# Patient Record
Sex: MALE | Race: WHITE | NOT HISPANIC OR LATINO | Employment: FULL TIME | URBAN - METROPOLITAN AREA
[De-identification: names, ages, dates, MRNs, and addresses within clinical notes are randomized per-mention and may not be internally consistent; named-entity substitution may affect disease eponyms.]

---

## 2017-08-04 ENCOUNTER — TRANSFERRED RECORDS (OUTPATIENT)
Dept: HEALTH INFORMATION MANAGEMENT | Facility: CLINIC | Age: 57
End: 2017-08-04

## 2017-08-11 ENCOUNTER — TRANSFERRED RECORDS (OUTPATIENT)
Dept: HEALTH INFORMATION MANAGEMENT | Facility: CLINIC | Age: 57
End: 2017-08-11

## 2017-11-30 ENCOUNTER — TRANSFERRED RECORDS (OUTPATIENT)
Dept: HEALTH INFORMATION MANAGEMENT | Facility: CLINIC | Age: 57
End: 2017-11-30

## 2017-12-29 ENCOUNTER — TRANSFERRED RECORDS (OUTPATIENT)
Dept: HEALTH INFORMATION MANAGEMENT | Facility: CLINIC | Age: 57
End: 2017-12-29

## 2018-01-19 ENCOUNTER — TRANSFERRED RECORDS (OUTPATIENT)
Dept: HEALTH INFORMATION MANAGEMENT | Facility: CLINIC | Age: 58
End: 2018-01-19

## 2018-01-25 ENCOUNTER — MEDICAL CORRESPONDENCE (OUTPATIENT)
Dept: HEALTH INFORMATION MANAGEMENT | Facility: CLINIC | Age: 58
End: 2018-01-25

## 2018-01-26 ENCOUNTER — TRANSFERRED RECORDS (OUTPATIENT)
Dept: HEALTH INFORMATION MANAGEMENT | Facility: CLINIC | Age: 58
End: 2018-01-26

## 2018-03-20 ENCOUNTER — PRE VISIT (OUTPATIENT)
Dept: CARDIOLOGY | Facility: CLINIC | Age: 58
End: 2018-03-20

## 2018-03-20 DIAGNOSIS — D86.85 SARCOID, CARDIAC: Primary | ICD-10-CM

## 2018-03-20 PROBLEM — R00.2 PALPITATIONS: Status: ACTIVE | Noted: 2017-11-30

## 2018-03-20 PROBLEM — D86.9 SARCOIDOSIS: Status: ACTIVE | Noted: 2018-03-20

## 2018-03-20 PROBLEM — R00.2 PALPITATIONS: Status: ACTIVE | Noted: 2018-03-20

## 2018-03-20 PROBLEM — I44.0 FIRST DEGREE AV BLOCK: Status: ACTIVE | Noted: 2018-03-20

## 2018-03-20 PROBLEM — I47.10 PAROXYSMAL SUPRAVENTRICULAR TACHYCARDIA (H): Status: ACTIVE | Noted: 2018-03-20

## 2018-03-20 PROBLEM — I47.10 PAROXYSMAL SUPRAVENTRICULAR TACHYCARDIA (H): Status: ACTIVE | Noted: 2017-11-30

## 2018-03-20 RX ORDER — METOPROLOL SUCCINATE 25 MG/1
50 TABLET, EXTENDED RELEASE ORAL DAILY
COMMUNITY
End: 2018-09-20

## 2018-03-20 RX ORDER — PREDNISOLONE ACETATE 10 MG/ML
1 SUSPENSION/ DROPS OPHTHALMIC
COMMUNITY
End: 2024-07-09

## 2018-03-20 RX ORDER — IBUPROFEN 200 MG
400 TABLET ORAL EVERY 4 HOURS PRN
COMMUNITY
End: 2019-09-26

## 2018-03-22 ENCOUNTER — OFFICE VISIT (OUTPATIENT)
Dept: CARDIOLOGY | Facility: CLINIC | Age: 58
End: 2018-03-22
Attending: INTERNAL MEDICINE
Payer: COMMERCIAL

## 2018-03-22 VITALS
SYSTOLIC BLOOD PRESSURE: 112 MMHG | HEIGHT: 73 IN | BODY MASS INDEX: 24.4 KG/M2 | WEIGHT: 184.1 LBS | DIASTOLIC BLOOD PRESSURE: 75 MMHG | HEART RATE: 71 BPM | OXYGEN SATURATION: 98 %

## 2018-03-22 DIAGNOSIS — D86.85 SARCOID, CARDIAC: ICD-10-CM

## 2018-03-22 LAB
ALBUMIN SERPL-MCNC: 3.9 G/DL (ref 3.4–5)
ALP SERPL-CCNC: 56 U/L (ref 40–150)
ALT SERPL W P-5'-P-CCNC: 25 U/L (ref 0–70)
ANION GAP SERPL CALCULATED.3IONS-SCNC: 7 MMOL/L (ref 3–14)
AST SERPL W P-5'-P-CCNC: 17 U/L (ref 0–45)
BILIRUB SERPL-MCNC: 0.8 MG/DL (ref 0.2–1.3)
BUN SERPL-MCNC: 19 MG/DL (ref 7–30)
CALCIUM SERPL-MCNC: 8.9 MG/DL (ref 8.5–10.1)
CHLORIDE SERPL-SCNC: 104 MMOL/L (ref 94–109)
CO2 SERPL-SCNC: 27 MMOL/L (ref 20–32)
CREAT SERPL-MCNC: 0.94 MG/DL (ref 0.66–1.25)
ERYTHROCYTE [DISTWIDTH] IN BLOOD BY AUTOMATED COUNT: 12.9 % (ref 10–15)
GFR SERPL CREATININE-BSD FRML MDRD: 83 ML/MIN/1.7M2
GLUCOSE SERPL-MCNC: 103 MG/DL (ref 70–99)
HCT VFR BLD AUTO: 42.8 % (ref 40–53)
HGB BLD-MCNC: 15.2 G/DL (ref 13.3–17.7)
MAGNESIUM SERPL-MCNC: 2.2 MG/DL (ref 1.6–2.3)
MCH RBC QN AUTO: 32.2 PG (ref 26.5–33)
MCHC RBC AUTO-ENTMCNC: 35.5 G/DL (ref 31.5–36.5)
MCV RBC AUTO: 91 FL (ref 78–100)
PLATELET # BLD AUTO: 213 10E9/L (ref 150–450)
POTASSIUM SERPL-SCNC: 3.8 MMOL/L (ref 3.4–5.3)
PROT SERPL-MCNC: 6.9 G/DL (ref 6.8–8.8)
RBC # BLD AUTO: 4.72 10E12/L (ref 4.4–5.9)
SODIUM SERPL-SCNC: 139 MMOL/L (ref 133–144)
TROPONIN I SERPL-MCNC: <0.015 UG/L (ref 0–0.04)
TSH SERPL DL<=0.005 MIU/L-ACNC: 1.01 MU/L (ref 0.4–4)
WBC # BLD AUTO: 4.7 10E9/L (ref 4–11)

## 2018-03-22 PROCEDURE — 80053 COMPREHEN METABOLIC PANEL: CPT | Performed by: INTERNAL MEDICINE

## 2018-03-22 PROCEDURE — 84484 ASSAY OF TROPONIN QUANT: CPT | Performed by: INTERNAL MEDICINE

## 2018-03-22 PROCEDURE — 83735 ASSAY OF MAGNESIUM: CPT | Performed by: INTERNAL MEDICINE

## 2018-03-22 PROCEDURE — 85027 COMPLETE CBC AUTOMATED: CPT | Performed by: INTERNAL MEDICINE

## 2018-03-22 PROCEDURE — 84443 ASSAY THYROID STIM HORMONE: CPT | Performed by: INTERNAL MEDICINE

## 2018-03-22 PROCEDURE — G0463 HOSPITAL OUTPT CLINIC VISIT: HCPCS | Mod: 25,ZF

## 2018-03-22 PROCEDURE — 93010 ELECTROCARDIOGRAM REPORT: CPT | Mod: ZP | Performed by: INTERNAL MEDICINE

## 2018-03-22 PROCEDURE — 36415 COLL VENOUS BLD VENIPUNCTURE: CPT | Performed by: INTERNAL MEDICINE

## 2018-03-22 PROCEDURE — 99205 OFFICE O/P NEW HI 60 MIN: CPT | Mod: ZP | Performed by: INTERNAL MEDICINE

## 2018-03-22 ASSESSMENT — PAIN SCALES - GENERAL: PAINLEVEL: NO PAIN (0)

## 2018-03-22 NOTE — MR AVS SNAPSHOT
After Visit Summary   3/22/2018    Jose Carney    MRN: [id number]           Patient Information     Date Of Birth          1960        Visit Information        Provider Department      3/22/2018 2:00 PM Mandi Dickey MD [facility]        Today's Diagnoses     Sarcoid, cardiac/EF 54% per MRI,[place] of affected myocardium is 12% of myocardial mass          Care Instructions    Schedule Cardiac PET scan  Follow up with Dr. Dickey to be determined      Yudelka Sloan, RN  Cardiology Care Coordinator  Please be aware that I work part-time but I will be checking messages several times per week.   For urgent needs, please call the number below.    [phone], press 1 for [place], press 3 to speak to a nurse    .          Follow-ups after your visit        Future tests that were ordered for you today     Open Future Orders        Priority Expected Expires Ordered    PET Myocardial Perfusion Sgl Rst or Strs Routine 3/22/2018 3/22/2019 3/22/2018    PET Cardiac Viability Routine 3/22/2018 3/22/2019 3/22/2018            Who to contact     If you have questions or need follow up information about today's clinic visit or your schedule please contact [facility] directly at [phone].  Normal or non-critical lab and imaging results will be communicated to you by MyChart, letter or phone within 4 business days after the clinic has received the results. If you do not hear from us within 7 days, please contact the clinic through MyChart or phone. If you have a critical or abnormal lab result, we will notify you by phone as soon as possible.  Submit refill requests through [employer] or call your pharmacy and they will forward the refill request to us. Please allow 3 business days for your refill to be completed.          Additional Information About Your Visit        [facility] Information     [employer] gives you secure access to your electronic health record. If you see a primary  "care provider, you can also send messages to your care team and make appointments. If you have questions, please call your primary care clinic.  If you do not have a primary care provider, please call [phone] and they will assist you.        Care EveryWhere ID     This is your Care EveryWhere ID. This could be used by other organizations to access your [place] medical records  [id number]        Your Vitals Were     Pulse Height Pulse Oximetry BMI (Body Mass Index)          71 1.854 m (6' 1\") 98% 24.29 kg/m2         Blood Pressure from Last 3 Encounters:   03/22/18 112/75    Weight from Last 3 Encounters:   03/22/18 83.5 kg (184 lb 1.6 oz)              We Performed the Following     EKG 12-lead, tracing only (Future)        Primary Care Provider Office Phone # Fax #    [facility] [phone] [address] [facility] [address]        Equal Access to Services     DILSHAD HERNANDEZ : Hadii aad ku hadasho Soomaali, waaxda luqadaha, qaybta kaalmada adeegyada, waxay jenniferin hayconnorn alec rolon . So [facility] [phone].    ATENCIÓN: Si ilda jaquez, tiene a pearson disposición servicios gratuitos de asistencia lingüística. Llame al [phone].    We comply with applicable federal civil rights laws and [place] laws. We do not discriminate on the basis of race, color, national origin, age, disability, sex, sexual orientation, or gender identity.            Thank you!     Thank you for choosing [facility]  for your care. Our goal is always to provide you with excellent care. Hearing back from our patients is one way we can continue to improve our services. Please take a few minutes to complete the written survey that you may receive in the mail after your visit with us. Thank you!             Your Updated Medication List - Protect others around you: Learn how to safely use, store and throw away your medicines at www.disposemymeds.org.          This list is accurate as of 3/22/18 "  2:43 PM.  Always use your most recent med list.                   Brand Name Dispense Instructions for use Diagnosis    ADVIL 200 MG tablet   Generic drug:  ibuprofen      Take 400 mg by mouth every 4 hours as needed for mild pain    Sarcoid, cardiac       ASPIRIN 81 PO      Take 81 mg by mouth daily    Sarcoid, cardiac       prednisoLONE acetate 1 % ophthalmic susp    PRED FORTE     Place 1 drop into both eyes every hour    Sarcoid, cardiac       TOPROL XL 25 MG 24 hr tablet   Generic drug:  metoprolol succinate      Take 50 mg by mouth daily    Sarcoid, cardiac

## 2018-03-22 NOTE — LETTER
3/22/2018      RE: Jose Carney  [address]  [place] [address]       Dear Colleague,    Thank you for the opportunity to participate in the care of your patient, Jose Carney, at the [facility] at [facility]. Please see a copy of my visit note below.      Dear Dr. De León,     I the pleasure of meeting Jose Carney in the [facility] cardiac sarcoidosis clinic on 3/22/2018.     As you know he is a very pleasant 57-year-old man with a history sarcoidosis which is diagnosed by transbronchial biopsy in 2013.  He had been having back pain prior to this and 30 pound weight loss and had substantial mediastinal lymphadenopathy.  He was placed on several months of steroids with significant weight gain and improvement in symptoms as well as back pain.  He has been off of all steroids since this time.     In late summer and fall 2017 he started to develop palpitations which were found to be supraventricular tachycardia (likely AVNRT as well as some atrial tachycardia)  based on his cardiac monitor. He then had a cardiac MRI which was consistent with myocardial involvement from sarcoidosis (see below).  He was then sent down here for further management and was placed on metoprolol.    He did have improvement in his palpitations with the addition of metoprolol although he did have some palpitations which lasted about half an hour this morning.  While he has had some dizziness he is never lost consciousness.  On his cardiac monitor he did not have heart block or ventricular tachycardia.       PAST MEDICAL HISTORY:  Past Medical History:   Diagnosis Date     First degree AV block 3/20/2018     Palpitations 3/20/2018     Paroxysmal supraventricular tachycardia (H) 3/20/2018     Sarcoid, cardiac/EF 54% per MRI,[place] of affected myocardium is 12% of myocardial mass 1/25/2018     Sarcoidosis/ systemic 2013 3/20/2018     FAMILY HISTORY:  His family history is notable  "for several family members with autoimmune disease.  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  He denies any tobacco or alcohol use or recreational drug use.       CURRENT MEDICATIONS:  Current Outpatient Prescriptions   Medication Sig Dispense Refill     metoprolol succinate (TOPROL XL) 25 MG 24 hr tablet Take 50 mg by mouth daily       prednisoLONE acetate (PRED FORTE) 1 % ophthalmic susp Place 1 drop into both eyes every hour       ibuprofen (ADVIL) 200 MG tablet Take 400 mg by mouth every 4 hours as needed for mild pain         ROS:   Constitutional: No fever, chills, or sweats. Weight has been stable   ENT: No visual disturbance, ear ache, epistaxis, sore throat.   Allergies/Immunologic: Negative.   Respiratory: No cough, hemoptysis.   Cardiovascular: As per HPI.   GI: No nausea, vomiting, hematemesis, melena, or hematochezia.   : No urinary frequency, dysuria, or hematuria.   Integument: Negative.   Psychiatric: Negative.   Neuro: Negative.   Endocrinology: Negative.   Musculoskeletal: Chronic back pain    EXAM:  /75 (BP Location: Left arm, Patient Position: Chair, Cuff Size: Adult Regular)  Pulse 71  Ht 1.854 m (6' 1\")  Wt 83.5 kg (184 lb 1.6 oz)  SpO2 98%  BMI 24.29 kg/m2  General: appears comfortable, alert and articulate  Head: normocephalic, atraumatic  Eyes: anicteric sclera, EOMI  Neck: no adenopathy  Orophyarynx: moist mucosa, no lesions, dentition intact  Heart: regular, S1/S2, no murmur, gallop, rub, estimated JVP < 10   Lungs: clear, no rales or wheezing  Abdomen: soft, non-tender, bowel sounds present, no hepatosplenomegaly  Extremities: no clubbing, cyanosis or edema  Neurological: normal speech and affect, no gross motor deficits    Labs:    Lab Results   Component Value Date    WBC 4.7 03/22/2018    HGB 15.2 03/22/2018    HCT 42.8 03/22/2018    PLT " 213 03/22/2018     03/22/2018    POTASSIUM 3.8 03/22/2018    CHLORIDE 104 03/22/2018    CO2 27 03/22/2018    BUN 19 03/22/2018    CR 0.94 03/22/2018     (H) 03/22/2018    TROPI <0.015 03/22/2018    AST 17 03/22/2018    ALT 25 03/22/2018    ALKPHOS 56 03/22/2018    BILITOTAL 0.8 03/22/2018         ECG: Sinus rhythm, first-degree AV block PVCs normal axis normal QT interval     Cardiac monitor from 8/20/2017 10/9/2017     SUMMARY: Several transmissions were available for review. More than half of these transmissions were automatic transmissions without symptoms. The transmissions that had symptoms reported as shortness of breath, heart racing, lightheadedness were reviewed and indicated sinus rhythm with first degree AV block, paroxysmal supraventricular tachycardia with rate of about 150 beats per minute, which, based on the rate, could not exclude underlying atrial flutter; however, no distinct flutter waves noted.     CONCLUSION:   1. Predominantly sinus rhythm.   2. Symptoms of shortness of breath, heart racing associated with sinus rhythm and paroxysmal supraventricular tachycardia. Heart rate as fast as 170 beats a minute, which, based on the rate, could not exclude atrial flutter.   3. Other times, patient was in supraventricular tachycardia, no symptoms reported and rate has been right in between 150 to 170 beats a minute.   4. Clinical correlation advised.     Assessment and Plan:   In summary this is a very pleasant 57-year-old man with a history of biopsy-proven cardiac sarcoid from a transbronchial biopsy-who now has palpitations, supraventricular tachycardia, and a cardiac MRI consistent with myocardial involvement from sarcoidosis.   At this point I would like to do a PET scan for further definition of the  degree of myocardial involvement and determine whether there are any other areas of sarcoid involvement.     He has had some fatigue and shortness of breath and he likely needs repeat  pulmonary function tests as well.     As a starting point - he has normal cardiac function and he presently has no clinical heart failure.     For his atrial arrhythmias, I am suspicious that his PET will be positive and therefore while he can remain on metoprolol - I would prefer that we treat him with steroids before considering ablation as his arrhythmias may improve substantially with treatment of the underlying sarcoid.     We spent 45 minutes today discussing the diagnosis of cardiac sarcoidosis in the pathway going forward.     If his PET is positive we will start 40 mill grams of prednisone with Bactrim prophylaxis for 3-4 months with a repeat clinic visit with me in a repeat PET at that time and echo. When he returns we will do pulmonary function tests, see pulmonary here and be given the opportunity to enter into our cardiac sarcoidosis registry.  If his PET is positive I would recommend after we achieve remission again that we place him on something chronic given this is his second flare with organ involvement.     I will also repeat his cardiac monitor prior to his next visit with me.     For sudden cardiac death prevention - he does not presently meet guideline criteria for ICD placement.     Please feel free to call me with any further questions and thank you for the opportunity to assist in his care.     Mandi Dickey MD   [facility]  No care team member to display  GIULIANO KOHLER,  [place], [place]

## 2018-03-22 NOTE — PROGRESS NOTES
Dear Dr. De León,     I the pleasure of meeting Jose Carney in the [facility] cardiac sarcoidosis clinic on 3/22/2018.     As you know he is a very pleasant 57-year-old man with a history sarcoidosis which is diagnosed by transbronchial biopsy in 2013.  He had been having back pain prior to this and 30 pound weight loss and had substantial mediastinal lymphadenopathy.  He was placed on several months of steroids with significant weight gain and improvement in symptoms as well as back pain.  He has been off of all steroids since this time.     In late summer and fall 2017 he started to develop palpitations which were found to be supraventricular tachycardia (likely AVNRT as well as some atrial tachycardia)  based on his cardiac monitor. He then had a cardiac MRI which was consistent with myocardial involvement from sarcoidosis (see below).  He was then sent down here for further management and was placed on metoprolol.    He did have improvement in his palpitations with the addition of metoprolol although he did have some palpitations which lasted about half an hour this morning.  While he has had some dizziness he is never lost consciousness.  On his cardiac monitor he did not have heart block or ventricular tachycardia.       PAST MEDICAL HISTORY:  Past Medical History:   Diagnosis Date     First degree AV block 3/20/2018     Palpitations 3/20/2018     Paroxysmal supraventricular tachycardia (H) 3/20/2018     Sarcoid, cardiac/EF 54% per MRI,[place] of affected myocardium is 12% of myocardial mass 1/25/2018     Sarcoidosis/ systemic 2013 3/20/2018     FAMILY HISTORY:  His family history is notable for several family members with autoimmune disease.  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  He denies any tobacco or alcohol use or recreational drug use.       CURRENT  "MEDICATIONS:  Current Outpatient Prescriptions   Medication Sig Dispense Refill     metoprolol succinate (TOPROL XL) 25 MG 24 hr tablet Take 50 mg by mouth daily       prednisoLONE acetate (PRED FORTE) 1 % ophthalmic susp Place 1 drop into both eyes every hour       ibuprofen (ADVIL) 200 MG tablet Take 400 mg by mouth every 4 hours as needed for mild pain         ROS:   Constitutional: No fever, chills, or sweats. Weight has been stable   ENT: No visual disturbance, ear ache, epistaxis, sore throat.   Allergies/Immunologic: Negative.   Respiratory: No cough, hemoptysis.   Cardiovascular: As per HPI.   GI: No nausea, vomiting, hematemesis, melena, or hematochezia.   : No urinary frequency, dysuria, or hematuria.   Integument: Negative.   Psychiatric: Negative.   Neuro: Negative.   Endocrinology: Negative.   Musculoskeletal: Chronic back pain    EXAM:  /75 (BP Location: Left arm, Patient Position: Chair, Cuff Size: Adult Regular)  Pulse 71  Ht 1.854 m (6' 1\")  Wt 83.5 kg (184 lb 1.6 oz)  SpO2 98%  BMI 24.29 kg/m2  General: appears comfortable, alert and articulate  Head: normocephalic, atraumatic  Eyes: anicteric sclera, EOMI  Neck: no adenopathy  Orophyarynx: moist mucosa, no lesions, dentition intact  Heart: regular, S1/S2, no murmur, gallop, rub, estimated JVP < 10   Lungs: clear, no rales or wheezing  Abdomen: soft, non-tender, bowel sounds present, no hepatosplenomegaly  Extremities: no clubbing, cyanosis or edema  Neurological: normal speech and affect, no gross motor deficits    Labs:    Lab Results   Component Value Date    WBC 4.7 03/22/2018    HGB 15.2 03/22/2018    HCT 42.8 03/22/2018     03/22/2018     03/22/2018    POTASSIUM 3.8 03/22/2018    CHLORIDE 104 03/22/2018    CO2 27 03/22/2018    BUN 19 03/22/2018    CR 0.94 03/22/2018     (H) 03/22/2018    TROPI <0.015 03/22/2018    AST 17 03/22/2018    ALT 25 03/22/2018    ALKPHOS 56 03/22/2018    BILITOTAL 0.8 03/22/2018 "         ECG: Sinus rhythm, first-degree AV block PVCs normal axis normal QT interval     Cardiac monitor from 8/20/2017 10/9/2017     SUMMARY: Several transmissions were available for review. More than half of these transmissions were automatic transmissions without symptoms. The transmissions that had symptoms reported as shortness of breath, heart racing, lightheadedness were reviewed and indicated sinus rhythm with first degree AV block, paroxysmal supraventricular tachycardia with rate of about 150 beats per minute, which, based on the rate, could not exclude underlying atrial flutter; however, no distinct flutter waves noted.     CONCLUSION:   1. Predominantly sinus rhythm.   2. Symptoms of shortness of breath, heart racing associated with sinus rhythm and paroxysmal supraventricular tachycardia. Heart rate as fast as 170 beats a minute, which, based on the rate, could not exclude atrial flutter.   3. Other times, patient was in supraventricular tachycardia, no symptoms reported and rate has been right in between 150 to 170 beats a minute.   4. Clinical correlation advised.     Assessment and Plan:   In summary this is a very pleasant 57-year-old man with a history of biopsy-proven cardiac sarcoid from a transbronchial biopsy-who now has palpitations, supraventricular tachycardia, and a cardiac MRI consistent with myocardial involvement from sarcoidosis.   At this point I would like to do a PET scan for further definition of the  degree of myocardial involvement and determine whether there are any other areas of sarcoid involvement.     He has had some fatigue and shortness of breath and he likely needs repeat pulmonary function tests as well.     As a starting point - he has normal cardiac function and he presently has no clinical heart failure.     For his atrial arrhythmias, I am suspicious that his PET will be positive and therefore while he can remain on metoprolol - I would prefer that we treat him with  steroids before considering ablation as his arrhythmias may improve substantially with treatment of the underlying sarcoid.     We spent 45 minutes today discussing the diagnosis of cardiac sarcoidosis in the pathway going forward.     If his PET is positive we will start 40 mill grams of prednisone with Bactrim prophylaxis for 3-4 months with a repeat clinic visit with me in a repeat PET at that time and echo. When he returns we will do pulmonary function tests, see pulmonary here and be given the opportunity to enter into our cardiac sarcoidosis registry.  If his PET is positive I would recommend after we achieve remission again that we place him on something chronic given this is his second flare with organ involvement.     I will also repeat his cardiac monitor prior to his next visit with me.     For sudden cardiac death prevention - he does not presently meet guideline criteria for ICD placement.       Please feel free to call me with any further questions and thank you for the opportunity to assist in his care.       Mandi Dickey MD   [facility]  No care team member to display  GIULIANO KOHLER,  [place], [place]

## 2018-03-22 NOTE — NURSING NOTE
Chief Complaint   Patient presents with     New Patient     Ref. Dr. Genet Shah  for eval. of sarcodosis, EKG, labs     Vitals were taken and medications were reconciled. EKG was performed.    Cynthia Frederick MA    1:45 PM

## 2018-03-22 NOTE — PATIENT INSTRUCTIONS
Schedule Cardiac PET scan  Follow up with Dr. Dickey to be determined      Yudelka Sloan, RN  Cardiology Care Coordinator  Please be aware that I work part-time but I will be checking messages several times per week.   For urgent needs, please call the number below.    [phone], press 1 for [place], press 3 to speak to a nurse    .

## 2018-03-23 LAB — INTERPRETATION ECG - MUSE: NORMAL

## 2018-05-18 ENCOUNTER — RADIANT APPOINTMENT (OUTPATIENT)
Dept: PET IMAGING | Facility: CLINIC | Age: 58
End: 2018-05-18
Attending: INTERNAL MEDICINE
Payer: COMMERCIAL

## 2018-05-18 DIAGNOSIS — D86.85 SARCOID, CARDIAC: ICD-10-CM

## 2018-05-18 DIAGNOSIS — I50.9 CHF (CONGESTIVE HEART FAILURE) (H): Primary | ICD-10-CM

## 2018-05-18 LAB — GLUCOSE SERPL-MCNC: 97 MG/DL (ref 70–99)

## 2018-05-31 ENCOUNTER — CARE COORDINATION (OUTPATIENT)
Dept: CARDIOLOGY | Facility: CLINIC | Age: 58
End: 2018-05-31

## 2018-05-31 NOTE — PROGRESS NOTES
Per Dr. Dickey, patient's cardiac PET showed active sarcoid.  Her recommendations are:    1.  Prednisone 40 mg daily for 3-4 mos.  2.  Bactrim SS daily while on steroids  3.  48 hr Holter after treatment with steroids - done locally before he returns (if SVT persists, may consider ablation)  4.  When he returns, PFT's, appt with Dr. Altamirano/Pulmonary, echo and EKG, appt w/Dr. Dickey    I have called patient and left message on his cell phone to call me to discuss PET results and therapy plan.

## 2018-06-01 ENCOUNTER — CARE COORDINATION (OUTPATIENT)
Dept: CARDIOLOGY | Facility: CLINIC | Age: 58
End: 2018-06-01

## 2018-06-01 DIAGNOSIS — Z79.2 NEED FOR PROPHYLACTIC ANTIBIOTIC: ICD-10-CM

## 2018-06-01 DIAGNOSIS — D84.821 IMMUNOCOMPROMISED DUE TO CORTICOSTEROIDS (H): ICD-10-CM

## 2018-06-01 DIAGNOSIS — Z79.52 IMMUNOCOMPROMISED DUE TO CORTICOSTEROIDS (H): ICD-10-CM

## 2018-06-01 DIAGNOSIS — T38.0X5A IMMUNOCOMPROMISED DUE TO CORTICOSTEROIDS (H): ICD-10-CM

## 2018-06-01 DIAGNOSIS — D86.85 CARDIAC SARCOIDOSIS: Primary | ICD-10-CM

## 2018-06-01 RX ORDER — SULFAMETHOXAZOLE AND TRIMETHOPRIM 400; 80 MG/1; MG/1
1 TABLET ORAL DAILY
Qty: 30 TABLET | Refills: 4 | Status: SHIPPED | OUTPATIENT
Start: 2018-06-01 | End: 2018-09-20

## 2018-06-01 RX ORDER — PREDNISONE 20 MG/1
40 TABLET ORAL DAILY
Qty: 60 TABLET | Refills: 4 | Status: SHIPPED | OUTPATIENT
Start: 2018-06-01 | End: 2018-09-20 | Stop reason: DRUGHIGH

## 2018-06-01 NOTE — PROGRESS NOTES
Received call back from patient's wife today. We discussed the results of the cardiac PET scan which was positive for sarcoid. We discussed Dr. Dickey's recommendation to start treatment with prednisone 40 mg daily and bactrim ss for prophylaxis. We discussed potential side effects from the prednisone and verified he is not allergic to sulfa. Prescriptions have been sent electronically to his local pharmacy.    He will return in 3-4 months for repeat PET, echo, EKG and appt with Dr. Dickey. At that time she would also like him to have PFT's and see Dr. Altamirano for pulmonary sarcoid. I will coordinate getting the follow up testing done. He lives about 4 hours from the [facility] and they would prefer to have all appts in one day, but they understand since there are several tests and appts we may not be able to get all done in one day. The preference is to have done over 2 days then instead of coming back on different weeks.

## 2018-07-27 DIAGNOSIS — D86.9 SARCOIDOSIS: ICD-10-CM

## 2018-07-27 DIAGNOSIS — R00.2 PALPITATIONS: ICD-10-CM

## 2018-07-27 DIAGNOSIS — I47.10 PAROXYSMAL SUPRAVENTRICULAR TACHYCARDIA (H): ICD-10-CM

## 2018-07-27 DIAGNOSIS — D86.85 SARCOID, CARDIAC: Primary | ICD-10-CM

## 2018-08-09 ENCOUNTER — CARE COORDINATION (OUTPATIENT)
Dept: CARDIOLOGY | Facility: CLINIC | Age: 58
End: 2018-08-09

## 2018-09-10 DIAGNOSIS — R00.2 PALPITATIONS: ICD-10-CM

## 2018-09-10 DIAGNOSIS — I47.10 PAROXYSMAL SUPRAVENTRICULAR TACHYCARDIA (H): Primary | ICD-10-CM

## 2018-09-10 DIAGNOSIS — I44.0 FIRST DEGREE AV BLOCK: ICD-10-CM

## 2018-09-10 DIAGNOSIS — D86.0 PULMONARY SARCOIDOSIS (H): ICD-10-CM

## 2018-09-12 ENCOUNTER — TRANSFERRED RECORDS (OUTPATIENT)
Dept: HEALTH INFORMATION MANAGEMENT | Facility: CLINIC | Age: 58
End: 2018-09-12

## 2018-09-13 ENCOUNTER — TRANSFERRED RECORDS (OUTPATIENT)
Dept: HEALTH INFORMATION MANAGEMENT | Facility: CLINIC | Age: 58
End: 2018-09-13

## 2018-09-18 ENCOUNTER — PATIENT OUTREACH (OUTPATIENT)
Dept: CARE COORDINATION | Facility: CLINIC | Age: 58
End: 2018-09-18

## 2018-09-19 DIAGNOSIS — D86.85 SARCOID, CARDIAC: Primary | ICD-10-CM

## 2018-09-20 ENCOUNTER — OFFICE VISIT (OUTPATIENT)
Dept: CARDIOLOGY | Facility: CLINIC | Age: 58
End: 2018-09-20
Attending: INTERNAL MEDICINE
Payer: COMMERCIAL

## 2018-09-20 ENCOUNTER — RADIANT APPOINTMENT (OUTPATIENT)
Dept: PET IMAGING | Facility: CLINIC | Age: 58
End: 2018-09-20
Attending: INTERNAL MEDICINE
Payer: COMMERCIAL

## 2018-09-20 ENCOUNTER — RADIANT APPOINTMENT (OUTPATIENT)
Dept: CARDIOLOGY | Facility: CLINIC | Age: 58
End: 2018-09-20
Payer: COMMERCIAL

## 2018-09-20 VITALS
DIASTOLIC BLOOD PRESSURE: 67 MMHG | WEIGHT: 188.2 LBS | BODY MASS INDEX: 24.15 KG/M2 | OXYGEN SATURATION: 98 % | HEIGHT: 74 IN | SYSTOLIC BLOOD PRESSURE: 101 MMHG | HEART RATE: 73 BPM

## 2018-09-20 DIAGNOSIS — D86.85 SARCOID, CARDIAC: ICD-10-CM

## 2018-09-20 DIAGNOSIS — R00.2 PALPITATIONS: ICD-10-CM

## 2018-09-20 DIAGNOSIS — I50.9 CHF (CONGESTIVE HEART FAILURE) (H): Primary | ICD-10-CM

## 2018-09-20 DIAGNOSIS — D86.9 SARCOIDOSIS: Primary | ICD-10-CM

## 2018-09-20 DIAGNOSIS — I47.10 PAROXYSMAL SUPRAVENTRICULAR TACHYCARDIA (H): ICD-10-CM

## 2018-09-20 LAB
ANION GAP SERPL CALCULATED.3IONS-SCNC: 8 MMOL/L (ref 3–14)
BUN SERPL-MCNC: 26 MG/DL (ref 7–30)
CALCIUM SERPL-MCNC: 9.4 MG/DL (ref 8.5–10.1)
CHLORIDE SERPL-SCNC: 105 MMOL/L (ref 94–109)
CO2 SERPL-SCNC: 25 MMOL/L (ref 20–32)
CREAT SERPL-MCNC: 0.94 MG/DL (ref 0.66–1.25)
GFR SERPL CREATININE-BSD FRML MDRD: 82 ML/MIN/1.7M2
GLUCOSE SERPL-MCNC: 100 MG/DL (ref 70–99)
GLUCOSE SERPL-MCNC: 145 MG/DL (ref 70–99)
NT-PROBNP SERPL-MCNC: 277 PG/ML (ref 0–125)
POTASSIUM SERPL-SCNC: 4.8 MMOL/L (ref 3.4–5.3)
SODIUM SERPL-SCNC: 138 MMOL/L (ref 133–144)
TROPONIN I SERPL-MCNC: <0.015 UG/L (ref 0–0.04)

## 2018-09-20 PROCEDURE — 99215 OFFICE O/P EST HI 40 MIN: CPT | Mod: ZP | Performed by: INTERNAL MEDICINE

## 2018-09-20 PROCEDURE — 84484 ASSAY OF TROPONIN QUANT: CPT | Performed by: INTERNAL MEDICINE

## 2018-09-20 PROCEDURE — 80048 BASIC METABOLIC PNL TOTAL CA: CPT | Performed by: INTERNAL MEDICINE

## 2018-09-20 PROCEDURE — G0463 HOSPITAL OUTPT CLINIC VISIT: HCPCS | Mod: ZF

## 2018-09-20 PROCEDURE — 83880 ASSAY OF NATRIURETIC PEPTIDE: CPT | Performed by: INTERNAL MEDICINE

## 2018-09-20 PROCEDURE — 36415 COLL VENOUS BLD VENIPUNCTURE: CPT | Performed by: INTERNAL MEDICINE

## 2018-09-20 RX ORDER — METOPROLOL SUCCINATE 25 MG/1
75 TABLET, EXTENDED RELEASE ORAL DAILY
Qty: 270 TABLET | Refills: 3 | Status: SHIPPED | OUTPATIENT
Start: 2018-09-20 | End: 2019-09-10

## 2018-09-20 RX ORDER — PREDNISONE 10 MG/1
TABLET ORAL
Qty: 90 TABLET | Refills: 1 | Status: SHIPPED | OUTPATIENT
Start: 2018-09-20 | End: 2019-03-19 | Stop reason: DRUGHIGH

## 2018-09-20 RX ORDER — SILDENAFIL CITRATE 20 MG/1
20-60 TABLET ORAL PRN
COMMUNITY
Start: 2018-07-31

## 2018-09-20 RX ORDER — TOLTERODINE TARTRATE 1 MG/1
1 TABLET, EXTENDED RELEASE ORAL
Status: ON HOLD | COMMUNITY
Start: 2018-07-31 | End: 2022-03-29

## 2018-09-20 ASSESSMENT — PAIN SCALES - GENERAL: PAINLEVEL: NO PAIN (0)

## 2018-09-20 NOTE — NURSING NOTE
Chief Complaint   Patient presents with     Follow Up For      cardiac sarcoidosis     Vitals were taken and medications were reconciled.     KEILA Foley  2:27 PM

## 2018-09-20 NOTE — PATIENT INSTRUCTIONS
Please increase your Toprol to 75 MG once daily.    Here are some directions for slowly decreasing your Prednisone and slowly increasing you Methotrexate.    Take Methotrexate 7.5 MG once weekly with Folic Acid 5 MG once weekly.  Take Prednisone 30 MG daily.  Do this for 2 weeks.   Come in for a lab test, a CBC and a Hepatic panel.    Then, increase Methotrexate to 10 MG once weekly with Folic Acid 5 MG once weekly.  Decrease Prednisone to 20 MG daily.  Stop taking Bactrim.  Do this for 2 weeks.  Come in for a lab test, a CBC and a Hepatic panel.    Then, increase Methotrexate to 12.5 MG once weekly.  Decrease Prednisone to 10 MG daily.  Do this for 1 week.  Come in for a lab test, a CBC and a Hepatic panel.    Keep Methotrexate at 12.5 MG once weekly with Folic Acid 5 MG once weekly  Decrease Prednisone to 5 MG once daily.  Stay with these doses until you see Dr. Dickey again.    Cardiology Providers you saw during your visit:  Dr. Dickey    Medication changes: See medication message above.      Follow up: In 4 months with lab testing and echocardiogram.      Please return to clinic for follow-up:  With Dr. Dickey in 4 months.      Please call if you have :  1. Weight gain of more than 2 pounds in a day or 5 pounds in a week  2. Increased shortness of breath, swelling or bloating  3. Dizziness, lightheadedness   4. Any questions or concerns.       Follow the American Heart Association Diet and Lifestyle recommendations:  Limit saturated fat, trans fat, sodium, red meat, sweets and sugar-sweetened beverages. If you choose to eat red meat, compare labels and select the leanest cuts available.  Aim for at least 150 minutes of moderate physical activity or 75 minutes of vigorous physical activity - or an equal combination of both - each week.      During business hours: [phone], press option # 1 to be routed to the [employer] then option # 3 for medical questions to speak with a nurse     After hours,  weekends or holidays: On Call Cardiologist- [phone]   option #4 and ask to speak to the on-call Cardiologist. Inform them you are a CORE/heart failure patient at the [facility].      Joe Torres, RN  Cardiology Nurse Care Coordinator    Keep up the good work!    Take Care!    Please let me know if you have any questions.    Thanks!

## 2018-09-20 NOTE — PROGRESS NOTES
September 20, 2018    Dear Dr. De León,     I the pleasure of seeing Jose Carney in the [facility] cardiac sarcoidosis clinic today.     As you know he is a very pleasant 57-year-old man with a history sarcoidosis which is diagnosed by transbronchial biopsy in 2013.  He had been having back pain prior to this and 30 pound weight loss and had substantial mediastinal lymphadenopathy.  He was placed on several months of steroids with significant weight gain and improvement in symptoms as well as back pain.  He has been off of all steroids since this time.     In late summer and fall 2017 he started to develop palpitations which were found to be supraventricular tachycardia (likely AVNRT as well as some atrial tachycardia)  based on his cardiac monitor. He then had a cardiac MRI which was consistent with myocardial involvement from sarcoidosis (see below).  He was then sent down here for further management and was placed on metoprolol.    He has now been on several months of prednisone after PET scan showed active disease.  He tolerated the prednisone relatively well and has now had a repeat PET scan which is negative for any cardiac inflammation.  He has been feeling tired overall which he attributes to insomnia rather than shortness of breath.  He can walk a mile on flat ground.  He denies PND orthopnea or lower extremity edema.  He denies any chest heaviness or pressure.  He does have frequent palpitations, some of which are sustained episodes.  He has occasional lightheadedness in the setting of palpitations but no overt syncope.       PAST MEDICAL HISTORY:  Past Medical History:   Diagnosis Date     First degree AV block 3/20/2018     Palpitations 3/20/2018     Paroxysmal supraventricular tachycardia (H) 3/20/2018     Sarcoid, cardiac/EF 54% per MRI,[place] of affected myocardium is 12% of myocardial mass 1/25/2018     Sarcoidosis/ systemic 2013 3/20/2018     FAMILY HISTORY:  His family history is  "notable for several family members with autoimmune disease.  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  He denies any tobacco or alcohol use or recreational drug use.       CURRENT MEDICATIONS:  Current Outpatient Prescriptions   Medication Sig Dispense Refill     ASPIRIN 81 PO Take 81 mg by mouth daily       ibuprofen (ADVIL) 200 MG tablet Take 400 mg by mouth every 4 hours as needed for mild pain       metoprolol succinate (TOPROL XL) 25 MG 24 hr tablet Take 50 mg by mouth daily       prednisoLONE acetate (PRED FORTE) 1 % ophthalmic susp Place 1 drop into both eyes every hour       predniSONE (DELTASONE) 20 MG tablet Take 2 tablets (40 mg) by mouth daily 60 tablet 4     sulfamethoxazole-trimethoprim (BACTRIM/SEPTRA) 400-80 MG per tablet Take 1 tablet by mouth daily 30 tablet 4     tolterodine (DETROL) 1 MG tablet Take 1 mg by mouth       sildenafil (REVATIO) 20 MG tablet Take 20-60 mg by mouth as needed         ROS:   Constitutional: No fever, chills, or sweats. Weight has been stable + fatigue   ENT: No visual disturbance, ear ache, epistaxis, sore throat.   Allergies/Immunologic: Negative.   Respiratory: No cough, hemoptysis.   Cardiovascular: As per HPI.   GI: No nausea, vomiting, hematemesis, melena, or hematochezia.   : No urinary frequency, dysuria, or hematuria.   Integument: Negative.   Psychiatric: insomnia   Neuro: Negative.   Endocrinology: Negative.   Musculoskeletal: Chronic back pain    EXAM:  /67 (BP Location: Left arm, Cuff Size: Adult Regular)  Pulse 73  Ht 1.867 m (6' 1.5\")  Wt 85.4 kg (188 lb 3.2 oz)  SpO2 98%  BMI 24.49 kg/m2  General: appears comfortable, alert and articulate  Head: normocephalic, atraumatic  Eyes: anicteric sclera, EOMI  Neck: no adenopathy  Orophyarynx: moist mucosa, no lesions, dentition intact  Heart: regular, S1/S2, II/IV " systolic murmur at the apex, no gallop, rub, estimated JVP < 10   Lungs: clear, no rales or wheezing  Abdomen: soft, non-tender, bowel sounds present, no hepatosplenomegaly  Extremities: no clubbing, cyanosis or edema  Neurological: normal speech and affect, no gross motor deficits    Labs:    Lab Results   Component Value Date    WBC 4.7 03/22/2018    HGB 15.2 03/22/2018    HCT 42.8 03/22/2018     03/22/2018     09/20/2018    POTASSIUM 4.8 09/20/2018    CHLORIDE 105 09/20/2018    CO2 25 09/20/2018    BUN 26 09/20/2018    CR 0.94 09/20/2018     (H) 09/20/2018    NTBNP 277 (H) 09/20/2018    TROPI <0.015 09/20/2018    AST 17 03/22/2018    ALT 25 03/22/2018    ALKPHOS 56 03/22/2018    BILITOTAL 0.8 03/22/2018         ECG: Sinus rhythm, first-degree AV block PVCs normal axis normal QT interval     Cardiac monitor from 8/20/2017 10/9/2017     Repeat PET:   8/2018    Impression: In this patient with cardiac sarcoidosis under treatment  with steroids for the last 4 months;  1. No FDG uptake within the myocardium. Overall there is complete  resolution of previous cardiac sarcoidosis.  2. Excellent suppression of myocardial glucose metabolism.  3. Stable number and size of parenchymal lung nodules, although  previously seen high metabolic activity within a few of these lesions  is no longer present.  4. Stable size number and metabolic activity of lymphadenopathy in the  mediastinum, bilateral axilla and right supraclavicular region. 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  5. Stable left stone.     Last cardiac monitor     SUMMARY: Several transmissions were available for review. More than half of these transmissions were automatic transmissions without symptoms. The transmissions that had symptoms reported as shortness of breath, heart racing, lightheadedness were reviewed and indicated sinus rhythm with first degree AV block, paroxysmal supraventricular tachycardia with  rate of about 150 beats per minute, which, based on the rate, could not exclude underlying atrial flutter; however, no distinct flutter waves noted.     CONCLUSION:   1. Predominantly sinus rhythm.   2. Symptoms of shortness of breath, heart racing associated with sinus rhythm and paroxysmal supraventricular tachycardia. Heart rate as fast as 170 beats a minute, which, based on the rate, could not exclude atrial flutter.   3. Other times, patient was in supraventricular tachycardia, no symptoms reported and rate has been right in between 150 to 170 beats a minute.   4. Clinical correlation advised.     Repeat cardiac monitor: 9/13/2018  Repeat cardiac monitor shows significant PVC burden mostly in a bigeminy pattern the test was a 48 hour Holter 2436 ventricular runs noted 35  % in percent of all beats were ventricular ectopy longest run was 16 beats although this appeared to be slow.   echocardiogam today   Interpretation Summary  GLS strain not performed due to arrhythmia  Borderline (EF 50-55%) reduced left ventricular function is present.  No diagnostic wall motion abnormality, but analysis limited by irregular  rhythm with frequent ectopy.  Moderate left atrial enlargement is present.  Mild-moderate mitral valve regurgitation.  Previous study not available for comparison.      Assessment and Plan:   In summary this is a very pleasant 57-year-old man with a history of biopsy-proven cardiac sarcoid from a transbronchial biopsy-who now has palpitations, supraventricular tachycardia, and a cardiac MRI consistent with myocardial involvement from sarcoidosis. His PET was positive for myocardial involvement which now after several months of steroids-  this has resolved completely.  While I was hopeful that this would decrease his arrhythmia burden, he has presently 35% of all beats his PVCs on his last 48 hour Holter.  He also has some slower sustained runs of ventricular tachycardia (although we would likely classify  this as an accelerated idioventricular rhythm).  He has had dizzy spells but no overt syncope.     We will look at this MRI as a team to determine whether or not he has high risk features- this may guide our decision whether he needs a primary prevention ICD.  If he does have a defibrillator placed I would recommend a dual-chamber in case he requires atrial pacing at a later date.  For now I will increase his metoprolol to 75 mg a day to try to have further PVC suppression.  I cannot blame his degree of ectopy on active sarcoid presently and this must be due to old scart.  I do not have the 48 hour Holter print out to  know whether this is one focus or several different foci contributing to this PVC burden as this will make a difference on whether we try medical therapy versus catheter ablation.     We will plan to present him at her sarcoidosis meeting next week for a team decision around whether or not he requires a primary prevention ICD and whether or not we recommend an ablation.  These could be done closer to home with his local electrophysiologist or here at the [facility] if that was preferred.     With regard to his cardiac sarcoid immunosuppression -   I am changing him to methotrexate (see titration schedule in instructions to patient) with a slow taper of his prednisone and I will see him again in 4 months with another echocardiogram.     Mild to moderate MR: will watch this for now, LV is not dilated     When I see him back I would recommend that he see ophthalmology, as well as have pulmonary function tests to establish a baseline.       Please feel free to call me with any further questions and thank you for the opportunity to assist in his care.       Mandi Dickey MD   [facility] at [facility]  Patient Care Team:  [facility], [facility] as PCP - GIULIANO Juarez,  [place], [place]

## 2018-09-20 NOTE — LETTER
9/20/2018      RE: Jose Carney  [address]  formerly [facility] [address]       Dear Colleague,    Thank you for the opportunity to participate in the care of your patient, Jose Carney, at the [facility]. Please see a copy of my visit note below.    September 20, 2018    Dear Dr. De León,     I the pleasure of seeing Jose Carney in the [facility] cardiac sarcoidosis clinic today.     As you know he is a very pleasant 57-year-old man with a history sarcoidosis which is diagnosed by transbronchial biopsy in 2013.  He had been having back pain prior to this and 30 pound weight loss and had substantial mediastinal lymphadenopathy.  He was placed on several months of steroids with significant weight gain and improvement in symptoms as well as back pain.  He has been off of all steroids since this time.     In late summer and fall 2017 he started to develop palpitations which were found to be supraventricular tachycardia (likely AVNRT as well as some atrial tachycardia)  based on his cardiac monitor. He then had a cardiac MRI which was consistent with myocardial involvement from sarcoidosis (see below).  He was then sent down here for further management and was placed on metoprolol.    He has now been on several months of prednisone after PET scan showed active disease.  He tolerated the prednisone relatively well and has now had a repeat PET scan which is negative for any cardiac inflammation.  He has been feeling tired overall which he attributes to insomnia rather than shortness of breath.  He can walk a mile on flat ground.  He denies PND orthopnea or lower extremity edema.  He denies any chest heaviness or pressure.  He does have frequent palpitations, some of which are sustained episodes.  He has occasional lightheadedness in the setting of palpitations but no overt syncope.       PAST MEDICAL HISTORY:  Past Medical History:   Diagnosis Date      First degree AV block 3/20/2018     Palpitations 3/20/2018     Paroxysmal supraventricular tachycardia (H) 3/20/2018     Sarcoid, cardiac/EF 54% per MRI,[place] of affected myocardium is 12% of myocardial mass 1/25/2018     Sarcoidosis/ systemic 2013 3/20/2018     FAMILY HISTORY:  His family history is notable for several family members with autoimmune disease.  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  He denies any tobacco or alcohol use or recreational drug use.       CURRENT MEDICATIONS:  Current Outpatient Prescriptions   Medication Sig Dispense Refill     ASPIRIN 81 PO Take 81 mg by mouth daily       ibuprofen (ADVIL) 200 MG tablet Take 400 mg by mouth every 4 hours as needed for mild pain       metoprolol succinate (TOPROL XL) 25 MG 24 hr tablet Take 50 mg by mouth daily       prednisoLONE acetate (PRED FORTE) 1 % ophthalmic susp Place 1 drop into both eyes every hour       predniSONE (DELTASONE) 20 MG tablet Take 2 tablets (40 mg) by mouth daily 60 tablet 4     sulfamethoxazole-trimethoprim (BACTRIM/SEPTRA) 400-80 MG per tablet Take 1 tablet by mouth daily 30 tablet 4     tolterodine (DETROL) 1 MG tablet Take 1 mg by mouth       sildenafil (REVATIO) 20 MG tablet Take 20-60 mg by mouth as needed         ROS:   Constitutional: No fever, chills, or sweats. Weight has been stable + fatigue   ENT: No visual disturbance, ear ache, epistaxis, sore throat.   Allergies/Immunologic: Negative.   Respiratory: No cough, hemoptysis.   Cardiovascular: As per HPI.   GI: No nausea, vomiting, hematemesis, melena, or hematochezia.   : No urinary frequency, dysuria, or hematuria.   Integument: Negative.   Psychiatric: insomnia   Neuro: Negative.   Endocrinology: Negative.   Musculoskeletal: Chronic back pain    EXAM:  /67 (BP Location: Left arm, Cuff Size: Adult Regular)  Pulse 73   "Ht 1.867 m (6' 1.5\")  Wt 85.4 kg (188 lb 3.2 oz)  SpO2 98%  BMI 24.49 kg/m2  General: appears comfortable, alert and articulate  Head: normocephalic, atraumatic  Eyes: anicteric sclera, EOMI  Neck: no adenopathy  Orophyarynx: moist mucosa, no lesions, dentition intact  Heart: regular, S1/S2, II/IV systolic murmur at the apex, no gallop, rub, estimated JVP < 10   Lungs: clear, no rales or wheezing  Abdomen: soft, non-tender, bowel sounds present, no hepatosplenomegaly  Extremities: no clubbing, cyanosis or edema  Neurological: normal speech and affect, no gross motor deficits    Labs:    Lab Results   Component Value Date    WBC 4.7 03/22/2018    HGB 15.2 03/22/2018    HCT 42.8 03/22/2018     03/22/2018     09/20/2018    POTASSIUM 4.8 09/20/2018    CHLORIDE 105 09/20/2018    CO2 25 09/20/2018    BUN 26 09/20/2018    CR 0.94 09/20/2018     (H) 09/20/2018    NTBNP 277 (H) 09/20/2018    TROPI <0.015 09/20/2018    AST 17 03/22/2018    ALT 25 03/22/2018    ALKPHOS 56 03/22/2018    BILITOTAL 0.8 03/22/2018         ECG: Sinus rhythm, first-degree AV block PVCs normal axis normal QT interval     Cardiac monitor from 8/20/2017 10/9/2017     Repeat PET:   8/2018    Impression: In this patient with cardiac sarcoidosis under treatment  with steroids for the last 4 months;  1. No FDG uptake within the myocardium. Overall there is complete  resolution of previous cardiac sarcoidosis.  2. Excellent suppression of myocardial glucose metabolism.  3. Stable number and size of parenchymal lung nodules, although  previously seen high metabolic activity within a few of these lesions  is no longer present.  4. Stable size number and metabolic activity of lymphadenopathy in the  mediastinum, bilateral axilla and right supraclavicular region. 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  5. Stable left stone.     Last cardiac monitor     SUMMARY: Several transmissions were available for " review. More than half of these transmissions were automatic transmissions without symptoms. The transmissions that had symptoms reported as shortness of breath, heart racing, lightheadedness were reviewed and indicated sinus rhythm with first degree AV block, paroxysmal supraventricular tachycardia with rate of about 150 beats per minute, which, based on the rate, could not exclude underlying atrial flutter; however, no distinct flutter waves noted.     CONCLUSION:   1. Predominantly sinus rhythm.   2. Symptoms of shortness of breath, heart racing associated with sinus rhythm and paroxysmal supraventricular tachycardia. Heart rate as fast as 170 beats a minute, which, based on the rate, could not exclude atrial flutter.   3. Other times, patient was in supraventricular tachycardia, no symptoms reported and rate has been right in between 150 to 170 beats a minute.   4. Clinical correlation advised.     Repeat cardiac monitor: 9/13/2018  Repeat cardiac monitor shows significant PVC burden mostly in a bigeminy pattern the test was a 48 hour Holter 2436 ventricular runs noted 35  % in percent of all beats were ventricular ectopy longest run was 16 beats although this appeared to be slow.   echocardiogam today   Interpretation Summary  GLS strain not performed due to arrhythmia  Borderline (EF 50-55%) reduced left ventricular function is present.  No diagnostic wall motion abnormality, but analysis limited by irregular  rhythm with frequent ectopy.  Moderate left atrial enlargement is present.  Mild-moderate mitral valve regurgitation.  Previous study not available for comparison.      Assessment and Plan:   In summary this is a very pleasant 57-year-old man with a history of biopsy-proven cardiac sarcoid from a transbronchial biopsy-who now has palpitations, supraventricular tachycardia, and a cardiac MRI consistent with myocardial involvement from sarcoidosis. His PET was positive for myocardial involvement which now  after several months of steroids-  this has resolved completely.  While I was hopeful that this would decrease his arrhythmia burden, he has presently 35% of all beats his PVCs on his last 48 hour Holter.  He also has some slower sustained runs of ventricular tachycardia (although we would likely classify this as an accelerated idioventricular rhythm).  He has had dizzy spells but no overt syncope.     We will look at this MRI as a team to determine whether or not he has high risk features- this may guide our decision whether he needs a primary prevention ICD.  If he does have a defibrillator placed I would recommend a dual-chamber in case he requires atrial pacing at a later date.  For now I will increase his metoprolol to 75 mg a day to try to have further PVC suppression.  I cannot blame his degree of ectopy on active sarcoid presently and this must be due to old scart.  I do not have the 48 hour Holter print out to  know whether this is one focus or several different foci contributing to this PVC burden as this will make a difference on whether we try medical therapy versus catheter ablation.     We will plan to present him at her sarcoidosis meeting next week for a team decision around whether or not he requires a primary prevention ICD and whether or not we recommend an ablation.  These could be done closer to home with his local electrophysiologist [facility].     With regard to his cardiac sarcoid immunosuppression -   I am changing him to methotrexate (see titration schedule in instructions to patient) with a slow taper of his prednisone and I will see him again in 4 months with another echocardiogram.     Mild to moderate MR: will watch this for now, LV is not dilated     When I see him back I would recommend that he see ophthalmology, as well as have pulmonary function tests to establish a baseline.       Please feel free to call me with any further questions and thank  you for the opportunity to assist in his care.       Mandi Dickey MD   [facility] at [facility]  Patient Care Team:  U[facility], Chino Andrews as PCP - General  GIULIANO KOHLER [place], [place]

## 2018-09-20 NOTE — MR AVS SNAPSHOT
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    Here are some directions for slowly decreasing your Prednisone and slowly increasing you Methotrexate.    Take Methotrexate 7.5 MG once weekly with Folic Acid 5 MG once weekly.  Take Prednisone 30 MG daily.  Do this for 2 weeks.   Come in for a lab test, a CBC and a Hepatic panel.    Then, increase Methotrexate to 10 MG once weekly with Folic Acid 5 MG once weekly.  Decrease Prednisone to 20 MG daily.  Stop taking Bactrim.  Do this for 2 weeks.  Come in for a lab test, a CBC and a Hepatic panel.    Then, increase Methotrexate to 12.5 MG once weekly.  Decrease Prednisone to 10 MG daily.  Do this for 1 week.  Come in for a lab test, a CBC and a Hepatic panel.    Keep Methotrexate at 12.5 MG once weekly with Folic Acid 5 MG once weekly  Decrease Prednisone to 5 MG once daily.  Stay with these doses until you see Dr. Dickey again.    Cardiology Providers you saw during your visit:  Dr. Dickey    Medication changes: See medication message above.      Follow up: In 4 months with lab testing and echocardiogram.      Please return to clinic for follow-up:  With Dr. Dickey in 4 months.      Please call if you have :  1. Weight gain of more than 2 pounds in a day or 5 pounds in a week  2. Increased shortness of breath, swelling or bloating  3. Dizziness, lightheadedness   4. Any questions or concerns.       Follow the American Heart Association Diet and Lifestyle recommendations:  Limit  saturated fat, trans fat, sodium, red meat, sweets and sugar-sweetened beverages. If you choose to eat red meat, compare labels and select the leanest cuts available.  Aim for at least 150 minutes of moderate physical activity or 75 minutes of vigorous physical activity - or an equal combination of both - each week.      During business hours: [phone], press option # 1 to be routed to the [facility] then option # 3 for medical questions to speak with a nurse     After hours, weekends or holidays: On Call Cardiologist- [phone]   option #4 and ask to speak to the on-call Cardiologist. Inform them you are a CORE/heart failure patient at the [facility].      Joe Torres, RN  Cardiology Nurse Care Coordinator    Keep up the good work!    Take Care!    Please let me know if you have any questions.    Thanks!          Follow-ups after your visit        Additional Services     Follow-Up with Cardiologist       Please schedule an echocardiogram, labs, and a clinic visit same day in 4 months.                  Your next 10 appointments already scheduled     Jan 24, 2019  3:45 PM CST   Lab with  LAB   [facility] ([facility])    [address] [place] [id number] [phone]            Jan 24, 2019  4:00 PM CST   Ech Complete with [id number]   [facility] Echo ([facility])    [address] [place] [id number] [phone]           1.  Please bring or wear a comfortable two-piece outfit. 2.  You may eat, drink and take your normal medicines. 3.  For any questions that cannot be answered, please contact the ordering physician 4.  Please do not wear perfumes or scented lotions on the day of your exam.            Jan 24, 2019  5:00 PM CST   (Arrive by 4:45 PM)   RETURN HEART FAILURE with Mandi Dickey MD   [facility] ([facility])    [address] [place] [address]  [address] [place] [address]  [id number] [phone]              Future tests that were ordered for you today     Open Standing Orders        Priority Remaining Interval Expires Ordered    CBC with platelets Routine 99/99  9/20/2019 9/20/2018    Hepatic panel Routine 99/99  9/20/2019 9/20/2018          Open Future Orders        Priority Expected Expires Ordered    Follow-Up with Cardiologist Routine 1/18/2019 4/18/2019 9/20/2018    Echo Complete Routine 1/18/2019 4/18/2019 9/20/2018    CBC with platelets Routine 1/18/2019 4/18/2019 9/20/2018    Hepatic panel Routine 1/18/2019 4/18/2019 9/20/2018    N terminal pro BNP outpatient Routine 1/18/2019 4/18/2019 9/20/2018    Basic metabolic panel Routine 1/18/2019 4/18/2019 9/20/2018            Who to contact     If you have questions or need follow up information about today's clinic visit or your schedule please contact [facility] directly at [phone].  Normal or non-critical lab and imaging results will be communicated to you by [employer], letter or phone within 4 business days after the clinic has received the results. If you do not hear from us within 7 days, please contact the clinic through [employer] or phone. If you have a critical or abnormal lab result, we will notify you by phone as soon as possible.  Submit refill requests through [employer] or call your pharmacy and they will forward the refill request to us. Please allow 3 business days for your refill to be completed.          Additional Information About Your Visit        [facility] [employer] Information     [facility] lets you send messages to your doctor, view your test results, renew your prescriptions, schedule appointments and more. To sign up, go to [email or web address] . Click on \"Log in\" on the left side of the screen, which will take you to the Welcome page. Then click on \"Sign up Now\" on the right side of the page.     You will be asked to enter the access code listed below, as well as some personal information. Please follow " "the directions to create your username and password.     Your access code is: [email or web address]  Expires: 2018  3:38 PM     Your access code will  in 90 days. If you need help or a new code, please call your [facility] clinic or [phone].        Care EveryWhere ID     This is your Care EveryWhere ID. This could be used by other organizations to access your [facility] medical records  [id number]        Your Vitals Were     Pulse Height Pulse Oximetry BMI (Body Mass Index)          73 1.867 m (6' 1.5\") 98% 24.49 kg/m2         Blood Pressure from Last 3 Encounters:   18 101/67   18 112/75    Weight from Last 3 Encounters:   18 85.4 kg (188 lb 3.2 oz)   18 83.5 kg (184 lb 1.6 oz)              We Performed the Following     EKG 12-lead, tracing only (Future)          Today's Medication Changes          These changes are accurate as of 18  4:03 PM.  If you have any questions, ask your nurse or doctor.               Start taking these medicines.        Dose/Directions    folic acid 5 MG Caps   Used for:  Sarcoid, cardiac   Started by:  Mnadi Dickey MD        Take 5 MG once weekly with your Methotrexate   Quantity:  30 capsule   Refills:  1       methotrexate 2.5 MG tablet CHEMO   Used for:  Sarcoid, cardiac   Started by:  Mandi Dickey MD        Tapering schedule: Take Methotrexate 7.5 MG once weekly times 2 weeks, then 10 MG weekly times 2 weeks, then 12.5 MG weekly there after   Quantity:  30 tablet   Refills:  3         These medicines have changed or have updated prescriptions.        Dose/Directions    metoprolol succinate 25 MG 24 hr tablet   Commonly known as:  TOPROL XL   This may have changed:  how much to take   Used for:  Sarcoid, cardiac   Changed by:  Mandi Dickey MD        Dose:  75 mg   Take 3 tablets (75 mg) by mouth daily   Quantity:  270 tablet   Refills:  3       predniSONE 10 MG tablet   Commonly known as:  DELTASONE   This may have " changed:    - medication strength  - how much to take  - how to take this  - when to take this  - additional instructions   Used for:  Sarcoid, cardiac   Changed by:  Mandi Dickey MD        Tapering schedule: 30 MG daily times 2 weeks, then 20 MG daily times 2 weeks, then 10 MG daily times one week then 5 MG daily there after   Quantity:  90 tablet   Refills:  1            Where to get your medicines      These medications were sent to [id number] - [place], [place] - [address]  [address] [facility] [address]     Phone:  [phone]     folic acid 5 MG Caps    methotrexate 2.5 MG tablet CHEMO    metoprolol succinate 25 MG 24 hr tablet    predniSONE 10 MG tablet                Primary Care Provider Office Phone # Fax #    [facility] [phone] [address] [facility] [address]        Equal Access to Services     DILSHAD HERNANDEZ : Hadii carlos hernandez hadasho Soomaali, waaxda luqadaha, qaybta kaalmada adeegyada, waxay jenniferin hayterrence rolon . So [facility] [phone].    ATENCIÓN: Si habla español, tiene a pearson disposición servicios gratuitos de asistencia lingüística. Llame al [phone].    We comply with applicable federal civil rights laws and [place] laws. We do not discriminate on the basis of race, color, national origin, age, disability, sex, sexual orientation, or gender identity.            Thank you!     Thank you for choosing [facility]  for your care. Our goal is always to provide you with excellent care. Hearing back from our patients is one way we can continue to improve our services. Please take a few minutes to complete the written survey that you may receive in the mail after your visit with us. Thank you!             Your Updated Medication List - Protect others around you: Learn how to safely use, store and throw away your medicines at www.disposemymeds.org.          This list is accurate as of 9/20/18  4:03 PM.  Always use your most  recent med list.                   Brand Name Dispense Instructions for use Diagnosis    ADVIL 200 MG tablet   Generic drug:  ibuprofen      Take 400 mg by mouth every 4 hours as needed for mild pain    Sarcoid, cardiac       ASPIRIN 81 PO      Take 81 mg by mouth daily    Sarcoid, cardiac       folic acid 5 MG Caps     30 capsule    Take 5 MG once weekly with your Methotrexate    Sarcoid, cardiac       methotrexate 2.5 MG tablet CHEMO     30 tablet    Tapering schedule: Take Methotrexate 7.5 MG once weekly times 2 weeks, then 10 MG weekly times 2 weeks, then 12.5 MG weekly there after    Sarcoid, cardiac       metoprolol succinate 25 MG 24 hr tablet    TOPROL XL    270 tablet    Take 3 tablets (75 mg) by mouth daily    Sarcoid, cardiac       prednisoLONE acetate 1 % ophthalmic susp    PRED FORTE     Place 1 drop into both eyes every hour    Sarcoid, cardiac       predniSONE 10 MG tablet    DELTASONE    90 tablet    Tapering schedule: 30 MG daily times 2 weeks, then 20 MG daily times 2 weeks, then 10 MG daily times one week then 5 MG daily there after    Sarcoid, cardiac       sildenafil 20 MG tablet    REVATIO     Take 20-60 mg by mouth as needed        tolterodine 1 MG tablet    DETROL     Take 1 mg by mouth

## 2018-10-09 LAB
DLCOCOR-%PRED-PRE: 105 %
DLCOCOR-PRE: 32.22 ML/MIN/MMHG
DLCOUNC-%PRED-PRE: 107 %
DLCOUNC-PRE: 32.75 ML/MIN/MMHG
DLCOUNC-PRED: 30.41 ML/MIN/MMHG
ERV-%PRED-PRE: 72 %
ERV-PRE: 1.19 L
ERV-PRED: 1.63 L
EXPTIME-PRE: 9.22 SEC
FEF2575-%PRED-PRE: 115 %
FEF2575-PRE: 3.74 L/SEC
FEF2575-PRED: 3.24 L/SEC
FEFMAX-%PRED-PRE: 101 %
FEFMAX-PRE: 10.25 L/SEC
FEFMAX-PRED: 10.13 L/SEC
FEV1-%PRED-PRE: 91 %
FEV1-PRE: 3.48 L
FEV1FEV6-PRE: 82 %
FEV1FEV6-PRED: 79 %
FEV1FVC-PRE: 82 %
FEV1FVC-PRED: 76 %
FEV1SVC-PRE: 82 %
FEV1SVC-PRED: 68 %
FIFMAX-PRE: 6.2 L/SEC
FRCPLETH-%PRED-PRE: 97 %
FRCPLETH-PRE: 3.65 L
FRCPLETH-PRED: 3.76 L
FVC-%PRED-PRE: 87 %
FVC-PRE: 4.22 L
FVC-PRED: 4.85 L
IC-%PRED-PRE: 76 %
IC-PRE: 3.04 L
IC-PRED: 3.96 L
RVPLETH-%PRED-PRE: 100 %
RVPLETH-PRE: 2.47 L
RVPLETH-PRED: 2.45 L
TLCPLETH-%PRED-PRE: 86 %
TLCPLETH-PRE: 6.69 L
TLCPLETH-PRED: 7.74 L
VA-%PRED-PRE: 83 %
VA-PRE: 6.14 L
VC-%PRED-PRE: 75 %
VC-PRE: 4.23 L
VC-PRED: 5.59 L

## 2018-10-22 ENCOUNTER — CARE COORDINATION (OUTPATIENT)
Dept: CARDIOLOGY | Facility: CLINIC | Age: 58
End: 2018-10-22

## 2018-10-22 NOTE — PROGRESS NOTES
Patient had seen Dr. Dickey in office in September for f/u sarcoid.  His PET at that time showed resolution of the myocardial sarcoid.  He had also had a recent Holter locally to evaluate his ongoing arrhythmias and although the PET was clear, he continued to have frequent PVC's.  The plan at that time was for Dr. Dickey to discuss with local EP physician to see if they could do a PVC ablation and ICD or if they preferred the patient have it [facility] the [facility]. I updated the patient's wife with this plan on 10/5.  However, Dr. Dickey had left message with the local EP physician but had never heard back. Per Dr. Dickey's recommendation, the Holter strips were obtained and I emailed them for review to Dr. Will and his team to review. I have left message on patient's wife's cell phone.  Will check on review of Holter strips.

## 2018-10-29 ENCOUNTER — CARE COORDINATION (OUTPATIENT)
Dept: CARDIOLOGY | Facility: CLINIC | Age: 58
End: 2018-10-29

## 2018-10-29 NOTE — PROGRESS NOTES
"Pt has been seen and treated by Dr. Dickey for cardiac sarcoid.  His last PET scan showed resolution of the sarcoid.  However, a Holter done locally per our request showed a large amount of ventricular ectopy (PVC's) and strips were obtained and reviewed by Dr. Will per our request to see if the patient would benefit from a PVC ablation and whether an ICD was needed as Dr. Caban and Dr. Dickey felt he may be high risk for arrhythmia based on the appearance of his MRI.    Per Dr. Will (via email), \"PVC's are multi-focal with a somewhat dominant morphology. He can be amenable to PVC ablation but will not take all away\".Dr. Dickey had previously attempted to call to discuss whether patient's local EP physician, Dr. Spencer De León, but last week she had not heard back from him and requested I call patient and set him up for consult with Dr. Will.  I had previously spoke with patient's wife and they are agreeable to come back for possible PVC ablation or have Dr. De León do it locally.      I called wife today at her work number to attempt to set up appt for consult. I noted in care everywhere that Dr. De León's nurse had called patient (per his request) and left message to check on how patient is feeling and to discuss his appt with Dr. De León which appears it is on 12/7. I spoke with wife today and she is unaware of the phone message left by Dr. De León's office. I called his office and left message for his nurse to call me to clarify whether Dr. De León would be seeing Wm to discuss possible ablation (perhaps Dr. Dickey and Dr. De León did discuss this plan).    10/30/18 - Spoke with Karla from Dr. De León's office who informed me that he has been out of the office and will return next Wed on 11/7. Discussed that Dr. Dickey recommended Wm have a PVC ablation and possible ICD and wanted to discuss with him whether he preferred to do it there or with us at the " [facility]. She will give message to Dr. De León and request he call Dr. Dickey to discuss when he returns. I called Wm's wife and informed her of plan.

## 2018-11-12 ENCOUNTER — CARE COORDINATION (OUTPATIENT)
Dept: CARDIOLOGY | Facility: CLINIC | Age: 58
End: 2018-11-12

## 2018-11-12 NOTE — PROGRESS NOTES
Received call from patient's wife.  They have not heard from Dr. De León's office re: PVC ablation. We had left a couple of messages and was informed Dr. De León was out of the office and would be back on 11/7. We left Dr. Dickey's cell phone number for him to call her directly when he returned. Patient and wife are understandably feeling frustrated and she states they have moved Wm's f/u appointment from 12/6 to 12/17.  I have sent message to Dr. Dickey to see if she has received any f/u.    *Received message back from Dr. Dickey that she had talked to Dr. De León and they would be reaching out to patient to schedule and ICD and PVC ablation. Spoke to wife and she confirmed she received a health message from his office.  Wm has a f/u appt with Dr. Dickey the end of January.

## 2019-01-14 DIAGNOSIS — D86.85 SARCOID, CARDIAC: ICD-10-CM

## 2019-01-18 RX ORDER — FOLIC ACID 1 MG/1
TABLET ORAL
Qty: 30 TABLET | Refills: 1 | Status: SHIPPED | OUTPATIENT
Start: 2019-01-18 | End: 2019-04-15

## 2019-02-07 ENCOUNTER — ANCILLARY PROCEDURE (OUTPATIENT)
Dept: CARDIOLOGY | Facility: CLINIC | Age: 59
End: 2019-02-07
Attending: INTERNAL MEDICINE
Payer: COMMERCIAL

## 2019-02-07 ENCOUNTER — ANCILLARY PROCEDURE (OUTPATIENT)
Dept: GENERAL RADIOLOGY | Facility: CLINIC | Age: 59
End: 2019-02-07
Attending: INTERNAL MEDICINE
Payer: COMMERCIAL

## 2019-02-07 VITALS
HEART RATE: 60 BPM | BODY MASS INDEX: 24.96 KG/M2 | SYSTOLIC BLOOD PRESSURE: 93 MMHG | DIASTOLIC BLOOD PRESSURE: 59 MMHG | OXYGEN SATURATION: 96 % | WEIGHT: 194.5 LBS | HEIGHT: 74 IN

## 2019-02-07 DIAGNOSIS — D86.85 SARCOID, CARDIAC: ICD-10-CM

## 2019-02-07 DIAGNOSIS — I47.20 VENTRICULAR TACHYCARDIA (H): ICD-10-CM

## 2019-02-07 DIAGNOSIS — D86.85 CARDIAC SARCOIDOSIS: ICD-10-CM

## 2019-02-07 DIAGNOSIS — D86.85 CARDIAC SARCOIDOSIS: Primary | ICD-10-CM

## 2019-02-07 LAB
ALBUMIN SERPL-MCNC: 3.6 G/DL (ref 3.4–5)
ALP SERPL-CCNC: 58 U/L (ref 40–150)
ALT SERPL W P-5'-P-CCNC: 29 U/L (ref 0–70)
ANION GAP SERPL CALCULATED.3IONS-SCNC: 6 MMOL/L (ref 3–14)
AST SERPL W P-5'-P-CCNC: 15 U/L (ref 0–45)
BILIRUB DIRECT SERPL-MCNC: 0.2 MG/DL (ref 0–0.2)
BILIRUB SERPL-MCNC: 0.8 MG/DL (ref 0.2–1.3)
BUN SERPL-MCNC: 20 MG/DL (ref 7–30)
CALCIUM SERPL-MCNC: 8.5 MG/DL (ref 8.5–10.1)
CHLORIDE SERPL-SCNC: 107 MMOL/L (ref 94–109)
CO2 SERPL-SCNC: 28 MMOL/L (ref 20–32)
CREAT SERPL-MCNC: 1.19 MG/DL (ref 0.66–1.25)
ERYTHROCYTE [DISTWIDTH] IN BLOOD BY AUTOMATED COUNT: 13.6 % (ref 10–15)
GFR SERPL CREATININE-BSD FRML MDRD: 67 ML/MIN/{1.73_M2}
GLUCOSE SERPL-MCNC: 91 MG/DL (ref 70–99)
HCT VFR BLD AUTO: 44.8 % (ref 40–53)
HGB BLD-MCNC: 15 G/DL (ref 13.3–17.7)
MCH RBC QN AUTO: 31.3 PG (ref 26.5–33)
MCHC RBC AUTO-ENTMCNC: 33.5 G/DL (ref 31.5–36.5)
MCV RBC AUTO: 94 FL (ref 78–100)
NT-PROBNP SERPL-MCNC: 274 PG/ML (ref 0–125)
PLATELET # BLD AUTO: 232 10E9/L (ref 150–450)
POTASSIUM SERPL-SCNC: 3.7 MMOL/L (ref 3.4–5.3)
PROT SERPL-MCNC: 6.2 G/DL (ref 6.8–8.8)
RBC # BLD AUTO: 4.79 10E12/L (ref 4.4–5.9)
SODIUM SERPL-SCNC: 142 MMOL/L (ref 133–144)
WBC # BLD AUTO: 12.8 10E9/L (ref 4–11)

## 2019-02-07 PROCEDURE — 99215 OFFICE O/P EST HI 40 MIN: CPT | Mod: ZP | Performed by: INTERNAL MEDICINE

## 2019-02-07 PROCEDURE — 80076 HEPATIC FUNCTION PANEL: CPT | Performed by: INTERNAL MEDICINE

## 2019-02-07 PROCEDURE — 80048 BASIC METABOLIC PNL TOTAL CA: CPT | Performed by: INTERNAL MEDICINE

## 2019-02-07 PROCEDURE — 36415 COLL VENOUS BLD VENIPUNCTURE: CPT | Performed by: INTERNAL MEDICINE

## 2019-02-07 PROCEDURE — 83880 ASSAY OF NATRIURETIC PEPTIDE: CPT | Performed by: INTERNAL MEDICINE

## 2019-02-07 PROCEDURE — 85027 COMPLETE CBC AUTOMATED: CPT | Performed by: INTERNAL MEDICINE

## 2019-02-07 PROCEDURE — G0463 HOSPITAL OUTPT CLINIC VISIT: HCPCS | Mod: ZF

## 2019-02-07 RX ORDER — AMIODARONE HYDROCHLORIDE 200 MG/1
TABLET ORAL
COMMUNITY
Start: 2019-01-25 | End: 2021-12-21

## 2019-02-07 RX ORDER — LANOLIN ALCOHOL/MO/W.PET/CERES
3 CREAM (GRAM) TOPICAL
COMMUNITY
Start: 2019-01-26 | End: 2022-06-15

## 2019-02-07 ASSESSMENT — MIFFLIN-ST. JEOR: SCORE: 1777

## 2019-02-07 ASSESSMENT — PAIN SCALES - GENERAL: PAINLEVEL: NO PAIN (0)

## 2019-02-07 NOTE — PROGRESS NOTES
February 7, 2019    Dear Dr. De León,     I the pleasure of seeing Jose Carney in the [facility]iac [facility]arcoidosis clinic today.     As you know he is a very pleasant 57-year-old man with a history sarcoidosis which is diagnosed by transbronchial biopsy in 2013.  He had been having back pain prior to this and 30 pound weight loss and had substantial mediastinal lymphadenopathy.  He was placed on several months of steroids with significant weight gain and improvement in symptoms as well as back pain.      In late summer and fall 2017 he started to develop palpitations which were found to be supraventricular tachycardia (likely AVNRT as well as some atrial tachycardia)  based on his cardiac monitor. He then had a cardiac MRI which was consistent with myocardial involvement from sarcoidosis (see below).  He was then sent down here for further management and was placed on metoprolol.    Based on high risk features of his cardiac MRI we recommended that he have an ICD placed which was placed locally with your team.  I did place him on a burst of prednisone given a highly suspicious PET scan which showed complete resolution of his cardiac sarcoidosis.  At that time I switch him to methotrexate as a staring steroid sparing agent and was titrating this up and tapering down his prednisone when he developed ventricular tachycardia.  This was in January 2019.  The VT was at a rate of 135 and was unable to be paced out.  He did not have syncope with this.  He was loaded with amiodarone, and given failure to gain control of his VT he was given Solu-Medrol and placed on higher dose of prednisone in addition to his methotrexate.     He was then able to be converted and he is finishing his oral load of amiodarone now.  He is scheduled to go 200 mg tomorrow.  He has not any further palpitations and his device interrogation today shows no further ventricular tachycardia since the time of discharge.  He remains again  on methotrexate 12.5 mg daily and 40 mg of prednisone.     He can walk a mile on flat ground.  He denies PND orthopnea or lower extremity edema.  He denies any chest heaviness or pressure.      Of note he also was treated for uveitis in the last year and has some ongoing residual right eye blurriness.  He is not presently driving due to his ventricular tachycardia.     PAST MEDICAL HISTORY:  Past Medical History:   Diagnosis Date     First degree AV block 3/20/2018     Palpitations 3/20/2018     Paroxysmal supraventricular tachycardia (H) 3/20/2018     Sarcoid, cardiac/EF 54% per MRI,[place] of affected myocardium is 12% of myocardial mass 1/25/2018     Sarcoidosis/ systemic 2013 3/20/2018     FAMILY HISTORY:  His family history is notable for several family members with autoimmune disease.  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  He denies any tobacco or alcohol use or recreational drug use.     CURRENT MEDICATIONS:  Current Outpatient Medications   Medication Sig Dispense Refill     amiodarone (PACERONE/CODARONE) 200 MG tablet Take 2 tabs (400 mg) by mouth twice daily for 13 days. Then take 2 tabs (400 mg) a day for 14 days. Then take 1 tab (200 mg) daily.       ASPIRIN 81 PO Take 81 mg by mouth daily       folic acid (FOLVITE) 1 MG tablet TAKE 5 MG ONCE WEEKLY WITH YOUR METHOTREXATE 30 tablet 1     ibuprofen (ADVIL) 200 MG tablet Take 400 mg by mouth every 4 hours as needed for mild pain       melatonin 3 MG tablet Take 6 mg by mouth       methotrexate 2.5 MG tablet CHEMO Tapering schedule: Take Methotrexate 7.5 MG once weekly times 2 weeks, then 10 MG weekly times 2 weeks, then 12.5 MG weekly there after 30 tablet 3     metoprolol succinate (TOPROL XL) 25 MG 24 hr tablet Take 3 tablets (75 mg) by mouth daily 270 tablet 3     prednisoLONE acetate (PRED FORTE) 1 % ophthalmic susp  "Place 1 drop into both eyes every hour       predniSONE (DELTASONE) 10 MG tablet Tapering schedule: 30 MG daily times 2 weeks, then 20 MG daily times 2 weeks, then 10 MG daily times one week then 5 MG daily there after 90 tablet 1     sildenafil (REVATIO) 20 MG tablet Take 20-60 mg by mouth as needed       tolterodine (DETROL) 1 MG tablet Take 1 mg by mouth         ROS:   Constitutional: No fever, chills, or sweats. Weight has been stable + fatigue   ENT: No visual disturbance, ear ache, epistaxis, sore throat.   Allergies/Immunologic: Negative.   Respiratory: No cough, hemoptysis.   Cardiovascular: As per HPI.   GI: No nausea, vomiting, hematemesis, melena, or hematochezia.   : No urinary frequency, dysuria, or hematuria.   Integument: Negative.   Psychiatric: insomnia   Neuro: Negative.   Endocrinology: Negative.   Musculoskeletal: Chronic back pain    EXAM:  BP 93/59 (BP Location: Right arm, Patient Position: Chair, Cuff Size: Adult Regular)   Pulse 60   Ht 1.88 m (6' 2\")   Wt 88.2 kg (194 lb 8 oz)   SpO2 96%   BMI 24.97 kg/m    General: appears comfortable, alert and articulate  Head: normocephalic, atraumatic  Eyes: anicteric sclera, EOMI  Neck: no adenopathy  Orophyarynx: moist mucosa, no lesions, dentition intact  Heart: regular, S1/S2, II/IV systolic murmur at the apex, no gallop, rub, estimated JVP < 10   Lungs: clear, no rales or wheezing  Abdomen: soft, non-tender, bowel sounds present, no hepatosplenomegaly  Extremities: no clubbing, cyanosis or edema  Neurological: normal speech and affect, no gross motor deficits    Labs:    Lab Results   Component Value Date    WBC 12.8 (H) 02/07/2019    HGB 15.0 02/07/2019    HCT 44.8 02/07/2019     02/07/2019     02/07/2019    POTASSIUM 3.7 02/07/2019    CHLORIDE 107 02/07/2019    CO2 28 02/07/2019    BUN 20 02/07/2019    CR 1.19 02/07/2019    GLC 91 02/07/2019    NTBNP 274 (H) 02/07/2019    TROPI <0.015 09/20/2018    AST 15 02/07/2019    ALT 29 " 02/07/2019    ALKPHOS 58 02/07/2019    BILITOTAL 0.8 02/07/2019         ECG: Sinus rhythm, first-degree AV block PVCs normal axis normal QT interval     Cardiac monitor from 8/20/2017 10/9/2017     Repeat PET:   8/2018    Impression: In this patient with cardiac sarcoidosis under treatment  with steroids for the last 4 months;  1. No FDG uptake within the myocardium. Overall there is complete  resolution of previous cardiac sarcoidosis.  2. Excellent suppression of myocardial glucose metabolism.  3. Stable number and size of parenchymal lung nodules, although  previously seen high metabolic activity within a few of these lesions  is no longer present.  4. Stable size number and metabolic activity of lymphadenopathy in the  mediastinum, bilateral axilla and right supraclavicular region. 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  5. Stable left stone.     Last cardiac monitor     SUMMARY: Several transmissions were available for review. More than half of these transmissions were automatic transmissions without symptoms. The transmissions that had symptoms reported as shortness of breath, heart racing, lightheadedness were reviewed and indicated sinus rhythm with first degree AV block, paroxysmal supraventricular tachycardia with rate of about 150 beats per minute, which, based on the rate, could not exclude underlying atrial flutter; however, no distinct flutter waves noted.     CONCLUSION:   1. Predominantly sinus rhythm.   2. Symptoms of shortness of breath, heart racing associated with sinus rhythm and paroxysmal supraventricular tachycardia. Heart rate as fast as 170 beats a minute, which, based on the rate, could not exclude atrial flutter.   3. Other times, patient was in supraventricular tachycardia, no symptoms reported and rate has been right in between 150 to 170 beats a minute.   4. Clinical correlation advised.     Repeat cardiac monitor: 9/13/2018  Repeat cardiac monitor shows  significant PVC burden mostly in a bigeminy pattern the test was a 48 hour Holter 2436 ventricular runs noted 35  % in percent of all beats were ventricular ectopy longest run was 16 beats although this appeared to be slow.   echocardiogam today   Interpretation Summary  GLS strain not performed due to arrhythmia  Borderline (EF 50-55%) reduced left ventricular function is present.  No diagnostic wall motion abnormality, but analysis limited by irregular  rhythm with frequent ectopy.  Moderate left atrial enlargement is present.  Mild-moderate mitral valve regurgitation.  Previous study not available for comparison.      Assessment and Plan:   In summary this is a very pleasant 58-year-old man with a history of biopsy-proven cardiac sarcoid from a transbronchial biopsy-who has a cardiac MRI consistent with myocardial involvement from sarcoidosis. His PET scan related inflammation resolved completely with 3-4 months of 40 mg of prednisone.  I did have an ICD placed given he had high risk features on his cardiac MRI for malignant arrhythmias.     He is presently on high-dose amiodarone as well as 12.5 mg of methotrexate and 40 mg of prednisone.  The real question here is whether this is scar based ventricular tachycardia or represents recurrence of inflammation due to lowering his immunosuppression.     We are finding out whether his ICD has MRI compatible leads presently-we will plan on a repeat MRI if possible to determine the degree of inflammation and presence of scar.  This will dictate my next move about whether or not to lower his immunosuppression further or think about catheter ablation.     Uveitis-  Followed locally     Please feel free to call me with any further questions and thank you for the opportunity to assist in his care.       Mandi Dickey MD   [facility]  Patient Care Team:  [facility], [facility] as PCP - General  GIULIANO KOHLER  M    Darryl [place] Hausch, Raymond C, MD

## 2019-02-07 NOTE — PATIENT INSTRUCTIONS
It was a pleasure to see you in clinic today.  Please do not hesitate to call with any questions or concerns.     Aliyah Muñiz, RN, BSN  Electrophysiology Nurse Clinician  [facility]    During Business Hours Please Call:  [phone]  After Hours Please Call:  [phone] - select option #4 and ask for job code [phone]     My findings and recommendations are based on patient's symptoms, eye exam, diagnostic testing, and records.

## 2019-02-07 NOTE — PATIENT INSTRUCTIONS
Cardiology Providers you saw during your visit:  Dr. Dickey    Medication changes:  No medication changes    Follow up:  Chest xray today  Cardiac MRI tomorrow at, please report to the [address] [address] to the [facility] waiting room at 10:15 am  Follow up with Dr. Dickey will be determined based on MRI results      Please call if you have :  1. Weight gain of more than 2 pounds in a day or 5 pounds in a week  2. Increased shortness of breath, swelling or bloating  3. Dizziness, lightheadedness   4. Any questions or concerns.       Follow the American Heart Association Diet and Lifestyle recommendations:  Limit saturated fat, trans fat, sodium, red meat, sweets and sugar-sweetened beverages. If you choose to eat red meat, compare labels and select the leanest cuts available.  Aim for at least 150 minutes of moderate physical activity or 75 minutes of vigorous physical activity - or an equal combination of both - each week.      During business hours: [phone], press option # 1 to be routed to the [facility] then option # 3 for medical questions to speak with a nurse      After hours, weekends or holidays: On Call Cardiologist- [phone]   option #4 and ask to speak to the on-call Cardiologist. Inform them you are a CORE/heart failure patient at the [facility].      Joe Torres RN  Cardiology Nurse Care Coordinator    Keep up the good work!    Take Care!

## 2019-02-07 NOTE — LETTER
2/7/2019      RE: Jose Carney  [address]  [place] [facility] [address]       Dear Colleague,    Thank you for the opportunity to participate in the care of your patient, Jose Carney, at the [facility]. Please see a copy of my visit note below.    February 7, 2019    Dear Dr. De León,     I the pleasure of seeing Jose Carney in the [facility] Cardiac Sarcoidosis clinic today.     As you know he is a very pleasant 57-year-old man with a history sarcoidosis which is diagnosed by transbronchial biopsy in 2013.  He had been having back pain prior to this and 30 pound weight loss and had substantial mediastinal lymphadenopathy.  He was placed on several months of steroids with significant weight gain and improvement in symptoms as well as back pain.      In late summer and fall 2017 he started to develop palpitations which were found to be supraventricular tachycardia (likely AVNRT as well as some atrial tachycardia)  based on his cardiac monitor. He then had a cardiac MRI which was consistent with myocardial involvement from sarcoidosis (see below).  He was then sent down here for further management and was placed on metoprolol.    Based on high risk features of his cardiac MRI we recommended that he have an ICD placed which was placed locally with your team.  I did place him on a burst of prednisone given a highly suspicious PET scan which showed complete resolution of his cardiac sarcoidosis.  At that time I switch him to methotrexate as a staring steroid sparing agent and was titrating this up and tapering down his prednisone when he developed ventricular tachycardia.  This was in January 2019.  The VT was at a rate of 135 and was unable to be paced out.  He did not have syncope with this.  He was loaded with amiodarone, and given failure to gain control of his VT he was given Solu-Medrol and placed on higher dose of prednisone in addition to  his methotrexate.     He was then able to be converted and he is finishing his oral load of amiodarone now.  He is scheduled to go 200 mg tomorrow.  He has not any further palpitations and his device interrogation today shows no further ventricular tachycardia since the time of discharge.  He remains again on methotrexate 12.5 mg daily and 40 mg of prednisone.     He can walk a mile on flat ground.  He denies PND orthopnea or lower extremity edema.  He denies any chest heaviness or pressure.      Of note he also was treated for uveitis in the last year and has some ongoing residual right eye blurriness.  He is not presently driving due to his ventricular tachycardia.     PAST MEDICAL HISTORY:  Past Medical History:   Diagnosis Date     First degree AV block 3/20/2018     Palpitations 3/20/2018     Paroxysmal supraventricular tachycardia (H) 3/20/2018     Sarcoid, cardiac/EF 54% per MRI,[place] of affected myocardium is 12% of myocardial mass 1/25/2018     Sarcoidosis/ systemic 2013 3/20/2018     FAMILY HISTORY:  His family history is notable for several family members with autoimmune disease.  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  He denies any tobacco or alcohol use or recreational drug use.     CURRENT MEDICATIONS:  Current Outpatient Medications   Medication Sig Dispense Refill     amiodarone (PACERONE/CODARONE) 200 MG tablet Take 2 tabs (400 mg) by mouth twice daily for 13 days. Then take 2 tabs (400 mg) a day for 14 days. Then take 1 tab (200 mg) daily.       ASPIRIN 81 PO Take 81 mg by mouth daily       folic acid (FOLVITE) 1 MG tablet TAKE 5 MG ONCE WEEKLY WITH YOUR METHOTREXATE 30 tablet 1     ibuprofen (ADVIL) 200 MG tablet Take 400 mg by mouth every 4 hours as needed for mild pain       melatonin 3 MG tablet Take 6 mg by mouth       methotrexate 2.5 MG tablet CHEMO  "Tapering schedule: Take Methotrexate 7.5 MG once weekly times 2 weeks, then 10 MG weekly times 2 weeks, then 12.5 MG weekly there after 30 tablet 3     metoprolol succinate (TOPROL XL) 25 MG 24 hr tablet Take 3 tablets (75 mg) by mouth daily 270 tablet 3     prednisoLONE acetate (PRED FORTE) 1 % ophthalmic susp Place 1 drop into both eyes every hour       predniSONE (DELTASONE) 10 MG tablet Tapering schedule: 30 MG daily times 2 weeks, then 20 MG daily times 2 weeks, then 10 MG daily times one week then 5 MG daily there after 90 tablet 1     sildenafil (REVATIO) 20 MG tablet Take 20-60 mg by mouth as needed       tolterodine (DETROL) 1 MG tablet Take 1 mg by mouth         ROS:   Constitutional: No fever, chills, or sweats. Weight has been stable + fatigue   ENT: No visual disturbance, ear ache, epistaxis, sore throat.   Allergies/Immunologic: Negative.   Respiratory: No cough, hemoptysis.   Cardiovascular: As per HPI.   GI: No nausea, vomiting, hematemesis, melena, or hematochezia.   : No urinary frequency, dysuria, or hematuria.   Integument: Negative.   Psychiatric: insomnia   Neuro: Negative.   Endocrinology: Negative.   Musculoskeletal: Chronic back pain    EXAM:  BP 93/59 (BP Location: Right arm, Patient Position: Chair, Cuff Size: Adult Regular)   Pulse 60   Ht 1.88 m (6' 2\")   Wt 88.2 kg (194 lb 8 oz)   SpO2 96%   BMI 24.97 kg/m     General: appears comfortable, alert and articulate  Head: normocephalic, atraumatic  Eyes: anicteric sclera, EOMI  Neck: no adenopathy  Orophyarynx: moist mucosa, no lesions, dentition intact  Heart: regular, S1/S2, II/IV systolic murmur at the apex, no gallop, rub, estimated JVP < 10   Lungs: clear, no rales or wheezing  Abdomen: soft, non-tender, bowel sounds present, no hepatosplenomegaly  Extremities: no clubbing, cyanosis or edema  Neurological: normal speech and affect, no gross motor deficits    Labs:    Lab Results   Component Value Date    WBC 12.8 (H) 02/07/2019 "    HGB 15.0 02/07/2019    HCT 44.8 02/07/2019     02/07/2019     02/07/2019    POTASSIUM 3.7 02/07/2019    CHLORIDE 107 02/07/2019    CO2 28 02/07/2019    BUN 20 02/07/2019    CR 1.19 02/07/2019    GLC 91 02/07/2019    NTBNP 274 (H) 02/07/2019    TROPI <0.015 09/20/2018    AST 15 02/07/2019    ALT 29 02/07/2019    ALKPHOS 58 02/07/2019    BILITOTAL 0.8 02/07/2019         ECG: Sinus rhythm, first-degree AV block PVCs normal axis normal QT interval     Cardiac monitor from 8/20/2017 10/9/2017     Repeat PET:   8/2018    Impression: In this patient with cardiac sarcoidosis under treatment  with steroids for the last 4 months;  1. No FDG uptake within the myocardium. Overall there is complete  resolution of previous cardiac sarcoidosis.  2. Excellent suppression of myocardial glucose metabolism.  3. Stable number and size of parenchymal lung nodules, although  previously seen high metabolic activity within a few of these lesions  is no longer present.  4. Stable size number and metabolic activity of lymphadenopathy in the  mediastinum, bilateral axilla and right supraclavicular region. 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  5. Stable left stone.     Last cardiac monitor     SUMMARY: Several transmissions were available for review. More than half of these transmissions were automatic transmissions without symptoms. The transmissions that had symptoms reported as shortness of breath, heart racing, lightheadedness were reviewed and indicated sinus rhythm with first degree AV block, paroxysmal supraventricular tachycardia with rate of about 150 beats per minute, which, based on the rate, could not exclude underlying atrial flutter; however, no distinct flutter waves noted.     CONCLUSION:   1. Predominantly sinus rhythm.   2. Symptoms of shortness of breath, heart racing associated with sinus rhythm and paroxysmal supraventricular tachycardia. Heart rate as fast as 170 beats a  minute, which, based on the rate, could not exclude atrial flutter.   3. Other times, patient was in supraventricular tachycardia, no symptoms reported and rate has been right in between 150 to 170 beats a minute.   4. Clinical correlation advised.     Repeat cardiac monitor: 9/13/2018  Repeat cardiac monitor shows significant PVC burden mostly in a bigeminy pattern the test was a 48 hour Holter 2436 ventricular runs noted 35  % in percent of all beats were ventricular ectopy longest run was 16 beats although this appeared to be slow.   echocardiogam today   Interpretation Summary  GLS strain not performed due to arrhythmia  Borderline (EF 50-55%) reduced left ventricular function is present.  No diagnostic wall motion abnormality, but analysis limited by irregular  rhythm with frequent ectopy.  Moderate left atrial enlargement is present.  Mild-moderate mitral valve regurgitation.  Previous study not available for comparison.    Assessment and Plan:   In summary this is a very pleasant 58-year-old man with a history of biopsy-proven cardiac sarcoid from a transbronchial biopsy-who has a cardiac MRI consistent with myocardial involvement from sarcoidosis. His PET scan related inflammation resolved completely with 3-4 months of 40 mg of prednisone.  I did have an ICD placed given he had high risk features on his cardiac MRI for malignant arrhythmias.     He is presently on high-dose amiodarone as well as 12.5 mg of methotrexate and 40 mg of prednisone.  The real question here is whether this is scar based ventricular tachycardia or represents recurrence of inflammation due to lowering his immunosuppression.     We are finding out whether his ICD has MRI compatible leads presently-we will plan on a repeat MRI if possible to determine the degree of inflammation and presence of scar.  This will dictate my next move about whether or not to lower his immunosuppression further or think about catheter ablation.     Uveitis-   Followed locally     Please feel free to call me with any further questions and thank you for the opportunity to assist in his care.     Mandi Dickey MD   [facility]  Patient Care Team:  [facility], [place] as PCP - General  GIULIANO KOHLER  [place], [place]   Gary Soares MD

## 2019-02-08 ENCOUNTER — HOSPITAL ENCOUNTER (OUTPATIENT)
Dept: MRI IMAGING | Facility: CLINIC | Age: 59
Discharge: HOME | End: 2019-02-08
Attending: INTERNAL MEDICINE | Admitting: INTERNAL MEDICINE
Payer: COMMERCIAL

## 2019-02-08 DIAGNOSIS — D86.85 CARDIAC SARCOIDOSIS: ICD-10-CM

## 2019-02-08 PROCEDURE — A9585 GADOBUTROL INJECTION: HCPCS | Performed by: INTERNAL MEDICINE

## 2019-02-08 PROCEDURE — 25500064 ZZH RX 255 OP 636: Performed by: INTERNAL MEDICINE

## 2019-02-08 PROCEDURE — 75561 CARDIAC MRI FOR MORPH W/DYE: CPT

## 2019-02-08 RX ORDER — GADOBUTROL 604.72 MG/ML
10 INJECTION INTRAVENOUS ONCE
Status: COMPLETED | OUTPATIENT
Start: 2019-02-08 | End: 2019-02-08

## 2019-02-08 RX ADMIN — GADOBUTROL 10 ML: 604.72 INJECTION INTRAVENOUS at 10:58

## 2019-02-14 ENCOUNTER — CARE COORDINATION (OUTPATIENT)
Dept: CARDIOLOGY | Facility: CLINIC | Age: 59
End: 2019-02-14

## 2019-02-14 DIAGNOSIS — D86.9 SARCOIDOSIS: ICD-10-CM

## 2019-02-14 DIAGNOSIS — D86.85 SARCOID, CARDIAC: Primary | ICD-10-CM

## 2019-02-14 RX ORDER — PREDNISONE 10 MG/1
TABLET ORAL
Qty: 114 TABLET | Refills: 3 | Status: SHIPPED | OUTPATIENT
Start: 2019-02-14 | End: 2019-03-19 | Stop reason: DRUGHIGH

## 2019-02-14 NOTE — PROGRESS NOTES
"Per Dr. Dickey \"Will obtain another CXR when he returns - likely granuloma in lung and nothing to worry about\"    MRI results reviewed by Dr. Dickey, she recommends  tapering prednisone by 10 mg per week and and then leave him at 10 and go to 15 on the methotrexate now.     Follow up in 3-4 months.    Left a voicemail for patient's wife with results and plan. I asked that she calls back to clarify the doses for Prednisone and Methotrexate that patient is on.      Called Faby sosa    Given the below instructions    Please taper off Prednisone as per below:  1- Decrease dose to  30 mg by mouth once daily for 1 week.  2-Then decrease to 20 mg by mouth once daily for 1 week and stop taking Bactrim.  3- Then decrease to 15 mg by mouth once daily for 1 week.  4- After that decrease Prednisone to 10 mg by mouth once daily  And saty on this dose      CBC and liver function before increasing  Methotrexate to 15mg.   CBC and liver function 2 weeks after increasing Methotrexate to 15 mg CBC and liver function every 3 months once stable on goal dose.        "

## 2019-02-14 NOTE — PROGRESS NOTES
[facility] Call Center    Phone Message    May a detailed message be left on voicemail: yes    Reason for Call: Other: Trevon (pts wife) called in for Rashefraín. Please give her a call back tomorrow (Friday) thank you!     Action Taken: Message routed to:  [facility] ([facility] Cardiology

## 2019-02-25 LAB
ABSOLUTE BASOPHILS (EXTERNAL): 0
ALBUMIN SERPL-MCNC: 4.1 G/DL
ALP SERPL-CCNC: 48 U/L
ALT SERPL-CCNC: 33 U/L
AST SERPL-CCNC: 24 U/L
BASOPHILS NFR BLD AUTO: 0.1 %
BILIRUB SERPL-MCNC: 0.8 MG/DL
BILIRUBIN DIRECT: 0.3 MG/DL
BILIRUBIN INDIRECT NEONATAL: 0.5 MG/DL
EOSINOPHIL # BLD AUTO: 0 10*3/UL
EOSINOPHIL NFR BLD AUTO: 0.1 %
ERYTHROCYTE [DISTWIDTH] IN BLOOD BY AUTOMATED COUNT: 14.7 %
HCT VFR BLD AUTO: 44.6 %
HEMOGLOBIN: 15.6 G/DL (ref 13.3–17.7)
IMMATURE GRANS (ABS): 0.12 X10E3/UL
IMMATURE GRANULOCYTES: 1.3 %
LYMPHOCYTES # BLD AUTO: 0.3 10*3/UL
LYMPHOCYTES NFR BLD AUTO: 3.8 %
MCH RBC QN AUTO: 32.5 PG
MCHC RBC AUTO-ENTMCNC: 35 G/DL
MCV RBC AUTO: 92.9 FL
MONOCYTES # BLD AUTO: 0.2 10*3/UL
MONOCYTES NFR BLD AUTO: 2.7 %
NEUTROPHILS # BLD AUTO: 8.3 X10E3/UL
NEUTROPHILS NFR BLD AUTO: 92 %
PLATELET COUNT - QUEST: 190 10^9/L (ref 150–450)
PROTEIN TOTAL (EXTERNAL): 6.3
RBC # BLD AUTO: 4.8 10^12/L
WBC # BLD AUTO: 9 10^9/L

## 2019-03-19 DIAGNOSIS — D86.85 SARCOID, CARDIAC: Primary | ICD-10-CM

## 2019-03-19 DIAGNOSIS — Z79.631 ON METHOTREXATE THERAPY: ICD-10-CM

## 2019-03-19 DIAGNOSIS — D86.9 SARCOIDOSIS: ICD-10-CM

## 2019-03-19 RX ORDER — PREDNISONE 5 MG/1
5 TABLET ORAL DAILY
Qty: 90 TABLET | Refills: 3 | Status: SHIPPED | OUTPATIENT
Start: 2019-03-19 | End: 2019-05-09

## 2019-03-20 ENCOUNTER — CARE COORDINATION (OUTPATIENT)
Dept: CARDIOLOGY | Facility: CLINIC | Age: 59
End: 2019-03-20

## 2019-03-20 NOTE — PROGRESS NOTES
Have been communicating with patient/wife via My Chart. Patient currently on methotrexate 12.5 mg weekly and 10 mg prednisone daily.  Per Dr. Dickey's recommendations, pt should increase methotrexate to 15 mg weekly and decrease prednisone to 5 mg daily and continue as maintenance dose.  He will decrease dosages on 3/24 and obtain labs one week later.  Lab order sent to [facility] in [place] per pt request.

## 2019-03-21 LAB
MDC_IDC_LEAD_IMPLANT_DT: NORMAL
MDC_IDC_LEAD_IMPLANT_DT: NORMAL
MDC_IDC_LEAD_LOCATION: NORMAL
MDC_IDC_LEAD_LOCATION: NORMAL
MDC_IDC_LEAD_LOCATION_DETAIL_1: NORMAL
MDC_IDC_LEAD_LOCATION_DETAIL_1: NORMAL
MDC_IDC_LEAD_MFG: NORMAL
MDC_IDC_LEAD_MFG: NORMAL
MDC_IDC_LEAD_MODEL: NORMAL
MDC_IDC_LEAD_MODEL: NORMAL
MDC_IDC_LEAD_POLARITY_TYPE: NORMAL
MDC_IDC_LEAD_POLARITY_TYPE: NORMAL
MDC_IDC_LEAD_SERIAL: NORMAL
MDC_IDC_LEAD_SERIAL: NORMAL
MDC_IDC_MSMT_BATTERY_DTM: NORMAL
MDC_IDC_MSMT_BATTERY_REMAINING_LONGEVITY: 144 MO
MDC_IDC_MSMT_BATTERY_STATUS: NORMAL
MDC_IDC_MSMT_CAP_CHARGE_DTM: NORMAL
MDC_IDC_MSMT_CAP_CHARGE_ENERGY: 0 J
MDC_IDC_MSMT_CAP_CHARGE_TIME: 0 S
MDC_IDC_MSMT_CAP_CHARGE_TIME: 8.93 S
MDC_IDC_MSMT_CAP_CHARGE_TYPE: NORMAL
MDC_IDC_MSMT_CAP_CHARGE_TYPE: NORMAL
MDC_IDC_MSMT_LEADCHNL_RA_IMPEDANCE_VALUE: 723 OHM
MDC_IDC_MSMT_LEADCHNL_RA_PACING_THRESHOLD_AMPLITUDE: 0.8 V
MDC_IDC_MSMT_LEADCHNL_RA_PACING_THRESHOLD_PULSEWIDTH: 0.4 MS
MDC_IDC_MSMT_LEADCHNL_RA_SENSING_INTR_AMPL: 8.5 MV
MDC_IDC_MSMT_LEADCHNL_RV_IMPEDANCE_VALUE: 493 OHM
MDC_IDC_MSMT_LEADCHNL_RV_PACING_THRESHOLD_AMPLITUDE: 0.7 V
MDC_IDC_MSMT_LEADCHNL_RV_PACING_THRESHOLD_PULSEWIDTH: 0.4 MS
MDC_IDC_MSMT_LEADCHNL_RV_SENSING_INTR_AMPL: 13.6 MV
MDC_IDC_PG_IMPLANT_DTM: NORMAL
MDC_IDC_PG_MFG: NORMAL
MDC_IDC_PG_MODEL: NORMAL
MDC_IDC_PG_SERIAL: NORMAL
MDC_IDC_PG_TYPE: NORMAL
MDC_IDC_SESS_CLINIC_NAME: NORMAL
MDC_IDC_SESS_DTM: NORMAL
MDC_IDC_SESS_TYPE: NORMAL
MDC_IDC_SET_BRADY_AT_MODE_SWITCH_MODE: NORMAL
MDC_IDC_SET_BRADY_AT_MODE_SWITCH_RATE: 170 {BEATS}/MIN
MDC_IDC_SET_BRADY_LOWRATE: 60 {BEATS}/MIN
MDC_IDC_SET_BRADY_MAX_SENSOR_RATE: 125 {BEATS}/MIN
MDC_IDC_SET_BRADY_MAX_TRACKING_RATE: 125 {BEATS}/MIN
MDC_IDC_SET_BRADY_MODE: NORMAL
MDC_IDC_SET_BRADY_PAV_DELAY_HIGH: 110 MS
MDC_IDC_SET_BRADY_PAV_DELAY_LOW: 220 MS
MDC_IDC_SET_BRADY_SAV_DELAY_HIGH: 110 MS
MDC_IDC_SET_BRADY_SAV_DELAY_LOW: 220 MS
MDC_IDC_SET_LEADCHNL_RA_PACING_AMPLITUDE: 3.5 V
MDC_IDC_SET_LEADCHNL_RA_PACING_ANODE_ELECTRODE_1: NORMAL
MDC_IDC_SET_LEADCHNL_RA_PACING_ANODE_LOCATION_1: NORMAL
MDC_IDC_SET_LEADCHNL_RA_PACING_CAPTURE_MODE: NORMAL
MDC_IDC_SET_LEADCHNL_RA_PACING_CATHODE_ELECTRODE_1: NORMAL
MDC_IDC_SET_LEADCHNL_RA_PACING_CATHODE_LOCATION_1: NORMAL
MDC_IDC_SET_LEADCHNL_RA_PACING_POLARITY: NORMAL
MDC_IDC_SET_LEADCHNL_RA_PACING_PULSEWIDTH: 0.4 MS
MDC_IDC_SET_LEADCHNL_RA_SENSING_ADAPTATION_MODE: NORMAL
MDC_IDC_SET_LEADCHNL_RA_SENSING_ANODE_ELECTRODE_1: NORMAL
MDC_IDC_SET_LEADCHNL_RA_SENSING_ANODE_LOCATION_1: NORMAL
MDC_IDC_SET_LEADCHNL_RA_SENSING_CATHODE_ELECTRODE_1: NORMAL
MDC_IDC_SET_LEADCHNL_RA_SENSING_CATHODE_LOCATION_1: NORMAL
MDC_IDC_SET_LEADCHNL_RA_SENSING_POLARITY: NORMAL
MDC_IDC_SET_LEADCHNL_RA_SENSING_SENSITIVITY: 0.25 MV
MDC_IDC_SET_LEADCHNL_RV_PACING_AMPLITUDE: 3.5 V
MDC_IDC_SET_LEADCHNL_RV_PACING_ANODE_ELECTRODE_1: NORMAL
MDC_IDC_SET_LEADCHNL_RV_PACING_ANODE_LOCATION_1: NORMAL
MDC_IDC_SET_LEADCHNL_RV_PACING_CAPTURE_MODE: NORMAL
MDC_IDC_SET_LEADCHNL_RV_PACING_CATHODE_ELECTRODE_1: NORMAL
MDC_IDC_SET_LEADCHNL_RV_PACING_CATHODE_LOCATION_1: NORMAL
MDC_IDC_SET_LEADCHNL_RV_PACING_POLARITY: NORMAL
MDC_IDC_SET_LEADCHNL_RV_PACING_PULSEWIDTH: 0.4 MS
MDC_IDC_SET_LEADCHNL_RV_SENSING_ADAPTATION_MODE: NORMAL
MDC_IDC_SET_LEADCHNL_RV_SENSING_ANODE_ELECTRODE_1: NORMAL
MDC_IDC_SET_LEADCHNL_RV_SENSING_ANODE_LOCATION_1: NORMAL
MDC_IDC_SET_LEADCHNL_RV_SENSING_CATHODE_ELECTRODE_1: NORMAL
MDC_IDC_SET_LEADCHNL_RV_SENSING_CATHODE_LOCATION_1: NORMAL
MDC_IDC_SET_LEADCHNL_RV_SENSING_POLARITY: NORMAL
MDC_IDC_SET_LEADCHNL_RV_SENSING_SENSITIVITY: 0.6 MV
MDC_IDC_SET_ZONE_DETECTION_INTERVAL: 300 MS
MDC_IDC_SET_ZONE_DETECTION_INTERVAL: 353 MS
MDC_IDC_SET_ZONE_DETECTION_INTERVAL: 462 MS
MDC_IDC_SET_ZONE_TYPE: NORMAL
MDC_IDC_SET_ZONE_VENDOR_TYPE: NORMAL
MDC_IDC_STAT_BRADY_RA_PERCENT_PACED: 77 %
MDC_IDC_STAT_BRADY_RV_PERCENT_PACED: 1 %
MDC_IDC_STAT_EPISODE_RECENT_COUNT: 0
MDC_IDC_STAT_EPISODE_RECENT_COUNT_DTM_END: NORMAL
MDC_IDC_STAT_EPISODE_RECENT_COUNT_DTM_START: NORMAL
MDC_IDC_STAT_EPISODE_TOTAL_COUNT: 1
MDC_IDC_STAT_EPISODE_TOTAL_COUNT: 2
MDC_IDC_STAT_EPISODE_TOTAL_COUNT: 44
MDC_IDC_STAT_EPISODE_TOTAL_COUNT_DTM_END: NORMAL
MDC_IDC_STAT_EPISODE_TYPE: NORMAL
MDC_IDC_STAT_EPISODE_VENDOR_TYPE: NORMAL
MDC_IDC_STAT_TACHYTHERAPY_ATP_DELIVERED_RECENT: 0
MDC_IDC_STAT_TACHYTHERAPY_ATP_DELIVERED_TOTAL: 2
MDC_IDC_STAT_TACHYTHERAPY_RECENT_DTM_END: NORMAL
MDC_IDC_STAT_TACHYTHERAPY_RECENT_DTM_START: NORMAL
MDC_IDC_STAT_TACHYTHERAPY_SHOCKS_ABORTED_RECENT: 0
MDC_IDC_STAT_TACHYTHERAPY_SHOCKS_ABORTED_TOTAL: 0
MDC_IDC_STAT_TACHYTHERAPY_SHOCKS_DELIVERED_RECENT: 0
MDC_IDC_STAT_TACHYTHERAPY_SHOCKS_DELIVERED_TOTAL: 0
MDC_IDC_STAT_TACHYTHERAPY_TOTAL_DTM_END: NORMAL

## 2019-04-01 LAB
ALBUMIN SERPL-MCNC: 4.2 G/DL
ALP SERPL-CCNC: 55 U/L
ALT SERPL-CCNC: 27 U/L
AST SERPL-CCNC: 24 U/L
BASOPHILS NFR BLD AUTO: 0.4 %
BILIRUB SERPL-MCNC: 1.1 MG/DL
BILIRUBIN DIRECT: 0.4 MG/DL
EOSINOPHIL NFR BLD AUTO: 0.7 %
ERYTHROCYTE [DISTWIDTH] IN BLOOD BY AUTOMATED COUNT: 14.8 %
HCT VFR BLD AUTO: 43.6 %
HEMOGLOBIN: 15.4 G/DL (ref 13.3–17.7)
LYMPHOCYTES NFR BLD AUTO: 6.9 %
MCH RBC QN AUTO: 33 PG
MCHC RBC AUTO-ENTMCNC: 35.3 G/DL
MCV RBC AUTO: 93.6 FL
MONOCYTES NFR BLD AUTO: 8.1 %
NEUTROPHILS NFR BLD AUTO: 82.4 %
PLATELET COUNT - QUEST: 205 10^9/L (ref 150–450)
PROT SERPL-MCNC: 6.4 G/DL
RBC # BLD AUTO: 4.6 10^12/L
WBC # BLD AUTO: 6.7 10^9/L

## 2019-04-11 DIAGNOSIS — D86.9 SARCOIDOSIS: Primary | ICD-10-CM

## 2019-04-11 DIAGNOSIS — Z79.631 ON METHOTREXATE THERAPY: ICD-10-CM

## 2019-04-15 DIAGNOSIS — D86.85 SARCOID, CARDIAC: ICD-10-CM

## 2019-04-15 DIAGNOSIS — D86.9 SARCOIDOSIS: ICD-10-CM

## 2019-04-15 RX ORDER — FOLIC ACID 1 MG/1
TABLET ORAL
Qty: 60 TABLET | Refills: 3 | Status: SHIPPED | OUTPATIENT
Start: 2019-04-15 | End: 2020-03-24

## 2019-04-15 RX ORDER — FOLIC ACID 1 MG/1
TABLET ORAL
Qty: 60 TABLET | Refills: 3 | Status: SHIPPED | OUTPATIENT
Start: 2019-04-15 | End: 2019-04-15

## 2019-05-09 ENCOUNTER — OFFICE VISIT (OUTPATIENT)
Dept: CARDIOLOGY | Facility: CLINIC | Age: 59
End: 2019-05-09
Attending: INTERNAL MEDICINE
Payer: COMMERCIAL

## 2019-05-09 ENCOUNTER — ANCILLARY PROCEDURE (OUTPATIENT)
Dept: CARDIOLOGY | Facility: CLINIC | Age: 59
End: 2019-05-09
Payer: COMMERCIAL

## 2019-05-09 VITALS
OXYGEN SATURATION: 98 % | DIASTOLIC BLOOD PRESSURE: 63 MMHG | HEIGHT: 74 IN | SYSTOLIC BLOOD PRESSURE: 98 MMHG | BODY MASS INDEX: 26.69 KG/M2 | WEIGHT: 208 LBS | HEART RATE: 60 BPM

## 2019-05-09 DIAGNOSIS — D86.85 SARCOID, CARDIAC: ICD-10-CM

## 2019-05-09 DIAGNOSIS — Z79.631 ON METHOTREXATE THERAPY: ICD-10-CM

## 2019-05-09 DIAGNOSIS — D86.9 SARCOIDOSIS: ICD-10-CM

## 2019-05-09 LAB
ALBUMIN SERPL-MCNC: 3.8 G/DL (ref 3.4–5)
ALP SERPL-CCNC: 54 U/L (ref 40–150)
ALT SERPL W P-5'-P-CCNC: 39 U/L (ref 0–70)
ANION GAP SERPL CALCULATED.3IONS-SCNC: 5 MMOL/L (ref 3–14)
AST SERPL W P-5'-P-CCNC: 20 U/L (ref 0–45)
BASOPHILS # BLD AUTO: 0.1 10E9/L (ref 0–0.2)
BASOPHILS NFR BLD AUTO: 0.8 %
BILIRUB DIRECT SERPL-MCNC: 0.2 MG/DL (ref 0–0.2)
BILIRUB SERPL-MCNC: 0.8 MG/DL (ref 0.2–1.3)
BUN SERPL-MCNC: 14 MG/DL (ref 7–30)
CALCIUM SERPL-MCNC: 9 MG/DL (ref 8.5–10.1)
CHLORIDE SERPL-SCNC: 106 MMOL/L (ref 94–109)
CO2 SERPL-SCNC: 30 MMOL/L (ref 20–32)
CREAT SERPL-MCNC: 1.04 MG/DL (ref 0.66–1.25)
DIFFERENTIAL METHOD BLD: ABNORMAL
EOSINOPHIL # BLD AUTO: 0.1 10E9/L (ref 0–0.7)
EOSINOPHIL NFR BLD AUTO: 2 %
ERYTHROCYTE [DISTWIDTH] IN BLOOD BY AUTOMATED COUNT: 14.4 % (ref 10–15)
GFR SERPL CREATININE-BSD FRML MDRD: 79 ML/MIN/{1.73_M2}
GLUCOSE SERPL-MCNC: 89 MG/DL (ref 70–99)
HCT VFR BLD AUTO: 43.2 % (ref 40–53)
HGB BLD-MCNC: 15 G/DL (ref 13.3–17.7)
IMM GRANULOCYTES # BLD: 0.1 10E9/L (ref 0–0.4)
IMM GRANULOCYTES NFR BLD: 0.9 %
LYMPHOCYTES # BLD AUTO: 0.6 10E9/L (ref 0.8–5.3)
LYMPHOCYTES NFR BLD AUTO: 9.9 %
MCH RBC QN AUTO: 33.6 PG (ref 26.5–33)
MCHC RBC AUTO-ENTMCNC: 34.7 G/DL (ref 31.5–36.5)
MCV RBC AUTO: 97 FL (ref 78–100)
MONOCYTES # BLD AUTO: 0.7 10E9/L (ref 0–1.3)
MONOCYTES NFR BLD AUTO: 10.8 %
NEUTROPHILS # BLD AUTO: 4.9 10E9/L (ref 1.6–8.3)
NEUTROPHILS NFR BLD AUTO: 75.6 %
NRBC # BLD AUTO: 0 10*3/UL
NRBC BLD AUTO-RTO: 0 /100
PLATELET # BLD AUTO: 182 10E9/L (ref 150–450)
POTASSIUM SERPL-SCNC: 3.7 MMOL/L (ref 3.4–5.3)
PROT SERPL-MCNC: 6.4 G/DL (ref 6.8–8.8)
RBC # BLD AUTO: 4.46 10E12/L (ref 4.4–5.9)
SODIUM SERPL-SCNC: 141 MMOL/L (ref 133–144)
WBC # BLD AUTO: 6.5 10E9/L (ref 4–11)

## 2019-05-09 PROCEDURE — 80053 COMPREHEN METABOLIC PANEL: CPT | Performed by: INTERNAL MEDICINE

## 2019-05-09 PROCEDURE — 82248 BILIRUBIN DIRECT: CPT | Performed by: INTERNAL MEDICINE

## 2019-05-09 PROCEDURE — G0463 HOSPITAL OUTPT CLINIC VISIT: HCPCS

## 2019-05-09 PROCEDURE — 99215 OFFICE O/P EST HI 40 MIN: CPT | Mod: ZP | Performed by: INTERNAL MEDICINE

## 2019-05-09 PROCEDURE — 85025 COMPLETE CBC W/AUTO DIFF WBC: CPT | Performed by: INTERNAL MEDICINE

## 2019-05-09 PROCEDURE — 36415 COLL VENOUS BLD VENIPUNCTURE: CPT | Performed by: INTERNAL MEDICINE

## 2019-05-09 RX ORDER — ACYCLOVIR 200 MG/1
10 CAPSULE ORAL ONCE
Status: ACTIVE | OUTPATIENT
Start: 2019-05-09

## 2019-05-09 RX ADMIN — Medication 5 ML: at 12:00

## 2019-05-09 ASSESSMENT — PAIN SCALES - GENERAL: PAINLEVEL: NO PAIN (0)

## 2019-05-09 ASSESSMENT — MIFFLIN-ST. JEOR: SCORE: 1838.23

## 2019-05-09 NOTE — NURSING NOTE
Diet: Patient instructed regarding a heart failure healthy diet, including discussion of reduced fat and 2000 mg daily sodium restriction, daily weights, medication purpose and compliance, fluid restrictions and resources for patient and family to access for assistance with heart failure management.       Labs: Patient was given results of the laboratory testing obtained today and patient was instructed about when to return for the next laboratory testing.     Med Reconcile: Reviewed and verified all current medications with the patient. The updated medication list was printed and given to the patient.     Med changes: methotrexate increased to 20 mg weekly, decrease prednisone to 5 mg daily on Sunday, continue for 1 week and then stop.      Return Appointment: Patient given instructions regarding scheduling next clinic visit: follow up with Dr Dickey in 4 months for echo, lab, device check    Patient stated he understood all health information given and agreed to call with further questions or concerns.     Helen Gonzalez RN

## 2019-05-09 NOTE — LETTER
5/9/2019      RE: Jose Carney  [address]  [facility] [address]       Dear Colleague,    Thank you for the opportunity to participate in the care of your patient, Jose Carney, at the [facility]. Please see a copy of my visit note below.    February 7, 2019    Dear Dr. De León,     I the pleasure of seeing Jose Carney in the [facility] Cardiac Sarcoidosis clinic today.     As you know he is a very pleasant 58-year-old man with a history sarcoidosis which is diagnosed by transbronchial biopsy in 2013.  He had been having back pain prior to this and 30 pound weight loss and had substantial mediastinal lymphadenopathy.  He was placed on several months of steroids with significant weight gain and improvement in symptoms as well as back pain.      In late summer and fall 2017 he started to develop palpitations which were found to be supraventricular tachycardia (likely AVNRT as well as some atrial tachycardia)  based on his cardiac monitor. He then had a cardiac MRI which was consistent with myocardial involvement from sarcoidosis (see below).  He was then sent down here for further management and was placed on metoprolol.    Based on high risk features of his cardiac MRI we recommended that he have an ICD placed which was placed locally with your team.  I did place him on a burst of prednisone given a highly suspicious PET scan which showed complete resolution of his cardiac sarcoidosis.  At that time I switch him to methotrexate as a staring steroid sparing agent and was titrating this up and tapering down his prednisone when he developed ventricular tachycardia.  This was in January 2019.  The VT was at a rate of 135 and was unable to be paced out.  He did not have syncope with this.  He was loaded with amiodarone, and given failure to gain control of his VT he was given Solu-Medrol and placed on higher dose of prednisone in addition to  his methotrexate.     He was then able to be converted and when I saw him last I continued him on amiodarone  200 mg, I increased his methotrexate 15 mg, and tapered down his prednisone to 5 m daily.  He has been overall feeling very well since I saw him last. He has not had any further VT. He can walk a mile on flat ground.  He denies PND orthopnea or lower extremity edema.  He denies any chest heaviness or pressure.  He is tolerating the methotrexate without any side effects with stable surveillance labs.     Of note he also was treated for uveitis in the last year and has some ongoing residual right eye blurriness.     PAST MEDICAL HISTORY:  Past Medical History:   Diagnosis Date     First degree AV block 3/20/2018     Palpitations 3/20/2018     Paroxysmal supraventricular tachycardia (H) 3/20/2018     Sarcoid, cardiac/EF 54% per MRI,[place] of affected myocardium is 12% of myocardial mass 1/25/2018     Sarcoidosis/ systemic 2013 3/20/2018     FAMILY HISTORY:  His family history is notable for several family members with autoimmune disease.  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  He denies any tobacco or alcohol use or recreational drug use.     CURRENT MEDICATIONS:  Current Outpatient Medications   Medication Sig Dispense Refill     amiodarone (PACERONE/CODARONE) 200 MG tablet Take 2 tabs (400 mg) by mouth twice daily for 13 days. Then take 2 tabs (400 mg) a day for 14 days. Then take 1 tab (200 mg) daily.       ASPIRIN 81 PO Take 81 mg by mouth daily       folic acid (FOLVITE) 1 MG tablet TAKE 5 MG ONCE WEEKLY WITH YOUR METHOTREXATE 60 tablet 3     ibuprofen (ADVIL) 200 MG tablet Take 400 mg by mouth every 4 hours as needed for mild pain       melatonin 3 MG tablet Take 3 mg by mouth        methotrexate 2.5 MG tablet Take 6 tablets (15 mg) by mouth every 7 days 72 tablet 3  "    metoprolol succinate (TOPROL XL) 25 MG 24 hr tablet Take 3 tablets (75 mg) by mouth daily 270 tablet 3     prednisoLONE acetate (PRED FORTE) 1 % ophthalmic susp Place 1 drop into both eyes every hour       predniSONE (DELTASONE) 5 MG tablet Take 5 mg by mouth daily. (Patient taking differently: Take 10 mg by mouth daily .) 90 tablet 3     sildenafil (REVATIO) 20 MG tablet Take 20-60 mg by mouth as needed       tolterodine (DETROL) 1 MG tablet Take 1 mg by mouth         ROS:   Constitutional: No fever, chills, or sweats. Weight has been stable   ENT: No visual disturbance, ear ache, epistaxis, sore throat.   Allergies/Immunologic: Negative.   Respiratory: No cough, hemoptysis.   Cardiovascular: As per HPI.   GI: No nausea, vomiting, hematemesis, melena, or hematochezia.   : No urinary frequency, dysuria, or hematuria.   Integument: Negative.   Psychiatric: insomnia   Neuro: Negative.   Endocrinology: Negative.   Musculoskeletal: Chronic back pain    EXAM:  BP 98/63 (BP Location: Right arm, Patient Position: Sitting, Cuff Size: Adult Regular)   Pulse 60   Ht 1.88 m (6' 2\")   Wt 94.3 kg (208 lb)   SpO2 98%   BMI 26.71 kg/m     General: appears comfortable, alert and articulate  Head: normocephalic, atraumatic  Eyes: anicteric sclera, EOMI  Neck: no adenopathy  Orophyarynx: moist mucosa, no lesions, dentition intact  Heart: regular, S1/S2, II/IV systolic murmur at the apex, no gallop, rub, estimated JVP < 10   Lungs: clear, no rales or wheezing  Abdomen: soft, non-tender, bowel sounds present, no hepatosplenomegaly  Extremities: no clubbing, cyanosis or edema  Neurological: normal speech and affect, no gross motor deficits    Labs:    Lab Results   Component Value Date    WBC 6.5 05/09/2019    HGB 15.0 05/09/2019    HCT 43.2 05/09/2019     05/09/2019     05/09/2019    POTASSIUM 3.7 05/09/2019    CHLORIDE 106 05/09/2019    CO2 30 05/09/2019    BUN 14 05/09/2019    CR 1.04 05/09/2019    GLC 89 " 05/09/2019    NTBNP 274 (H) 02/07/2019    TROPI <0.015 09/20/2018    AST 20 05/09/2019    ALT 39 05/09/2019    ALKPHOS 54 05/09/2019    BILITOTAL 0.8 05/09/2019         ECG: Sinus rhythm, first-degree AV block PVCs normal axis normal QT interval     Cardiac monitor from 8/20/2017 10/9/2017     Repeat PET:   8/2018    Impression: In this patient with cardiac sarcoidosis under treatment  with steroids for the last 4 months;  1. No FDG uptake within the myocardium. Overall there is complete  resolution of previous cardiac sarcoidosis.  2. Excellent suppression of myocardial glucose metabolism.  3. Stable number and size of parenchymal lung nodules, although  previously seen high metabolic activity within a few of these lesions  is no longer present.  4. Stable size number and metabolic activity of lymphadenopathy in the  mediastinum, bilateral axilla and right supraclavicular region. 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  5. Stable left stone.      rdia, no symptoms reported and rate has been right in between 150 to 170 beats a minute.   4. Clinical correlation advised.   echocardiogam today   Interpretation Summary  GLS strain not performed due to arrhythmia  Borderline (EF 50-55%) reduced left ventricular function is present.  No diagnostic wall motion abnormality, but analysis limited by irregular  rhythm with frequent ectopy.  Moderate left atrial enlargement is present.  Mild-moderate mitral valve regurgitation.  Previous study not available for comparison.      Cardiac MRI   1. The LV cavity size is mildly enlarged. The global systolic function is normal. The LVEF is 55%. There is  severe uwefktvfdtt-oz-hxnswlum involving the inferoseptal and inferior basal segments.     2. The RV is normal in cavity size. The global systolic function is normal. The RVEF is 66%.      3. Both atria are normal in size.     4. Due to ICD lead artifact, the tricuspid and pulmonic valves were not well  visualized.       The mitral and aortic valves appear normally functioning without significant disease.       5. Late gadolinium enhancement imaging shows epicardial and mid-myocardial enhancement in the  basal-inferoseptal, basal-inferior, and basal-inferolateral segments.      6. There is no pericardial effusion.     7. Unable to assess for intracardiac thrombus.     8. Unable to do T2 mapping and assess for myocardial edema (active inflammation).      CONCLUSIONS:    1. Cardiac sarcoidosis with preserved systolic function; LVEF 55%, RVEF 66%.  2. Sarcoidosis related scarring and severe smlxpxikwwq-pi-vpzjiydi involving the basal-inferoseptal and  basal-inferior segments. Total scar burden 12%. Unchanged from previous CMR dated 01/19/2018.    ICD interrogation today:   Patient seen in [facility] for evaluation and iterative programming of  lead ICD per MD orders.  Normal ICD function. Last episode of VT was on 1/23/19, appears monomorphic, with a rate in the 130s lasting a total of 24 minutes in duration.  Patient was hospitalized during this episode and received ATP x 12 which only briefly converted the rhythm.  No atrial arrhythmias recorded. Intrinsic rhythm = Sinus bradycardia.   AP = 77%.  =< 1%.  Estimated battery longevity to TORIE = 12  years.  Lead  trends appear stable. Patient reports that he is currently feeling well and denies complaints. Plan for patient to continue to follow with his primary device clinic in [place], [place].  No programming changes were made.  Dual lead ICDI have reviewed and interpreted the device interrogation, settings, programming and nurse's summary.  The device is functioning within normal device parameters.   I agree with the current findings, assessment and plan.      Cardiac MRI: 2/2019      2. The RV is normal in cavity size. The global systolic function is normal. The RVEF is 66%.      3. Both atria are normal in size.     4. Due to ICD lead artifact, the tricuspid and pulmonic  valves were not well visualized.       The mitral and aortic valves appear normally functioning without significant disease.       5. Late gadolinium enhancement imaging shows epicardial and mid-myocardial enhancement in the  basal-inferoseptal, basal-inferior, and basal-inferolateral segments.      6. There is no pericardial effusion.     7. Unable to assess for intracardiac thrombus.     8. Unable to do T2 mapping and assess for myocardial edema (active inflammation).      CONCLUSIONS:    1. Cardiac sarcoidosis with preserved systolic function; LVEF 55%, RVEF 66%.  2. Sarcoidosis related scarring and severe lajrfvxaxhj-be-vkrbojnh involving the basal-inferoseptal and  basal-inferior segments. Total scar burden 12%. Unchanged from previous CMR dated 01/19/2018.      CORE EXAM      Echocardiogram today   Interpretation Summary  Borderline (EF 50-55%) reduced left ventricular function is present. Basal  inferolateral thinng and akinesis.  Mild right ventricular dilation is present. Global right ventricular function  is normal.  Moderate mitral insufficiency is present.     This study was compared with the study from 09/20/2018. The mitral  regurgitation appears worse.    Assessment and Plan:   In summary this is a very pleasant 58-year-old man with a history of biopsy-proven cardiac sarcoid from a transbronchial biopsy-who has a cardiac MRI consistent with myocardial involvement from sarcoidosis. His PET scan related inflammation resolved completely with 3-4 months of 40 mg of prednisone.  I did have an ICD placed given he had high risk features on his cardiac MRI for malignant arrhythmias.     Given remission in his VT and stable cardiac function-  I have tapered down his prednisone and he has been stable on less steroids and on methotrexate. I am increasing his methotrexate to 20 mg daily and then stopping his prednisone. If he has remission from VT at our next visit I will up -titrate his beta blocker and stop his  amiodarone.     We will try to get away with as little immunosuppression as possible and will hope that the methotrexate keeps the sarcoid in remission.     I will see him back in 4 months with CBC, LFTs in 2 months.     VT: see plan above     MR on last echo: no symptoms, will monitor for now     Uveitis-  Followed locally     Please feel free to call me with any further questions and thank you for the opportunity to assist in his care.       Mandi Dickey MD   [facility]  Patient Care Team:  Clinic, [facility] as PCP - General  Helen Gonzalez, RN as Registered Nurse (Cardiology)  GIULIANO KOHLER  [place], [place]     Gary Soares MD

## 2019-05-09 NOTE — NURSING NOTE
Chief Complaint   Patient presents with     Follow Up     cardiac sarcoidosis with labs and echo prior     Vitals were taken and medications were reconciled.    Wendie Duran RMA  11:56 AM

## 2019-05-09 NOTE — PROGRESS NOTES
February 7, 2019    Dear Dr. De León,     I the pleasure of seeing Jose Carney in the [facility] Cardiac Sarcoidosis clinic today.     As you know he is a very pleasant 58-year-old man with a history sarcoidosis which is diagnosed by transbronchial biopsy in 2013.  He had been having back pain prior to this and 30 pound weight loss and had substantial mediastinal lymphadenopathy.  He was placed on several months of steroids with significant weight gain and improvement in symptoms as well as back pain.      In late summer and fall 2017 he started to develop palpitations which were found to be supraventricular tachycardia (likely AVNRT as well as some atrial tachycardia)  based on his cardiac monitor. He then had a cardiac MRI which was consistent with myocardial involvement from sarcoidosis (see below).  He was then sent down here for further management and was placed on metoprolol.    Based on high risk features of his cardiac MRI we recommended that he have an ICD placed which was placed locally with your team.  I did place him on a burst of prednisone given a highly suspicious PET scan which showed complete resolution of his cardiac sarcoidosis.  At that time I switch him to methotrexate as a staring steroid sparing agent and was titrating this up and tapering down his prednisone when he developed ventricular tachycardia.  This was in January 2019.  The VT was at a rate of 135 and was unable to be paced out.  He did not have syncope with this.  He was loaded with amiodarone, and given failure to gain control of his VT he was given Solu-Medrol and placed on higher dose of prednisone in addition to his methotrexate.     He was then able to be converted and when I saw him last I continued him on amiodarone  200 mg, I increased his methotrexate 15 mg, and tapered down his prednisone to 5 m daily.  He has been overall feeling very well since I saw him last. He has not had any further VT. He can walk a  mile on flat ground.  He denies PND orthopnea or lower extremity edema.  He denies any chest heaviness or pressure.  He is tolerating the methotrexate without any side effects with stable surveillance labs.     Of note he also was treated for uveitis in the last year and has some ongoing residual right eye blurriness.     PAST MEDICAL HISTORY:  Past Medical History:   Diagnosis Date     First degree AV block 3/20/2018     Palpitations 3/20/2018     Paroxysmal supraventricular tachycardia (H) 3/20/2018     Sarcoid, cardiac/EF 54% per MRI,[place] of affected myocardium is 12% of myocardial mass 1/25/2018     Sarcoidosis/ systemic 2013 3/20/2018     FAMILY HISTORY:  His family history is notable for several family members with autoimmune disease.  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  He denies any tobacco or alcohol use or recreational drug use.     CURRENT MEDICATIONS:  Current Outpatient Medications   Medication Sig Dispense Refill     amiodarone (PACERONE/CODARONE) 200 MG tablet Take 2 tabs (400 mg) by mouth twice daily for 13 days. Then take 2 tabs (400 mg) a day for 14 days. Then take 1 tab (200 mg) daily.       ASPIRIN 81 PO Take 81 mg by mouth daily       folic acid (FOLVITE) 1 MG tablet TAKE 5 MG ONCE WEEKLY WITH YOUR METHOTREXATE 60 tablet 3     ibuprofen (ADVIL) 200 MG tablet Take 400 mg by mouth every 4 hours as needed for mild pain       melatonin 3 MG tablet Take 3 mg by mouth        methotrexate 2.5 MG tablet Take 6 tablets (15 mg) by mouth every 7 days 72 tablet 3     metoprolol succinate (TOPROL XL) 25 MG 24 hr tablet Take 3 tablets (75 mg) by mouth daily 270 tablet 3     prednisoLONE acetate (PRED FORTE) 1 % ophthalmic susp Place 1 drop into both eyes every hour       predniSONE (DELTASONE) 5 MG tablet Take 5 mg by mouth daily. (Patient taking differently: Take 10  "mg by mouth daily .) 90 tablet 3     sildenafil (REVATIO) 20 MG tablet Take 20-60 mg by mouth as needed       tolterodine (DETROL) 1 MG tablet Take 1 mg by mouth         ROS:   Constitutional: No fever, chills, or sweats. Weight has been stable   ENT: No visual disturbance, ear ache, epistaxis, sore throat.   Allergies/Immunologic: Negative.   Respiratory: No cough, hemoptysis.   Cardiovascular: As per HPI.   GI: No nausea, vomiting, hematemesis, melena, or hematochezia.   : No urinary frequency, dysuria, or hematuria.   Integument: Negative.   Psychiatric: insomnia   Neuro: Negative.   Endocrinology: Negative.   Musculoskeletal: Chronic back pain    EXAM:  BP 98/63 (BP Location: Right arm, Patient Position: Sitting, Cuff Size: Adult Regular)   Pulse 60   Ht 1.88 m (6' 2\")   Wt 94.3 kg (208 lb)   SpO2 98%   BMI 26.71 kg/m    General: appears comfortable, alert and articulate  Head: normocephalic, atraumatic  Eyes: anicteric sclera, EOMI  Neck: no adenopathy  Orophyarynx: moist mucosa, no lesions, dentition intact  Heart: regular, S1/S2, II/IV systolic murmur at the apex, no gallop, rub, estimated JVP < 10   Lungs: clear, no rales or wheezing  Abdomen: soft, non-tender, bowel sounds present, no hepatosplenomegaly  Extremities: no clubbing, cyanosis or edema  Neurological: normal speech and affect, no gross motor deficits    Labs:    Lab Results   Component Value Date    WBC 6.5 05/09/2019    HGB 15.0 05/09/2019    HCT 43.2 05/09/2019     05/09/2019     05/09/2019    POTASSIUM 3.7 05/09/2019    CHLORIDE 106 05/09/2019    CO2 30 05/09/2019    BUN 14 05/09/2019    CR 1.04 05/09/2019    GLC 89 05/09/2019    NTBNP 274 (H) 02/07/2019    TROPI <0.015 09/20/2018    AST 20 05/09/2019    ALT 39 05/09/2019    ALKPHOS 54 05/09/2019    BILITOTAL 0.8 05/09/2019         ECG: Sinus rhythm, first-degree AV block PVCs normal axis normal QT interval     Cardiac monitor from 8/20/2017 10/9/2017     Repeat PET:   " 8/2018    Impression: In this patient with cardiac sarcoidosis under treatment  with steroids for the last 4 months;  1. No FDG uptake within the myocardium. Overall there is complete  resolution of previous cardiac sarcoidosis.  2. Excellent suppression of myocardial glucose metabolism.  3. Stable number and size of parenchymal lung nodules, although  previously seen high metabolic activity within a few of these lesions  is no longer present.  4. Stable size number and metabolic activity of lymphadenopathy in the  mediastinum, bilateral axilla and right supraclavicular region. 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  5. Stable left stone.      rdia, no symptoms reported and rate has been right in between 150 to 170 beats a minute.   4. Clinical correlation advised.   echocardiogam today   Interpretation Summary  GLS strain not performed due to arrhythmia  Borderline (EF 50-55%) reduced left ventricular function is present.  No diagnostic wall motion abnormality, but analysis limited by irregular  rhythm with frequent ectopy.  Moderate left atrial enlargement is present.  Mild-moderate mitral valve regurgitation.  Previous study not available for comparison.      Cardiac MRI   1. The LV cavity size is mildly enlarged. The global systolic function is normal. The LVEF is 55%. There is  severe dxhthbyokhv-nw-pxnwefwt involving the inferoseptal and inferior basal segments.     2. The RV is normal in cavity size. The global systolic function is normal. The RVEF is 66%.      3. Both atria are normal in size.     4. Due to ICD lead artifact, the tricuspid and pulmonic valves were not well visualized.       The mitral and aortic valves appear normally functioning without significant disease.       5. Late gadolinium enhancement imaging shows epicardial and mid-myocardial enhancement in the  basal-inferoseptal, basal-inferior, and basal-inferolateral segments.      6. There is no pericardial  effusion.     7. Unable to assess for intracardiac thrombus.     8. Unable to do T2 mapping and assess for myocardial edema (active inflammation).      CONCLUSIONS:    1. Cardiac sarcoidosis with preserved systolic function; LVEF 55%, RVEF 66%.  2. Sarcoidosis related scarring and severe flkroprdghd-lk-gqxgcicg involving the basal-inferoseptal and  basal-inferior segments. Total scar burden 12%. Unchanged from previous CMR dated 01/19/2018.    ICD interrogation today:   Patient seen in [facility] for evaluation and iterative programming of  lead ICD per MD orders.  Normal ICD function. Last episode of VT was on 1/23/19, appears monomorphic, with a rate in the 130s lasting a total of 24 minutes in duration.  Patient was hospitalized during this episode and received ATP x 12 which only briefly converted the rhythm.  No atrial arrhythmias recorded. Intrinsic rhythm = Sinus bradycardia.   AP = 77%.  =< 1%.  Estimated battery longevity to TORIE = 12  years.  Lead  trends appear stable. Patient reports that he is currently feeling well and denies complaints. Plan for patient to continue to follow with his primary device clinic in [place], [place].  No programming changes were made.  Dual lead ICDI have reviewed and interpreted the device interrogation, settings, programming and nurse's summary.  The device is functioning within normal device parameters.   I agree with the current findings, assessment and plan.      Cardiac MRI: 2/2019      2. The RV is normal in cavity size. The global systolic function is normal. The RVEF is 66%.      3. Both atria are normal in size.     4. Due to ICD lead artifact, the tricuspid and pulmonic valves were not well visualized.       The mitral and aortic valves appear normally functioning without significant disease.       5. Late gadolinium enhancement imaging shows epicardial and mid-myocardial enhancement in the  basal-inferoseptal, basal-inferior, and basal-inferolateral segments.      6. There  is no pericardial effusion.     7. Unable to assess for intracardiac thrombus.     8. Unable to do T2 mapping and assess for myocardial edema (active inflammation).      CONCLUSIONS:    1. Cardiac sarcoidosis with preserved systolic function; LVEF 55%, RVEF 66%.  2. Sarcoidosis related scarring and severe ynknslyfcqg-wf-tzdrsdcu involving the basal-inferoseptal and  basal-inferior segments. Total scar burden 12%. Unchanged from previous CMR dated 01/19/2018.      CORE EXAM      Echocardiogram today   Interpretation Summary  Borderline (EF 50-55%) reduced left ventricular function is present. Basal  inferolateral thinng and akinesis.  Mild right ventricular dilation is present. Global right ventricular function  is normal.  Moderate mitral insufficiency is present.     This study was compared with the study from 09/20/2018. The mitral  regurgitation appears worse.    Assessment and Plan:   In summary this is a very pleasant 58-year-old man with a history of biopsy-proven cardiac sarcoid from a transbronchial biopsy-who has a cardiac MRI consistent with myocardial involvement from sarcoidosis. His PET scan related inflammation resolved completely with 3-4 months of 40 mg of prednisone.  I did have an ICD placed given he had high risk features on his cardiac MRI for malignant arrhythmias.     Given remission in his VT and stable cardiac function-  I have tapered down his prednisone and he has been stable on less steroids and on methotrexate. I am increasing his methotrexate to 20 mg daily and then stopping his prednisone. If he has remission from VT at our next visit I will up -titrate his beta blocker and stop his amiodarone.     We will try to get away with as little immunosuppression as possible and will hope that the methotrexate keeps the sarcoid in remission.     I will see him back in 4 months with CBC, LFTs in 2 months.     VT: see plan above     MR on last echo: no symptoms, will monitor for now     Uveitis-   Followed locally     Please feel free to call me with any further questions and thank you for the opportunity to assist in his care.       Mandi Dickey MD   [facility]  Patient Care Team:  Umm, Chino Andrews as PCP - General  Helen Gonzalez, RN as Registered Nurse (Cardiology)  GIULIANO KOHLER [place], [place]     Gary Soares MD

## 2019-05-09 NOTE — LETTER
5/9/2019      RE: Jose Carney  [address]  [facility] [address]       February 7, 2019    Dear Dr. De León,     I the pleasure of seeing Jose Carney in the [facility] Cardiac Sarcoidosis clinic today.     As you know he is a very pleasant 58-year-old man with a history sarcoidosis which is diagnosed by transbronchial biopsy in 2013.  He had been having back pain prior to this and 30 pound weight loss and had substantial mediastinal lymphadenopathy.  He was placed on several months of steroids with significant weight gain and improvement in symptoms as well as back pain.      In late summer and fall 2017 he started to develop palpitations which were found to be supraventricular tachycardia (likely AVNRT as well as some atrial tachycardia)  based on his cardiac monitor. He then had a cardiac MRI which was consistent with myocardial involvement from sarcoidosis (see below).  He was then sent down here for further management and was placed on metoprolol.    Based on high risk features of his cardiac MRI we recommended that he have an ICD placed which was placed locally with your team.  I did place him on a burst of prednisone given a highly suspicious PET scan which showed complete resolution of his cardiac sarcoidosis.  At that time I switch him to methotrexate as a staring steroid sparing agent and was titrating this up and tapering down his prednisone when he developed ventricular tachycardia.  This was in January 2019.  The VT was at a rate of 135 and was unable to be paced out.  He did not have syncope with this.  He was loaded with amiodarone, and given failure to gain control of his VT he was given Solu-Medrol and placed on higher dose of prednisone in addition to his methotrexate.     He was then able to be converted and when I saw him last I continued him on amiodarone  200 mg, I increased his methotrexate 15 mg, and tapered down his prednisone to 5 m daily.  He has been overall feeling  very well since I saw him last. He has not had any further VT. He can walk a mile on flat ground.  He denies PND orthopnea or lower extremity edema.  He denies any chest heaviness or pressure.  He is tolerating the methotrexate without any side effects with stable surveillance labs.     Of note he also was treated for uveitis in the last year and has some ongoing residual right eye blurriness.     PAST MEDICAL HISTORY:  Past Medical History:   Diagnosis Date     First degree AV block 3/20/2018     Palpitations 3/20/2018     Paroxysmal supraventricular tachycardia (H) 3/20/2018     Sarcoid, cardiac/EF 54% per MRI,[place] of affected myocardium is 12% of myocardial mass 1/25/2018     Sarcoidosis/ systemic 2013 3/20/2018     FAMILY HISTORY:  His family history is notable for several family members with autoimmune disease.  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  He denies any tobacco or alcohol use or recreational drug use.     CURRENT MEDICATIONS:  Current Outpatient Medications   Medication Sig Dispense Refill     amiodarone (PACERONE/CODARONE) 200 MG tablet Take 2 tabs (400 mg) by mouth twice daily for 13 days. Then take 2 tabs (400 mg) a day for 14 days. Then take 1 tab (200 mg) daily.       ASPIRIN 81 PO Take 81 mg by mouth daily       folic acid (FOLVITE) 1 MG tablet TAKE 5 MG ONCE WEEKLY WITH YOUR METHOTREXATE 60 tablet 3     ibuprofen (ADVIL) 200 MG tablet Take 400 mg by mouth every 4 hours as needed for mild pain       melatonin 3 MG tablet Take 3 mg by mouth        methotrexate 2.5 MG tablet Take 6 tablets (15 mg) by mouth every 7 days 72 tablet 3     metoprolol succinate (TOPROL XL) 25 MG 24 hr tablet Take 3 tablets (75 mg) by mouth daily 270 tablet 3     prednisoLONE acetate (PRED FORTE) 1 % ophthalmic susp Place 1 drop into both eyes every hour       predniSONE  "(DELTASONE) 5 MG tablet Take 5 mg by mouth daily. (Patient taking differently: Take 10 mg by mouth daily .) 90 tablet 3     sildenafil (REVATIO) 20 MG tablet Take 20-60 mg by mouth as needed       tolterodine (DETROL) 1 MG tablet Take 1 mg by mouth         ROS:   Constitutional: No fever, chills, or sweats. Weight has been stable   ENT: No visual disturbance, ear ache, epistaxis, sore throat.   Allergies/Immunologic: Negative.   Respiratory: No cough, hemoptysis.   Cardiovascular: As per HPI.   GI: No nausea, vomiting, hematemesis, melena, or hematochezia.   : No urinary frequency, dysuria, or hematuria.   Integument: Negative.   Psychiatric: insomnia   Neuro: Negative.   Endocrinology: Negative.   Musculoskeletal: Chronic back pain    EXAM:  BP 98/63 (BP Location: Right arm, Patient Position: Sitting, Cuff Size: Adult Regular)   Pulse 60   Ht 1.88 m (6' 2\")   Wt 94.3 kg (208 lb)   SpO2 98%   BMI 26.71 kg/m     General: appears comfortable, alert and articulate  Head: normocephalic, atraumatic  Eyes: anicteric sclera, EOMI  Neck: no adenopathy  Orophyarynx: moist mucosa, no lesions, dentition intact  Heart: regular, S1/S2, II/IV systolic murmur at the apex, no gallop, rub, estimated JVP < 10   Lungs: clear, no rales or wheezing  Abdomen: soft, non-tender, bowel sounds present, no hepatosplenomegaly  Extremities: no clubbing, cyanosis or edema  Neurological: normal speech and affect, no gross motor deficits    Labs:    Lab Results   Component Value Date    WBC 6.5 05/09/2019    HGB 15.0 05/09/2019    HCT 43.2 05/09/2019     05/09/2019     05/09/2019    POTASSIUM 3.7 05/09/2019    CHLORIDE 106 05/09/2019    CO2 30 05/09/2019    BUN 14 05/09/2019    CR 1.04 05/09/2019    GLC 89 05/09/2019    NTBNP 274 (H) 02/07/2019    TROPI <0.015 09/20/2018    AST 20 05/09/2019    ALT 39 05/09/2019    ALKPHOS 54 05/09/2019    BILITOTAL 0.8 05/09/2019         ECG: Sinus rhythm, first-degree AV block PVCs normal " axis normal QT interval     Cardiac monitor from 8/20/2017 10/9/2017     Repeat PET:   8/2018    Impression: In this patient with cardiac sarcoidosis under treatment  with steroids for the last 4 months;  1. No FDG uptake within the myocardium. Overall there is complete  resolution of previous cardiac sarcoidosis.  2. Excellent suppression of myocardial glucose metabolism.  3. Stable number and size of parenchymal lung nodules, although  previously seen high metabolic activity within a few of these lesions  is no longer present.  4. Stable size number and metabolic activity of lymphadenopathy in the  mediastinum, bilateral axilla and right supraclavicular region. 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  5. Stable left stone.      rdia, no symptoms reported and rate has been right in between 150 to 170 beats a minute.   4. Clinical correlation advised.   echocardiogam today   Interpretation Summary  GLS strain not performed due to arrhythmia  Borderline (EF 50-55%) reduced left ventricular function is present.  No diagnostic wall motion abnormality, but analysis limited by irregular  rhythm with frequent ectopy.  Moderate left atrial enlargement is present.  Mild-moderate mitral valve regurgitation.  Previous study not available for comparison.      Cardiac MRI   1. The LV cavity size is mildly enlarged. The global systolic function is normal. The LVEF is 55%. There is  severe ljmyrnxlfoe-sh-yrhltvgs involving the inferoseptal and inferior basal segments.     2. The RV is normal in cavity size. The global systolic function is normal. The RVEF is 66%.      3. Both atria are normal in size.     4. Due to ICD lead artifact, the tricuspid and pulmonic valves were not well visualized.       The mitral and aortic valves appear normally functioning without significant disease.       5. Late gadolinium enhancement imaging shows epicardial and mid-myocardial enhancement in  the  basal-inferoseptal, basal-inferior, and basal-inferolateral segments.      6. There is no pericardial effusion.     7. Unable to assess for intracardiac thrombus.     8. Unable to do T2 mapping and assess for myocardial edema (active inflammation).      CONCLUSIONS:    1. Cardiac sarcoidosis with preserved systolic function; LVEF 55%, RVEF 66%.  2. Sarcoidosis related scarring and severe sksliwqeerj-vw-iaiqwcmv involving the basal-inferoseptal and  basal-inferior segments. Total scar burden 12%. Unchanged from previous CMR dated 01/19/2018.    ICD interrogation today:   Patient seen in [facility] for evaluation and iterative programming of  lead ICD per MD orders.  Normal ICD function. Last episode of VT was on 1/23/19, appears monomorphic, with a rate in the 130s lasting a total of 24 minutes in duration.  Patient was hospitalized during this episode and received ATP x 12 which only briefly converted the rhythm.  No atrial arrhythmias recorded. Intrinsic rhythm = Sinus bradycardia.   AP = 77%.  =< 1%.  Estimated battery longevity to TORIE = 12  years.  Lead  trends appear stable. Patient reports that he is currently feeling well and denies complaints. Plan for patient to continue to follow with his primary device clinic in [place], [place].  No programming changes were made.  Dual lead ICDI have reviewed and interpreted the device interrogation, settings, programming and nurse's summary.  The device is functioning within normal device parameters.   I agree with the current findings, assessment and plan.      Cardiac MRI: 2/2019      2. The RV is normal in cavity size. The global systolic function is normal. The RVEF is 66%.      3. Both atria are normal in size.     4. Due to ICD lead artifact, the tricuspid and pulmonic valves were not well visualized.       The mitral and aortic valves appear normally functioning without significant disease.       5. Late gadolinium enhancement imaging shows epicardial and  mid-myocardial enhancement in the  basal-inferoseptal, basal-inferior, and basal-inferolateral segments.      6. There is no pericardial effusion.     7. Unable to assess for intracardiac thrombus.     8. Unable to do T2 mapping and assess for myocardial edema (active inflammation).      CONCLUSIONS:    1. Cardiac sarcoidosis with preserved systolic function; LVEF 55%, RVEF 66%.  2. Sarcoidosis related scarring and severe couxuujdtks-xd-impzmcuv involving the basal-inferoseptal and  basal-inferior segments. Total scar burden 12%. Unchanged from previous CMR dated 01/19/2018.      CORE EXAM      Echocardiogram today   Interpretation Summary  Borderline (EF 50-55%) reduced left ventricular function is present. Basal  inferolateral thinng and akinesis.  Mild right ventricular dilation is present. Global right ventricular function  is normal.  Moderate mitral insufficiency is present.     This study was compared with the study from 09/20/2018. The mitral  regurgitation appears worse.    Assessment and Plan:   In summary this is a very pleasant 58-year-old man with a history of biopsy-proven cardiac sarcoid from a transbronchial biopsy-who has a cardiac MRI consistent with myocardial involvement from sarcoidosis. His PET scan related inflammation resolved completely with 3-4 months of 40 mg of prednisone.  I did have an ICD placed given he had high risk features on his cardiac MRI for malignant arrhythmias.     Given remission in his VT and stable cardiac function-  I have tapered down his prednisone and he has been stable on less steroids and on methotrexate. I am increasing his methotrexate to 20 mg daily and then stopping his prednisone. If he has remission from VT at our next visit I will up -titrate his beta blocker and stop his amiodarone.     We will try to get away with as little immunosuppression as possible and will hope that the methotrexate keeps the sarcoid in remission.     I will see him back in 4 months  with CBC, LFTs in 2 months.     VT: see plan above     MR on last echo: no symptoms, will monitor for now     Uveitis-  Followed locally     Please feel free to call me with any further questions and thank you for the opportunity to assist in his care.       Mandi Dickey MD   [facility]  Patient Care Team:  Clinic, [facility] as PCP - General Gonzalez, Helen Edmondson, RN as Registered Nurse (Cardiology)  GIULIANO KOHLER  [place], [place]     Gary Soares MD Rebecca Jane Cogswell, MD

## 2019-05-10 LAB
MDC_IDC_LEAD_IMPLANT_DT: NORMAL
MDC_IDC_LEAD_IMPLANT_DT: NORMAL
MDC_IDC_LEAD_LOCATION: NORMAL
MDC_IDC_LEAD_LOCATION: NORMAL
MDC_IDC_LEAD_LOCATION_DETAIL_1: NORMAL
MDC_IDC_LEAD_LOCATION_DETAIL_1: NORMAL
MDC_IDC_LEAD_MFG: NORMAL
MDC_IDC_LEAD_MFG: NORMAL
MDC_IDC_LEAD_MODEL: NORMAL
MDC_IDC_LEAD_MODEL: NORMAL
MDC_IDC_LEAD_POLARITY_TYPE: NORMAL
MDC_IDC_LEAD_POLARITY_TYPE: NORMAL
MDC_IDC_LEAD_SERIAL: NORMAL
MDC_IDC_LEAD_SERIAL: NORMAL
MDC_IDC_MSMT_BATTERY_STATUS: NORMAL
MDC_IDC_MSMT_CAP_CHARGE_DTM: NORMAL
MDC_IDC_MSMT_CAP_CHARGE_ENERGY: 0 J
MDC_IDC_MSMT_CAP_CHARGE_TIME: 0 S
MDC_IDC_MSMT_CAP_CHARGE_TIME: 8.93 S
MDC_IDC_MSMT_CAP_CHARGE_TYPE: NORMAL
MDC_IDC_MSMT_CAP_CHARGE_TYPE: NORMAL
MDC_IDC_MSMT_LEADCHNL_RA_IMPEDANCE_VALUE: 755 OHM
MDC_IDC_MSMT_LEADCHNL_RA_PACING_THRESHOLD_AMPLITUDE: 0.9 V
MDC_IDC_MSMT_LEADCHNL_RA_PACING_THRESHOLD_PULSEWIDTH: 0.4 MS
MDC_IDC_MSMT_LEADCHNL_RA_SENSING_INTR_AMPL: 7.5 MV
MDC_IDC_MSMT_LEADCHNL_RV_IMPEDANCE_VALUE: 521 OHM
MDC_IDC_MSMT_LEADCHNL_RV_PACING_THRESHOLD_AMPLITUDE: 0.7 V
MDC_IDC_MSMT_LEADCHNL_RV_PACING_THRESHOLD_PULSEWIDTH: 0.4 MS
MDC_IDC_MSMT_LEADCHNL_RV_SENSING_INTR_AMPL: 13.2 MV
MDC_IDC_PG_IMPLANT_DTM: NORMAL
MDC_IDC_PG_MFG: NORMAL
MDC_IDC_PG_MODEL: NORMAL
MDC_IDC_PG_SERIAL: NORMAL
MDC_IDC_PG_TYPE: NORMAL
MDC_IDC_SESS_CLINIC_NAME: NORMAL
MDC_IDC_SESS_DTM: NORMAL
MDC_IDC_SESS_TYPE: NORMAL
MDC_IDC_SET_BRADY_AT_MODE_SWITCH_MODE: NORMAL
MDC_IDC_SET_BRADY_AT_MODE_SWITCH_RATE: 170 {BEATS}/MIN
MDC_IDC_SET_BRADY_LOWRATE: 60 {BEATS}/MIN
MDC_IDC_SET_BRADY_MAX_SENSOR_RATE: 125 {BEATS}/MIN
MDC_IDC_SET_BRADY_MAX_TRACKING_RATE: 125 {BEATS}/MIN
MDC_IDC_SET_BRADY_MODE: NORMAL
MDC_IDC_SET_BRADY_PAV_DELAY_HIGH: 110 MS
MDC_IDC_SET_BRADY_PAV_DELAY_LOW: 220 MS
MDC_IDC_SET_BRADY_SAV_DELAY_HIGH: 110 MS
MDC_IDC_SET_BRADY_SAV_DELAY_LOW: 220 MS
MDC_IDC_SET_LEADCHNL_RA_PACING_AMPLITUDE: 2.5 V
MDC_IDC_SET_LEADCHNL_RA_PACING_ANODE_ELECTRODE_1: NORMAL
MDC_IDC_SET_LEADCHNL_RA_PACING_ANODE_LOCATION_1: NORMAL
MDC_IDC_SET_LEADCHNL_RA_PACING_CAPTURE_MODE: NORMAL
MDC_IDC_SET_LEADCHNL_RA_PACING_CATHODE_ELECTRODE_1: NORMAL
MDC_IDC_SET_LEADCHNL_RA_PACING_CATHODE_LOCATION_1: NORMAL
MDC_IDC_SET_LEADCHNL_RA_PACING_POLARITY: NORMAL
MDC_IDC_SET_LEADCHNL_RA_PACING_PULSEWIDTH: 0.4 MS
MDC_IDC_SET_LEADCHNL_RA_SENSING_ADAPTATION_MODE: NORMAL
MDC_IDC_SET_LEADCHNL_RA_SENSING_ANODE_ELECTRODE_1: NORMAL
MDC_IDC_SET_LEADCHNL_RA_SENSING_ANODE_LOCATION_1: NORMAL
MDC_IDC_SET_LEADCHNL_RA_SENSING_CATHODE_ELECTRODE_1: NORMAL
MDC_IDC_SET_LEADCHNL_RA_SENSING_CATHODE_LOCATION_1: NORMAL
MDC_IDC_SET_LEADCHNL_RA_SENSING_POLARITY: NORMAL
MDC_IDC_SET_LEADCHNL_RA_SENSING_SENSITIVITY: 0.25 MV
MDC_IDC_SET_LEADCHNL_RV_PACING_AMPLITUDE: 2.5 V
MDC_IDC_SET_LEADCHNL_RV_PACING_ANODE_ELECTRODE_1: NORMAL
MDC_IDC_SET_LEADCHNL_RV_PACING_ANODE_LOCATION_1: NORMAL
MDC_IDC_SET_LEADCHNL_RV_PACING_CAPTURE_MODE: NORMAL
MDC_IDC_SET_LEADCHNL_RV_PACING_CATHODE_ELECTRODE_1: NORMAL
MDC_IDC_SET_LEADCHNL_RV_PACING_CATHODE_LOCATION_1: NORMAL
MDC_IDC_SET_LEADCHNL_RV_PACING_POLARITY: NORMAL
MDC_IDC_SET_LEADCHNL_RV_PACING_PULSEWIDTH: 0.4 MS
MDC_IDC_SET_LEADCHNL_RV_SENSING_ADAPTATION_MODE: NORMAL
MDC_IDC_SET_LEADCHNL_RV_SENSING_ANODE_ELECTRODE_1: NORMAL
MDC_IDC_SET_LEADCHNL_RV_SENSING_ANODE_LOCATION_1: NORMAL
MDC_IDC_SET_LEADCHNL_RV_SENSING_CATHODE_ELECTRODE_1: NORMAL
MDC_IDC_SET_LEADCHNL_RV_SENSING_CATHODE_LOCATION_1: NORMAL
MDC_IDC_SET_LEADCHNL_RV_SENSING_POLARITY: NORMAL
MDC_IDC_SET_LEADCHNL_RV_SENSING_SENSITIVITY: 0.6 MV
MDC_IDC_SET_ZONE_DETECTION_INTERVAL: 300 MS
MDC_IDC_SET_ZONE_DETECTION_INTERVAL: 353 MS
MDC_IDC_SET_ZONE_DETECTION_INTERVAL: 462 MS
MDC_IDC_SET_ZONE_TYPE: NORMAL
MDC_IDC_SET_ZONE_VENDOR_TYPE: NORMAL
MDC_IDC_STAT_EPISODE_RECENT_COUNT: 0
MDC_IDC_STAT_EPISODE_RECENT_COUNT_DTM_END: NORMAL
MDC_IDC_STAT_EPISODE_RECENT_COUNT_DTM_START: NORMAL
MDC_IDC_STAT_EPISODE_TOTAL_COUNT: 1
MDC_IDC_STAT_EPISODE_TOTAL_COUNT: 2
MDC_IDC_STAT_EPISODE_TOTAL_COUNT: 47
MDC_IDC_STAT_EPISODE_TOTAL_COUNT_DTM_END: NORMAL
MDC_IDC_STAT_EPISODE_TYPE: NORMAL
MDC_IDC_STAT_EPISODE_VENDOR_TYPE: NORMAL
MDC_IDC_STAT_TACHYTHERAPY_ATP_DELIVERED_RECENT: 0
MDC_IDC_STAT_TACHYTHERAPY_ATP_DELIVERED_TOTAL: 2
MDC_IDC_STAT_TACHYTHERAPY_RECENT_DTM_END: NORMAL
MDC_IDC_STAT_TACHYTHERAPY_RECENT_DTM_START: NORMAL
MDC_IDC_STAT_TACHYTHERAPY_SHOCKS_ABORTED_RECENT: 0
MDC_IDC_STAT_TACHYTHERAPY_SHOCKS_ABORTED_TOTAL: 0
MDC_IDC_STAT_TACHYTHERAPY_SHOCKS_DELIVERED_RECENT: 0
MDC_IDC_STAT_TACHYTHERAPY_SHOCKS_DELIVERED_TOTAL: 0
MDC_IDC_STAT_TACHYTHERAPY_TOTAL_DTM_END: NORMAL

## 2019-06-18 ENCOUNTER — TRANSFERRED RECORDS (OUTPATIENT)
Dept: HEALTH INFORMATION MANAGEMENT | Facility: CLINIC | Age: 59
End: 2019-06-18

## 2019-06-18 ENCOUNTER — TELEPHONE (OUTPATIENT)
Dept: CARDIOLOGY | Facility: CLINIC | Age: 59
End: 2019-06-18

## 2019-06-18 DIAGNOSIS — D86.85 SARCOID, CARDIAC: Primary | ICD-10-CM

## 2019-06-18 NOTE — TELEPHONE ENCOUNTER
[facility] Call Center    Phone Message    May a detailed message be left on voicemail: No    Reason for Call: Dr. De León from [facility] is sending notes for Dr. Dickey to review to see what Dr. Dickey would like to do with Patient from visit patient had today. Phone number for Dr. De León in [place] is [phone].     Action Taken: Message routed to:  [facility]): Cardiology Clinic

## 2019-06-18 NOTE — TELEPHONE ENCOUNTER
Health Call Center    Phone Message    May a detailed message be left on voicemail: no    Reason for Call: Other: Pt reports swelling in his lower legs and shortness of breath. He says he is supposed to report this to Dr. Dickey when he can. Please call Pt back.     Action Taken: Message routed to:  [facility] ([facility]): [facility] CARDIOLOGY ADULT [facility]

## 2019-06-19 NOTE — TELEPHONE ENCOUNTER
Health Call Center    Phone Message    May a detailed message be left on voicemail: yes    Reason for Call: Symptoms or Concerns       Current symptom or concern: Swelling of the leg.  Still experiencing shortness of breath. No chest pain.    Symptoms have been present for:  2 weeks    Has patient previously been seen for this? Yes    By :[facility]    Date: 6.18.19    Are there any new or worsening symptoms? No- Symptoms are staying about the same      Action Taken: Message routed to:  [facility]): [facility] cardiology

## 2019-06-20 NOTE — TELEPHONE ENCOUNTER
Discussed symptoms with dr Dickey.  Recommended that the Jose come into see a CORE NP, echo and device check    Date: 6/20/2019    Time of Call: 4:50 PM     Diagnosis:  Sarcoidosis     [ VORB ] Ordering provider: Dr Dickey    Order: CORE, echo, device check     Order received by: Helen Gonzalez RN

## 2019-06-21 NOTE — TELEPHONE ENCOUNTER
Left VM for pt to call back to discuss his appointments that we have scheduled.    2:30 ECHO over at Hospital.  3:30 Labs at [facility]  4:00 Device check   4:30 with Reyna Frederick CMA

## 2019-06-21 NOTE — TELEPHONE ENCOUNTER
[facility] Call Center    Phone Message    May a detailed message be left on voicemail: yes    Reason for Call: Other: Jose calling to state someone was  going to call him today morning to schedule appt. Please call him back to schedule.      Action Taken: Message routed to:  [facility]

## 2019-06-24 DIAGNOSIS — D86.85 SARCOID, CARDIAC: Primary | ICD-10-CM

## 2019-06-25 ENCOUNTER — OFFICE VISIT (OUTPATIENT)
Dept: CARDIOLOGY | Facility: CLINIC | Age: 59
End: 2019-06-25
Attending: NURSE PRACTITIONER
Payer: COMMERCIAL

## 2019-06-25 ENCOUNTER — ANCILLARY PROCEDURE (OUTPATIENT)
Dept: CARDIOLOGY | Facility: CLINIC | Age: 59
End: 2019-06-25
Attending: INTERNAL MEDICINE
Payer: COMMERCIAL

## 2019-06-25 ENCOUNTER — RESEARCH ENCOUNTER (OUTPATIENT)
Dept: CARDIOLOGY | Facility: CLINIC | Age: 59
End: 2019-06-25

## 2019-06-25 ENCOUNTER — HOSPITAL ENCOUNTER (OUTPATIENT)
Dept: CARDIOLOGY | Facility: CLINIC | Age: 59
Discharge: HOME | End: 2019-06-25
Attending: INTERNAL MEDICINE | Admitting: INTERNAL MEDICINE
Payer: COMMERCIAL

## 2019-06-25 VITALS
SYSTOLIC BLOOD PRESSURE: 103 MMHG | DIASTOLIC BLOOD PRESSURE: 65 MMHG | BODY MASS INDEX: 27.13 KG/M2 | OXYGEN SATURATION: 98 % | HEIGHT: 74 IN | WEIGHT: 211.4 LBS | HEART RATE: 104 BPM

## 2019-06-25 DIAGNOSIS — D86.85 SARCOID, CARDIAC: ICD-10-CM

## 2019-06-25 DIAGNOSIS — D86.9 SARCOIDOSIS: ICD-10-CM

## 2019-06-25 LAB
ALBUMIN SERPL-MCNC: 3.8 G/DL (ref 3.4–5)
ALP SERPL-CCNC: 73 U/L (ref 40–150)
ALT SERPL W P-5'-P-CCNC: 54 U/L (ref 0–70)
ANION GAP SERPL CALCULATED.3IONS-SCNC: 6 MMOL/L (ref 3–14)
AST SERPL W P-5'-P-CCNC: 30 U/L (ref 0–45)
BASOPHILS # BLD AUTO: 0 10E9/L (ref 0–0.2)
BASOPHILS NFR BLD AUTO: 0.7 %
BILIRUB DIRECT SERPL-MCNC: 0.2 MG/DL (ref 0–0.2)
BILIRUB SERPL-MCNC: 0.8 MG/DL (ref 0.2–1.3)
BUN SERPL-MCNC: 17 MG/DL (ref 7–30)
CALCIUM SERPL-MCNC: 8.6 MG/DL (ref 8.5–10.1)
CHLORIDE SERPL-SCNC: 107 MMOL/L (ref 94–109)
CO2 SERPL-SCNC: 26 MMOL/L (ref 20–32)
CREAT SERPL-MCNC: 1.07 MG/DL (ref 0.66–1.25)
DIFFERENTIAL METHOD BLD: ABNORMAL
EOSINOPHIL # BLD AUTO: 0.3 10E9/L (ref 0–0.7)
EOSINOPHIL NFR BLD AUTO: 5.3 %
ERYTHROCYTE [DISTWIDTH] IN BLOOD BY AUTOMATED COUNT: 13.1 % (ref 10–15)
GFR SERPL CREATININE-BSD FRML MDRD: 76 ML/MIN/{1.73_M2}
GLUCOSE SERPL-MCNC: 85 MG/DL (ref 70–99)
HCT VFR BLD AUTO: 43 % (ref 40–53)
HGB BLD-MCNC: 14.8 G/DL (ref 13.3–17.7)
IMM GRANULOCYTES # BLD: 0 10E9/L (ref 0–0.4)
IMM GRANULOCYTES NFR BLD: 0.7 %
LYMPHOCYTES # BLD AUTO: 0.7 10E9/L (ref 0.8–5.3)
LYMPHOCYTES NFR BLD AUTO: 12.2 %
MCH RBC QN AUTO: 33 PG (ref 26.5–33)
MCHC RBC AUTO-ENTMCNC: 34.4 G/DL (ref 31.5–36.5)
MCV RBC AUTO: 96 FL (ref 78–100)
MDC_IDC_LEAD_IMPLANT_DT: NORMAL
MDC_IDC_LEAD_IMPLANT_DT: NORMAL
MDC_IDC_LEAD_LOCATION: NORMAL
MDC_IDC_LEAD_LOCATION: NORMAL
MDC_IDC_LEAD_LOCATION_DETAIL_1: NORMAL
MDC_IDC_LEAD_LOCATION_DETAIL_1: NORMAL
MDC_IDC_LEAD_MFG: NORMAL
MDC_IDC_LEAD_MFG: NORMAL
MDC_IDC_LEAD_MODEL: NORMAL
MDC_IDC_LEAD_MODEL: NORMAL
MDC_IDC_LEAD_POLARITY_TYPE: NORMAL
MDC_IDC_LEAD_POLARITY_TYPE: NORMAL
MDC_IDC_LEAD_SERIAL: NORMAL
MDC_IDC_LEAD_SERIAL: NORMAL
MDC_IDC_MSMT_BATTERY_REMAINING_LONGEVITY: 150 MO
MDC_IDC_MSMT_BATTERY_STATUS: NORMAL
MDC_IDC_MSMT_CAP_CHARGE_DTM: NORMAL
MDC_IDC_MSMT_CAP_CHARGE_ENERGY: 0 J
MDC_IDC_MSMT_CAP_CHARGE_TIME: 0 S
MDC_IDC_MSMT_CAP_CHARGE_TIME: 9.53
MDC_IDC_MSMT_CAP_CHARGE_TYPE: NORMAL
MDC_IDC_MSMT_CAP_CHARGE_TYPE: NORMAL
MDC_IDC_MSMT_LEADCHNL_RA_IMPEDANCE_VALUE: 752 OHM
MDC_IDC_MSMT_LEADCHNL_RA_PACING_THRESHOLD_AMPLITUDE: 0.8 V
MDC_IDC_MSMT_LEADCHNL_RA_PACING_THRESHOLD_PULSEWIDTH: 0.4 MS
MDC_IDC_MSMT_LEADCHNL_RA_SENSING_INTR_AMPL: 6.6 MV
MDC_IDC_MSMT_LEADCHNL_RV_IMPEDANCE_VALUE: 599 OHM
MDC_IDC_MSMT_LEADCHNL_RV_PACING_THRESHOLD_AMPLITUDE: 0.8 V
MDC_IDC_MSMT_LEADCHNL_RV_PACING_THRESHOLD_PULSEWIDTH: 0.4 MS
MDC_IDC_MSMT_LEADCHNL_RV_SENSING_INTR_AMPL: 11.5 MV
MDC_IDC_PG_IMPLANT_DTM: NORMAL
MDC_IDC_PG_MFG: NORMAL
MDC_IDC_PG_MODEL: NORMAL
MDC_IDC_PG_SERIAL: NORMAL
MDC_IDC_PG_TYPE: NORMAL
MDC_IDC_SESS_CLINIC_NAME: NORMAL
MDC_IDC_SESS_DTM: NORMAL
MDC_IDC_SESS_TYPE: NORMAL
MDC_IDC_SET_BRADY_AT_MODE_SWITCH_MODE: NORMAL
MDC_IDC_SET_BRADY_AT_MODE_SWITCH_RATE: 170 {BEATS}/MIN
MDC_IDC_SET_BRADY_LOWRATE: 60 {BEATS}/MIN
MDC_IDC_SET_BRADY_MAX_SENSOR_RATE: 125 {BEATS}/MIN
MDC_IDC_SET_BRADY_MAX_TRACKING_RATE: 125 {BEATS}/MIN
MDC_IDC_SET_BRADY_MODE: NORMAL
MDC_IDC_SET_BRADY_PAV_DELAY_HIGH: 110 MS
MDC_IDC_SET_BRADY_PAV_DELAY_LOW: 220 MS
MDC_IDC_SET_BRADY_SAV_DELAY_HIGH: 110 MS
MDC_IDC_SET_BRADY_SAV_DELAY_LOW: 220 MS
MDC_IDC_SET_LEADCHNL_RA_PACING_AMPLITUDE: 2.5 V
MDC_IDC_SET_LEADCHNL_RA_PACING_ANODE_ELECTRODE_1: NORMAL
MDC_IDC_SET_LEADCHNL_RA_PACING_ANODE_LOCATION_1: NORMAL
MDC_IDC_SET_LEADCHNL_RA_PACING_CAPTURE_MODE: NORMAL
MDC_IDC_SET_LEADCHNL_RA_PACING_CATHODE_ELECTRODE_1: NORMAL
MDC_IDC_SET_LEADCHNL_RA_PACING_CATHODE_LOCATION_1: NORMAL
MDC_IDC_SET_LEADCHNL_RA_PACING_POLARITY: NORMAL
MDC_IDC_SET_LEADCHNL_RA_PACING_PULSEWIDTH: 0.4 MS
MDC_IDC_SET_LEADCHNL_RA_SENSING_ADAPTATION_MODE: NORMAL
MDC_IDC_SET_LEADCHNL_RA_SENSING_ANODE_ELECTRODE_1: NORMAL
MDC_IDC_SET_LEADCHNL_RA_SENSING_ANODE_LOCATION_1: NORMAL
MDC_IDC_SET_LEADCHNL_RA_SENSING_CATHODE_ELECTRODE_1: NORMAL
MDC_IDC_SET_LEADCHNL_RA_SENSING_CATHODE_LOCATION_1: NORMAL
MDC_IDC_SET_LEADCHNL_RA_SENSING_POLARITY: NORMAL
MDC_IDC_SET_LEADCHNL_RA_SENSING_SENSITIVITY: 0.25 MV
MDC_IDC_SET_LEADCHNL_RV_PACING_AMPLITUDE: 2.5 V
MDC_IDC_SET_LEADCHNL_RV_PACING_ANODE_ELECTRODE_1: NORMAL
MDC_IDC_SET_LEADCHNL_RV_PACING_ANODE_LOCATION_1: NORMAL
MDC_IDC_SET_LEADCHNL_RV_PACING_CAPTURE_MODE: NORMAL
MDC_IDC_SET_LEADCHNL_RV_PACING_CATHODE_ELECTRODE_1: NORMAL
MDC_IDC_SET_LEADCHNL_RV_PACING_CATHODE_LOCATION_1: NORMAL
MDC_IDC_SET_LEADCHNL_RV_PACING_POLARITY: NORMAL
MDC_IDC_SET_LEADCHNL_RV_PACING_PULSEWIDTH: 0.4 MS
MDC_IDC_SET_LEADCHNL_RV_SENSING_ADAPTATION_MODE: NORMAL
MDC_IDC_SET_LEADCHNL_RV_SENSING_ANODE_ELECTRODE_1: NORMAL
MDC_IDC_SET_LEADCHNL_RV_SENSING_ANODE_LOCATION_1: NORMAL
MDC_IDC_SET_LEADCHNL_RV_SENSING_CATHODE_ELECTRODE_1: NORMAL
MDC_IDC_SET_LEADCHNL_RV_SENSING_CATHODE_LOCATION_1: NORMAL
MDC_IDC_SET_LEADCHNL_RV_SENSING_POLARITY: NORMAL
MDC_IDC_SET_LEADCHNL_RV_SENSING_SENSITIVITY: 0.6 MV
MDC_IDC_SET_ZONE_DETECTION_INTERVAL: 300 MS
MDC_IDC_SET_ZONE_DETECTION_INTERVAL: 353 MS
MDC_IDC_SET_ZONE_DETECTION_INTERVAL: 462 MS
MDC_IDC_SET_ZONE_TYPE: NORMAL
MDC_IDC_SET_ZONE_VENDOR_TYPE: NORMAL
MDC_IDC_STAT_AT_BURDEN_PERCENT: 1 %
MDC_IDC_STAT_BRADY_RA_PERCENT_PACED: 94 %
MDC_IDC_STAT_BRADY_RV_PERCENT_PACED: 47 %
MDC_IDC_STAT_EPISODE_RECENT_COUNT: 0
MDC_IDC_STAT_EPISODE_RECENT_COUNT: 26
MDC_IDC_STAT_EPISODE_RECENT_COUNT_DTM_END: NORMAL
MDC_IDC_STAT_EPISODE_RECENT_COUNT_DTM_START: NORMAL
MDC_IDC_STAT_EPISODE_TOTAL_COUNT: 1
MDC_IDC_STAT_EPISODE_TOTAL_COUNT: 2
MDC_IDC_STAT_EPISODE_TOTAL_COUNT: 47
MDC_IDC_STAT_EPISODE_TOTAL_COUNT_DTM_END: NORMAL
MDC_IDC_STAT_EPISODE_TYPE: NORMAL
MDC_IDC_STAT_EPISODE_VENDOR_TYPE: NORMAL
MDC_IDC_STAT_TACHYTHERAPY_ATP_DELIVERED_RECENT: 0
MDC_IDC_STAT_TACHYTHERAPY_ATP_DELIVERED_TOTAL: 2
MDC_IDC_STAT_TACHYTHERAPY_RECENT_DTM_END: NORMAL
MDC_IDC_STAT_TACHYTHERAPY_RECENT_DTM_START: NORMAL
MDC_IDC_STAT_TACHYTHERAPY_SHOCKS_ABORTED_RECENT: 0
MDC_IDC_STAT_TACHYTHERAPY_SHOCKS_ABORTED_TOTAL: 0
MDC_IDC_STAT_TACHYTHERAPY_SHOCKS_DELIVERED_RECENT: 0
MDC_IDC_STAT_TACHYTHERAPY_SHOCKS_DELIVERED_TOTAL: 0
MDC_IDC_STAT_TACHYTHERAPY_TOTAL_DTM_END: NORMAL
MONOCYTES # BLD AUTO: 0.6 10E9/L (ref 0–1.3)
MONOCYTES NFR BLD AUTO: 11.5 %
NEUTROPHILS # BLD AUTO: 3.8 10E9/L (ref 1.6–8.3)
NEUTROPHILS NFR BLD AUTO: 69.6 %
NRBC # BLD AUTO: 0 10*3/UL
NRBC BLD AUTO-RTO: 0 /100
NT-PROBNP SERPL-MCNC: 360 PG/ML (ref 0–125)
PLATELET # BLD AUTO: 201 10E9/L (ref 150–450)
POTASSIUM SERPL-SCNC: 4 MMOL/L (ref 3.4–5.3)
PROT SERPL-MCNC: 6.4 G/DL (ref 6.8–8.8)
RBC # BLD AUTO: 4.49 10E12/L (ref 4.4–5.9)
SODIUM SERPL-SCNC: 139 MMOL/L (ref 133–144)
WBC # BLD AUTO: 5.5 10E9/L (ref 4–11)

## 2019-06-25 PROCEDURE — 83880 ASSAY OF NATRIURETIC PEPTIDE: CPT | Performed by: NURSE PRACTITIONER

## 2019-06-25 PROCEDURE — 85025 COMPLETE CBC W/AUTO DIFF WBC: CPT | Performed by: NURSE PRACTITIONER

## 2019-06-25 PROCEDURE — 80076 HEPATIC FUNCTION PANEL: CPT | Performed by: NURSE PRACTITIONER

## 2019-06-25 PROCEDURE — 93325 DOPPLER ECHO COLOR FLOW MAPG: CPT | Mod: 26 | Performed by: INTERNAL MEDICINE

## 2019-06-25 PROCEDURE — 93308 TTE F-UP OR LMTD: CPT

## 2019-06-25 PROCEDURE — 36415 COLL VENOUS BLD VENIPUNCTURE: CPT | Performed by: NURSE PRACTITIONER

## 2019-06-25 PROCEDURE — G0463 HOSPITAL OUTPT CLINIC VISIT: HCPCS | Mod: ZF

## 2019-06-25 PROCEDURE — 93321 DOPPLER ECHO F-UP/LMTD STD: CPT | Mod: 26 | Performed by: INTERNAL MEDICINE

## 2019-06-25 PROCEDURE — 93308 TTE F-UP OR LMTD: CPT | Mod: 26 | Performed by: INTERNAL MEDICINE

## 2019-06-25 PROCEDURE — 80048 BASIC METABOLIC PNL TOTAL CA: CPT | Performed by: NURSE PRACTITIONER

## 2019-06-25 PROCEDURE — 99214 OFFICE O/P EST MOD 30 MIN: CPT | Mod: ZP | Performed by: NURSE PRACTITIONER

## 2019-06-25 RX ORDER — FUROSEMIDE 20 MG
20 TABLET ORAL DAILY
Qty: 30 TABLET | Refills: 0 | Status: ON HOLD | OUTPATIENT
Start: 2019-06-25 | End: 2022-03-29

## 2019-06-25 ASSESSMENT — MIFFLIN-ST. JEOR: SCORE: 1853.65

## 2019-06-25 ASSESSMENT — PAIN SCALES - GENERAL: PAINLEVEL: NO PAIN (0)

## 2019-06-25 NOTE — LETTER
6/25/2019      RE: Jose Carney  [address]  [facility] [address]       Dear Colleague,    Thank you for the opportunity to participate in the care of your patient, Jose Carney, at the [facility]. Please see a copy of my visit note below.    HPI  Mr. Carney is a 58 year old man with a history of biopsy confirmed sarcoidosis and VT referred to CORE clinic by Dr. Dickey after Mr. Carney called reporting new shortness of breath and lower leg edema. He presents to clinic for follow up.    Mr. Carney is feeling more sob with walking. Not at rest. No orthopnea, new WILLIAM --waiting for CPAP. No PND. No chest pain, palpitations, lightheadedness. Ankle swelling extending to the knees. Appetite is good. No nausea.     PAST MEDICAL HISTORY:  Past Medical History:   Diagnosis Date     First degree AV block 3/20/2018     Palpitations 3/20/2018     Paroxysmal supraventricular tachycardia (H) 3/20/2018     Sarcoid, cardiac/EF 54% per MRI,[place] of affected myocardium is 12% of myocardial mass 1/25/2018     Sarcoidosis/ systemic 2013 3/20/2018     FAMILY HISTORY:  His family history is notable for several family members with autoimmune disease.  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  He denies any tobacco or alcohol use or recreational drug use.       Current Outpatient Medications on File Prior to Visit:  amiodarone (PACERONE/CODARONE) 200 MG tablet Take 2 tabs (400 mg) by mouth twice daily for 13 days. Then take 2 tabs (400 mg) a day for 14 days. Then take 1 tab (200 mg) daily.   ASPIRIN 81 PO Take 81 mg by mouth daily   folic acid (FOLVITE) 1 MG tablet TAKE 5 MG ONCE WEEKLY WITH YOUR METHOTREXATE   ibuprofen (ADVIL) 200 MG tablet Take 400 mg by mouth every 4 hours as needed for mild pain   melatonin 3 MG tablet Take 3 mg by mouth   "  methotrexate 2.5 MG tablet Take 8 tablets (20 mg) by mouth every 7 days   metoprolol succinate (TOPROL XL) 25 MG 24 hr tablet Take 3 tablets (75 mg) by mouth daily   prednisoLONE acetate (PRED FORTE) 1 % ophthalmic susp Place 1 drop into both eyes every hour   sildenafil (REVATIO) 20 MG tablet Take 20-60 mg by mouth as needed   tolterodine (DETROL) 1 MG tablet Take 1 mg by mouth     Current Facility-Administered Medications on File Prior to Visit:  sodium chloride (PF) 0.9% PF flush 10 mL   sodium chloride bacteriostatic 0.9 % flush 10 mL           ROS:   Constitutional: No fever, chills, or sweats. Weight has been stable   ENT: No visual disturbance, ear ache, epistaxis, sore throat.   Allergies/Immunologic: Negative.   Respiratory: No cough, hemoptysis.   Cardiovascular: As per HPI.   GI: No nausea, vomiting, hematemesis, melena, or hematochezia.   : No urinary frequency, dysuria, or hematuria.   Integument: Negative.   Psychiatric: insomnia   Neuro: Negative.   Endocrinology: Negative.   Musculoskeletal: Chronic back pain    EXAM:  /65 (BP Location: Right arm, Patient Position: Chair, Cuff Size: Adult Regular)   Pulse 104   Ht 1.88 m (6' 2\")   Wt 95.9 kg (211 lb 6.4 oz)   SpO2 98%   BMI 27.14 kg/m     General: appears comfortable, alert and articulate  Head: normocephalic, atraumatic  Eyes: anicteric sclera, EOMI  Neck: no adenopathy  Orophyarynx: moist mucosa, no lesions, dentition intact  Heart: regular, S1/S2, II/IV systolic murmur at the apex, no gallop, rub, estimated JVP < 10   Lungs: clear, no rales or wheezing  Abdomen: soft, non-tender, bowel sounds present, no hepatosplenomegaly  Extremities: no clubbing, cyanosis or edema  Neurological: normal speech and affect, no gross motor deficits    Labs:    Lab Results   Component Value Date    WBC 5.5 06/25/2019    HGB 14.8 06/25/2019    HCT 43.0 06/25/2019     06/25/2019     06/25/2019    POTASSIUM 4.0 06/25/2019    CHLORIDE 107 " 06/25/2019    CO2 26 06/25/2019    BUN 17 06/25/2019    CR 1.07 06/25/2019    GLC 85 06/25/2019    NTBNP 360 (H) 06/25/2019    TROPI <0.015 09/20/2018    AST 30 06/25/2019    ALT 54 06/25/2019    ALKPHOS 73 06/25/2019    BILITOTAL 0.8 06/25/2019         ECG: Sinus rhythm, first-degree AV block PVCs normal axis normal QT interval     Cardiac monitor from 8/20/2017 10/9/2017     Repeat PET:   8/2018    Impression: In this patient with cardiac sarcoidosis under treatment  with steroids for the last 4 months;  1. No FDG uptake within the myocardium. Overall there is complete  resolution of previous cardiac sarcoidosis.  2. Excellent suppression of myocardial glucose metabolism.  3. Stable number and size of parenchymal lung nodules, although  previously seen high metabolic activity within a few of these lesions  is no longer present.  4. Stable size number and metabolic activity of lymphadenopathy in the  mediastinum, bilateral axilla and right supraclavicular region. 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  5. Stable left stone.      rdia, no symptoms reported and rate has been right in between 150 to 170 beats a minute.   4. Clinical correlation advised.   echocardiogam today   Interpretation Summary  GLS strain not performed due to arrhythmia  Borderline (EF 50-55%) reduced left ventricular function is present.  No diagnostic wall motion abnormality, but analysis limited by irregular  rhythm with frequent ectopy.  Moderate left atrial enlargement is present.  Mild-moderate mitral valve regurgitation.  Previous study not available for comparison.      Cardiac MRI   1. The LV cavity size is mildly enlarged. The global systolic function is normal. The LVEF is 55%. There is  severe livmjpxotbq-kz-zrjdlapc involving the inferoseptal and inferior basal segments.     2. The RV is normal in cavity size. The global systolic function is normal. The RVEF is 66%.      3. Both atria are normal in  size.     4. Due to ICD lead artifact, the tricuspid and pulmonic valves were not well visualized.       The mitral and aortic valves appear normally functioning without significant disease.       5. Late gadolinium enhancement imaging shows epicardial and mid-myocardial enhancement in the  basal-inferoseptal, basal-inferior, and basal-inferolateral segments.      6. There is no pericardial effusion.     7. Unable to assess for intracardiac thrombus.     8. Unable to do T2 mapping and assess for myocardial edema (active inflammation).      CONCLUSIONS:    1. Cardiac sarcoidosis with preserved systolic function; LVEF 55%, RVEF 66%.  2. Sarcoidosis related scarring and severe ceyfmvnmzrz-uj-kvnsihey involving the basal-inferoseptal and  basal-inferior segments. Total scar burden 12%. Unchanged from previous CMR dated 01/19/2018.    ICD interrogation today:   Patient seen in clinic for evaluation and iterative programming of his [employer] dual lead ICD per MD orders. Patient has an appointment to see Reyna Sandoval NP today. Normal ICD function. 26 AT/AF episodes recorded - 1 sec - 51 min 4 sec. AF burden = < 1%. No ventricular arrhythmias recorded. Intrinsic rhythm = SB with first degree AVB @ 55 bpm. AP = 94%.  = 47%. Estimated battery longevity to TORIE = 12.5 years. Patient reports that he is feeling well and denies complaints. Patient routinely has his device checked in [place]. Camden Becerra lead ICDI have reviewed and interpreted the device interrogation, settings, programming and nurse's summary. The device is functioning within normal device parameters. I agree with the current findings, assessment and plan.      Cardiac MRI: 2/2019      2. The RV is normal in cavity size. The global systolic function is normal. The RVEF is 66%.      3. Both atria are normal in size.     4. Due to ICD lead artifact, the tricuspid and pulmonic valves were not well visualized.       The mitral and aortic valves appear  normally functioning without significant disease.       5. Late gadolinium enhancement imaging shows epicardial and mid-myocardial enhancement in the  basal-inferoseptal, basal-inferior, and basal-inferolateral segments.      6. There is no pericardial effusion.     7. Unable to assess for intracardiac thrombus.     8. Unable to do T2 mapping and assess for myocardial edema (active inflammation).      CONCLUSIONS:    1. Cardiac sarcoidosis with preserved systolic function; LVEF 55%, RVEF 66%.  2. Sarcoidosis related scarring and severe fygwiweiqmx-pj-xjggpeow involving the basal-inferoseptal and  basal-inferior segments. Total scar burden 12%. Unchanged from previous CMR dated 01/19/2018.      CORE EXAM      Echocardiogram today   Interpretation Summary  Borderline (EF 50-55%) reduced left ventricular function is present. Basal  inferolateral thinng and akinesis.  Mild right ventricular dilation is present. Global right ventricular function  is normal.  Moderate mitral insufficiency is present.     This study was compared with the study from 09/20/2018. The mitral  regurgitation appears worse.    Assessment and Plan:   Mr. Carney is a pleasant 58 year old man with cardiac sarcoid and VT s/p ICD who appears volume up and will take Lasix 20 mg once daily with a goal of 3-4 pound weight loss. His device check showed 26 episodes of AT/AF with <1% AF burden. He will escalate his Toprol to 100 mg daily. I've messaged Dr. Dickey regarding anticoagulation and regarding repeat PET scan. Mr. Carney misunderstood his instructions at his last visit when he cut back his prednisone but did not increase methotrexate.     Mr. Carney will call us with an update later this week. We'll coordinate follow up based on Dr. Dickey's recommendations for repeat imaging and with his local cardiology team.       25 minutes spent in direct care, >50% in counseling    CC  Patient Care Team:  Clinic, [facility] as PCP - Helen Hernadez  Delvis RN as Registered Nurse (Cardiology)  GIULIANO KOHLER [place], [place] Hausch, Raymond C, MD      Please do not hesitate to contact me if you have any questions/concerns.     Sincerely,     Reyna Sandoval NP

## 2019-06-25 NOTE — PATIENT INSTRUCTIONS
It was a pleasure to see you in clinic today.  Please do not hesitate to call with any questions or concerns.      Colette Burks, RN, MS, CCRN  Electrophysiology Nurse Clinician  [facility]    During Business Hours Please Call:  [phone]  After Hours Please Call:  [phone] - select option #4 and ask for job code [id number]

## 2019-06-25 NOTE — PROGRESS NOTES
Trial name: [facility] Cardiac Sarcoidosis Registry    Development of a prospective Cardiac Sarcoidosis Registry   IRB # [id number]  PI: Lauro Will MD ([phone])   : Fabiola Palmer RN ([phone])  : Faby Duarte ([phone])  Patient was approached for possible participation for the above study. The current approved IRB consent form was discussed and explained to the patient.  It was discussed that involvement with the study is voluntary and refusal to participate would not involve penalty or decrease benefits at which the patient is entitled, and the subject may discontinue his/her involvement at any time without penalty or loss in benefits. We also discussed that this study does not have follow up visits or procedures. Patient was informed that an additional contact might occur if data needed was not found in patient s medical record. The patient was given time to review and ask any questions about the consent. Patient was shown contact information for PI and study staff in consent for future questions. Patient verbalized understanding of consent and study by restating the purpose, procedures, duration, risk, confidentiality of PHI, and voluntarily participation. Patient printed, signed and dated the consent and HIPAA form prior to study involvement. A copy was given to the patient for their records. SUBJECT CONSENT/HIPPA SIGNED ON: 06.25.2019

## 2019-06-25 NOTE — PROGRESS NOTES
HPI  Mr. Carney is a 58 year old man with a history of biopsy confirmed sarcoidosis and VT referred to CORE clinic by Dr. Dickey after Mr. Carney called reporting new shortness of breath and lower leg edema. He presents to clinic for follow up.    Mr. Carney is feeling more sob with walking. Not at rest. No orthopnea, new WILLIAM --waiting for CPAP. No PND. No chest pain, palpitations, lightheadedness. Ankle swelling extending to the knees. Appetite is good. No nausea.     PAST MEDICAL HISTORY:  Past Medical History:   Diagnosis Date     First degree AV block 3/20/2018     Palpitations 3/20/2018     Paroxysmal supraventricular tachycardia (H) 3/20/2018     Sarcoid, cardiac/EF 54% per MRI,[place] of affected myocardium is 12% of myocardial mass 1/25/2018     Sarcoidosis/ systemic 2013 3/20/2018     FAMILY HISTORY:  His family history is notable for several family members with autoimmune disease.  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  He denies any tobacco or alcohol use or recreational drug use.       Current Outpatient Medications on File Prior to Visit:  amiodarone (PACERONE/CODARONE) 200 MG tablet Take 2 tabs (400 mg) by mouth twice daily for 13 days. Then take 2 tabs (400 mg) a day for 14 days. Then take 1 tab (200 mg) daily.   ASPIRIN 81 PO Take 81 mg by mouth daily   folic acid (FOLVITE) 1 MG tablet TAKE 5 MG ONCE WEEKLY WITH YOUR METHOTREXATE   ibuprofen (ADVIL) 200 MG tablet Take 400 mg by mouth every 4 hours as needed for mild pain   melatonin 3 MG tablet Take 3 mg by mouth    methotrexate 2.5 MG tablet Take 8 tablets (20 mg) by mouth every 7 days   metoprolol succinate (TOPROL XL) 25 MG 24 hr tablet Take 3 tablets (75 mg) by mouth daily   prednisoLONE acetate (PRED FORTE) 1 % ophthalmic susp Place 1 drop into both eyes every hour   sildenafil (REVATIO) 20 MG tablet Take 20-60 mg  "by mouth as needed   tolterodine (DETROL) 1 MG tablet Take 1 mg by mouth     Current Facility-Administered Medications on File Prior to Visit:  sodium chloride (PF) 0.9% PF flush 10 mL   sodium chloride bacteriostatic 0.9 % flush 10 mL           ROS:   Constitutional: No fever, chills, or sweats. Weight has been stable   ENT: No visual disturbance, ear ache, epistaxis, sore throat.   Allergies/Immunologic: Negative.   Respiratory: No cough, hemoptysis.   Cardiovascular: As per HPI.   GI: No nausea, vomiting, hematemesis, melena, or hematochezia.   : No urinary frequency, dysuria, or hematuria.   Integument: Negative.   Psychiatric: insomnia   Neuro: Negative.   Endocrinology: Negative.   Musculoskeletal: Chronic back pain    EXAM:  /65 (BP Location: Right arm, Patient Position: Chair, Cuff Size: Adult Regular)   Pulse 104   Ht 1.88 m (6' 2\")   Wt 95.9 kg (211 lb 6.4 oz)   SpO2 98%   BMI 27.14 kg/m    General: appears comfortable, alert and articulate  Head: normocephalic, atraumatic  Eyes: anicteric sclera, EOMI  Neck: no adenopathy  Orophyarynx: moist mucosa, no lesions, dentition intact  Heart: regular, S1/S2, II/IV systolic murmur at the apex, no gallop, rub, estimated JVP < 10   Lungs: clear, no rales or wheezing  Abdomen: soft, non-tender, bowel sounds present, no hepatosplenomegaly  Extremities: no clubbing, cyanosis or edema  Neurological: normal speech and affect, no gross motor deficits    Labs:    Lab Results   Component Value Date    WBC 5.5 06/25/2019    HGB 14.8 06/25/2019    HCT 43.0 06/25/2019     06/25/2019     06/25/2019    POTASSIUM 4.0 06/25/2019    CHLORIDE 107 06/25/2019    CO2 26 06/25/2019    BUN 17 06/25/2019    CR 1.07 06/25/2019    GLC 85 06/25/2019    NTBNP 360 (H) 06/25/2019    TROPI <0.015 09/20/2018    AST 30 06/25/2019    ALT 54 06/25/2019    ALKPHOS 73 06/25/2019    BILITOTAL 0.8 06/25/2019         ECG: Sinus rhythm, first-degree AV block PVCs normal axis " normal QT interval     Cardiac monitor from 8/20/2017 10/9/2017     Repeat PET:   8/2018    Impression: In this patient with cardiac sarcoidosis under treatment  with steroids for the last 4 months;  1. No FDG uptake within the myocardium. Overall there is complete  resolution of previous cardiac sarcoidosis.  2. Excellent suppression of myocardial glucose metabolism.  3. Stable number and size of parenchymal lung nodules, although  previously seen high metabolic activity within a few of these lesions  is no longer present.  4. Stable size number and metabolic activity of lymphadenopathy in the  mediastinum, bilateral axilla and right supraclavicular region. 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  5. Stable left stone.      rdia, no symptoms reported and rate has been right in between 150 to 170 beats a minute.   4. Clinical correlation advised.   echocardiogam today   Interpretation Summary  GLS strain not performed due to arrhythmia  Borderline (EF 50-55%) reduced left ventricular function is present.  No diagnostic wall motion abnormality, but analysis limited by irregular  rhythm with frequent ectopy.  Moderate left atrial enlargement is present.  Mild-moderate mitral valve regurgitation.  Previous study not available for comparison.      Cardiac MRI   1. The LV cavity size is mildly enlarged. The global systolic function is normal. The LVEF is 55%. There is  severe edgpzjfexol-pl-fvdjbdze involving the inferoseptal and inferior basal segments.     2. The RV is normal in cavity size. The global systolic function is normal. The RVEF is 66%.      3. Both atria are normal in size.     4. Due to ICD lead artifact, the tricuspid and pulmonic valves were not well visualized.       The mitral and aortic valves appear normally functioning without significant disease.       5. Late gadolinium enhancement imaging shows epicardial and mid-myocardial enhancement in the  basal-inferoseptal,  basal-inferior, and basal-inferolateral segments.      6. There is no pericardial effusion.     7. Unable to assess for intracardiac thrombus.     8. Unable to do T2 mapping and assess for myocardial edema (active inflammation).      CONCLUSIONS:    1. Cardiac sarcoidosis with preserved systolic function; LVEF 55%, RVEF 66%.  2. Sarcoidosis related scarring and severe wrigemoddgj-qi-lpjxxufg involving the basal-inferoseptal and  basal-inferior segments. Total scar burden 12%. Unchanged from previous CMR dated 01/19/2018.    ICD interrogation today:   Patient seen in clinic for evaluation and iterative programming of his [employer] dual lead ICD per MD orders. Patient has an appointment to see Reyna Sandoval NP today. Normal ICD function. 26 AT/AF episodes recorded - 1 sec - 51 min 4 sec. AF burden = < 1%. No ventricular arrhythmias recorded. Intrinsic rhythm = SB with first degree AVB @ 55 bpm. AP = 94%.  = 47%. Estimated battery longevity to TORIE = 12.5 years. Patient reports that he is feeling well and denies complaints. Patient routinely has his device checked in [place]. Camden Becerra lead ICDI have reviewed and interpreted the device interrogation, settings, programming and nurse's summary. The device is functioning within normal device parameters. I agree with the current findings, assessment and plan.      Cardiac MRI: 2/2019      2. The RV is normal in cavity size. The global systolic function is normal. The RVEF is 66%.      3. Both atria are normal in size.     4. Due to ICD lead artifact, the tricuspid and pulmonic valves were not well visualized.       The mitral and aortic valves appear normally functioning without significant disease.       5. Late gadolinium enhancement imaging shows epicardial and mid-myocardial enhancement in the  basal-inferoseptal, basal-inferior, and basal-inferolateral segments.      6. There is no pericardial effusion.     7. Unable to assess for intracardiac  thrombus.     8. Unable to do T2 mapping and assess for myocardial edema (active inflammation).      CONCLUSIONS:    1. Cardiac sarcoidosis with preserved systolic function; LVEF 55%, RVEF 66%.  2. Sarcoidosis related scarring and severe tfqlbdpvqoe-iw-cddbbdwf involving the basal-inferoseptal and  basal-inferior segments. Total scar burden 12%. Unchanged from previous CMR dated 01/19/2018.      CORE EXAM      Echocardiogram today   Interpretation Summary  Borderline (EF 50-55%) reduced left ventricular function is present. Basal  inferolateral thinng and akinesis.  Mild right ventricular dilation is present. Global right ventricular function  is normal.  Moderate mitral insufficiency is present.     This study was compared with the study from 09/20/2018. The mitral  regurgitation appears worse.    Assessment and Plan:   Mr. Carney is a pleasant 58 year old man with cardiac sarcoid and VT s/p ICD who appears volume up and will take Lasix 20 mg once daily with a goal of 3-4 pound weight loss. His device check showed 26 episodes of AT/AF with <1% AF burden. He will escalate his Toprol to 100 mg daily. I've messaged Dr. Dickey regarding anticoagulation and regarding repeat PET scan. Mr. Carney misunderstood his instructions at his last visit when he cut back his prednisone but did not increase methotrexate.     Mr. Carney will call us with an update later this week. We'll coordinate follow up based on Dr. Dickey's recommendations for repeat imaging and with his local cardiology team.       25 minutes spent in direct care, >50% in counseling    CC  Patient Care Team:  [facility], [facility] as PCP - Helen Hernadez, RN as Registered Nurse (Cardiology)  GIULIANO KOHLER  [place], [place]     Gary Soares MD

## 2019-06-25 NOTE — PATIENT INSTRUCTIONS
Take your medicines every day, as directed    Changes made today:  o Please take Lasix 20 mg once daily until you've lost 3-5 pounds. If you gain weight or feel worse, contact us. Call/message us Friday, either way  o Please increase dietary potassium  o Please increase methotrexate to 20 mg once daily   Monitor Your Weight and Symptoms    Contact us if you:      Gain 2 pounds in one day or 5 pounds in one week    Feel more short of breath    Notice more leg swelling    Feel lightheadeded   Change your lifestyle    Limit Salt or Sodium:    2000 mg  Limit Fluids:    2000 mL or approximately 64 ounces  Eat a Heart Healthy Diet    Low in saturated fats  Stay Active:    Aim to move at least 150 minutes every  week         To Contact us    During Business Hours:  [phone], option # 1 (University)  Then option # 4 (medical questions)     After hours, weekends or holidays:   [phone], Option #4  Ask to speak to the On-Call Cardiologist. Inform them you are a CORE/heart failure patient at the [facility].     Use [employer] allows you to communicate directly with your heart team through secure messaging.    [employer] can be accessed any time on your phone, computer, or tablet.    If you need assistance, we'd be happy to help!         Keep your Heart Appointments:    We'll coordinate follow up after talking with Dr. Dickey

## 2019-06-28 ENCOUNTER — TELEPHONE (OUTPATIENT)
Dept: CARDIOLOGY | Facility: CLINIC | Age: 59
End: 2019-06-28

## 2019-06-28 NOTE — TELEPHONE ENCOUNTER
[facility]    Phone Message    May a detailed message be left on voicemail: yes    Reason for Call: Other: Wm calling to report his weight.  On Wed:  209.2, Thur: 207.8, Fri: 205.2.  Please call him back if any additional information is needed     Action Taken: Message routed to:  [facility]

## 2019-07-01 NOTE — TELEPHONE ENCOUNTER
"I called Wm back to investigate symptoms. He states \"my legs are a lot better, but not back to normal. They aren't painful anymore and are now smaller than last week.\"  Reports improvement in SOB/chest heaviness, but states \"still there slightly.\" Still taking furosemide 20mg daily.  Will review with Reyna Sandoval NP and call Wm back with additional instruction.    "

## 2019-07-03 ENCOUNTER — PATIENT OUTREACH (OUTPATIENT)
Dept: CARDIOLOGY | Facility: CLINIC | Age: 59
End: 2019-07-03

## 2019-07-03 DIAGNOSIS — D86.85 SARCOID, CARDIAC: Primary | ICD-10-CM

## 2019-07-03 DIAGNOSIS — I48.91 ATRIAL FIBRILLATION (H): Primary | ICD-10-CM

## 2019-07-03 NOTE — PROGRESS NOTES
Discussed with Reyna Sandoval NP and called Jose back.      Date: 7/3/2019  Time of Call: 4:23 PM  Diagnosis:  HF   [facility] - Ordering provider: Reyna Sandoval NP   Order: Stay on furosemide 20mg daily. Get BMP on Friday. Will symptom check with results  Order received by: Viviana Zavala RN

## 2019-07-03 NOTE — PROGRESS NOTES
Device check reviewed by Dr Dickey, who would like to start a NOAC on Jose.  2 ordered at this point, awaiting insurance decision, aware of contraindication of 2 NOACs.    Date: 7/3/2019    Time of Call: 4:07 PM     Diagnosis:  Heart Failure     [ VORB ] Ordering provider: Dr Dickey    Order: Xeralto 20 mg daily  Or Eliquis 5 mg BID     Order received by: Helen Gonzalez RN       Follow-up/additional notes: follow up on decision for NOAC

## 2019-07-05 ENCOUNTER — TRANSFERRED RECORDS (OUTPATIENT)
Dept: HEALTH INFORMATION MANAGEMENT | Facility: CLINIC | Age: 59
End: 2019-07-05

## 2019-07-05 LAB
ALANINE AMINOTRANSFERASE FIBROMETER: 26 U/L
ALBUMIN (EXTERNAL): 4.4 G/DL
ALP SERPL-CCNC: 65 U/L
ANION GAP SERPL CALCULATED.3IONS-SCNC: 10 MMOL/L
ASPARTATE AMINOTRANSFERASE FIBROMETER: 22 U/L
BILIRUB SERPL-MCNC: 0.8 MG/DL
BILIRUBIN DIRECT (EXTERNAL): 0.4 MG/DL
BILIRUBIN INDIRECT NEONATAL: 0.4 MG/DL
BUN SERPL-MCNC: 21 MG/DL
CALCIUM SERPL-MCNC: 9.6 MG/DL
CHLORIDE SERPLBLD-SCNC: 102 MMOL/L
CO2 SERPL-SCNC: 28 MMOL/L
CREAT SERPL-MCNC: 1.3 MG/DL
ERYTHROCYTE [DISTWIDTH] IN BLOOD BY AUTOMATED COUNT: 12.7 %
GFR SERPL CREATININE-BSD FRML MDRD: ABNORMAL ML/MIN/1.73M2
GLUCOSE SERPL-MCNC: 84 MG/DL (ref 70–99)
HCT VFR BLD AUTO: 45.2 %
HEMOGLOBIN: 15.8 G/DL
Lab: 57
MCH RBC QN AUTO: 32.8 PG
MCHC RBC AUTO-ENTMCNC: 35 G/DL
MCV RBC AUTO: 93.8 FL
PLATELET # BLD AUTO: 238 10^9/L
POTASSIUM SERPL-SCNC: 4.1 MMOL/L
PROTEIN TOTAL (EXTERNAL): 6.7 G/DL
RBC # BLD AUTO: 4.82 10^12/L
SODIUM SERPL-SCNC: 140 MMOL/L
WBC # BLD AUTO: 7.6 10^9/L

## 2019-07-05 NOTE — TELEPHONE ENCOUNTER
"Results faxed from [facility] and review by Reyna Sandoval NP. Jose states he's feeling \"much better\", legs have \"return to almost normal\" and his breathing has improved, \"but not 100% yet.\"  I reviewed Reyna's recommendations with Jose. Jose states he would have a very hard time coming back down to clinic here in 10-14 days.  They live hours away and would have to take off of work.  They are wondering they can follow up with their local cardiologist in that time frame. Will discuss with Reyna and MyChart them back.     Date: 7/5/2019  Time of Call: 4:17 PM  Diagnosis:  HF  VORB - Ordering provider: Reyna Sandoval NP   Order: Stop furosemide.  Contact us with increasing symptoms again. Follow-up appt in 10 days.   Order received by: Viviana Zavala RN           "

## 2019-09-10 DIAGNOSIS — D86.85 SARCOID, CARDIAC: ICD-10-CM

## 2019-09-13 RX ORDER — METOPROLOL SUCCINATE 25 MG/1
75 TABLET, EXTENDED RELEASE ORAL DAILY
Qty: 270 TABLET | Refills: 3 | Status: SHIPPED | OUTPATIENT
Start: 2019-09-13 | End: 2019-11-21

## 2019-09-26 ENCOUNTER — OFFICE VISIT (OUTPATIENT)
Dept: CARDIOLOGY | Facility: CLINIC | Age: 59
End: 2019-09-26
Attending: INTERNAL MEDICINE
Payer: COMMERCIAL

## 2019-09-26 ENCOUNTER — RESEARCH ENCOUNTER (OUTPATIENT)
Dept: CARDIOLOGY | Facility: CLINIC | Age: 59
End: 2019-09-26

## 2019-09-26 VITALS
OXYGEN SATURATION: 99 % | DIASTOLIC BLOOD PRESSURE: 63 MMHG | WEIGHT: 207.1 LBS | HEIGHT: 73 IN | HEART RATE: 60 BPM | BODY MASS INDEX: 27.45 KG/M2 | SYSTOLIC BLOOD PRESSURE: 103 MMHG

## 2019-09-26 DIAGNOSIS — D86.85 SARCOID, CARDIAC: ICD-10-CM

## 2019-09-26 LAB
ANION GAP SERPL CALCULATED.3IONS-SCNC: 6 MMOL/L (ref 3–14)
BUN SERPL-MCNC: 19 MG/DL (ref 7–30)
CALCIUM SERPL-MCNC: 8.6 MG/DL (ref 8.5–10.1)
CHLORIDE SERPL-SCNC: 109 MMOL/L (ref 94–109)
CO2 SERPL-SCNC: 26 MMOL/L (ref 20–32)
CREAT SERPL-MCNC: 1.01 MG/DL (ref 0.66–1.25)
GFR SERPL CREATININE-BSD FRML MDRD: 82 ML/MIN/{1.73_M2}
GLUCOSE SERPL-MCNC: 82 MG/DL (ref 70–99)
NT-PROBNP SERPL-MCNC: 508 PG/ML (ref 0–125)
POTASSIUM SERPL-SCNC: 3.9 MMOL/L (ref 3.4–5.3)
SODIUM SERPL-SCNC: 142 MMOL/L (ref 133–144)

## 2019-09-26 PROCEDURE — 36415 COLL VENOUS BLD VENIPUNCTURE: CPT | Performed by: INTERNAL MEDICINE

## 2019-09-26 PROCEDURE — 83880 ASSAY OF NATRIURETIC PEPTIDE: CPT | Performed by: INTERNAL MEDICINE

## 2019-09-26 PROCEDURE — G0463 HOSPITAL OUTPT CLINIC VISIT: HCPCS | Mod: 25,ZF

## 2019-09-26 PROCEDURE — 99214 OFFICE O/P EST MOD 30 MIN: CPT | Mod: ZP | Performed by: INTERNAL MEDICINE

## 2019-09-26 PROCEDURE — 80048 BASIC METABOLIC PNL TOTAL CA: CPT | Performed by: INTERNAL MEDICINE

## 2019-09-26 RX ORDER — LISINOPRIL 5 MG/1
5 TABLET ORAL DAILY
Qty: 90 TABLET | Refills: 3 | Status: SHIPPED | OUTPATIENT
Start: 2019-09-26 | End: 2020-10-10

## 2019-09-26 RX ADMIN — Medication 5 ML: at 14:15

## 2019-09-26 ASSESSMENT — PAIN SCALES - GENERAL: PAINLEVEL: NO PAIN (0)

## 2019-09-26 ASSESSMENT — MIFFLIN-ST. JEOR: SCORE: 1818.28

## 2019-09-26 NOTE — PATIENT INSTRUCTIONS
"Cardiology Providers you saw during your visit:  Dr. Dickey    Medication changes:  1.  Start lisinopril 5 mg daily    Follow up:  1.  Have labs checked in 1 week  2.  Follow up with Dr Dickey in 4 months with echo and labs  Labs:    Results for LIVIER JONES (MRN [id number]) as of 9/26/2019 15:37   Ref. Range 9/26/2019 13:01   Sodium Latest Ref Range: 133 - 144 mmol/L 142   Potassium Latest Ref Range: 3.4 - 5.3 mmol/L 3.9   Chloride Latest Ref Range: 94 - 109 mmol/L 109   Carbon Dioxide Latest Ref Range: 20 - 32 mmol/L 26   Urea Nitrogen Latest Ref Range: 7 - 30 mg/dL 19   Creatinine Latest Ref Range: 0.66 - 1.25 mg/dL 1.01   GFR Estimate Latest Ref Range: >60 mL/min/1.73_m2 82   GFR Estimate If Black Latest Ref Range: >60 mL/min/1.73_m2 >90   Calcium Latest Ref Range: 8.5 - 10.1 mg/dL 8.6   Anion Gap Latest Ref Range: 3 - 14 mmol/L 6   N-Terminal Pro Bnp Latest Ref Range: 0 - 125 pg/mL 508 (H)   Glucose Latest Ref Range: 70 - 99 mg/dL 82     Please call if you have :  1. Weight gain of more than 2 pounds in a day or 5 pounds in a week  2. Increased shortness of breath, swelling or bloating  3. Dizziness, lightheadedness   4. Any questions or concerns.       Follow the American Heart Association Diet and Lifestyle recommendations:  Limit saturated fat, trans fat, sodium, red meat, sweets and sugar-sweetened beverages. If you choose to eat red meat, compare labels and select the leanest cuts available.  Aim for at least 150 minutes of moderate physical activity or 75 minutes of vigorous physical activity - or an equal combination of both - each week.      During business hours: [phone], press option # 1 to be routed to the [facility] then option # 4 for \"To send a message to your care team\"      After hours, weekends or holidays: On Call Cardiologist- [phone]   option #4 and ask to speak to the on-call Cardiologist. Inform them you are a CORE/heart failure patient at the [facility].      Helen" "PANCHITO Gonzalez BSN CHFN  Cardiology Care Coordinator - Heart Failure/ [facility].R[facility] Clinic  [facility]   Questions and [phone]   First press #1 for the [place] and then press #4 for \"To send a message to your care team\"    "

## 2019-09-26 NOTE — NURSING NOTE
Vitals completed successfully and medication reconciled.     Tonja Domingo, MARY  3:01 PM  .  Chief Complaint   Patient presents with     Follow Up     4 month return

## 2019-09-26 NOTE — PATIENT INSTRUCTIONS
It was a pleasure to see you in clinic today. Please do not hesitate to call with any questions or concerns.     Ida Shaw, RN  Electrophysiology Nurse Clinician  [facility] [place]  During business hours call:  [phone]  After business hours please call: [phone]- select option #4 and ask for job code [phone].

## 2019-09-26 NOTE — PROGRESS NOTES
September 26, 2019    Dear Dr. De León,     I the pleasure of seeing Jose Carney in the [facility] Cardiac Sarcoidosis clinic today.     As you know he is a very pleasant 58-year-old man with a history sarcoidosis which is diagnosed by transbronchial biopsy in 2013.  He had been having back pain prior to this and 30 pound weight loss and had substantial mediastinal lymphadenopathy.  He was placed on several months of steroids with significant weight gain and improvement in symptoms as well as back pain.      In late summer and fall 2017 he started to develop palpitations which were found to be supraventricular tachycardia (likely AVNRT as well as some atrial tachycardia)  based on his cardiac monitor. He then had a cardiac MRI which was consistent with myocardial involvement from sarcoidosis (see below).  He was then sent down here for further management and was placed on metoprolol.    Based on high risk features of his cardiac MRI we recommended that he have an ICD placed which was placed locally with your team.  I did place him on a burst of prednisone given a highly suspicious PET scan which showed complete resolution of his cardiac sarcoidosis.  At that time I switch him to methotrexate as a staring steroid sparing agent and was titrating this up and tapering down his prednisone when he developed ventricular tachycardia.  This was in January 2019.  The VT was at a rate of 135 and was unable to be paced out.  He did not have syncope with this.  He was loaded with amiodarone, and given failure to gain control of his VT he was given Solu-Medrol and placed on higher dose of prednisone in addition to his methotrexate.     He was then able to be converted and when I saw him last I continued him on amiodarone  200 mg, I increased his methotrexate 20 mg and tapered him off of prednisone. We have also been increasing his back on metoprolol to keep him out of ventricular tachycardia.     He came down to  [place] sooner than anticipated due to recurrent atrial  fibrillation.  He was seen in the core clinic. His metoprolol was increased and he is mostly out of AF now.     He has been overall feeling  well since I saw him last. He has not had any further VT. He can walk a mile on flat ground.  He denies PND orthopnea or lower extremity edema.  He denies any chest heaviness or pressure.  He is tolerating the methotrexate without any side effects with stable surveillance labs-although he does have some muscle aches.     Of note he also was treated for uveitis in the last year and has some ongoing residual right eye blurriness.     PAST MEDICAL HISTORY:  Past Medical History:   Diagnosis Date     First degree AV block 3/20/2018     Palpitations 3/20/2018     Paroxysmal supraventricular tachycardia (H) 3/20/2018     Sarcoid, cardiac/EF 54% per MRI,[place] of affected myocardium is 12% of myocardial mass 1/25/2018     Sarcoidosis/ systemic 2013 3/20/2018     FAMILY HISTORY:  His family history is notable for several family members with autoimmune disease.  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  He denies any tobacco or alcohol use or recreational drug use.     CURRENT MEDICATIONS:  Current Outpatient Medications   Medication Sig Dispense Refill     amiodarone (PACERONE/CODARONE) 200 MG tablet Take 2 tabs (400 mg) by mouth twice daily for 13 days. Then take 2 tabs (400 mg) a day for 14 days. Then take 1 tab (200 mg) daily.       folic acid (FOLVITE) 1 MG tablet TAKE 5 MG ONCE WEEKLY WITH YOUR METHOTREXATE 60 tablet 3     furosemide (LASIX) 20 MG tablet Take 1 tablet (20 mg) by mouth daily As directed by CORE / HF clinic 30 tablet 0     melatonin 3 MG tablet Take 3 mg by mouth        methotrexate 2.5 MG tablet Take 8 tablets (20 mg) by mouth every 7 days 96 tablet 3     metoprolol  "succinate ER (TOPROL-XL) 25 MG 24 hr tablet Take 3 tablets (75 mg) by mouth daily 270 tablet 3     prednisoLONE acetate (PRED FORTE) 1 % ophthalmic susp Place 1 drop into both eyes every hour       rivaroxaban ANTICOAGULANT (XARELTO) 20 MG TABS tablet Take 1 tablet (20 mg) by mouth daily (with dinner) 90 tablet 3     sildenafil (REVATIO) 20 MG tablet Take 20-60 mg by mouth as needed       ASPIRIN 81 PO Take 81 mg by mouth daily       ibuprofen (ADVIL) 200 MG tablet Take 400 mg by mouth every 4 hours as needed for mild pain       tolterodine (DETROL) 1 MG tablet Take 1 mg by mouth         ROS:   Constitutional: No fever, chills, or sweats. Weight has been stable   ENT: No visual disturbance, ear ache, epistaxis, sore throat.   Allergies/Immunologic: Negative.   Respiratory: No cough, hemoptysis.   Cardiovascular: As per HPI.   GI: No nausea, vomiting, hematemesis, melena, or hematochezia.   : No urinary frequency, dysuria, or hematuria.   Integument: Negative.   Psychiatric: insomnia   Neuro: Negative.   Endocrinology: Negative.   Musculoskeletal: Chronic back pain    EXAM:  /63 (BP Location: Left arm, Patient Position: Chair, Cuff Size: Adult Large)   Pulse 60   Ht 1.854 m (6' 1\")   Wt 93.9 kg (207 lb 1.6 oz)   SpO2 99%   BMI 27.32 kg/m    General: appears comfortable, alert and articulate  Head: normocephalic, atraumatic  Eyes: anicteric sclera, EOMI  Neck: no adenopathy  Orophyarynx: moist mucosa, no lesions, dentition intact  Heart: regular, S1/S2, III/IV systolic murmur at the apex, no gallop, rub, estimated JVP < 10   Lungs: clear, no rales or wheezing  Abdomen: soft, non-tender, bowel sounds present, no hepatosplenomegaly  Extremities: no clubbing, cyanosis or edema  Neurological: normal speech and affect, no gross motor deficits    Labs:    Lab Results   Component Value Date    WBC 7.6 07/05/2019    HGB 15.8 07/05/2019    HCT 45.2 07/05/2019     07/05/2019     09/26/2019    POTASSIUM " 3.9 09/26/2019    CHLORIDE 109 09/26/2019    CO2 26 09/26/2019    BUN 19 09/26/2019    CR 1.01 09/26/2019    GLC 82 09/26/2019    NTBNP 508 (H) 09/26/2019    TROPI <0.015 09/20/2018    AST 30 06/25/2019    ALT 54 06/25/2019    ALKPHOS 65 07/05/2019    BILITOTAL 0.8 06/25/2019         ECG: Sinus rhythm, first-degree AV block PVCs normal axis normal QT interval     Repeat PET:   8/2018    Impression: In this patient with cardiac sarcoidosis under treatment  with steroids for the last 4 months;  1. No FDG uptake within the myocardium. Overall there is complete  resolution of previous cardiac sarcoidosis.  2. Excellent suppression of myocardial glucose metabolism.  3. Stable number and size of parenchymal lung nodules, although  previously seen high metabolic activity within a few of these lesions  is no longer present.  4. Stable size number and metabolic activity of lymphadenopathy in the  mediastinum, bilateral axilla and right supraclavicular region. 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  5. Stable left stone.  The ER N and forming of the PEG and start any thickened o symptoms reported and rate has been right in between 150 to 170 beats a minute.   4. Clinical correlation advised.         Interpretation Summary-personally reviewed  Mildly (EF 45-50%) reduced left ventricular function is present.  Hypokinesis to akinesis of basal inferolateral segment.  Mild to moderate mitral insufficiency is present.  IVC diameter <2.1 cm collapsing >50% with sniff suggests a normal RA pressure  of 3 mmHg.      Cardiac MRI   1. The LV cavity size is mildly enlarged. The global systolic function is normal. The LVEF is 55%. There is  severe ypweeuosjvs-ed-cocjamqq involving the inferoseptal and inferior basal segments.     2. The RV is normal in cavity size. The global systolic function is normal. The RVEF is 66%.      3. Both atria are normal in size.     4. Due to ICD lead artifact, the tricuspid and  pulmonic valves were not well visualized.       The mitral and aortic valves appear normally functioning without significant disease.       5. Late gadolinium enhancement imaging shows epicardial and mid-myocardial enhancement in the  basal-inferoseptal, basal-inferior, and basal-inferolateral segments.      6. There is no pericardial effusion.     7. Unable to assess for intracardiac thrombus.     8. Unable to do T2 mapping and assess for myocardial edema (active inflammation).      CONCLUSIONS:    1. Cardiac sarcoidosis with preserved systolic function; LVEF 55%, RVEF 66%.  2. Sarcoidosis related scarring and severe wwzokqtpsaw-pu-bpjaoivj involving the basal-inferoseptal and  basal-inferior segments. Total scar burden 12%. Unchanged from previous CMR dated 01/19/2018.    ICD interrogation today:   Patient seen in [facility] for evaluation and iterative programming of  lead ICD per MD orders.  Normal ICD function. Last episode of VT was on 1/23/19, appears monomorphic, with a rate in the 130s lasting a total of 24 minutes in duration.  Patient was hospitalized during this episode and received ATP x 12 which only briefly converted the rhythm.  No atrial arrhythmias recorded. Intrinsic rhythm = Sinus bradycardia.   AP = 77%.  =< 1%.  Estimated battery longevity to TORIE = 12  years.  Lead  trends appear stable. Patient reports that he is currently feeling well and denies complaints. Plan for patient to continue to follow with his primary device clinic in [place], [place].  No programming changes were made.  Dual lead ICDI have reviewed and interpreted the device interrogation, settings, programming and nurse's summary.  The device is functioning within normal device parameters.   I agree with the current findings, assessment and plan.      Cardiac MRI: 2/2019    2. The RV is normal in cavity size. The global systolic function is normal. The RVEF is 66%.      3. Both atria are normal in size.     4. Due to ICD lead artifact, the  tricuspid and pulmonic valves were not well visualized.       The mitral and aortic valves appear normally functioning without significant disease.       5. Late gadolinium enhancement imaging shows epicardial and mid-myocardial enhancement in the  basal-inferoseptal, basal-inferior, and basal-inferolateral segments.      6. There is no pericardial effusion.     7. Unable to assess for intracardiac thrombus.     8. Unable to do T2 mapping and assess for myocardial edema (active inflammation).      CONCLUSIONS:    1. Cardiac sarcoidosis with preserved systolic function; LVEF 55%, RVEF 66%.  2. Sarcoidosis related scarring and severe vgxlqezbzzq-xs-ybozsxsa involving the basal-inferoseptal and  basal-inferior segments. Total scar burden 12%. Unchanged from previous CMR dated 01/19/2018.        Assessment and Plan:   In summary this is a very pleasant 58-year-old man with a history of biopsy-proven cardiac sarcoid from a transbronchial biopsy-who has a cardiac MRI consistent with myocardial involvement from sarcoidosis. His PET scan related inflammation resolved completely with 3-4 months of 40 mg of prednisone.  I did have an ICD placed given he had high risk features on his cardiac MRI for malignant arrhythmias.     He has been relatively stable for some time.  Given recurrent ventricular tachycardia last year he did get an increase in prednisone plus amiodarone-I now have him transition to methotrexate only as well as low-dose amiodarone.     Interestingly his LV ejection fraction has declined slightly although he has no clinical symptoms from this.  I doubt the degree of mitral regurgitation is leading to decline in his function.  Given more recent recurrent atrial fibrillation and slight decline in his cardiac function it does raise the question whether or not the methotrexate is enough immunosuppression for him or whether we have some recurrence in active sarcoid.     For now I am going to keep him on  methotrexate 20 mg daily with folic acid, keep his amiodarone at 200 mg daily I am also adding lisinopril 5 mg daily for neurohormonal blockade-we will keep him at 100 mg metoprolol daily.  I will see him back in 4 months with repeat echocardiogram or sooner as needed.  Should he have any increase in A. fib burden or decline in cardiac function I would repeat his PET scan as he may need another pulse of steroids.     From a ventricular tachycardia perspective I will continue to metoprolol 100 mg daily as well as amiodarone 200 mill grams daily    I will see him back in 4 months with CBC, LFTs in 2 months.     VT: see plan above     Atrial  Fibrillation: Paroxysmal - he is presently anticoagulated-I will continue metoprolol 100 mg XL daily for now-should this come back again this raises the question whether or not the sarcoid is controlled      MR on last echo: no symptoms, will monitor for now -this is overall stable although his LV function has declined slightly I suspect his mitral regurgitation is related to papillary scar-I am adding afterload reduction today      Uveitis-  Followed locally     Please feel free to call me with any further questions and thank you for the opportunity to assist in his care.     Teeth cleaning: no antibiotics needed       Mandi Dickey MD   [facility]  Patient Care Team:  Clinic, [facility] as PCP - Helen Hernadez, RN as Registered Nurse (Cardiology)  GIULIANO KOHLER  [place], [place]     Gary Soares MD

## 2019-09-26 NOTE — PROGRESS NOTES
PREEMPT-HF : PRECISION EVENT MONITORING FOR PATIENTS WITH HEART FAILURE USING HEARTLOGIC    PI: Juan Gallegos M.D., Ph.D - Phone: [phone]  Primary : Fabiola Palmer RN - Phone: [phone] Pager: [phone]  : Faby Duarte - Phone: [phone]    Patient was approached for possible participation for the above study. Inclusion and exclusion criteria reviewed. Patient meets all of the inclusion criteria and does not meet any of the exclusion criteria. The current approved IRB consent form was discussed and explained to the patient.  It was discussed that involvement with the study is voluntary and refusal to participate would not involve penalty or decrease benefits at which the patient is entitled, and the subject may discontinue his/her involvement at any time without penalty or loss in benefits. The consent form was reviewed including purpose, research hypothesis, nature of the research, risk & benefits. Also reviewed were confidentiality issues, compensation for injury, and alternative procedures available. The patient was given time to review and ask any questions about the consent. Patient was shown contact information for PI and study staff in consent for future questions. Patient verbalized understanding of consent and study by restating the purpose, procedures, duration, risk, confidentiality of PHI, and voluntarily participation. Questions and concerns were addressed. Patient printed, signed and dated the consent and HIPAA form prior to study involvement. A copy was given to the patient for their records.     Subject Consent/HIPAA : SIGNED ON 09.26.2019

## 2019-09-26 NOTE — NURSING NOTE
Diet: Patient instructed regarding a heart failure healthy diet, including discussion of reduced fat and 2000 mg daily sodium restriction, daily weights, medication purpose and compliance, fluid restrictions and resources for patient and family to access for assistance with heart failure management.       Labs: Patient was given results of the laboratory testing obtained today and patient was instructed about when to return for the next laboratory testing.     Med Reconcile: Reviewed and verified all current medications with the patient. The updated medication list was printed and given to the patient.     Med changes: start lisinopril 5 mg daily    Return Appointment: Patient given instructions regarding scheduling next clinic visit: [facility] next week, follow up with Fanny in 4 months with an echo    Patient stated he understood all health information given and agreed to call with further questions or concerns.     Helen Gonzalez RN

## 2019-09-26 NOTE — LETTER
9/26/2019      RE: Jose Carney  [address]  [facility] [address]       Dear Colleague,    Thank you for the opportunity to participate in the care of your patient, Jose Carney, at the [facility] at [facility]. Please see a copy of my visit note below.      September 26, 2019    Dear Dr. De León,     I the pleasure of seeing Jose Carney in the [facility] Cardiac Sarcoidosis clinic today.     As you know he is a very pleasant 58-year-old man with a history sarcoidosis which is diagnosed by transbronchial biopsy in 2013.  He had been having back pain prior to this and 30 pound weight loss and had substantial mediastinal lymphadenopathy.  He was placed on several months of steroids with significant weight gain and improvement in symptoms as well as back pain.      In late summer and fall 2017 he started to develop palpitations which were found to be supraventricular tachycardia (likely AVNRT as well as some atrial tachycardia)  based on his cardiac monitor. He then had a cardiac MRI which was consistent with myocardial involvement from sarcoidosis (see below).  He was then sent down here for further management and was placed on metoprolol.    Based on high risk features of his cardiac MRI we recommended that he have an ICD placed which was placed locally with your team.  I did place him on a burst of prednisone given a highly suspicious PET scan which showed complete resolution of his cardiac sarcoidosis.  At that time I switch him to methotrexate as a staring steroid sparing agent and was titrating this up and tapering down his prednisone when he developed ventricular tachycardia.  This was in January 2019.  The VT was at a rate of 135 and was unable to be paced out.  He did not have syncope with this.  He was loaded with amiodarone, and given failure to gain control of his VT he was given Solu-Medrol and placed on higher dose of prednisone in  addition to his methotrexate.     He was then able to be converted and when I saw him last I continued him on amiodarone  200 mg, I increased his methotrexate 20 mg and tapered him off of prednisone. We have also been increasing his back on metoprolol to keep him out of ventricular tachycardia.     He came down to [place] sooner than anticipated due to recurrent atrial  fibrillation.  He was seen in the core clinic. His metoprolol was increased and he is mostly out of AF now.     He has been overall feeling  well since I saw him last. He has not had any further VT. He can walk a mile on flat ground.  He denies PND orthopnea or lower extremity edema.  He denies any chest heaviness or pressure.  He is tolerating the methotrexate without any side effects with stable surveillance labs-although he does have some muscle aches.     Of note he also was treated for uveitis in the last year and has some ongoing residual right eye blurriness.     PAST MEDICAL HISTORY:  Past Medical History:   Diagnosis Date     First degree AV block 3/20/2018     Palpitations 3/20/2018     Paroxysmal supraventricular tachycardia (H) 3/20/2018     Sarcoid, cardiac/EF 54% per MRI,[place] of affected myocardium is 12% of myocardial mass 1/25/2018     Sarcoidosis/ systemic 2013 3/20/2018     FAMILY HISTORY:  His family history is notable for several family members with autoimmune disease.  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  He denies any tobacco or alcohol use or recreational drug use.     CURRENT MEDICATIONS:  Current Outpatient Medications   Medication Sig Dispense Refill     amiodarone (PACERONE/CODARONE) 200 MG tablet Take 2 tabs (400 mg) by mouth twice daily for 13 days. Then take 2 tabs (400 mg) a day for 14 days. Then take 1 tab (200 mg) daily.       folic acid (FOLVITE) 1 MG tablet TAKE 5 MG ONCE  "WEEKLY WITH YOUR METHOTREXATE 60 tablet 3     furosemide (LASIX) 20 MG tablet Take 1 tablet (20 mg) by mouth daily As directed by CORE / HF clinic 30 tablet 0     melatonin 3 MG tablet Take 3 mg by mouth        methotrexate 2.5 MG tablet Take 8 tablets (20 mg) by mouth every 7 days 96 tablet 3     metoprolol succinate ER (TOPROL-XL) 25 MG 24 hr tablet Take 3 tablets (75 mg) by mouth daily 270 tablet 3     prednisoLONE acetate (PRED FORTE) 1 % ophthalmic susp Place 1 drop into both eyes every hour       rivaroxaban ANTICOAGULANT (XARELTO) 20 MG TABS tablet Take 1 tablet (20 mg) by mouth daily (with dinner) 90 tablet 3     sildenafil (REVATIO) 20 MG tablet Take 20-60 mg by mouth as needed       ASPIRIN 81 PO Take 81 mg by mouth daily       ibuprofen (ADVIL) 200 MG tablet Take 400 mg by mouth every 4 hours as needed for mild pain       tolterodine (DETROL) 1 MG tablet Take 1 mg by mouth         ROS:   Constitutional: No fever, chills, or sweats. Weight has been stable   ENT: No visual disturbance, ear ache, epistaxis, sore throat.   Allergies/Immunologic: Negative.   Respiratory: No cough, hemoptysis.   Cardiovascular: As per HPI.   GI: No nausea, vomiting, hematemesis, melena, or hematochezia.   : No urinary frequency, dysuria, or hematuria.   Integument: Negative.   Psychiatric: insomnia   Neuro: Negative.   Endocrinology: Negative.   Musculoskeletal: Chronic back pain    EXAM:  /63 (BP Location: Left arm, Patient Position: Chair, Cuff Size: Adult Large)   Pulse 60   Ht 1.854 m (6' 1\")   Wt 93.9 kg (207 lb 1.6 oz)   SpO2 99%   BMI 27.32 kg/m     General: appears comfortable, alert and articulate  Head: normocephalic, atraumatic  Eyes: anicteric sclera, EOMI  Neck: no adenopathy  Orophyarynx: moist mucosa, no lesions, dentition intact  Heart: regular, S1/S2, III/IV systolic murmur at the apex, no gallop, rub, estimated JVP < 10   Lungs: clear, no rales or wheezing  Abdomen: soft, non-tender, bowel sounds " present, no hepatosplenomegaly  Extremities: no clubbing, cyanosis or edema  Neurological: normal speech and affect, no gross motor deficits    Labs:    Lab Results   Component Value Date    WBC 7.6 07/05/2019    HGB 15.8 07/05/2019    HCT 45.2 07/05/2019     07/05/2019     09/26/2019    POTASSIUM 3.9 09/26/2019    CHLORIDE 109 09/26/2019    CO2 26 09/26/2019    BUN 19 09/26/2019    CR 1.01 09/26/2019    GLC 82 09/26/2019    NTBNP 508 (H) 09/26/2019    TROPI <0.015 09/20/2018    AST 30 06/25/2019    ALT 54 06/25/2019    ALKPHOS 65 07/05/2019    BILITOTAL 0.8 06/25/2019         ECG: Sinus rhythm, first-degree AV block PVCs normal axis normal QT interval     Repeat PET:   8/2018    Impression: In this patient with cardiac sarcoidosis under treatment  with steroids for the last 4 months;  1. No FDG uptake within the myocardium. Overall there is complete  resolution of previous cardiac sarcoidosis.  2. Excellent suppression of myocardial glucose metabolism.  3. Stable number and size of parenchymal lung nodules, although  previously seen high metabolic activity within a few of these lesions  is no longer present.  4. Stable size number and metabolic activity of lymphadenopathy in the  mediastinum, bilateral axilla and right supraclavicular region. 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  5. Stable left stone.  The ER N and forming of the PEG and start any thickened o symptoms reported and rate has been right in between 150 to 170 beats a minute.   4. Clinical correlation advised.         Interpretation Summary-personally reviewed  Mildly (EF 45-50%) reduced left ventricular function is present.  Hypokinesis to akinesis of basal inferolateral segment.  Mild to moderate mitral insufficiency is present.  IVC diameter <2.1 cm collapsing >50% with sniff suggests a normal RA pressure  of 3 mmHg.      Cardiac MRI   1. The LV cavity size is mildly enlarged. The global systolic function  is normal. The LVEF is 55%. There is  severe oqnlxdsdxtd-zn-qbzslrqm involving the inferoseptal and inferior basal segments.     2. The RV is normal in cavity size. The global systolic function is normal. The RVEF is 66%.      3. Both atria are normal in size.     4. Due to ICD lead artifact, the tricuspid and pulmonic valves were not well visualized.       The mitral and aortic valves appear normally functioning without significant disease.       5. Late gadolinium enhancement imaging shows epicardial and mid-myocardial enhancement in the  basal-inferoseptal, basal-inferior, and basal-inferolateral segments.      6. There is no pericardial effusion.     7. Unable to assess for intracardiac thrombus.     8. Unable to do T2 mapping and assess for myocardial edema (active inflammation).      CONCLUSIONS:    1. Cardiac sarcoidosis with preserved systolic function; LVEF 55%, RVEF 66%.  2. Sarcoidosis related scarring and severe gdyzfiseaqu-ve-enawilya involving the basal-inferoseptal and  basal-inferior segments. Total scar burden 12%. Unchanged from previous CMR dated 01/19/2018.    ICD interrogation today:   Patient seen in [facility] for evaluation and iterative programming of  lead ICD per MD orders.  Normal ICD function. Last episode of VT was on 1/23/19, appears monomorphic, with a rate in the 130s lasting a total of 24 minutes in duration.  Patient was hospitalized during this episode and received ATP x 12 which only briefly converted the rhythm.  No atrial arrhythmias recorded. Intrinsic rhythm = Sinus bradycardia.   AP = 77%.  =< 1%.  Estimated battery longevity to TORIE = 12  years.  Lead  trends appear stable. Patient reports that he is currently feeling well and denies complaints. Plan for patient to continue to follow with his primary device clinic in [place], [place].  No programming changes were made.  Dual lead ICDI have reviewed and interpreted the device interrogation, settings, programming and nurse's summary.   The device is functioning within normal device parameters.   I agree with the current findings, assessment and plan.      Cardiac MRI: 2/2019    2. The RV is normal in cavity size. The global systolic function is normal. The RVEF is 66%.      3. Both atria are normal in size.     4. Due to ICD lead artifact, the tricuspid and pulmonic valves were not well visualized.       The mitral and aortic valves appear normally functioning without significant disease.       5. Late gadolinium enhancement imaging shows epicardial and mid-myocardial enhancement in the  basal-inferoseptal, basal-inferior, and basal-inferolateral segments.      6. There is no pericardial effusion.     7. Unable to assess for intracardiac thrombus.     8. Unable to do T2 mapping and assess for myocardial edema (active inflammation).      CONCLUSIONS:    1. Cardiac sarcoidosis with preserved systolic function; LVEF 55%, RVEF 66%.  2. Sarcoidosis related scarring and severe yarugtuodgb-tg-pthubtfm involving the basal-inferoseptal and  basal-inferior segments. Total scar burden 12%. Unchanged from previous CMR dated 01/19/2018.        Assessment and Plan:   In summary this is a very pleasant 58-year-old man with a history of biopsy-proven cardiac sarcoid from a transbronchial biopsy-who has a cardiac MRI consistent with myocardial involvement from sarcoidosis. His PET scan related inflammation resolved completely with 3-4 months of 40 mg of prednisone.  I did have an ICD placed given he had high risk features on his cardiac MRI for malignant arrhythmias.     He has been relatively stable for some time.  Given recurrent ventricular tachycardia last year he did get an increase in prednisone plus amiodarone-I now have him transition to methotrexate only as well as low-dose amiodarone.     Interestingly his LV ejection fraction has declined slightly although he has no clinical symptoms from this.  I doubt the degree of mitral regurgitation is leading to  decline in his function.  Given more recent recurrent atrial fibrillation and slight decline in his cardiac function it does raise the question whether or not the methotrexate is enough immunosuppression for him or whether we have some recurrence in active sarcoid.     For now I am going to keep him on methotrexate 20 mg daily with folic acid, keep his amiodarone at 200 mg daily I am also adding lisinopril 5 mg daily for neurohormonal blockade-we will keep him at 100 mg metoprolol daily.  I will see him back in 4 months with repeat echocardiogram or sooner as needed.  Should he have any increase in A. fib burden or decline in cardiac function I would repeat his PET scan as he may need another pulse of steroids.     From a ventricular tachycardia perspective I will continue to metoprolol 100 mg daily as well as amiodarone 200 mill grams daily    I will see him back in 4 months with CBC, LFTs in 2 months.     VT: see plan above     Atrial  Fibrillation: Paroxysmal - he is presently anticoagulated-I will continue metoprolol 100 mg XL daily for now-should this come back again this raises the question whether or not the sarcoid is controlled      MR on last echo: no symptoms, will monitor for now -this is overall stable although his LV function has declined slightly I suspect his mitral regurgitation is related to papillary scar-I am adding afterload reduction today      Uveitis-  Followed locally     Please feel free to call me with any further questions and thank you for the opportunity to assist in his care.     Teeth cleaning: no antibiotics needed       Mandi Dickey MD   [facility]  Patient Care Team:  [facility], [facility] as PCP - Helen Hernadez RN as Registered Nurse (Cardiology)  GIULIANO KOHLER  [place] Raymond C, MD

## 2019-10-04 ENCOUNTER — MYC MEDICAL ADVICE (OUTPATIENT)
Dept: CARDIOLOGY | Facility: CLINIC | Age: 59
End: 2019-10-04

## 2019-10-08 LAB
MDC_IDC_LEAD_IMPLANT_DT: NORMAL
MDC_IDC_LEAD_IMPLANT_DT: NORMAL
MDC_IDC_LEAD_LOCATION: NORMAL
MDC_IDC_LEAD_LOCATION: NORMAL
MDC_IDC_LEAD_LOCATION_DETAIL_1: NORMAL
MDC_IDC_LEAD_LOCATION_DETAIL_1: NORMAL
MDC_IDC_LEAD_MFG: NORMAL
MDC_IDC_LEAD_MFG: NORMAL
MDC_IDC_LEAD_MODEL: NORMAL
MDC_IDC_LEAD_MODEL: NORMAL
MDC_IDC_LEAD_POLARITY_TYPE: NORMAL
MDC_IDC_LEAD_POLARITY_TYPE: NORMAL
MDC_IDC_LEAD_SERIAL: NORMAL
MDC_IDC_LEAD_SERIAL: NORMAL
MDC_IDC_MSMT_BATTERY_DTM: NORMAL
MDC_IDC_MSMT_BATTERY_REMAINING_LONGEVITY: 144 MO
MDC_IDC_MSMT_BATTERY_STATUS: NORMAL
MDC_IDC_MSMT_CAP_CHARGE_DTM: NORMAL
MDC_IDC_MSMT_CAP_CHARGE_ENERGY: 0 J
MDC_IDC_MSMT_CAP_CHARGE_TIME: 0 S
MDC_IDC_MSMT_CAP_CHARGE_TIME: 9.53
MDC_IDC_MSMT_CAP_CHARGE_TYPE: NORMAL
MDC_IDC_MSMT_CAP_CHARGE_TYPE: NORMAL
MDC_IDC_MSMT_LEADCHNL_RA_IMPEDANCE_VALUE: 729 OHM
MDC_IDC_MSMT_LEADCHNL_RA_PACING_THRESHOLD_AMPLITUDE: 1 V
MDC_IDC_MSMT_LEADCHNL_RA_PACING_THRESHOLD_PULSEWIDTH: 0.4 MS
MDC_IDC_MSMT_LEADCHNL_RA_SENSING_INTR_AMPL: 6.9 MV
MDC_IDC_MSMT_LEADCHNL_RV_IMPEDANCE_VALUE: 595 OHM
MDC_IDC_MSMT_LEADCHNL_RV_PACING_THRESHOLD_AMPLITUDE: 0.9 V
MDC_IDC_MSMT_LEADCHNL_RV_PACING_THRESHOLD_PULSEWIDTH: 0.4 MS
MDC_IDC_MSMT_LEADCHNL_RV_SENSING_INTR_AMPL: 12.5 MV
MDC_IDC_PG_IMPLANT_DTM: NORMAL
MDC_IDC_PG_MFG: NORMAL
MDC_IDC_PG_MODEL: NORMAL
MDC_IDC_PG_SERIAL: NORMAL
MDC_IDC_PG_TYPE: NORMAL
MDC_IDC_SESS_CLINIC_NAME: NORMAL
MDC_IDC_SESS_DTM: NORMAL
MDC_IDC_SESS_TYPE: NORMAL
MDC_IDC_SET_BRADY_AT_MODE_SWITCH_MODE: NORMAL
MDC_IDC_SET_BRADY_AT_MODE_SWITCH_RATE: 170 {BEATS}/MIN
MDC_IDC_SET_BRADY_LOWRATE: 60 {BEATS}/MIN
MDC_IDC_SET_BRADY_MAX_SENSOR_RATE: 125 {BEATS}/MIN
MDC_IDC_SET_BRADY_MAX_TRACKING_RATE: 125 {BEATS}/MIN
MDC_IDC_SET_BRADY_MODE: NORMAL
MDC_IDC_SET_BRADY_PAV_DELAY_HIGH: 110 MS
MDC_IDC_SET_BRADY_PAV_DELAY_LOW: 220 MS
MDC_IDC_SET_BRADY_SAV_DELAY_HIGH: 110 MS
MDC_IDC_SET_BRADY_SAV_DELAY_LOW: 220 MS
MDC_IDC_SET_LEADCHNL_RA_PACING_AMPLITUDE: 2.5 V
MDC_IDC_SET_LEADCHNL_RA_PACING_ANODE_ELECTRODE_1: NORMAL
MDC_IDC_SET_LEADCHNL_RA_PACING_ANODE_LOCATION_1: NORMAL
MDC_IDC_SET_LEADCHNL_RA_PACING_CAPTURE_MODE: NORMAL
MDC_IDC_SET_LEADCHNL_RA_PACING_CATHODE_ELECTRODE_1: NORMAL
MDC_IDC_SET_LEADCHNL_RA_PACING_CATHODE_LOCATION_1: NORMAL
MDC_IDC_SET_LEADCHNL_RA_PACING_POLARITY: NORMAL
MDC_IDC_SET_LEADCHNL_RA_PACING_PULSEWIDTH: 0.4 MS
MDC_IDC_SET_LEADCHNL_RA_SENSING_ADAPTATION_MODE: NORMAL
MDC_IDC_SET_LEADCHNL_RA_SENSING_ANODE_ELECTRODE_1: NORMAL
MDC_IDC_SET_LEADCHNL_RA_SENSING_ANODE_LOCATION_1: NORMAL
MDC_IDC_SET_LEADCHNL_RA_SENSING_CATHODE_ELECTRODE_1: NORMAL
MDC_IDC_SET_LEADCHNL_RA_SENSING_CATHODE_LOCATION_1: NORMAL
MDC_IDC_SET_LEADCHNL_RA_SENSING_POLARITY: NORMAL
MDC_IDC_SET_LEADCHNL_RA_SENSING_SENSITIVITY: 0.25 MV
MDC_IDC_SET_LEADCHNL_RV_PACING_AMPLITUDE: 2.5 V
MDC_IDC_SET_LEADCHNL_RV_PACING_ANODE_ELECTRODE_1: NORMAL
MDC_IDC_SET_LEADCHNL_RV_PACING_ANODE_LOCATION_1: NORMAL
MDC_IDC_SET_LEADCHNL_RV_PACING_CAPTURE_MODE: NORMAL
MDC_IDC_SET_LEADCHNL_RV_PACING_CATHODE_ELECTRODE_1: NORMAL
MDC_IDC_SET_LEADCHNL_RV_PACING_CATHODE_LOCATION_1: NORMAL
MDC_IDC_SET_LEADCHNL_RV_PACING_POLARITY: NORMAL
MDC_IDC_SET_LEADCHNL_RV_PACING_PULSEWIDTH: 0.4 MS
MDC_IDC_SET_LEADCHNL_RV_SENSING_ADAPTATION_MODE: NORMAL
MDC_IDC_SET_LEADCHNL_RV_SENSING_ANODE_ELECTRODE_1: NORMAL
MDC_IDC_SET_LEADCHNL_RV_SENSING_ANODE_LOCATION_1: NORMAL
MDC_IDC_SET_LEADCHNL_RV_SENSING_CATHODE_ELECTRODE_1: NORMAL
MDC_IDC_SET_LEADCHNL_RV_SENSING_CATHODE_LOCATION_1: NORMAL
MDC_IDC_SET_LEADCHNL_RV_SENSING_POLARITY: NORMAL
MDC_IDC_SET_LEADCHNL_RV_SENSING_SENSITIVITY: 0.6 MV
MDC_IDC_SET_ZONE_DETECTION_INTERVAL: 300 MS
MDC_IDC_SET_ZONE_DETECTION_INTERVAL: 353 MS
MDC_IDC_SET_ZONE_DETECTION_INTERVAL: 462 MS
MDC_IDC_SET_ZONE_TYPE: NORMAL
MDC_IDC_SET_ZONE_VENDOR_TYPE: NORMAL
MDC_IDC_STAT_BRADY_RA_PERCENT_PACED: 90 %
MDC_IDC_STAT_BRADY_RV_PERCENT_PACED: 64 %
MDC_IDC_STAT_EPISODE_RECENT_COUNT: 0
MDC_IDC_STAT_EPISODE_RECENT_COUNT_DTM_END: NORMAL
MDC_IDC_STAT_EPISODE_RECENT_COUNT_DTM_START: NORMAL
MDC_IDC_STAT_EPISODE_TOTAL_COUNT: 1
MDC_IDC_STAT_EPISODE_TOTAL_COUNT: 2
MDC_IDC_STAT_EPISODE_TOTAL_COUNT: 47
MDC_IDC_STAT_EPISODE_TOTAL_COUNT_DTM_END: NORMAL
MDC_IDC_STAT_EPISODE_TYPE: NORMAL
MDC_IDC_STAT_EPISODE_VENDOR_TYPE: NORMAL
MDC_IDC_STAT_TACHYTHERAPY_ATP_DELIVERED_RECENT: 0
MDC_IDC_STAT_TACHYTHERAPY_ATP_DELIVERED_TOTAL: 2
MDC_IDC_STAT_TACHYTHERAPY_RECENT_DTM_END: NORMAL
MDC_IDC_STAT_TACHYTHERAPY_RECENT_DTM_START: NORMAL
MDC_IDC_STAT_TACHYTHERAPY_SHOCKS_ABORTED_RECENT: 0
MDC_IDC_STAT_TACHYTHERAPY_SHOCKS_ABORTED_TOTAL: 0
MDC_IDC_STAT_TACHYTHERAPY_SHOCKS_DELIVERED_RECENT: 0
MDC_IDC_STAT_TACHYTHERAPY_SHOCKS_DELIVERED_TOTAL: 0
MDC_IDC_STAT_TACHYTHERAPY_TOTAL_DTM_END: NORMAL

## 2019-10-09 NOTE — TELEPHONE ENCOUNTER
Left message for Jose asking him to call back regarding lab testing.  Lab orders have been resent to [facility], just in case.     Helen Gonzalez RN

## 2019-10-16 NOTE — TELEPHONE ENCOUNTER
Left message for Jose following up on labs.  Also attempted wife's work number but phone went to busy signal.      Helen Gonzalez RN

## 2019-10-31 LAB
ANION GAP SERPL CALCULATED.3IONS-SCNC: 9 MMOL/L
BUN SERPL-MCNC: 19 MG/DL
CALCIUM SERPL-MCNC: 9.5 MG/DL
CHLORIDE SERPLBLD-SCNC: 107 MMOL/L
CO2 SERPL-SCNC: 27 MMOL/L
CREAT SERPL-MCNC: 1.13 MG/DL
GFR SERPL CREATININE-BSD FRML MDRD: >60 ML/MIN/1.73M2
GLUCOSE SERPL-MCNC: 84 MG/DL (ref 70–99)
POTASSIUM SERPL-SCNC: 4.6 MMOL/L
SODIUM SERPL-SCNC: 143 MMOL/L

## 2019-11-20 ENCOUNTER — TELEPHONE (OUTPATIENT)
Dept: CARDIOLOGY | Facility: CLINIC | Age: 59
End: 2019-11-20

## 2019-11-21 ENCOUNTER — PATIENT OUTREACH (OUTPATIENT)
Dept: CARDIOLOGY | Facility: CLINIC | Age: 59
End: 2019-11-21

## 2019-11-21 DIAGNOSIS — D86.85 SARCOID, CARDIAC: ICD-10-CM

## 2019-11-21 RX ORDER — METOPROLOL SUCCINATE 25 MG/1
100 TABLET, EXTENDED RELEASE ORAL DAILY
Qty: 360 TABLET | Refills: 3 | Status: SHIPPED | OUTPATIENT
Start: 2019-11-21 | End: 2020-11-27

## 2019-11-21 NOTE — PROGRESS NOTES
Date: 11/21/2019    Time of Call: 4:56 PM     Diagnosis:  Heart failure     [ VORB ] Ordering provider: Dr Dickey    Order: increase metorpolol to 100 mg daily     Order received by: Helen Gonzalez RN       Follow-up/additional notes: will communicate with Jose

## 2020-01-30 ENCOUNTER — OFFICE VISIT (OUTPATIENT)
Dept: CARDIOLOGY | Facility: CLINIC | Age: 60
End: 2020-01-30
Attending: INTERNAL MEDICINE
Payer: COMMERCIAL

## 2020-01-30 VITALS
BODY MASS INDEX: 27.17 KG/M2 | DIASTOLIC BLOOD PRESSURE: 71 MMHG | SYSTOLIC BLOOD PRESSURE: 117 MMHG | HEIGHT: 73 IN | WEIGHT: 205 LBS | HEART RATE: 63 BPM | OXYGEN SATURATION: 96 %

## 2020-01-30 DIAGNOSIS — D86.85 SARCOID, CARDIAC: Primary | ICD-10-CM

## 2020-01-30 DIAGNOSIS — D86.85 CARDIAC SARCOIDOSIS: Primary | ICD-10-CM

## 2020-01-30 DIAGNOSIS — D86.85 SARCOID, CARDIAC: ICD-10-CM

## 2020-01-30 LAB
ANION GAP SERPL CALCULATED.3IONS-SCNC: 5 MMOL/L (ref 3–14)
BUN SERPL-MCNC: 19 MG/DL (ref 7–30)
CALCIUM SERPL-MCNC: 8.8 MG/DL (ref 8.5–10.1)
CHLORIDE SERPL-SCNC: 109 MMOL/L (ref 94–109)
CO2 SERPL-SCNC: 27 MMOL/L (ref 20–32)
CREAT SERPL-MCNC: 1.15 MG/DL (ref 0.66–1.25)
GFR SERPL CREATININE-BSD FRML MDRD: 70 ML/MIN/{1.73_M2}
GLUCOSE SERPL-MCNC: 77 MG/DL (ref 70–99)
NT-PROBNP SERPL-MCNC: 354 PG/ML (ref 0–125)
POTASSIUM SERPL-SCNC: 4.3 MMOL/L (ref 3.4–5.3)
SODIUM SERPL-SCNC: 141 MMOL/L (ref 133–144)

## 2020-01-30 PROCEDURE — 99215 OFFICE O/P EST HI 40 MIN: CPT | Mod: ZP | Performed by: INTERNAL MEDICINE

## 2020-01-30 PROCEDURE — 83880 ASSAY OF NATRIURETIC PEPTIDE: CPT | Performed by: INTERNAL MEDICINE

## 2020-01-30 PROCEDURE — 36415 COLL VENOUS BLD VENIPUNCTURE: CPT | Performed by: INTERNAL MEDICINE

## 2020-01-30 PROCEDURE — G0463 HOSPITAL OUTPT CLINIC VISIT: HCPCS | Mod: ZF

## 2020-01-30 PROCEDURE — 80048 BASIC METABOLIC PNL TOTAL CA: CPT | Performed by: INTERNAL MEDICINE

## 2020-01-30 RX ADMIN — Medication 5 ML: at 14:30

## 2020-01-30 ASSESSMENT — MIFFLIN-ST. JEOR: SCORE: 1803.75

## 2020-01-30 ASSESSMENT — PAIN SCALES - GENERAL: PAINLEVEL: NO PAIN (0)

## 2020-01-30 NOTE — LETTER
1/30/2020      RE: Jose Carney  [address]  [facility] [address]       January 30, 2020    Dear Dr. De León,     I the pleasure of seeing Jose Carney in the [facility] Sarcoidosis clinic today.     As you know he is a very pleasant 58-year-old man with a history sarcoidosis which is diagnosed by transbronchial biopsy in 2013.  He had been having back pain prior to this and 30 pound weight loss and had substantial mediastinal lymphadenopathy.  He was placed on several months of steroids with significant weight gain and improvement in symptoms as well as back pain.      In late summer and fall 2017 he started to develop palpitations which were found to be supraventricular tachycardia (likely AVNRT as well as some atrial tachycardia)  based on his cardiac monitor. He then had a cardiac MRI which was consistent with myocardial involvement from sarcoidosis (see below).  He was then sent down here for further management and was placed on metoprolol.    Based on high risk features of his cardiac MRI we recommended that he have an ICD placed which was placed locally with your team.  I did place him on a burst of prednisone given a highly suspicious PET scan which showed complete resolution of his cardiac sarcoidosis.  At that time I switch him to methotrexate as a staring steroid sparing agent and was titrating this up and tapering down his prednisone when he developed ventricular tachycardia.  This was in January 2019.  The VT was at a rate of 135 and was unable to be paced out.  He did not have syncope with this.  He was loaded with amiodarone, and given failure to gain control of his VT he was given Solu-Medrol and placed on higher dose of prednisone in addition to his methotrexate.     He was then able to be converted and when I saw him last I continued him on amiodarone 200 mg, I increased his methotrexate 20 mg and tapered him off of prednisone. We have also been increasing his back on  metoprolol to keep him out of ventricular tachycardia.     He came down to [place] sooner than anticipated due to recurrent atrial  fibrillation.  He was seen in the core clinic. His metoprolol was increased and he is mostly out of AF now.     He has been overall feeling  well since I saw him last. He has not had any further VT. He can walk a mile on flat ground.  He denies PND orthopnea or lower extremity edema.  He denies any chest heaviness or pressure.  He is tolerating the methotrexate without any side effects with stable surveillance labs-although he does have some muscle aches.     Of note he also was treated for uveitis in the last year and has some ongoing residual right eye blurriness.     His in device his device interrogation today shows even more V pacing over time   Underlying rhythm is sinus bradycardia with a very long AV delay (close to 400 ms),       PAST MEDICAL HISTORY:  Past Medical History:   Diagnosis Date     First degree AV block 3/20/2018     Palpitations 3/20/2018     Paroxysmal supraventricular tachycardia (H) 3/20/2018     Sarcoid, cardiac/EF 54% per MRI,[place] of affected myocardium is 12% of myocardial mass 1/25/2018     Sarcoidosis/ systemic 2013 3/20/2018     FAMILY HISTORY:  His family history is notable for several family members with autoimmune disease.  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  He denies any tobacco or alcohol use or recreational drug use.     CURRENT MEDICATIONS:  Current Outpatient Medications   Medication Sig Dispense Refill     amiodarone (PACERONE/CODARONE) 200 MG tablet Take 2 tabs (400 mg) by mouth twice daily for 13 days. Then take 2 tabs (400 mg) a day for 14 days. Then take 1 tab (200 mg) daily.       folic acid (FOLVITE) 1 MG tablet TAKE 5 MG ONCE WEEKLY WITH YOUR METHOTREXATE 60 tablet 3     furosemide (LASIX) 20  "MG tablet Take 1 tablet (20 mg) by mouth daily As directed by CORE / HF clinic 30 tablet 0     lisinopril (PRINIVIL/ZESTRIL) 5 MG tablet Take 1 tablet (5 mg) by mouth daily 90 tablet 3     melatonin 3 MG tablet Take 3 mg by mouth        methotrexate 2.5 MG tablet Take 8 tablets (20 mg) by mouth every 7 days 96 tablet 3     metoprolol succinate ER (TOPROL-XL) 25 MG 24 hr tablet Take 4 tablets (100 mg) by mouth daily 360 tablet 3     prednisoLONE acetate (PRED FORTE) 1 % ophthalmic susp Place 1 drop into both eyes every hour       rivaroxaban ANTICOAGULANT (XARELTO) 20 MG TABS tablet Take 1 tablet (20 mg) by mouth daily (with dinner) 90 tablet 3     sildenafil (REVATIO) 20 MG tablet Take 20-60 mg by mouth as needed       tolterodine (DETROL) 1 MG tablet Take 1 mg by mouth         ROS:   Constitutional: No fever, chills, or sweats. Weight has been stable   ENT: No visual disturbance, ear ache, epistaxis, sore throat.   Allergies/Immunologic: Negative.   Respiratory: No cough, hemoptysis.   Cardiovascular: As per HPI.   GI: No nausea, vomiting, hematemesis, melena, or hematochezia.   : No urinary frequency, dysuria, or hematuria.   Integument: Negative.   Psychiatric: insomnia   Neuro: Negative.   Endocrinology: Negative.   Musculoskeletal: Chronic back pain    EXAM:  /71 (BP Location: Right arm, Patient Position: Chair, Cuff Size: Adult Large)   Pulse 63   Ht 1.854 m (6' 1\")   Wt 93 kg (205 lb)   SpO2 96%   BMI 27.05 kg/m     General: appears comfortable, alert and articulate  Head: normocephalic, atraumatic  Eyes: anicteric sclera, EOMI  Neck: no adenopathy  Orophyarynx: moist mucosa, no lesions, dentition intact  Heart: regular, S1/S2, III/IV systolic murmur at the apex, no gallop, rub, estimated JVP < 10   Lungs: clear, no rales or wheezing  Abdomen: soft, non-tender, bowel sounds present, no hepatosplenomegaly  Extremities: no clubbing, cyanosis or edema  Neurological: normal speech and affect, no " gross motor deficits    Labs:    Lab Results   Component Value Date    WBC 7.6 07/05/2019    HGB 15.8 07/05/2019    HCT 45.2 07/05/2019     07/05/2019     01/30/2020    POTASSIUM 4.3 01/30/2020    CHLORIDE 109 01/30/2020    CO2 27 01/30/2020    BUN 19 01/30/2020    CR 1.15 01/30/2020    GLC 77 01/30/2020    NTBNP 354 (H) 01/30/2020    TROPI <0.015 09/20/2018    AST 30 06/25/2019    ALT 54 06/25/2019    ALKPHOS 65 07/05/2019    BILITOTAL 0.8 06/25/2019         ECG: Sinus rhythm, first-degree AV block PVCs normal axis normal QT interval     Repeat PET:   8/2018    Impression: In this patient with cardiac sarcoidosis under treatment  with steroids for the last 4 months;  1. No FDG uptake within the myocardium. Overall there is complete  resolution of previous cardiac sarcoidosis.  2. Excellent suppression of myocardial glucose metabolism.  3. Stable number and size of parenchymal lung nodules, although  previously seen high metabolic activity within a few of these lesions  is no longer present.  4. Stable size number and metabolic activity of lymphadenopathy in the  mediastinum, bilateral axilla and right supraclavicular region. 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  5. Stable left stone.  The ER N and forming of the PEG and start any thickened o symptoms reported and rate has been right in between 150 to 170 beats a minute.   4. Clinical correlation advised.         Cardiac MRI   1. The LV cavity size is mildly enlarged. The global systolic function is normal. The LVEF is 55%. There is  severe zousgdjpouo-gc-coiqobmk involving the inferoseptal and inferior basal segments.     2. The RV is normal in cavity size. The global systolic function is normal. The RVEF is 66%.      3. Both atria are normal in size.     4. Due to ICD lead artifact, the tricuspid and pulmonic valves were not well visualized.       The mitral and aortic valves appear normally functioning without  significant disease.       5. Late gadolinium enhancement imaging shows epicardial and mid-myocardial enhancement in the  basal-inferoseptal, basal-inferior, and basal-inferolateral segments.      6. There is no pericardial effusion.     7. Unable to assess for intracardiac thrombus.     8. Unable to do T2 mapping and assess for myocardial edema (active inflammation).      CONCLUSIONS:    1. Cardiac sarcoidosis with preserved systolic function; LVEF 55%, RVEF 66%.  2. Sarcoidosis related scarring and severe zwhofopnlqb-ob-elvmirhc involving the basal-inferoseptal and  basal-inferior segments. Total scar burden 12%. Unchanged from previous CMR dated 01/19/2018.      Cardiac MRI: 2/2019    2. The RV is normal in cavity size. The global systolic function is normal. The RVEF is 66%.      3. Both atria are normal in size.     4. Due to ICD lead artifact, the tricuspid and pulmonic valves were not well visualized.       The mitral and aortic valves appear normally functioning without significant disease.       5. Late gadolinium enhancement imaging shows epicardial and mid-myocardial enhancement in the  basal-inferoseptal, basal-inferior, and basal-inferolateral segments.      6. There is no pericardial effusion.     7. Unable to assess for intracardiac thrombus.     8. Unable to do T2 mapping and assess for myocardial edema (active inflammation).      CONCLUSIONS:    1. Cardiac sarcoidosis with preserved systolic function; LVEF 55%, RVEF 66%.  2. Sarcoidosis related scarring and severe syckdsjtgfh-ej-luqismym involving the basal-inferoseptal and  basal-inferior segments. Total scar burden 12%. Unchanged from previous CMR dated 01/19/2018.        Patient seen in the [facility] for evaluation and iterative programming of his [employer] dual lead ICD per MD orders. Patient has an appointment to see Dr. Dickey today. Normal ICD function.  23 AT/AF episodes recorded since 9/26/19, all < 1 minute in duration.  Patient  reports he takes Xarelto.  No ventricular arrhythmias recorded. Intrinsic rhythm = SB @ 48 with 1st degree AVB.  AP =88%.  = 87%.   Estimated battery longevity to TORIE =12 years.  Lead trends appear stable. Patient reports that he is feeling OK.  Heart rate histograms display 80% of heart rates at 60 bpm and 10-15% rates at 70-80 bpm.  Patient endorses NEWELL.  Activity sensor adjusted from slope of 8 to 10 and activity threshold from medium to medium/low.  Plan for patient to send a remote transmission in 3 months and return to clinic in 6 months.  JO ANN Muñiz RN.      Echo 1/30/2020 personally reviewed     Assessment and Plan:   In summary this is a very pleasant 58-year-old man with a history of biopsy-proven cardiac sarcoid from a transbronchial biopsy-who has a cardiac MRI consistent with myocardial involvement from sarcoidosis. His PET scan related inflammation resolved completely with 3-4 months of 40 mg of prednisone.  I did have an ICD placed given he had high risk features on his cardiac MRI for malignant arrhythmias.     He has been relatively stable for some time.  Given recurrent ventricular tachycardia last year he did get an increase in prednisone plus amiodarone-I now have him transition to methotrexate only as well as low-dose amiodarone.     Interestingly his LV ejection fraction has declined slightly although he has no clinical symptoms from this.  I doubt the degree of mitral regurgitation is leading to decline in his function.  Given more recent recurrent atrial fibrillation and slight decline in his cardiac function it does raise the question whether or not the methotrexate is enough immunosuppression for him or whether we have some recurrence in active sarcoid. He is also having more AV pacing over time and I am not able to set his AV delay any longer on this device.     This raises 2 questions: 1) is the sarcoid active 2) should he have his pacing or CRT upgrade at some point     The stability of  his LV function over the last few months balanced with the fact that he is in no clinical heart failure and is in remission from VT and AF, and my concern about adding MORE immunosuppression, I am going to again watchful wait.     For now I am going to keep him on methotrexate 20 mg daily with folic acid, keep his amiodarone at 200 mg daily I am also adding lisinopril 5 mg daily for neurohormonal blockade-we will keep him at 100 mg metoprolol daily.  I will see him back in 4 months with repeat echocardiogram or sooner as needed.      Should he have any increase in A. fib burden or decline in cardiac function I would repeat his PET scan as he may need another pulse of steroids with likely change in baseline immunosuppression.     From a ventricular tachycardia perspective I will continue to metoprolol 100 mg daily as well as amiodarone 200 mill grams daily    I will see him back in 4 months with CBC, LFTs in 2 months, echo and device interrogation     I am leaving all of his other neurohormonal blockade immunosuppression and antiarrhythmics the same    Atrial  Fibrillation: Paroxysmal - he is presently anticoagulated-I will continue metoprolol 100 mg XL daily for now-should this come back again this raises the question whether or not the sarcoid is controlled    MR on last echo: no symptoms, will monitor for now -this is overall stable although his LV function has declined slightly I suspect his mitral regurgitation is related to papillary scar-I am adding afterload reduction today    Uveitis-  Followed locally     Please feel free to call me with any further questions and thank you for the opportunity to assist in his care.     Teeth cleaning: no antibiotics needed       Mandi Dickey MD   [facility]  Patient Care Team:  [facility] as PCP - Helen Hernadez, RN as Specialty Care Coordinator (Cardiology)  GIULIANO KOHLER  Darryl Roe [place] Hausch, Raymond C, MD

## 2020-01-30 NOTE — NURSING NOTE
Chief Complaint   Patient presents with     Follow Up     Return HF, 59 yo male, Sarcoid/HF, EF EF 50-55%, labs, device check and echo prior.      Vitals were taken and medications were reconciled.     Wendie Duran RMA  3:23 PM

## 2020-01-30 NOTE — NURSING NOTE
Diet: Patient instructed regarding a heart failure healthy diet, including discussion of reduced fat and 2000 mg daily sodium restriction, daily weights, medication purpose and compliance, fluid restrictions and resources for patient and family to access for assistance with heart failure management.       Labs: Patient was given results of the laboratory testing obtained today and patient was instructed about when to return for the next laboratory testing.     Med Reconcile: Reviewed and verified all current medications with the patient. The updated medication list was printed and given to the patient.     Med changes: no changes    Return Appointment: Patient given instructions regarding scheduling next clinic visit: Cardiac MRI and Dr Dickey in 4-5 months, device check and labs.  I will call with appointment time    Patient stated he understood all health information given and agreed to call with further questions or concerns.     Helen Gonzalez RN

## 2020-01-30 NOTE — PATIENT INSTRUCTIONS
It was a pleasure to see you in clinic today.  Please do not hesitate to call with any questions or concerns.  You will be scheduled for a remote transmission in 90 days.  We look forward to seeing you in clinic at your next device check in 6 months.    Aliyah Muñiz RN, BSN  Electrophysiology Nurse Clinician  [facility]    During Business Hours Please Call:  [phone]  After Hours Please Call:  [phone] - select option #4 and ask for job code [phone]

## 2020-01-30 NOTE — PATIENT INSTRUCTIONS
"Cardiology Providers you saw during your visit:  Dr. Dickey    Medication changes:  1.  No changes    Follow up:  1.  Follow up with Dr Dickey in 4-5 months on a Friday with a cardiac MRI and labs the same day  Labs:  Results for LIVIER JONES (MRN [id number]) as of 1/30/2020 15:26   Ref. Range 1/30/2020 13:26   Sodium Latest Ref Range: 133 - 144 mmol/L 141   Potassium Latest Ref Range: 3.4 - 5.3 mmol/L 4.3   Chloride Latest Ref Range: 94 - 109 mmol/L 109   Carbon Dioxide Latest Ref Range: 20 - 32 mmol/L 27   Urea Nitrogen Latest Ref Range: 7 - 30 mg/dL 19   Creatinine Latest Ref Range: 0.66 - 1.25 mg/dL 1.15   GFR Estimate Latest Ref Range: >60 mL/min/1.73_m2 70   GFR Estimate If Black Latest Ref Range: >60 mL/min/1.73_m2 81   Calcium Latest Ref Range: 8.5 - 10.1 mg/dL 8.8   Anion Gap Latest Ref Range: 3 - 14 mmol/L 5   N-Terminal Pro Bnp Latest Ref Range: 0 - 125 pg/mL 354 (H)   Glucose Latest Ref Range: 70 - 99 mg/dL 77       Please call if you have :  1. Weight gain of more than 2 pounds in a day or 5 pounds in a week  2. Increased shortness of breath, swelling or bloating  3. Dizziness, lightheadedness   4. Any questions or concerns.       Follow the American Heart Association Diet and Lifestyle recommendations:  Limit saturated fat, trans fat, sodium, red meat, sweets and sugar-sweetened beverages. If you choose to eat red meat, compare labels and select the leanest cuts available.  Aim for at least 150 minutes of moderate physical activity or 75 minutes of vigorous physical activity - or an equal combination of both - each week.      During business hours: [phone], press option # 1 to be routed to the [facility] then option # 4 for \"To send a message to your care team\"      After hours, weekends or holidays: On Call Cardiologist- [phone]   option #4 and ask to speak to the on-call Cardiologist. Inform them you are a CORE/heart failure patient at the [facility].      Heart Failure Support " "Group  [facility]  The Board Room, 8th Floor  [address] [address]     Meetings   1-2 pm  January 6  March 2  May 4  July 6  September 14  November 2      Helen Gonzalez RN BSN CHFN  Cardiology Care Coordinator - Heart Failure/ C.O.R.E. Clinic  [facility]   Questions and [phone]   First press #1 for the [employer] and then press #4 for \"To send a message to your care team\"    "

## 2020-01-30 NOTE — LETTER
1/30/2020      RE: Jose Carney  [address]  [facility] [address]       Dear Colleague,    Thank you for the opportunity to participate in the care of your patient, Jose Carney, at the [facility]. Please see a copy of my visit note below.      January 30, 2020    Dear Dr. De León,     I the pleasure of seeing Jose Carney in the [facility] Cardiac Sarcoidosis clinic today.     As you know he is a very pleasant 58-year-old man with a history sarcoidosis which is diagnosed by transbronchial biopsy in 2013.  He had been having back pain prior to this and 30 pound weight loss and had substantial mediastinal lymphadenopathy.  He was placed on several months of steroids with significant weight gain and improvement in symptoms as well as back pain.      In late summer and fall 2017 he started to develop palpitations which were found to be supraventricular tachycardia (likely AVNRT as well as some atrial tachycardia)  based on his cardiac monitor. He then had a cardiac MRI which was consistent with myocardial involvement from sarcoidosis (see below).  He was then sent down here for further management and was placed on metoprolol.    Based on high risk features of his cardiac MRI we recommended that he have an ICD placed which was placed locally with your team.  I did place him on a burst of prednisone given a highly suspicious PET scan which showed complete resolution of his cardiac sarcoidosis.  At that time I switch him to methotrexate as a staring steroid sparing agent and was titrating this up and tapering down his prednisone when he developed ventricular tachycardia.  This was in January 2019.  The VT was at a rate of 135 and was unable to be paced out.  He did not have syncope with this.  He was loaded with amiodarone, and given failure to gain control of his VT he was given Solu-Medrol and placed on higher dose of prednisone in addition  to his methotrexate.     He was then able to be converted and when I saw him last I continued him on amiodarone 200 mg, I increased his methotrexate 20 mg and tapered him off of prednisone. We have also been increasing his back on metoprolol to keep him out of ventricular tachycardia.     He came down to [place] sooner than anticipated due to recurrent atrial  fibrillation.  He was seen in the core clinic. His metoprolol was increased and he is mostly out of AF now.     He has been overall feeling  well since I saw him last. He has not had any further VT. He can walk a mile on flat ground.  He denies PND orthopnea or lower extremity edema.  He denies any chest heaviness or pressure.  He is tolerating the methotrexate without any side effects with stable surveillance labs-although he does have some muscle aches.     Of note he also was treated for uveitis in the last year and has some ongoing residual right eye blurriness.     His in device his device interrogation today shows even more V pacing over time   Underlying rhythm is sinus bradycardia with a very long AV delay (close to 400 ms),       PAST MEDICAL HISTORY:  Past Medical History:   Diagnosis Date     First degree AV block 3/20/2018     Palpitations 3/20/2018     Paroxysmal supraventricular tachycardia (H) 3/20/2018     Sarcoid, cardiac/EF 54% per MRI,[place] of affected myocardium is 12% of myocardial mass 1/25/2018     Sarcoidosis/ systemic 2013 3/20/2018     FAMILY HISTORY:  His family history is notable for several family members with autoimmune disease.  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  He denies any tobacco or alcohol use or recreational drug use.     CURRENT MEDICATIONS:  Current Outpatient Medications   Medication Sig Dispense Refill     amiodarone (PACERONE/CODARONE) 200 MG tablet Take 2 tabs (400 mg) by  "mouth twice daily for 13 days. Then take 2 tabs (400 mg) a day for 14 days. Then take 1 tab (200 mg) daily.       folic acid (FOLVITE) 1 MG tablet TAKE 5 MG ONCE WEEKLY WITH YOUR METHOTREXATE 60 tablet 3     furosemide (LASIX) 20 MG tablet Take 1 tablet (20 mg) by mouth daily As directed by CORE / HF clinic 30 tablet 0     lisinopril (PRINIVIL/ZESTRIL) 5 MG tablet Take 1 tablet (5 mg) by mouth daily 90 tablet 3     melatonin 3 MG tablet Take 3 mg by mouth        methotrexate 2.5 MG tablet Take 8 tablets (20 mg) by mouth every 7 days 96 tablet 3     metoprolol succinate ER (TOPROL-XL) 25 MG 24 hr tablet Take 4 tablets (100 mg) by mouth daily 360 tablet 3     prednisoLONE acetate (PRED FORTE) 1 % ophthalmic susp Place 1 drop into both eyes every hour       rivaroxaban ANTICOAGULANT (XARELTO) 20 MG TABS tablet Take 1 tablet (20 mg) by mouth daily (with dinner) 90 tablet 3     sildenafil (REVATIO) 20 MG tablet Take 20-60 mg by mouth as needed       tolterodine (DETROL) 1 MG tablet Take 1 mg by mouth         ROS:   Constitutional: No fever, chills, or sweats. Weight has been stable   ENT: No visual disturbance, ear ache, epistaxis, sore throat.   Allergies/Immunologic: Negative.   Respiratory: No cough, hemoptysis.   Cardiovascular: As per HPI.   GI: No nausea, vomiting, hematemesis, melena, or hematochezia.   : No urinary frequency, dysuria, or hematuria.   Integument: Negative.   Psychiatric: insomnia   Neuro: Negative.   Endocrinology: Negative.   Musculoskeletal: Chronic back pain    EXAM:  /71 (BP Location: Right arm, Patient Position: Chair, Cuff Size: Adult Large)   Pulse 63   Ht 1.854 m (6' 1\")   Wt 93 kg (205 lb)   SpO2 96%   BMI 27.05 kg/m     General: appears comfortable, alert and articulate  Head: normocephalic, atraumatic  Eyes: anicteric sclera, EOMI  Neck: no adenopathy  Orophyarynx: moist mucosa, no lesions, dentition intact  Heart: regular, S1/S2, III/IV systolic murmur at the apex, no " gallop, rub, estimated JVP < 10   Lungs: clear, no rales or wheezing  Abdomen: soft, non-tender, bowel sounds present, no hepatosplenomegaly  Extremities: no clubbing, cyanosis or edema  Neurological: normal speech and affect, no gross motor deficits    Labs:    Lab Results   Component Value Date    WBC 7.6 07/05/2019    HGB 15.8 07/05/2019    HCT 45.2 07/05/2019     07/05/2019     01/30/2020    POTASSIUM 4.3 01/30/2020    CHLORIDE 109 01/30/2020    CO2 27 01/30/2020    BUN 19 01/30/2020    CR 1.15 01/30/2020    GLC 77 01/30/2020    NTBNP 354 (H) 01/30/2020    TROPI <0.015 09/20/2018    AST 30 06/25/2019    ALT 54 06/25/2019    ALKPHOS 65 07/05/2019    BILITOTAL 0.8 06/25/2019         ECG: Sinus rhythm, first-degree AV block PVCs normal axis normal QT interval     Repeat PET:   8/2018    Impression: In this patient with cardiac sarcoidosis under treatment  with steroids for the last 4 months;  1. No FDG uptake within the myocardium. Overall there is complete  resolution of previous cardiac sarcoidosis.  2. Excellent suppression of myocardial glucose metabolism.  3. Stable number and size of parenchymal lung nodules, although  previously seen high metabolic activity within a few of these lesions  is no longer present.  4. Stable size number and metabolic activity of lymphadenopathy in the  mediastinum, bilateral axilla and right supraclavicular region. 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  5. Stable left stone.  The ER N and forming of the PEG and start any thickened o symptoms reported and rate has been right in between 150 to 170 beats a minute.   4. Clinical correlation advised.         Cardiac MRI   1. The LV cavity size is mildly enlarged. The global systolic function is normal. The LVEF is 55%. There is  severe okdcdswkvrx-wc-ocojnpqk involving the inferoseptal and inferior basal segments.     2. The RV is normal in cavity size. The global systolic function is  normal. The RVEF is 66%.      3. Both atria are normal in size.     4. Due to ICD lead artifact, the tricuspid and pulmonic valves were not well visualized.       The mitral and aortic valves appear normally functioning without significant disease.       5. Late gadolinium enhancement imaging shows epicardial and mid-myocardial enhancement in the  basal-inferoseptal, basal-inferior, and basal-inferolateral segments.      6. There is no pericardial effusion.     7. Unable to assess for intracardiac thrombus.     8. Unable to do T2 mapping and assess for myocardial edema (active inflammation).      CONCLUSIONS:    1. Cardiac sarcoidosis with preserved systolic function; LVEF 55%, RVEF 66%.  2. Sarcoidosis related scarring and severe ytfndqjntba-rg-kfvgycsz involving the basal-inferoseptal and  basal-inferior segments. Total scar burden 12%. Unchanged from previous CMR dated 01/19/2018.      Cardiac MRI: 2/2019    2. The RV is normal in cavity size. The global systolic function is normal. The RVEF is 66%.      3. Both atria are normal in size.     4. Due to ICD lead artifact, the tricuspid and pulmonic valves were not well visualized.       The mitral and aortic valves appear normally functioning without significant disease.       5. Late gadolinium enhancement imaging shows epicardial and mid-myocardial enhancement in the  basal-inferoseptal, basal-inferior, and basal-inferolateral segments.      6. There is no pericardial effusion.     7. Unable to assess for intracardiac thrombus.     8. Unable to do T2 mapping and assess for myocardial edema (active inflammation).      CONCLUSIONS:    1. Cardiac sarcoidosis with preserved systolic function; LVEF 55%, RVEF 66%.  2. Sarcoidosis related scarring and severe qmxmrqwrjhu-vz-vbxjbhep involving the basal-inferoseptal and  basal-inferior segments. Total scar burden 12%. Unchanged from previous CMR dated 01/19/2018.        Patient seen in the [facility] for evaluation and iterative  programming of his [employer] dual lead ICD per MD orders. Patient has an appointment to see Dr. Dickey today. Normal ICD function.  23 AT/AF episodes recorded since 9/26/19, all < 1 minute in duration.  Patient reports he takes Xarelto.  No ventricular arrhythmias recorded. Intrinsic rhythm = SB @ 48 with 1st degree AVB.  AP =88%.  = 87%.   Estimated battery longevity to TORIE =12 years.  Lead trends appear stable. Patient reports that he is feeling OK.  Heart rate histograms display 80% of heart rates at 60 bpm and 10-15% rates at 70-80 bpm.  Patient endorses NEWELL.  Activity sensor adjusted from slope of 8 to 10 and activity threshold from medium to medium/low.  Plan for patient to send a remote transmission in 3 months and return to clinic in 6 months.  JO ANN Muñiz RN.      Echo 1/30/2020 personally reviewed     Assessment and Plan:   In summary this is a very pleasant 58-year-old man with a history of biopsy-proven cardiac sarcoid from a transbronchial biopsy-who has a cardiac MRI consistent with myocardial involvement from sarcoidosis. His PET scan related inflammation resolved completely with 3-4 months of 40 mg of prednisone.  I did have an ICD placed given he had high risk features on his cardiac MRI for malignant arrhythmias.     He has been relatively stable for some time.  Given recurrent ventricular tachycardia last year he did get an increase in prednisone plus amiodarone-I now have him transition to methotrexate only as well as low-dose amiodarone.     Interestingly his LV ejection fraction has declined slightly although he has no clinical symptoms from this.  I doubt the degree of mitral regurgitation is leading to decline in his function.  Given more recent recurrent atrial fibrillation and slight decline in his cardiac function it does raise the question whether or not the methotrexate is enough immunosuppression for him or whether we have some recurrence in active sarcoid. He is also having  more AV pacing over time and I am not able to set his AV delay any longer on this device.     This raises 2 questions: 1) is the sarcoid active 2) should he have his pacing or CRT upgrade at some point     The stability of his LV function over the last few months balanced with the fact that he is in no clinical heart failure and is in remission from VT and AF, and my concern about adding MORE immunosuppression, I am going to again watchful wait.     For now I am going to keep him on methotrexate 20 mg daily with folic acid, keep his amiodarone at 200 mg daily I am also adding lisinopril 5 mg daily for neurohormonal blockade-we will keep him at 100 mg metoprolol daily.  I will see him back in 4 months with repeat echocardiogram or sooner as needed.      Should he have any increase in A. fib burden or decline in cardiac function I would repeat his PET scan as he may need another pulse of steroids with likely change in baseline immunosuppression.     From a ventricular tachycardia perspective I will continue to metoprolol 100 mg daily as well as amiodarone 200 mill grams daily    I will see him back in 4 months with CBC, LFTs in 2 months, echo and device interrogation     I am leaving all of his other neurohormonal blockade immunosuppression and antiarrhythmics the same    Atrial  Fibrillation: Paroxysmal - he is presently anticoagulated-I will continue metoprolol 100 mg XL daily for now-should this come back again this raises the question whether or not the sarcoid is controlled    MR on last echo: no symptoms, will monitor for now -this is overall stable although his LV function has declined slightly I suspect his mitral regurgitation is related to papillary scar-I am adding afterload reduction today    Uveitis-  Followed locally     Please feel free to call me with any further questions and thank you for the opportunity to assist in his care.     Teeth cleaning: no antibiotics needed       Mandi Dickey MD    [facility]  Patient Care Team:  Umm, Chino Andrews as PCP - General  Carlos, Helen Edmondson, RN as Specialty Care Coordinator (Cardiology)  GIULIANO KOHLER [place], [place] Hausch, Raymond C, MD      Please do not hesitate to contact me if you have any questions/concerns.     Sincerely,     Mandi Dickey MD

## 2020-01-30 NOTE — PROGRESS NOTES
January 30, 2020    Dear Dr. De León,     I the pleasure of seeing Jose Carney in the [facility] Cardiac Sarcoidosis clinic today.     As you know he is a very pleasant 58-year-old man with a history sarcoidosis which is diagnosed by transbronchial biopsy in 2013.  He had been having back pain prior to this and 30 pound weight loss and had substantial mediastinal lymphadenopathy.  He was placed on several months of steroids with significant weight gain and improvement in symptoms as well as back pain.      In late summer and fall 2017 he started to develop palpitations which were found to be supraventricular tachycardia (likely AVNRT as well as some atrial tachycardia)  based on his cardiac monitor. He then had a cardiac MRI which was consistent with myocardial involvement from sarcoidosis (see below).  He was then sent down here for further management and was placed on metoprolol.    Based on high risk features of his cardiac MRI we recommended that he have an ICD placed which was placed locally with your team.  I did place him on a burst of prednisone given a highly suspicious PET scan which showed complete resolution of his cardiac sarcoidosis.  At that time I switch him to methotrexate as a staring steroid sparing agent and was titrating this up and tapering down his prednisone when he developed ventricular tachycardia.  This was in January 2019.  The VT was at a rate of 135 and was unable to be paced out.  He did not have syncope with this.  He was loaded with amiodarone, and given failure to gain control of his VT he was given Solu-Medrol and placed on higher dose of prednisone in addition to his methotrexate.     He was then able to be converted and when I saw him last I continued him on amiodarone 200 mg, I increased his methotrexate 20 mg and tapered him off of prednisone. We have also been increasing his back on metoprolol to keep him out of ventricular tachycardia.     He came down to  [place] sooner than anticipated due to recurrent atrial  fibrillation.  He was seen in the core clinic. His metoprolol was increased and he is mostly out of AF now.     He has been overall feeling  well since I saw him last. He has not had any further VT. He can walk a mile on flat ground.  He denies PND orthopnea or lower extremity edema.  He denies any chest heaviness or pressure.  He is tolerating the methotrexate without any side effects with stable surveillance labs-although he does have some muscle aches.     Of note he also was treated for uveitis in the last year and has some ongoing residual right eye blurriness.     His in device his device interrogation today shows even more V pacing over time   Underlying rhythm is sinus bradycardia with a very long AV delay (close to 400 ms),       PAST MEDICAL HISTORY:  Past Medical History:   Diagnosis Date     First degree AV block 3/20/2018     Palpitations 3/20/2018     Paroxysmal supraventricular tachycardia (H) 3/20/2018     Sarcoid, cardiac/EF 54% per MRI,[place] of affected myocardium is 12% of myocardial mass 1/25/2018     Sarcoidosis/ systemic 2013 3/20/2018     FAMILY HISTORY:  His family history is notable for several family members with autoimmune disease.  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  He denies any tobacco or alcohol use or recreational drug use.     CURRENT MEDICATIONS:  Current Outpatient Medications   Medication Sig Dispense Refill     amiodarone (PACERONE/CODARONE) 200 MG tablet Take 2 tabs (400 mg) by mouth twice daily for 13 days. Then take 2 tabs (400 mg) a day for 14 days. Then take 1 tab (200 mg) daily.       folic acid (FOLVITE) 1 MG tablet TAKE 5 MG ONCE WEEKLY WITH YOUR METHOTREXATE 60 tablet 3     furosemide (LASIX) 20 MG tablet Take 1 tablet (20 mg) by mouth daily As directed by CORE / HF  "clinic 30 tablet 0     lisinopril (PRINIVIL/ZESTRIL) 5 MG tablet Take 1 tablet (5 mg) by mouth daily 90 tablet 3     melatonin 3 MG tablet Take 3 mg by mouth        methotrexate 2.5 MG tablet Take 8 tablets (20 mg) by mouth every 7 days 96 tablet 3     metoprolol succinate ER (TOPROL-XL) 25 MG 24 hr tablet Take 4 tablets (100 mg) by mouth daily 360 tablet 3     prednisoLONE acetate (PRED FORTE) 1 % ophthalmic susp Place 1 drop into both eyes every hour       rivaroxaban ANTICOAGULANT (XARELTO) 20 MG TABS tablet Take 1 tablet (20 mg) by mouth daily (with dinner) 90 tablet 3     sildenafil (REVATIO) 20 MG tablet Take 20-60 mg by mouth as needed       tolterodine (DETROL) 1 MG tablet Take 1 mg by mouth         ROS:   Constitutional: No fever, chills, or sweats. Weight has been stable   ENT: No visual disturbance, ear ache, epistaxis, sore throat.   Allergies/Immunologic: Negative.   Respiratory: No cough, hemoptysis.   Cardiovascular: As per HPI.   GI: No nausea, vomiting, hematemesis, melena, or hematochezia.   : No urinary frequency, dysuria, or hematuria.   Integument: Negative.   Psychiatric: insomnia   Neuro: Negative.   Endocrinology: Negative.   Musculoskeletal: Chronic back pain    EXAM:  /71 (BP Location: Right arm, Patient Position: Chair, Cuff Size: Adult Large)   Pulse 63   Ht 1.854 m (6' 1\")   Wt 93 kg (205 lb)   SpO2 96%   BMI 27.05 kg/m    General: appears comfortable, alert and articulate  Head: normocephalic, atraumatic  Eyes: anicteric sclera, EOMI  Neck: no adenopathy  Orophyarynx: moist mucosa, no lesions, dentition intact  Heart: regular, S1/S2, III/IV systolic murmur at the apex, no gallop, rub, estimated JVP < 10   Lungs: clear, no rales or wheezing  Abdomen: soft, non-tender, bowel sounds present, no hepatosplenomegaly  Extremities: no clubbing, cyanosis or edema  Neurological: normal speech and affect, no gross motor deficits    Labs:    Lab Results   Component Value Date    WBC " 7.6 07/05/2019    HGB 15.8 07/05/2019    HCT 45.2 07/05/2019     07/05/2019     01/30/2020    POTASSIUM 4.3 01/30/2020    CHLORIDE 109 01/30/2020    CO2 27 01/30/2020    BUN 19 01/30/2020    CR 1.15 01/30/2020    GLC 77 01/30/2020    NTBNP 354 (H) 01/30/2020    TROPI <0.015 09/20/2018    AST 30 06/25/2019    ALT 54 06/25/2019    ALKPHOS 65 07/05/2019    BILITOTAL 0.8 06/25/2019         ECG: Sinus rhythm, first-degree AV block PVCs normal axis normal QT interval     Repeat PET:   8/2018    Impression: In this patient with cardiac sarcoidosis under treatment  with steroids for the last 4 months;  1. No FDG uptake within the myocardium. Overall there is complete  resolution of previous cardiac sarcoidosis.  2. Excellent suppression of myocardial glucose metabolism.  3. Stable number and size of parenchymal lung nodules, although  previously seen high metabolic activity within a few of these lesions  is no longer present.  4. Stable size number and metabolic activity of lymphadenopathy in the  mediastinum, bilateral axilla and right supraclavicular region. 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  5. Stable left stone.  The ER N and forming of the PEG and start any thickened o symptoms reported and rate has been right in between 150 to 170 beats a minute.   4. Clinical correlation advised.         Cardiac MRI   1. The LV cavity size is mildly enlarged. The global systolic function is normal. The LVEF is 55%. There is  severe caaqfctrtqy-yq-rdgdadbh involving the inferoseptal and inferior basal segments.     2. The RV is normal in cavity size. The global systolic function is normal. The RVEF is 66%.      3. Both atria are normal in size.     4. Due to ICD lead artifact, the tricuspid and pulmonic valves were not well visualized.       The mitral and aortic valves appear normally functioning without significant disease.       5. Late gadolinium enhancement imaging shows epicardial  and mid-myocardial enhancement in the  basal-inferoseptal, basal-inferior, and basal-inferolateral segments.      6. There is no pericardial effusion.     7. Unable to assess for intracardiac thrombus.     8. Unable to do T2 mapping and assess for myocardial edema (active inflammation).      CONCLUSIONS:    1. Cardiac sarcoidosis with preserved systolic function; LVEF 55%, RVEF 66%.  2. Sarcoidosis related scarring and severe xxfmjjeodlx-st-utampksv involving the basal-inferoseptal and  basal-inferior segments. Total scar burden 12%. Unchanged from previous CMR dated 01/19/2018.      Cardiac MRI: 2/2019    2. The RV is normal in cavity size. The global systolic function is normal. The RVEF is 66%.      3. Both atria are normal in size.     4. Due to ICD lead artifact, the tricuspid and pulmonic valves were not well visualized.       The mitral and aortic valves appear normally functioning without significant disease.       5. Late gadolinium enhancement imaging shows epicardial and mid-myocardial enhancement in the  basal-inferoseptal, basal-inferior, and basal-inferolateral segments.      6. There is no pericardial effusion.     7. Unable to assess for intracardiac thrombus.     8. Unable to do T2 mapping and assess for myocardial edema (active inflammation).      CONCLUSIONS:    1. Cardiac sarcoidosis with preserved systolic function; LVEF 55%, RVEF 66%.  2. Sarcoidosis related scarring and severe lfzxtqwvpju-xx-ebvqktjr involving the basal-inferoseptal and  basal-inferior segments. Total scar burden 12%. Unchanged from previous CMR dated 01/19/2018.        Patient seen in the [facility] for evaluation and iterative programming of his [employer] dual lead ICD per MD orders. Patient has an appointment to see Dr. Dickey today. Normal ICD function.  23 AT/AF episodes recorded since 9/26/19, all < 1 minute in duration.  Patient reports he takes Xarelto.  No ventricular arrhythmias recorded. Intrinsic rhythm = SB @  48 with 1st degree AVB.  AP =88%.  = 87%.   Estimated battery longevity to TORIE =12 years.  Lead trends appear stable. Patient reports that he is feeling OK.  Heart rate histograms display 80% of heart rates at 60 bpm and 10-15% rates at 70-80 bpm.  Patient endorses NEWELL.  Activity sensor adjusted from slope of 8 to 10 and activity threshold from medium to medium/low.  Plan for patient to send a remote transmission in 3 months and return to clinic in 6 months.  JO ANN Muñiz RN.      Echo 1/30/2020 personally reviewed     Assessment and Plan:   In summary this is a very pleasant 58-year-old man with a history of biopsy-proven cardiac sarcoid from a transbronchial biopsy-who has a cardiac MRI consistent with myocardial involvement from sarcoidosis. His PET scan related inflammation resolved completely with 3-4 months of 40 mg of prednisone.  I did have an ICD placed given he had high risk features on his cardiac MRI for malignant arrhythmias.     He has been relatively stable for some time.  Given recurrent ventricular tachycardia last year he did get an increase in prednisone plus amiodarone-I now have him transition to methotrexate only as well as low-dose amiodarone.     Interestingly his LV ejection fraction has declined slightly although he has no clinical symptoms from this.  I doubt the degree of mitral regurgitation is leading to decline in his function.  Given more recent recurrent atrial fibrillation and slight decline in his cardiac function it does raise the question whether or not the methotrexate is enough immunosuppression for him or whether we have some recurrence in active sarcoid. He is also having more AV pacing over time and I am not able to set his AV delay any longer on this device.     This raises 2 questions: 1) is the sarcoid active 2) should he have his pacing or CRT upgrade at some point     The stability of his LV function over the last few months balanced with the fact that he is in no  clinical heart failure and is in remission from VT and AF, and my concern about adding MORE immunosuppression, I am going to again watchful wait.     For now I am going to keep him on methotrexate 20 mg daily with folic acid, keep his amiodarone at 200 mg daily I am also adding lisinopril 5 mg daily for neurohormonal blockade-we will keep him at 100 mg metoprolol daily.  I will see him back in 4 months with repeat echocardiogram or sooner as needed.      Should he have any increase in A. fib burden or decline in cardiac function I would repeat his PET scan as he may need another pulse of steroids with likely change in baseline immunosuppression.     From a ventricular tachycardia perspective I will continue to metoprolol 100 mg daily as well as amiodarone 200 mill grams daily    I will see him back in 4 months with CBC, LFTs in 2 months, echo and device interrogation     I am leaving all of his other neurohormonal blockade immunosuppression and antiarrhythmics the same    Atrial  Fibrillation: Paroxysmal - he is presently anticoagulated-I will continue metoprolol 100 mg XL daily for now-should this come back again this raises the question whether or not the sarcoid is controlled    MR on last echo: no symptoms, will monitor for now -this is overall stable although his LV function has declined slightly I suspect his mitral regurgitation is related to papillary scar-I am adding afterload reduction today    Uveitis-  Followed locally     Please feel free to call me with any further questions and thank you for the opportunity to assist in his care.     Teeth cleaning: no antibiotics needed       Mandi Dickey MD   [facility]  Patient Care Team:  [facility], [facility] as PCP - Helen Hernadez, RN as Specialty Care Coordinator (Cardiology)  GIULIANO KOHLER,  [place], [place]     Gary Soares MD

## 2020-01-31 LAB
MDC_IDC_EPISODE_DTM: NORMAL
MDC_IDC_EPISODE_ID: NORMAL
MDC_IDC_EPISODE_TYPE: NORMAL
MDC_IDC_LEAD_IMPLANT_DT: NORMAL
MDC_IDC_LEAD_IMPLANT_DT: NORMAL
MDC_IDC_LEAD_LOCATION: NORMAL
MDC_IDC_LEAD_LOCATION: NORMAL
MDC_IDC_LEAD_LOCATION_DETAIL_1: NORMAL
MDC_IDC_LEAD_LOCATION_DETAIL_1: NORMAL
MDC_IDC_LEAD_MFG: NORMAL
MDC_IDC_LEAD_MFG: NORMAL
MDC_IDC_LEAD_MODEL: NORMAL
MDC_IDC_LEAD_MODEL: NORMAL
MDC_IDC_LEAD_POLARITY_TYPE: NORMAL
MDC_IDC_LEAD_POLARITY_TYPE: NORMAL
MDC_IDC_LEAD_SERIAL: NORMAL
MDC_IDC_LEAD_SERIAL: NORMAL
MDC_IDC_MSMT_BATTERY_DTM: NORMAL
MDC_IDC_MSMT_BATTERY_REMAINING_LONGEVITY: 144 MO
MDC_IDC_MSMT_BATTERY_STATUS: NORMAL
MDC_IDC_MSMT_CAP_CHARGE_DTM: NORMAL
MDC_IDC_MSMT_CAP_CHARGE_ENERGY: 0 J
MDC_IDC_MSMT_CAP_CHARGE_TIME: 0 S
MDC_IDC_MSMT_CAP_CHARGE_TIME: 9.73 S
MDC_IDC_MSMT_CAP_CHARGE_TYPE: NORMAL
MDC_IDC_MSMT_CAP_CHARGE_TYPE: NORMAL
MDC_IDC_MSMT_LEADCHNL_RA_IMPEDANCE_VALUE: 749 OHM
MDC_IDC_MSMT_LEADCHNL_RA_PACING_THRESHOLD_AMPLITUDE: 1 V
MDC_IDC_MSMT_LEADCHNL_RA_PACING_THRESHOLD_PULSEWIDTH: 0.4 MS
MDC_IDC_MSMT_LEADCHNL_RA_SENSING_INTR_AMPL: 7.2 MV
MDC_IDC_MSMT_LEADCHNL_RV_IMPEDANCE_VALUE: 523 OHM
MDC_IDC_MSMT_LEADCHNL_RV_PACING_THRESHOLD_AMPLITUDE: 0.9 V
MDC_IDC_MSMT_LEADCHNL_RV_PACING_THRESHOLD_PULSEWIDTH: 0.4 MS
MDC_IDC_MSMT_LEADCHNL_RV_SENSING_INTR_AMPL: 10.5 MV
MDC_IDC_PG_IMPLANT_DTM: NORMAL
MDC_IDC_PG_MFG: NORMAL
MDC_IDC_PG_MODEL: NORMAL
MDC_IDC_PG_SERIAL: NORMAL
MDC_IDC_PG_TYPE: NORMAL
MDC_IDC_SESS_CLINIC_NAME: NORMAL
MDC_IDC_SESS_DTM: NORMAL
MDC_IDC_SESS_TYPE: NORMAL
MDC_IDC_SET_BRADY_AT_MODE_SWITCH_MODE: NORMAL
MDC_IDC_SET_BRADY_AT_MODE_SWITCH_RATE: 170 {BEATS}/MIN
MDC_IDC_SET_BRADY_LOWRATE: 60 {BEATS}/MIN
MDC_IDC_SET_BRADY_MAX_SENSOR_RATE: 125 {BEATS}/MIN
MDC_IDC_SET_BRADY_MAX_TRACKING_RATE: 125 {BEATS}/MIN
MDC_IDC_SET_BRADY_MODE: NORMAL
MDC_IDC_SET_BRADY_PAV_DELAY_HIGH: 110 MS
MDC_IDC_SET_BRADY_PAV_DELAY_LOW: 220 MS
MDC_IDC_SET_BRADY_SAV_DELAY_HIGH: 110 MS
MDC_IDC_SET_BRADY_SAV_DELAY_LOW: 220 MS
MDC_IDC_SET_LEADCHNL_RA_PACING_AMPLITUDE: 2.5 V
MDC_IDC_SET_LEADCHNL_RA_PACING_ANODE_ELECTRODE_1: NORMAL
MDC_IDC_SET_LEADCHNL_RA_PACING_ANODE_LOCATION_1: NORMAL
MDC_IDC_SET_LEADCHNL_RA_PACING_CAPTURE_MODE: NORMAL
MDC_IDC_SET_LEADCHNL_RA_PACING_CATHODE_ELECTRODE_1: NORMAL
MDC_IDC_SET_LEADCHNL_RA_PACING_CATHODE_LOCATION_1: NORMAL
MDC_IDC_SET_LEADCHNL_RA_PACING_POLARITY: NORMAL
MDC_IDC_SET_LEADCHNL_RA_PACING_PULSEWIDTH: 0.4 MS
MDC_IDC_SET_LEADCHNL_RA_SENSING_ADAPTATION_MODE: NORMAL
MDC_IDC_SET_LEADCHNL_RA_SENSING_ANODE_ELECTRODE_1: NORMAL
MDC_IDC_SET_LEADCHNL_RA_SENSING_ANODE_LOCATION_1: NORMAL
MDC_IDC_SET_LEADCHNL_RA_SENSING_CATHODE_ELECTRODE_1: NORMAL
MDC_IDC_SET_LEADCHNL_RA_SENSING_CATHODE_LOCATION_1: NORMAL
MDC_IDC_SET_LEADCHNL_RA_SENSING_POLARITY: NORMAL
MDC_IDC_SET_LEADCHNL_RA_SENSING_SENSITIVITY: 0.25 MV
MDC_IDC_SET_LEADCHNL_RV_PACING_AMPLITUDE: 2.5 V
MDC_IDC_SET_LEADCHNL_RV_PACING_ANODE_ELECTRODE_1: NORMAL
MDC_IDC_SET_LEADCHNL_RV_PACING_ANODE_LOCATION_1: NORMAL
MDC_IDC_SET_LEADCHNL_RV_PACING_CAPTURE_MODE: NORMAL
MDC_IDC_SET_LEADCHNL_RV_PACING_CATHODE_ELECTRODE_1: NORMAL
MDC_IDC_SET_LEADCHNL_RV_PACING_CATHODE_LOCATION_1: NORMAL
MDC_IDC_SET_LEADCHNL_RV_PACING_POLARITY: NORMAL
MDC_IDC_SET_LEADCHNL_RV_PACING_PULSEWIDTH: 0.4 MS
MDC_IDC_SET_LEADCHNL_RV_SENSING_ADAPTATION_MODE: NORMAL
MDC_IDC_SET_LEADCHNL_RV_SENSING_ANODE_ELECTRODE_1: NORMAL
MDC_IDC_SET_LEADCHNL_RV_SENSING_ANODE_LOCATION_1: NORMAL
MDC_IDC_SET_LEADCHNL_RV_SENSING_CATHODE_ELECTRODE_1: NORMAL
MDC_IDC_SET_LEADCHNL_RV_SENSING_CATHODE_LOCATION_1: NORMAL
MDC_IDC_SET_LEADCHNL_RV_SENSING_POLARITY: NORMAL
MDC_IDC_SET_LEADCHNL_RV_SENSING_SENSITIVITY: 0.6 MV
MDC_IDC_SET_ZONE_DETECTION_INTERVAL: 300 MS
MDC_IDC_SET_ZONE_DETECTION_INTERVAL: 353 MS
MDC_IDC_SET_ZONE_DETECTION_INTERVAL: 462 MS
MDC_IDC_SET_ZONE_TYPE: NORMAL
MDC_IDC_SET_ZONE_VENDOR_TYPE: NORMAL
MDC_IDC_STAT_BRADY_RA_PERCENT_PACED: 88 %
MDC_IDC_STAT_BRADY_RV_PERCENT_PACED: 87 %
MDC_IDC_STAT_EPISODE_RECENT_COUNT: 0
MDC_IDC_STAT_EPISODE_RECENT_COUNT_DTM_END: NORMAL
MDC_IDC_STAT_EPISODE_RECENT_COUNT_DTM_START: NORMAL
MDC_IDC_STAT_EPISODE_TOTAL_COUNT: 1
MDC_IDC_STAT_EPISODE_TOTAL_COUNT: 2
MDC_IDC_STAT_EPISODE_TOTAL_COUNT: 47
MDC_IDC_STAT_EPISODE_TOTAL_COUNT_DTM_END: NORMAL
MDC_IDC_STAT_EPISODE_TYPE: NORMAL
MDC_IDC_STAT_EPISODE_VENDOR_TYPE: NORMAL
MDC_IDC_STAT_TACHYTHERAPY_ATP_DELIVERED_RECENT: 0
MDC_IDC_STAT_TACHYTHERAPY_ATP_DELIVERED_TOTAL: 2
MDC_IDC_STAT_TACHYTHERAPY_RECENT_DTM_END: NORMAL
MDC_IDC_STAT_TACHYTHERAPY_RECENT_DTM_START: NORMAL
MDC_IDC_STAT_TACHYTHERAPY_SHOCKS_ABORTED_RECENT: 0
MDC_IDC_STAT_TACHYTHERAPY_SHOCKS_ABORTED_TOTAL: 0
MDC_IDC_STAT_TACHYTHERAPY_SHOCKS_DELIVERED_RECENT: 0
MDC_IDC_STAT_TACHYTHERAPY_SHOCKS_DELIVERED_TOTAL: 0
MDC_IDC_STAT_TACHYTHERAPY_TOTAL_DTM_END: NORMAL

## 2020-03-11 ENCOUNTER — HEALTH MAINTENANCE LETTER (OUTPATIENT)
Age: 60
End: 2020-03-11

## 2020-03-23 DIAGNOSIS — D86.85 SARCOID, CARDIAC: ICD-10-CM

## 2020-03-24 RX ORDER — FOLIC ACID 1 MG/1
TABLET ORAL
Qty: 60 TABLET | Refills: 3 | Status: SHIPPED | OUTPATIENT
Start: 2020-03-24 | End: 2021-02-19

## 2020-03-24 NOTE — TELEPHONE ENCOUNTER
FOLIC ACID 1 MG TABS 1 TAB   Last Written Prescription Date:  4/15/2019  Last Fill Quantity: 60,   # refills: 3  Last Office Visit : 1/30/2020  Future Office visit:  None  60 Tabs, 3 Refills sent to pharm 3/24/2020.    Maria Esther Ribera RN  Central Triage Red Flags/Med Refills

## 2020-06-17 DIAGNOSIS — D86.85 SARCOID, CARDIAC: ICD-10-CM

## 2020-06-17 DIAGNOSIS — D86.9 SARCOIDOSIS: ICD-10-CM

## 2020-06-18 NOTE — TELEPHONE ENCOUNTER
methotrexate 2.5 MG tablet       Last Written Prescription Date:  6-25-19  Last Fill Quantity: 96,   # refills: 3  Last Office Visit: 1-30-20 ( 4 M)  Future Office visit:  none    CBC RESULTS:   Recent Labs   Lab Test 07/05/19   WBC 7.6   RBC 4.82   HGB 15.8   HCT 45.2   MCV 93.8   MCH 32.8   MCHC 35.0   RDW 12.7          Creatinine   Date Value Ref Range Status   01/30/2020 1.15 0.66 - 1.25 mg/dL Final   ]    Liver Function Studies -   Recent Labs   Lab Test 07/05/19 06/25/19  1512   PROTTOTAL  --  6.4*   ALBUMIN  --  3.8   BILITOTAL  --  0.8   ALKPHOS 65 73   AST  --  30   ALT  --  54       Routing refill request to provider for review/approval because:  Med not on protocol  Last labs; 7-5-19, 1-30-20      Scheduling has been notified to contact the pt for appointment.

## 2020-07-01 DIAGNOSIS — I48.91 ATRIAL FIBRILLATION (H): ICD-10-CM

## 2020-07-03 DIAGNOSIS — I48.91 ATRIAL FIBRILLATION (H): ICD-10-CM

## 2020-07-03 RX ORDER — RIVAROXABAN 20 MG/1
TABLET, FILM COATED ORAL
Qty: 90 TABLET | Refills: 3 | OUTPATIENT
Start: 2020-07-03

## 2020-07-03 NOTE — TELEPHONE ENCOUNTER
[facility] Call Center    Phone Message    May a detailed message be left on voicemail: yes     Reason for Call: Medication Refill Request    Has the patient contacted the pharmacy for the refill? Yes   Name of medication being requested: rivaroxaban ANTICOAGULANT (XARELTO) 20 MG TABS tablet   Provider who prescribed the medication: Dr. Dickey   Pharmacy: [facility]   Date medication is needed: Asap         Action Taken: Message routed to:  [facility]    Travel Screening: Not Applicable

## 2020-07-03 NOTE — TELEPHONE ENCOUNTER
Creat Clearance 91.398 calculated using Creat from 1-3-2020.    Last Clinic Visit: 1-      Kathleen M Doege RN

## 2020-07-22 ENCOUNTER — TELEPHONE (OUTPATIENT)
Dept: CARDIOLOGY | Facility: CLINIC | Age: 60
End: 2020-07-22

## 2020-07-22 NOTE — TELEPHONE ENCOUNTER
[facility] Call Center    Phone Message    May a detailed message be left on voicemail: yes     Reason for Call: Other: Wm is calling in to inquire if Dr. Dickey would be able to fax the MRI orders over to [facility] in [place], [place] to be completed prior to his follow appointment on 9/3/20. Please give him a call back to confirm. Thank you     Action Taken: Message routed to:  [facility] ([facility]): Cardiology    Travel Screening: Not Applicable

## 2020-07-23 NOTE — TELEPHONE ENCOUNTER
Checked with [facility], per radiology department, they do not do Cardiac MRIs.  Left voicemail for Wm that he would need to get his MRI done here.  It has not been scheduled yet, will need Cardiac MRI and Labs scheduled prior to his Dr Dickey appointment on 9/3.  Requested that he call back to work on arranging.

## 2020-08-25 DIAGNOSIS — D86.85 CARDIAC SARCOIDOSIS: ICD-10-CM

## 2020-08-25 DIAGNOSIS — D86.85 SARCOID, CARDIAC: Primary | ICD-10-CM

## 2020-09-04 ENCOUNTER — ANCILLARY PROCEDURE (OUTPATIENT)
Dept: CARDIOLOGY | Facility: CLINIC | Age: 60
End: 2020-09-04
Attending: INTERNAL MEDICINE
Payer: COMMERCIAL

## 2020-09-04 ENCOUNTER — HOSPITAL ENCOUNTER (OUTPATIENT)
Dept: MRI IMAGING | Facility: CLINIC | Age: 60
End: 2020-09-04
Attending: INTERNAL MEDICINE
Payer: COMMERCIAL

## 2020-09-04 DIAGNOSIS — D86.85 CARDIAC SARCOIDOSIS: ICD-10-CM

## 2020-09-04 DIAGNOSIS — D86.85 SARCOID, CARDIAC: ICD-10-CM

## 2020-09-04 DIAGNOSIS — Z95.810 ICD (IMPLANTABLE CARDIOVERTER-DEFIBRILLATOR) IN PLACE: ICD-10-CM

## 2020-09-04 DIAGNOSIS — D86.85 CARDIAC SARCOIDOSIS: Primary | ICD-10-CM

## 2020-09-04 LAB
ALBUMIN SERPL-MCNC: 3.6 G/DL (ref 3.4–5)
ALP SERPL-CCNC: 71 U/L (ref 40–150)
ALT SERPL W P-5'-P-CCNC: 31 U/L (ref 0–70)
ANION GAP SERPL CALCULATED.3IONS-SCNC: 3 MMOL/L (ref 3–14)
AST SERPL W P-5'-P-CCNC: 19 U/L (ref 0–45)
BILIRUB DIRECT SERPL-MCNC: 0.2 MG/DL (ref 0–0.2)
BILIRUB SERPL-MCNC: 0.6 MG/DL (ref 0.2–1.3)
BUN SERPL-MCNC: 18 MG/DL (ref 7–30)
CALCIUM SERPL-MCNC: 8.5 MG/DL (ref 8.5–10.1)
CHLORIDE SERPL-SCNC: 109 MMOL/L (ref 94–109)
CO2 SERPL-SCNC: 28 MMOL/L (ref 20–32)
CREAT SERPL-MCNC: 1.19 MG/DL (ref 0.66–1.25)
ERYTHROCYTE [DISTWIDTH] IN BLOOD BY AUTOMATED COUNT: 14.1 % (ref 10–15)
GFR SERPL CREATININE-BSD FRML MDRD: 67 ML/MIN/{1.73_M2}
GLUCOSE SERPL-MCNC: 102 MG/DL (ref 70–99)
HCT VFR BLD AUTO: 42.9 % (ref 40–53)
HGB BLD-MCNC: 14.6 G/DL (ref 13.3–17.7)
MCH RBC QN AUTO: 33.8 PG (ref 26.5–33)
MCHC RBC AUTO-ENTMCNC: 34 G/DL (ref 31.5–36.5)
MCV RBC AUTO: 99 FL (ref 78–100)
NT-PROBNP SERPL-MCNC: 458 PG/ML (ref 0–125)
PLATELET # BLD AUTO: 193 10E9/L (ref 150–450)
POTASSIUM SERPL-SCNC: 3.8 MMOL/L (ref 3.4–5.3)
PROT SERPL-MCNC: 6.5 G/DL (ref 6.8–8.8)
RBC # BLD AUTO: 4.32 10E12/L (ref 4.4–5.9)
SODIUM SERPL-SCNC: 140 MMOL/L (ref 133–144)
TROPONIN I SERPL-MCNC: <0.015 UG/L (ref 0–0.04)
WBC # BLD AUTO: 5.7 10E9/L (ref 4–11)

## 2020-09-04 PROCEDURE — 83880 ASSAY OF NATRIURETIC PEPTIDE: CPT | Performed by: INTERNAL MEDICINE

## 2020-09-04 PROCEDURE — A9585 GADOBUTROL INJECTION: HCPCS | Performed by: INTERNAL MEDICINE

## 2020-09-04 PROCEDURE — 36415 COLL VENOUS BLD VENIPUNCTURE: CPT | Performed by: INTERNAL MEDICINE

## 2020-09-04 PROCEDURE — 85027 COMPLETE CBC AUTOMATED: CPT | Performed by: INTERNAL MEDICINE

## 2020-09-04 PROCEDURE — 93287 PERI-PX DEVICE EVAL & PRGR: CPT

## 2020-09-04 PROCEDURE — 75561 CARDIAC MRI FOR MORPH W/DYE: CPT

## 2020-09-04 PROCEDURE — 75561 CARDIAC MRI FOR MORPH W/DYE: CPT | Mod: 26 | Performed by: INTERNAL MEDICINE

## 2020-09-04 PROCEDURE — 80076 HEPATIC FUNCTION PANEL: CPT | Performed by: INTERNAL MEDICINE

## 2020-09-04 PROCEDURE — 93287 PERI-PX DEVICE EVAL & PRGR: CPT | Mod: 26 | Performed by: INTERNAL MEDICINE

## 2020-09-04 PROCEDURE — 99215 OFFICE O/P EST HI 40 MIN: CPT | Mod: 25 | Performed by: INTERNAL MEDICINE

## 2020-09-04 PROCEDURE — 84484 ASSAY OF TROPONIN QUANT: CPT | Performed by: INTERNAL MEDICINE

## 2020-09-04 PROCEDURE — 80048 BASIC METABOLIC PNL TOTAL CA: CPT | Performed by: INTERNAL MEDICINE

## 2020-09-04 PROCEDURE — G0463 HOSPITAL OUTPT CLINIC VISIT: HCPCS | Mod: 25,ZF

## 2020-09-04 PROCEDURE — 25500064 ZZH RX 255 OP 636: Performed by: INTERNAL MEDICINE

## 2020-09-04 RX ORDER — GADOBUTROL 604.72 MG/ML
10 INJECTION INTRAVENOUS ONCE
Status: COMPLETED | OUTPATIENT
Start: 2020-09-04 | End: 2020-09-04

## 2020-09-04 RX ADMIN — GADOBUTROL 10 ML: 604.72 INJECTION INTRAVENOUS at 13:51

## 2020-09-04 ASSESSMENT — PAIN SCALES - GENERAL: PAINLEVEL: NO PAIN (0)

## 2020-09-04 ASSESSMENT — MIFFLIN-ST. JEOR: SCORE: 1810.55

## 2020-09-04 NOTE — PATIENT INSTRUCTIONS
Cardiology Providers you saw during your visit:  Dr. Dickey    Medication changes:  None     Follow up:  6 months with [facility] with  1 year with [facility]      Labs:  Results for LIVIER JONES (MRN [id number]) as of 9/4/2020 14:27   Ref. Range 9/4/2020 11:08   Sodium Latest Ref Range: 133 - 144 mmol/L 140   Potassium Latest Ref Range: 3.4 - 5.3 mmol/L 3.8   Chloride Latest Ref Range: 94 - 109 mmol/L 109   Carbon Dioxide Latest Ref Range: 20 - 32 mmol/L 28   Urea Nitrogen Latest Ref Range: 7 - 30 mg/dL 18   Creatinine Latest Ref Range: 0.66 - 1.25 mg/dL 1.19   GFR Estimate Latest Ref Range: >60 mL/min/1.73_m2 67   GFR Estimate If Black Latest Ref Range: >60 mL/min/1.73_m2 77   Calcium Latest Ref Range: 8.5 - 10.1 mg/dL 8.5   Anion Gap Latest Ref Range: 3 - 14 mmol/L 3   Albumin Latest Ref Range: 3.4 - 5.0 g/dL 3.6   Protein Total Latest Ref Range: 6.8 - 8.8 g/dL 6.5 (L)   Bilirubin Total Latest Ref Range: 0.2 - 1.3 mg/dL 0.6   Alkaline Phosphatase Latest Ref Range: 40 - 150 U/L 71   ALT Latest Ref Range: 0 - 70 U/L 31   AST Latest Ref Range: 0 - 45 U/L 19   Bilirubin Direct Latest Ref Range: 0.0 - 0.2 mg/dL 0.2   N-Terminal Pro Bnp Latest Ref Range: 0 - 125 pg/mL 458 (H)   Troponin I ES Latest Ref Range: 0.000 - 0.045 ug/L <0.015   Glucose Latest Ref Range: 70 - 99 mg/dL 102 (H)   WBC Latest Ref Range: 4.0 - 11.0 10e9/L 5.7   Hemoglobin Latest Ref Range: 13.3 - 17.7 g/dL 14.6   Hematocrit Latest Ref Range: 40.0 - 53.0 % 42.9   Platelet Count Latest Ref Range: 150 - 450 10e9/L 193   RBC Count Latest Ref Range: 4.4 - 5.9 10e12/L 4.32 (L)   MCV Latest Ref Range: 78 - 100 fl 99   MCH Latest Ref Range: 26.5 - 33.0 pg 33.8 (H)   MCHC Latest Ref Range: 31.5 - 36.5 g/dL 34.0   RDW Latest Ref Range: 10.0 - 15.0 % 14.1       Please call if you have :  1. Weight gain of more than 2 pounds in a day or 5 pounds in a week  2. Increased shortness of breath, swelling or bloating  3. Dizziness, lightheadedness   4. Any  "questions or concerns.       Follow the American Heart Association Diet and Lifestyle recommendations:  Limit saturated fat, trans fat, sodium, red meat, sweets and sugar-sweetened beverages. If you choose to eat red meat, compare labels and select the leanest cuts available.  Aim for at least 150 minutes of moderate physical activity or 75 minutes of vigorous physical activity - or an equal combination of both - each week.      During business hours: [phone], press option # 1 to be routed to the [facility] then option # 4 for \"To send a message to your care team\"      After hours, weekends or holidays: On Call Cardiologist- [phone]   option #4 and ask to speak to the on-call Cardiologist. Inform them you are a CORE/heart failure patient at the [facility].      Heart Failure Support Group  [facility]  The Board Room, 8th Floor  [address] [place] [id number]     Meetings   1-2 pm  January 6  March 2  May 4  July 6  September 14  November 2      Helen Gonzalez RN BSN CHFN  Cardiology Care Coordinator - Heart Failure/ [facility] Clinic  [facility]   Questions and [phone]   First press #1 for the [facility] and then press #4 for \"To send a message to your care team\"  "

## 2020-09-04 NOTE — NURSING NOTE
Chief Complaint   Patient presents with     Follow Up     Return HF, 57 yo male, Sarcoid/HF, EF EF 50-55%, labs, MRI before.        Vitals were taken and medications were reconciled.     Shena Schwartz  2:24 PM

## 2020-09-04 NOTE — PROGRESS NOTES
September 4, 2020    Dear Dr. De León,     I the pleasure of seeing Jose Carney in the [facility] Cardiac Sarcoidosis clinic today.     As you know he is a very pleasant 59-year-old man with a history sarcoidosis which is diagnosed by transbronchial biopsy in 2013.  He had been having back pain prior to this and 30 pound weight loss and had substantial mediastinal lymphadenopathy.  He was placed on several months of steroids with significant weight gain and improvement in symptoms as well as back pain.      In late summer and fall 2017 he started to develop palpitations which were found to be supraventricular tachycardia (likely AVNRT as well as some atrial tachycardia)  based on his cardiac monitor. He then had a cardiac MRI which was consistent with myocardial involvement from sarcoidosis (see below).  He was then sent down here for further management and was placed on metoprolol.    Based on high risk features of his cardiac MRI we recommended that he have an ICD placed which was placed locally with your team.  I did place him on a burst of prednisone given a highly suspicious PET scan which showed complete resolution of his cardiac sarcoidosis.  At that time I switch him to methotrexate as a staring steroid sparing agent and was titrating this up and tapering down his prednisone when he developed ventricular tachycardia.  This was in January 2019.  The VT was at a rate of 135 and was unable to be paced out.  He did not have syncope with this.  He was loaded with amiodarone, and given failure to gain control of his VT he was given Solu-Medrol and placed on higher dose of prednisone in addition to his methotrexate.     He was then able to be converted and when I saw him last I continued him on amiodarone 200 mg, I increased his methotrexate 20 mg and tapered him off of prednisone. We have also been increasing his back on metoprolol to keep him out of ventricular tachycardia.     He came down to  [place] sooner than anticipated due to recurrent atrial  fibrillation.  He was seen in the core clinic. His metoprolol was increased and he is mostly out of AF now.     He has been overall feeling  well since I saw him last. He has not had any further VT. He can walk a mile on flat ground.  He denies PND orthopnea or lower extremity edema.  He denies any chest heaviness or pressure.  He is tolerating the methotrexate without any side effects with stable surveillance labs-although he does have some muscle aches.     Of note he also was treated for uveitis in the last year and has some ongoing residual right eye blurriness.     His in device his device interrogation today shows a stable amount of V pacing over time   Underlying rhythm is sinus bradycardia with a very long AV delay (close to 400 ms),         PAST MEDICAL HISTORY:  Past Medical History:   Diagnosis Date     First degree AV block 3/20/2018     Palpitations 3/20/2018     Paroxysmal supraventricular tachycardia (H) 3/20/2018     Sarcoid, cardiac/EF 54% per MRI,Balmorhea of affected myocardium is 12% of myocardial mass 1/25/2018     Sarcoidosis/ systemic 2013 3/20/2018     FAMILY HISTORY:  His family history is notable for several family members with autoimmune disease.  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  He denies any tobacco or alcohol use or recreational drug use.     CURRENT MEDICATIONS:  Current Outpatient Medications   Medication Sig Dispense Refill     amiodarone (PACERONE/CODARONE) 200 MG tablet Take 2 tabs (400 mg) by mouth twice daily for 13 days. Then take 2 tabs (400 mg) a day for 14 days. Then take 1 tab (200 mg) daily.       folic acid (FOLVITE) 1 MG tablet TAKE FIVE TABLETS (5MG) ONCE WEEKLY WITH YOUR METHOTREXATE 60 tablet 3     furosemide (LASIX) 20 MG tablet Take 1 tablet (20 mg) by mouth daily As  "directed by CORE / HF clinic 30 tablet 0     lisinopril (PRINIVIL/ZESTRIL) 5 MG tablet Take 1 tablet (5 mg) by mouth daily 90 tablet 3     melatonin 3 MG tablet Take 3 mg by mouth        methotrexate 2.5 MG tablet Take 8 tablets (20 mg) by mouth every 7 days Please schedule clinic appt for refills, [phone] 96 tablet 3     metoprolol succinate ER (TOPROL-XL) 25 MG 24 hr tablet Take 4 tablets (100 mg) by mouth daily 360 tablet 3     prednisoLONE acetate (PRED FORTE) 1 % ophthalmic susp Place 1 drop into both eyes every hour       rivaroxaban ANTICOAGULANT (XARELTO) 20 MG TABS tablet Take 1 tablet (20 mg) by mouth daily (with dinner) 90 tablet 2     sildenafil (REVATIO) 20 MG tablet Take 20-60 mg by mouth as needed       tolterodine (DETROL) 1 MG tablet Take 1 mg by mouth         ROS:   Constitutional: No fever, chills, or sweats. Weight has been stable   ENT: No visual disturbance, ear ache, epistaxis, sore throat.   Allergies/Immunologic: Negative.   Respiratory: No cough, hemoptysis.   Cardiovascular: As per HPI.   GI: No nausea, vomiting, hematemesis, melena, or hematochezia.   : No urinary frequency, dysuria, or hematuria.   Integument: Negative.   Psychiatric: insomnia   Neuro: Negative.   Endocrinology: Negative.   Musculoskeletal: Chronic back pain    Exam:   BP 94/57 (BP Location: Right arm, Patient Position: Sitting, Cuff Size: Adult Regular)   Pulse 63   Ht 1.88 m (6' 2\")   Wt 82.6 kg (182 lb)   SpO2 97%   BMI 23.37 kg/m     General: appears comfortable, alert and articulate  Head: normocephalic, atraumatic  Eyes: anicteric sclera, EOMI  Neck: no adenopathy  Orophyarynx: moist mucosa, no lesions, dentition intact  Heart: regular, S1/S2, III/IV systolic murmur at the apex, no gallop, rub, estimated JVP < 10   Lungs: clear, no rales or wheezing  Abdomen: soft, non-tender, bowel sounds present, no hepatosplenomegaly  Extremities: no clubbing, cyanosis or edema  Neurological: normal speech and " affect, no gross motor deficits    Labs:    Lab Results   Component Value Date    WBC 5.7 09/04/2020    HGB 14.6 09/04/2020    HCT 42.9 09/04/2020     09/04/2020     09/04/2020    POTASSIUM 3.8 09/04/2020    CHLORIDE 109 09/04/2020    CO2 28 09/04/2020    BUN 18 09/04/2020    CR 1.19 09/04/2020     (H) 09/04/2020    NTBNP 458 (H) 09/04/2020    TROPI <0.015 09/04/2020    AST 19 09/04/2020    ALT 31 09/04/2020    ALKPHOS 71 09/04/2020    BILITOTAL 0.6 09/04/2020       ECG: Sinus rhythm, first-degree AV block PVCs normal axis normal QT interval     Repeat PET:   8/2018    Impression: In this patient with cardiac sarcoidosis under treatment  with steroids for the last 4 months;  1. No FDG uptake within the myocardium. Overall there is complete  resolution of previous cardiac sarcoidosis.  2. Excellent suppression of myocardial glucose metabolism.  3. Stable number and size of parenchymal lung nodules, although  previously seen high metabolic activity within a few of these lesions  is no longer present.  4. Stable size number and metabolic activity of lymphadenopathy in the  mediastinum, bilateral axilla and right supraclavicular region. 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  5. Stable left stone.  The ER N and forming of the PEG and start any thickened o symptoms reported and rate has been right in between 150 to 170 beats a minute.   4. Clinical correlation advised.         Cardiac MRI   1. The LV cavity size is mildly enlarged. The global systolic function is normal. The LVEF is 55%. There is  severe pgnrfehbqpa-xn-zpetczhl involving the inferoseptal and inferior basal segments.     2. The RV is normal in cavity size. The global systolic function is normal. The RVEF is 66%.      3. Both atria are normal in size.     4. Due to ICD lead artifact, the tricuspid and pulmonic valves were not well visualized.       The mitral and aortic valves appear normally functioning  without significant disease.       5. Late gadolinium enhancement imaging shows epicardial and mid-myocardial enhancement in the  basal-inferoseptal, basal-inferior, and basal-inferolateral segments.      6. There is no pericardial effusion.     7. Unable to assess for intracardiac thrombus.     8. Unable to do T2 mapping and assess for myocardial edema (active inflammation).      CONCLUSIONS:    1. Cardiac sarcoidosis with preserved systolic function; LVEF 55%, RVEF 66%.  2. Sarcoidosis related scarring and severe pbrfititoqu-ik-wsfdgbgq involving the basal-inferoseptal and  basal-inferior segments. Total scar burden 12%. Unchanged from previous CMR dated 01/19/2018.      Cardiac MRI: 2/2019    2. The RV is normal in cavity size. The global systolic function is normal. The RVEF is 66%.      3. Both atria are normal in size.     4. Due to ICD lead artifact, the tricuspid and pulmonic valves were not well visualized.       The mitral and aortic valves appear normally functioning without significant disease.       5. Late gadolinium enhancement imaging shows epicardial and mid-myocardial enhancement in the  basal-inferoseptal, basal-inferior, and basal-inferolateral segments.      6. There is no pericardial effusion.     7. Unable to assess for intracardiac thrombus.     8. Unable to do T2 mapping and assess for myocardial edema (active inflammation).      CONCLUSIONS:    1. Cardiac sarcoidosis with preserved systolic function; LVEF 55%, RVEF 66%.  2. Sarcoidosis related scarring and severe qcheezhiozn-ik-djtfbpau involving the basal-inferoseptal and  basal-inferior segments. Total scar burden 12%. Unchanged from previous CMR dated 01/19/2018.        Repeat cardiac MRI pending     Last device interrogation:   [place] seen [place] [facility] [place] and iterative programming of his [employer] dual lead ICD per MD orders. Patient has an appointment to see Dr. Dickey today. Normal ICD function.  23 AT/AF episodes  recorded since 9/26/19, all < 1 minute in duration.  Patient reports he takes Xarelto.  No ventricular arrhythmias recorded. Intrinsic rhythm = SB @ 48 with 1st degree AVB.  AP =88%.  = 87%.   Estimated battery longevity to TORIE =12 years.  Lead trends appear stable. Patient reports that he is feeling OK.  Heart rate histograms display 80% of heart rates at 60 bpm and 10-15% rates at 70-80 bpm.  Patient endorses NEWELL.  Activity sensor adjusted from slope of 8 to 10 and activity threshold from medium to medium/low.  Plan for patient to send a remote transmission in 3 months and return to clinic in 6 months.  JO ANN Muñiz RN.          Assessment and Plan:   In summary this is a very pleasant 59-year-old man with a history of biopsy-proven cardiac sarcoid from a transbronchial biopsy-who has a cardiac MRI consistent with myocardial involvement from sarcoidosis. His PET scan related inflammation resolved completely with 3-4 months of 40 mg of prednisone.  I did have an ICD placed given he had high risk features on his cardiac MRI for malignant arrhythmias.     He has been relatively stable for some time.  Given recurrent ventricular tachycardia last year he did get an increase in prednisone plus amiodarone-I now have him transition to methotrexate only as well as low-dose amiodarone.     Interestingly his LV ejection fraction has declined slightly over time although he has no clinical symptoms from this.  I doubt the degree of mitral regurgitation is leading to decline in his function.  Given more recent recurrent atrial fibrillation and slight decline in his cardiac function it does raise the question whether or not the methotrexate is enough immunosuppression for him or whether we have some recurrence in active sarcoid. He is also having more AV pacing over time (although stable from last) and I am not able to set his AV delay any longer on this device.     This raises 2 questions: 1) is the sarcoid active 2) should he  have his pacing or CRT upgrade at some point     The stability of his LV function over the last few months balanced with the fact that he is in no clinical heart failure and is in remission from VT and AF, and my concern about adding MORE immunosuppression, I am going to again watchful wait.     For now I am going to keep him on methotrexate 20 mg daily with folic acid, keep his amiodarone at 200 mg daily I am also adding lisinopril 5 mg daily for neurohormonal blockade-we will keep him at 100 mg metoprolol daily.  I will see him back in a year, he can be seen in [place] in 6 months.        Should he have any increase in A. fib burden or decline in cardiac function I would repeat his PET scan as he may need another pulse of steroids with likely change in baseline immunosuppression.     From a ventricular tachycardia perspective I will continue to metoprolol 100 mg daily as well as amiodarone 200 mill grams daily    He should have CBC, LFTs in 3 months (we will order) and  echo and device interrogation in 6 months.     I am leaving all of his other neurohormonal blockade immunosuppression and antiarrhythmics the same    Atrial  Fibrillation: Paroxysmal - he is presently anticoagulated-I will continue metoprolol 100 mg XL daily for now-should this come back again this raises the question whether or not the sarcoid is controlled    MR on last echo: no symptoms, will monitor for now -this is overall stable although his LV function has declined slightly I suspect his mitral regurgitation is related to papillary scar-    Uveitis-  Followed locally     Please feel free to call me with any further questions and thank you for the opportunity to assist in his care.     Teeth cleaning: no antibiotics needed     I will modify above plan based on MRI results today     Mandi Dickey MD   [facility]  Patient Care Team:  [facility], [facility] as PCP - Helen Hernadez  PANCHITO Edmondson as Specialty Care Coordinator (Cardiology)  GIULIANO KOHLER [place], [place] Hausch, Raymond C, MD

## 2020-09-04 NOTE — LETTER
9/4/2020      RE: Jose Carney  [address]  [facility] [address]       Dear Colleague,    Thank you for the opportunity to participate in the care of your patient, Jose Carney, at the [facility] at [facility]. Please see a copy of my visit note below.      September 4, 2020    Dear Dr. De León,     I the pleasure of seeing Jose Carney in the [facility] Cardiac Sarcoidosis clinic today.     As you know he is a very pleasant 59-year-old man with a history sarcoidosis which is diagnosed by transbronchial biopsy in 2013.  He had been having back pain prior to this and 30 pound weight loss and had substantial mediastinal lymphadenopathy.  He was placed on several months of steroids with significant weight gain and improvement in symptoms as well as back pain.      In late summer and fall 2017 he started to develop palpitations which were found to be supraventricular tachycardia (likely AVNRT as well as some atrial tachycardia)  based on his cardiac monitor. He then had a cardiac MRI which was consistent with myocardial involvement from sarcoidosis (see below).  He was then sent down here for further management and was placed on metoprolol.    Based on high risk features of his cardiac MRI we recommended that he have an ICD placed which was placed locally with your team.  I did place him on a burst of prednisone given a highly suspicious PET scan which showed complete resolution of his cardiac sarcoidosis.  At that time I switch him to methotrexate as a staring steroid sparing agent and was titrating this up and tapering down his prednisone when he developed ventricular tachycardia.  This was in January 2019.  The VT was at a rate of 135 and was unable to be paced out.  He did not have syncope with this.  He was loaded with amiodarone, and given failure to gain control of his VT he was given Solu-Medrol and placed on higher dose of prednisone in addition  to his methotrexate.     He was then able to be converted and when I saw him last I continued him on amiodarone 200 mg, I increased his methotrexate 20 mg and tapered him off of prednisone. We have also been increasing his back on metoprolol to keep him out of ventricular tachycardia.     He came down to [place] sooner than anticipated due to recurrent atrial  fibrillation.  He was seen in the core clinic. His metoprolol was increased and he is mostly out of AF now.     He has been overall feeling  well since I saw him last. He has not had any further VT. He can walk a mile on flat ground.  He denies PND orthopnea or lower extremity edema.  He denies any chest heaviness or pressure.  He is tolerating the methotrexate without any side effects with stable surveillance labs-although he does have some muscle aches.     Of note he also was treated for uveitis in the last year and has some ongoing residual right eye blurriness.     His in device his device interrogation today shows a stable amount of V pacing over time   Underlying rhythm is sinus bradycardia with a very long AV delay (close to 400 ms),         PAST MEDICAL HISTORY:  Past Medical History:   Diagnosis Date     First degree AV block 3/20/2018     Palpitations 3/20/2018     Paroxysmal supraventricular tachycardia (H) 3/20/2018     Sarcoid, cardiac/EF 54% per MRI,[place] of affected myocardium is 12% of myocardial mass 1/25/2018     Sarcoidosis/ systemic 2013 3/20/2018     FAMILY HISTORY:  His family history is notable for several family members with autoimmune disease.  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  He denies any tobacco or alcohol use or recreational drug use.     CURRENT MEDICATIONS:  Current Outpatient Medications   Medication Sig Dispense Refill     amiodarone (PACERONE/CODARONE) 200 MG tablet Take 2 tabs  "(400 mg) by mouth twice daily for 13 days. Then take 2 tabs (400 mg) a day for 14 days. Then take 1 tab (200 mg) daily.       folic acid (FOLVITE) 1 MG tablet TAKE FIVE TABLETS (5MG) ONCE WEEKLY WITH YOUR METHOTREXATE 60 tablet 3     furosemide (LASIX) 20 MG tablet Take 1 tablet (20 mg) by mouth daily As directed by CORE / HF clinic 30 tablet 0     lisinopril (PRINIVIL/ZESTRIL) 5 MG tablet Take 1 tablet (5 mg) by mouth daily 90 tablet 3     melatonin 3 MG tablet Take 3 mg by mouth        methotrexate 2.5 MG tablet Take 8 tablets (20 mg) by mouth every 7 days Please schedule clinic appt for refills, [phone] 96 tablet 3     metoprolol succinate ER (TOPROL-XL) 25 MG 24 hr tablet Take 4 tablets (100 mg) by mouth daily 360 tablet 3     prednisoLONE acetate (PRED FORTE) 1 % ophthalmic susp Place 1 drop into both eyes every hour       rivaroxaban ANTICOAGULANT (XARELTO) 20 MG TABS tablet Take 1 tablet (20 mg) by mouth daily (with dinner) 90 tablet 2     sildenafil (REVATIO) 20 MG tablet Take 20-60 mg by mouth as needed       tolterodine (DETROL) 1 MG tablet Take 1 mg by mouth         ROS:   Constitutional: No fever, chills, or sweats. Weight has been stable   ENT: No visual disturbance, ear ache, epistaxis, sore throat.   Allergies/Immunologic: Negative.   Respiratory: No cough, hemoptysis.   Cardiovascular: As per HPI.   GI: No nausea, vomiting, hematemesis, melena, or hematochezia.   : No urinary frequency, dysuria, or hematuria.   Integument: Negative.   Psychiatric: insomnia   Neuro: Negative.   Endocrinology: Negative.   Musculoskeletal: Chronic back pain    Exam:   BP 94/57 (BP Location: Right arm, Patient Position: Sitting, Cuff Size: Adult Regular)   Pulse 63   Ht 1.88 m (6' 2\")   Wt 82.6 kg (182 lb)   SpO2 97%   BMI 23.37 kg/m     General: appears comfortable, alert and articulate  Head: normocephalic, atraumatic  Eyes: anicteric sclera, EOMI  Neck: no adenopathy  Orophyarynx: moist mucosa, no lesions, " dentition intact  Heart: regular, S1/S2, III/IV systolic murmur at the apex, no gallop, rub, estimated JVP < 10   Lungs: clear, no rales or wheezing  Abdomen: soft, non-tender, bowel sounds present, no hepatosplenomegaly  Extremities: no clubbing, cyanosis or edema  Neurological: normal speech and affect, no gross motor deficits    Labs:    Lab Results   Component Value Date    WBC 5.7 09/04/2020    HGB 14.6 09/04/2020    HCT 42.9 09/04/2020     09/04/2020     09/04/2020    POTASSIUM 3.8 09/04/2020    CHLORIDE 109 09/04/2020    CO2 28 09/04/2020    BUN 18 09/04/2020    CR 1.19 09/04/2020     (H) 09/04/2020    NTBNP 458 (H) 09/04/2020    TROPI <0.015 09/04/2020    AST 19 09/04/2020    ALT 31 09/04/2020    ALKPHOS 71 09/04/2020    BILITOTAL 0.6 09/04/2020       ECG: Sinus rhythm, first-degree AV block PVCs normal axis normal QT interval     Repeat PET:   8/2018    Impression: In this patient with cardiac sarcoidosis under treatment  with steroids for the last 4 months;  1. No FDG uptake within the myocardium. Overall there is complete  resolution of previous cardiac sarcoidosis.  2. Excellent suppression of myocardial glucose metabolism.  3. Stable number and size of parenchymal lung nodules, although  previously seen high metabolic activity within a few of these lesions  is no longer present.  4. Stable size number and metabolic activity of lymphadenopathy in the  mediastinum, bilateral axilla and right supraclavicular region. 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  5. Stable left stone.  The ER N and forming of the PEG and start any thickened o symptoms reported and rate has been right in between 150 to 170 beats a minute.   4. Clinical correlation advised.         Cardiac MRI   1. The LV cavity size is mildly enlarged. The global systolic function is normal. The LVEF is 55%. There is  severe ndkivcyukno-rz-gwysudgo involving the inferoseptal and inferior basal  segments.     2. The RV is normal in cavity size. The global systolic function is normal. The RVEF is 66%.      3. Both atria are normal in size.     4. Due to ICD lead artifact, the tricuspid and pulmonic valves were not well visualized.       The mitral and aortic valves appear normally functioning without significant disease.       5. Late gadolinium enhancement imaging shows epicardial and mid-myocardial enhancement in the  basal-inferoseptal, basal-inferior, and basal-inferolateral segments.      6. There is no pericardial effusion.     7. Unable to assess for intracardiac thrombus.     8. Unable to do T2 mapping and assess for myocardial edema (active inflammation).      CONCLUSIONS:    1. Cardiac sarcoidosis with preserved systolic function; LVEF 55%, RVEF 66%.  2. Sarcoidosis related scarring and severe iixnivtlemc-yx-kjqhhiqf involving the basal-inferoseptal and  basal-inferior segments. Total scar burden 12%. Unchanged from previous CMR dated 01/19/2018.      Cardiac MRI: 2/2019    2. The RV is normal in cavity size. The global systolic function is normal. The RVEF is 66%.      3. Both atria are normal in size.     4. Due to ICD lead artifact, the tricuspid and pulmonic valves were not well visualized.       The mitral and aortic valves appear normally functioning without significant disease.       5. Late gadolinium enhancement imaging shows epicardial and mid-myocardial enhancement in the  basal-inferoseptal, basal-inferior, and basal-inferolateral segments.      6. There is no pericardial effusion.     7. Unable to assess for intracardiac thrombus.     8. Unable to do T2 mapping and assess for myocardial edema (active inflammation).      CONCLUSIONS:    1. Cardiac sarcoidosis with preserved systolic function; LVEF 55%, RVEF 66%.  2. Sarcoidosis related scarring and severe yusvhqfqxdk-qe-etrtcgyb involving the basal-inferoseptal and  basal-inferior segments. Total scar burden 12%. Unchanged from previous  CMR dated 01/19/2018.        Repeat cardiac MRI pending     Last device interrogation:   Patient seen in the [facility] for evaluation and iterative programming of his [employer] dual lead ICD per MD orders. Patient has an appointment to see Dr. Dickey today. Normal ICD function.  23 AT/AF episodes recorded since 9/26/19, all < 1 minute in duration.  Patient reports he takes Xarelto.  No ventricular arrhythmias recorded. Intrinsic rhythm = SB @ 48 with 1st degree AVB.  AP =88%.  = 87%.   Estimated battery longevity to TORIE =12 years.  Lead trends appear stable. Patient reports that he is feeling OK.  Heart rate histograms display 80% of heart rates at 60 bpm and 10-15% rates at 70-80 bpm.  Patient endorses NEWELL.  Activity sensor adjusted from slope of 8 to 10 and activity threshold from medium to medium/low.  Plan for patient to send a remote transmission in 3 months and return to clinic in 6 months.  JO ANN Muñiz RN.          Assessment and Plan:   In summary this is a very pleasant 59-year-old man with a history of biopsy-proven cardiac sarcoid from a transbronchial biopsy-who has a cardiac MRI consistent with myocardial involvement from sarcoidosis. His PET scan related inflammation resolved completely with 3-4 months of 40 mg of prednisone.  I did have an ICD placed given he had high risk features on his cardiac MRI for malignant arrhythmias.     He has been relatively stable for some time.  Given recurrent ventricular tachycardia last year he did get an increase in prednisone plus amiodarone-I now have him transition to methotrexate only as well as low-dose amiodarone.     Interestingly his LV ejection fraction has declined slightly over time although he has no clinical symptoms from this.  I doubt the degree of mitral regurgitation is leading to decline in his function.  Given more recent recurrent atrial fibrillation and slight decline in his cardiac function it does raise the question whether or not the  methotrexate is enough immunosuppression for him or whether we have some recurrence in active sarcoid. He is also having more AV pacing over time (although stable from last) and I am not able to set his AV delay any longer on this device.     This raises 2 questions: 1) is the sarcoid active 2) should he have his pacing or CRT upgrade at some point     The stability of his LV function over the last few months balanced with the fact that he is in no clinical heart failure and is in remission from VT and AF, and my concern about adding MORE immunosuppression, I am going to again watchful wait.     For now I am going to keep him on methotrexate 20 mg daily with folic acid, keep his amiodarone at 200 mg daily I am also adding lisinopril 5 mg daily for neurohormonal blockade-we will keep him at 100 mg metoprolol daily.  I will see him back in a year, he can be seen in [place] in 6 months.        Should he have any increase in A. fib burden or decline in cardiac function I would repeat his PET scan as he may need another pulse of steroids with likely change in baseline immunosuppression.     From a ventricular tachycardia perspective I will continue to metoprolol 100 mg daily as well as amiodarone 200 mill grams daily    He should have CBC, LFTs in 3 months (we will order) and  echo and device interrogation in 6 months.     I am leaving all of his other neurohormonal blockade immunosuppression and antiarrhythmics the same    Atrial  Fibrillation: Paroxysmal - he is presently anticoagulated-I will continue metoprolol 100 mg XL daily for now-should this come back again this raises the question whether or not the sarcoid is controlled    MR on last echo: no symptoms, will monitor for now -this is overall stable although his LV function has declined slightly I suspect his mitral regurgitation is related to papillary scar-    Uveitis-  Followed locally     Please feel free to call me with any further questions and thank you for  the opportunity to assist in his care.     Teeth cleaning: no antibiotics needed     I will modify above plan based on MRI results today     Mandi Dickey MD   [facility]  Patient Care Team:  Clinic, [facility] Andrews as PCP - Helen Hernadez, RN as Specialty Care Coordinator (Cardiology)  GIULIANO KOHLER [place]     Gary Soares MD

## 2020-09-05 LAB
MDC_IDC_LEAD_IMPLANT_DT: NORMAL
MDC_IDC_LEAD_LOCATION: NORMAL
MDC_IDC_LEAD_LOCATION_DETAIL_1: NORMAL
MDC_IDC_LEAD_MFG: NORMAL
MDC_IDC_LEAD_MODEL: NORMAL
MDC_IDC_LEAD_POLARITY_TYPE: NORMAL
MDC_IDC_LEAD_SERIAL: NORMAL
MDC_IDC_MSMT_BATTERY_DTM: NORMAL
MDC_IDC_MSMT_BATTERY_DTM: NORMAL
MDC_IDC_MSMT_BATTERY_REMAINING_LONGEVITY: 132 MO
MDC_IDC_MSMT_BATTERY_REMAINING_LONGEVITY: 132 MO
MDC_IDC_MSMT_BATTERY_STATUS: NORMAL
MDC_IDC_MSMT_BATTERY_STATUS: NORMAL
MDC_IDC_MSMT_CAP_CHARGE_DTM: NORMAL
MDC_IDC_MSMT_CAP_CHARGE_DTM: NORMAL
MDC_IDC_MSMT_CAP_CHARGE_ENERGY: 0 J
MDC_IDC_MSMT_CAP_CHARGE_ENERGY: 0 J
MDC_IDC_MSMT_CAP_CHARGE_TIME: 0 S
MDC_IDC_MSMT_CAP_CHARGE_TIME: 0 S
MDC_IDC_MSMT_CAP_CHARGE_TIME: 9.86
MDC_IDC_MSMT_CAP_CHARGE_TIME: 9.86
MDC_IDC_MSMT_CAP_CHARGE_TYPE: NORMAL
MDC_IDC_MSMT_LEADCHNL_RA_IMPEDANCE_VALUE: 667 OHM
MDC_IDC_MSMT_LEADCHNL_RA_IMPEDANCE_VALUE: 667 OHM
MDC_IDC_MSMT_LEADCHNL_RA_PACING_THRESHOLD_AMPLITUDE: 0.7 V
MDC_IDC_MSMT_LEADCHNL_RA_PACING_THRESHOLD_AMPLITUDE: 0.7 V
MDC_IDC_MSMT_LEADCHNL_RA_PACING_THRESHOLD_PULSEWIDTH: 0.4 MS
MDC_IDC_MSMT_LEADCHNL_RA_PACING_THRESHOLD_PULSEWIDTH: 0.4 MS
MDC_IDC_MSMT_LEADCHNL_RA_SENSING_INTR_AMPL: 8.9 MV
MDC_IDC_MSMT_LEADCHNL_RA_SENSING_INTR_AMPL: 8.9 MV
MDC_IDC_MSMT_LEADCHNL_RV_IMPEDANCE_VALUE: 558 OHM
MDC_IDC_MSMT_LEADCHNL_RV_IMPEDANCE_VALUE: 558 OHM
MDC_IDC_MSMT_LEADCHNL_RV_PACING_THRESHOLD_AMPLITUDE: 1 V
MDC_IDC_MSMT_LEADCHNL_RV_PACING_THRESHOLD_AMPLITUDE: 1 V
MDC_IDC_MSMT_LEADCHNL_RV_PACING_THRESHOLD_PULSEWIDTH: 0.4 MS
MDC_IDC_MSMT_LEADCHNL_RV_PACING_THRESHOLD_PULSEWIDTH: 0.4 MS
MDC_IDC_MSMT_LEADCHNL_RV_SENSING_INTR_AMPL: 11.3 MV
MDC_IDC_MSMT_LEADCHNL_RV_SENSING_INTR_AMPL: 11.3 MV
MDC_IDC_PG_IMPLANT_DTM: NORMAL
MDC_IDC_PG_IMPLANT_DTM: NORMAL
MDC_IDC_PG_MFG: NORMAL
MDC_IDC_PG_MFG: NORMAL
MDC_IDC_PG_MODEL: NORMAL
MDC_IDC_PG_MODEL: NORMAL
MDC_IDC_PG_SERIAL: NORMAL
MDC_IDC_PG_SERIAL: NORMAL
MDC_IDC_PG_TYPE: NORMAL
MDC_IDC_PG_TYPE: NORMAL
MDC_IDC_SESS_CLINIC_NAME: NORMAL
MDC_IDC_SESS_CLINIC_NAME: NORMAL
MDC_IDC_SESS_DTM: NORMAL
MDC_IDC_SESS_DTM: NORMAL
MDC_IDC_SESS_TYPE: NORMAL
MDC_IDC_SESS_TYPE: NORMAL
MDC_IDC_SET_BRADY_AT_MODE_SWITCH_MODE: NORMAL
MDC_IDC_SET_BRADY_AT_MODE_SWITCH_MODE: NORMAL
MDC_IDC_SET_BRADY_AT_MODE_SWITCH_RATE: 170 {BEATS}/MIN
MDC_IDC_SET_BRADY_AT_MODE_SWITCH_RATE: 170 {BEATS}/MIN
MDC_IDC_SET_BRADY_LOWRATE: 60 {BEATS}/MIN
MDC_IDC_SET_BRADY_LOWRATE: 60 {BEATS}/MIN
MDC_IDC_SET_BRADY_MAX_SENSOR_RATE: 125 {BEATS}/MIN
MDC_IDC_SET_BRADY_MAX_SENSOR_RATE: 125 {BEATS}/MIN
MDC_IDC_SET_BRADY_MAX_TRACKING_RATE: 125 {BEATS}/MIN
MDC_IDC_SET_BRADY_MAX_TRACKING_RATE: 125 {BEATS}/MIN
MDC_IDC_SET_BRADY_MODE: NORMAL
MDC_IDC_SET_BRADY_MODE: NORMAL
MDC_IDC_SET_BRADY_PAV_DELAY_HIGH: 110 MS
MDC_IDC_SET_BRADY_PAV_DELAY_HIGH: 110 MS
MDC_IDC_SET_BRADY_PAV_DELAY_LOW: 220 MS
MDC_IDC_SET_BRADY_PAV_DELAY_LOW: 220 MS
MDC_IDC_SET_BRADY_SAV_DELAY_HIGH: 110 MS
MDC_IDC_SET_BRADY_SAV_DELAY_HIGH: 110 MS
MDC_IDC_SET_BRADY_SAV_DELAY_LOW: 220 MS
MDC_IDC_SET_BRADY_SAV_DELAY_LOW: 220 MS
MDC_IDC_SET_LEADCHNL_RA_PACING_AMPLITUDE: 2.5 V
MDC_IDC_SET_LEADCHNL_RA_PACING_AMPLITUDE: 2.5 V
MDC_IDC_SET_LEADCHNL_RA_PACING_CAPTURE_MODE: NORMAL
MDC_IDC_SET_LEADCHNL_RA_PACING_CAPTURE_MODE: NORMAL
MDC_IDC_SET_LEADCHNL_RA_PACING_POLARITY: NORMAL
MDC_IDC_SET_LEADCHNL_RA_PACING_POLARITY: NORMAL
MDC_IDC_SET_LEADCHNL_RA_PACING_PULSEWIDTH: 0.4 MS
MDC_IDC_SET_LEADCHNL_RA_PACING_PULSEWIDTH: 0.4 MS
MDC_IDC_SET_LEADCHNL_RA_SENSING_ADAPTATION_MODE: NORMAL
MDC_IDC_SET_LEADCHNL_RA_SENSING_ADAPTATION_MODE: NORMAL
MDC_IDC_SET_LEADCHNL_RA_SENSING_POLARITY: NORMAL
MDC_IDC_SET_LEADCHNL_RA_SENSING_POLARITY: NORMAL
MDC_IDC_SET_LEADCHNL_RA_SENSING_SENSITIVITY: 0.25 MV
MDC_IDC_SET_LEADCHNL_RA_SENSING_SENSITIVITY: 0.25 MV
MDC_IDC_SET_LEADCHNL_RV_PACING_AMPLITUDE: 2.5 V
MDC_IDC_SET_LEADCHNL_RV_PACING_AMPLITUDE: 2.5 V
MDC_IDC_SET_LEADCHNL_RV_PACING_CAPTURE_MODE: NORMAL
MDC_IDC_SET_LEADCHNL_RV_PACING_CAPTURE_MODE: NORMAL
MDC_IDC_SET_LEADCHNL_RV_PACING_POLARITY: NORMAL
MDC_IDC_SET_LEADCHNL_RV_PACING_POLARITY: NORMAL
MDC_IDC_SET_LEADCHNL_RV_PACING_PULSEWIDTH: 0.4 MS
MDC_IDC_SET_LEADCHNL_RV_PACING_PULSEWIDTH: 0.4 MS
MDC_IDC_SET_LEADCHNL_RV_SENSING_ADAPTATION_MODE: NORMAL
MDC_IDC_SET_LEADCHNL_RV_SENSING_ADAPTATION_MODE: NORMAL
MDC_IDC_SET_LEADCHNL_RV_SENSING_POLARITY: NORMAL
MDC_IDC_SET_LEADCHNL_RV_SENSING_POLARITY: NORMAL
MDC_IDC_SET_LEADCHNL_RV_SENSING_SENSITIVITY: 0.6 MV
MDC_IDC_SET_LEADCHNL_RV_SENSING_SENSITIVITY: 0.6 MV
MDC_IDC_SET_ZONE_DETECTION_INTERVAL: 300 MS
MDC_IDC_SET_ZONE_DETECTION_INTERVAL: 300 MS
MDC_IDC_SET_ZONE_DETECTION_INTERVAL: 353 MS
MDC_IDC_SET_ZONE_DETECTION_INTERVAL: 353 MS
MDC_IDC_SET_ZONE_DETECTION_INTERVAL: 462 MS
MDC_IDC_SET_ZONE_DETECTION_INTERVAL: 462 MS
MDC_IDC_SET_ZONE_TYPE: NORMAL
MDC_IDC_SET_ZONE_VENDOR_TYPE: NORMAL
MDC_IDC_STAT_BRADY_DTM_END: NORMAL
MDC_IDC_STAT_BRADY_DTM_START: NORMAL
MDC_IDC_STAT_BRADY_RA_PERCENT_PACED: 86 %
MDC_IDC_STAT_BRADY_RV_PERCENT_PACED: 93 %
MDC_IDC_STAT_EPISODE_RECENT_COUNT: 0
MDC_IDC_STAT_EPISODE_RECENT_COUNT_DTM_END: NORMAL
MDC_IDC_STAT_EPISODE_RECENT_COUNT_DTM_START: NORMAL
MDC_IDC_STAT_EPISODE_TOTAL_COUNT: 1
MDC_IDC_STAT_EPISODE_TOTAL_COUNT: 1
MDC_IDC_STAT_EPISODE_TOTAL_COUNT: 2
MDC_IDC_STAT_EPISODE_TOTAL_COUNT: 2
MDC_IDC_STAT_EPISODE_TOTAL_COUNT: 47
MDC_IDC_STAT_EPISODE_TOTAL_COUNT: 47
MDC_IDC_STAT_EPISODE_TOTAL_COUNT_DTM_END: NORMAL
MDC_IDC_STAT_EPISODE_TYPE: NORMAL
MDC_IDC_STAT_EPISODE_VENDOR_TYPE: NORMAL
MDC_IDC_STAT_TACHYTHERAPY_ATP_DELIVERED_RECENT: 0
MDC_IDC_STAT_TACHYTHERAPY_ATP_DELIVERED_RECENT: 0
MDC_IDC_STAT_TACHYTHERAPY_ATP_DELIVERED_TOTAL: 2
MDC_IDC_STAT_TACHYTHERAPY_ATP_DELIVERED_TOTAL: 2
MDC_IDC_STAT_TACHYTHERAPY_RECENT_DTM_END: NORMAL
MDC_IDC_STAT_TACHYTHERAPY_RECENT_DTM_END: NORMAL
MDC_IDC_STAT_TACHYTHERAPY_RECENT_DTM_START: NORMAL
MDC_IDC_STAT_TACHYTHERAPY_RECENT_DTM_START: NORMAL
MDC_IDC_STAT_TACHYTHERAPY_SHOCKS_ABORTED_RECENT: 0
MDC_IDC_STAT_TACHYTHERAPY_SHOCKS_ABORTED_RECENT: 0
MDC_IDC_STAT_TACHYTHERAPY_SHOCKS_ABORTED_TOTAL: 0
MDC_IDC_STAT_TACHYTHERAPY_SHOCKS_ABORTED_TOTAL: 0
MDC_IDC_STAT_TACHYTHERAPY_SHOCKS_DELIVERED_RECENT: 0
MDC_IDC_STAT_TACHYTHERAPY_SHOCKS_DELIVERED_RECENT: 0
MDC_IDC_STAT_TACHYTHERAPY_SHOCKS_DELIVERED_TOTAL: 0
MDC_IDC_STAT_TACHYTHERAPY_SHOCKS_DELIVERED_TOTAL: 0
MDC_IDC_STAT_TACHYTHERAPY_TOTAL_DTM_END: NORMAL
MDC_IDC_STAT_TACHYTHERAPY_TOTAL_DTM_END: NORMAL

## 2020-09-08 VITALS
HEART RATE: 63 BPM | DIASTOLIC BLOOD PRESSURE: 57 MMHG | WEIGHT: 203 LBS | BODY MASS INDEX: 26.05 KG/M2 | OXYGEN SATURATION: 97 % | SYSTOLIC BLOOD PRESSURE: 94 MMHG | HEIGHT: 74 IN

## 2020-10-07 DIAGNOSIS — D86.85 SARCOID, CARDIAC: ICD-10-CM

## 2020-10-10 RX ORDER — LISINOPRIL 5 MG/1
5 TABLET ORAL DAILY
Qty: 90 TABLET | Refills: 3 | Status: SHIPPED | OUTPATIENT
Start: 2020-10-10 | End: 2021-11-05

## 2020-11-24 DIAGNOSIS — D86.85 SARCOID, CARDIAC: Primary | ICD-10-CM

## 2020-11-27 RX ORDER — METOPROLOL SUCCINATE 25 MG/1
100 TABLET, EXTENDED RELEASE ORAL DAILY
Qty: 360 TABLET | Refills: 3 | Status: SHIPPED | OUTPATIENT
Start: 2020-11-27 | End: 2021-12-14

## 2020-12-28 ENCOUNTER — TELEPHONE (OUTPATIENT)
Dept: CARDIOLOGY | Facility: CLINIC | Age: 60
End: 2020-12-28

## 2020-12-28 NOTE — TELEPHONE ENCOUNTER
[facility] Call Center    Phone Message    May a detailed message be left on voicemail: yes     Reason for Call: Other: Pt would like a call back to discuss 's thoughts on him recieving the covid vaccine .     Action Taken: Message routed to:  [facility] Cardio    Travel Screening: Not Applicable

## 2020-12-29 NOTE — TELEPHONE ENCOUNTER
Jose is wondering if the COVID vaccine is ok to get with his heart condition.  Discussed with him that Dr Dickey does feel that the benefits of the vaccine likely outweigh the risk of the vaccine.  Clarifying if this is different for sarcoid patients.  Will let Jose know when I know.

## 2021-01-03 ENCOUNTER — HEALTH MAINTENANCE LETTER (OUTPATIENT)
Age: 61
End: 2021-01-03

## 2021-01-07 NOTE — TELEPHONE ENCOUNTER
Pt is calling to discuss the vaccine again, he stated today is his last chance to get it for a while, please have sarai call kimi woodard

## 2021-01-07 NOTE — TELEPHONE ENCOUNTER
Updated Jose that likely the benefit of the vaccine outweighs the risk of getting complications from COVID while acknowledging that it has not been studied in immunosuppressed patients

## 2021-02-16 DIAGNOSIS — D86.85 SARCOID, CARDIAC: ICD-10-CM

## 2021-02-19 RX ORDER — FOLIC ACID 1 MG/1
TABLET ORAL
Qty: 60 TABLET | Refills: 3 | Status: SHIPPED | OUTPATIENT
Start: 2021-02-19 | End: 2022-02-17

## 2021-02-19 NOTE — TELEPHONE ENCOUNTER
FOLIC ACID 1 MG TABS 1 Tablet      Last Written Prescription Date:  3/24/20  Last Fill Quantity: 60,   # refills: 3  Last Office Visit : 9/4/20  Future Office visit:  None    Routing refill request to provider for review/approval because:  Folic acid is not on the Cardiology [facility] refill protocol.

## 2021-04-08 DIAGNOSIS — I48.91 ATRIAL FIBRILLATION (H): ICD-10-CM

## 2021-04-12 RX ORDER — RIVAROXABAN 20 MG/1
TABLET, FILM COATED ORAL
Qty: 90 TABLET | Refills: 2 | Status: SHIPPED | OUTPATIENT
Start: 2021-04-12 | End: 2022-01-20

## 2021-04-25 ENCOUNTER — HEALTH MAINTENANCE LETTER (OUTPATIENT)
Age: 61
End: 2021-04-25

## 2021-05-26 DIAGNOSIS — D86.85 SARCOID, CARDIAC: ICD-10-CM

## 2021-05-26 DIAGNOSIS — D86.9 SARCOIDOSIS: ICD-10-CM

## 2021-05-28 NOTE — TELEPHONE ENCOUNTER
methotrexate 2.5 MG tablet  Take 8 tablets (20 mg) by mouth every 7 days       Last Written Prescription Date:  6/18/20  Last Fill Quantity: 96,   # refills: 3  Last Office Visit: 9/4/20  Follow up:  6 months with [facility] with  1 year with [facility]  Future Office visit:  none    CBC RESULTS:   Recent Labs   Lab Test 09/04/20  1108   WBC 5.7   RBC 4.32*   HGB 14.6   HCT 42.9   MCV 99   MCH 33.8*   MCHC 34.0   RDW 14.1          Creatinine   Date Value Ref Range Status   09/04/2020 1.19 0.66 - 1.25 mg/dL Final   ]    Liver Function Studies -   Recent Labs   Lab Test 09/04/20  1108   PROTTOTAL 6.5*   ALBUMIN 3.6   BILITOTAL 0.6   ALKPHOS 71   AST 19   ALT 31       Routing refill request to provider for review/approval because:  Drug not on the [facility] or [facility] refill protocol or controlled substance

## 2021-06-01 NOTE — TELEPHONE ENCOUNTER
[facility] Call Center    Phone Message    May a detailed message be left on voicemail: yes     Reason for Call: Medication Refill Request    Has the patient contacted the pharmacy for the refill? Yes   Name of medication being requested: methotrexate 2.5 MG tablet     Provider who prescribed the medication: [place]  Pharmacy: MEDICINE [facility] [id number] [place] [address] [address].  Date medication is needed: asap        Action Taken: Message routed to:  [facility] cardio    Travel Screening: Not Applicable

## 2021-10-10 ENCOUNTER — HEALTH MAINTENANCE LETTER (OUTPATIENT)
Age: 61
End: 2021-10-10

## 2021-11-03 DIAGNOSIS — D86.85 SARCOID, CARDIAC: ICD-10-CM

## 2021-11-05 RX ORDER — LISINOPRIL 5 MG/1
5 TABLET ORAL DAILY
Qty: 90 TABLET | Refills: 0 | Status: SHIPPED | OUTPATIENT
Start: 2021-11-05 | End: 2021-12-21

## 2021-11-05 NOTE — TELEPHONE ENCOUNTER
LCV: 9/4/2020  [facility] ( RTC [facility] Y)  OUTSIDE LAB: 6-15-21 Creatinine (0.70 - 1.20 mg/dL) 1.09   Overdue lab- K, FYI to clinic  RF 90 day  Scheduling has been notified to contact the pt for appointment.

## 2021-11-17 ENCOUNTER — TELEPHONE (OUTPATIENT)
Dept: CARDIOLOGY | Facility: CLINIC | Age: 61
End: 2021-11-17
Payer: COMMERCIAL

## 2021-11-17 DIAGNOSIS — D86.85 SARCOID, CARDIAC: Primary | ICD-10-CM

## 2021-11-17 NOTE — TELEPHONE ENCOUNTER
Called Jose back. Last seen in clinic 9/4/2020. No medication changes made at that time and instructed to follow up in 6 months locally with his EP Dr De León and 1 year with Dr Dickey.     Jose reports he can hardly do anything without resting. Gets more SOB and hard to breathe. Walking isn't bad, but bending over and doing anything physical he gets SOB. This has been going on at least a month or two.    Notes some lightheaded when he bends over. Also notes some intermittent nausea.  Has actually lost weight recently. Appetite hasn't been great. 183.4 lbs. He thinks that's down about 10 lbs over the last months. Was 203 lbs at last clinic visit in September 2020.  Also notices his hands have been shaky (like a Parkinsons shake).    Has an appointment to establish with a new PCP next week    Notes he was taken off amiodarone and put on Sotalol 80 mg twice daily in June. Has noticed the shakiness since starting the Sotalol. He reports Dr De León up in [place] made this change but he has since left. They talked about referring him to someone new but this has not been set up yet. I asked them to call the [place] clinic to schedule EP follow up and establish with a new provider.     In the meantime will update Dr Dickey for recommendations.   We scheduled follow up next available on 12/21.     Gia Del Valle RN

## 2021-11-17 NOTE — TELEPHONE ENCOUNTER
[facility] Call Center    Phone Message    May a detailed message be left on voicemail: yes     Reason for Call: Symptoms or Concerns     If patient has red-flag symptoms, warm transfer to triage line    Current symptom or concern: having a hard time with stamina and catching his breath. Starting to shake too (hands).    Symptoms have been present for:  1 month    Has patient previously been seen for this? Not for a long time per Jose    Are there any new or worsening symptoms? No      Action Taken: Message routed to:  [facility] Cardio    Travel Screening: Not Applicable

## 2021-11-18 NOTE — TELEPHONE ENCOUNTER
Device clinic from [facility] called back. They report nothing concerning on device check. NSR, pacing 74% atrial beats and 70% ventricular beats. No ventricular episodes/ectopy noted.     She will get the full report uploaded this evening and should be able to access in care everywhere tomorrow. If not able to see, I told her we will call to request a fax. Device clinic number [phone].

## 2021-11-18 NOTE — TELEPHONE ENCOUNTER
Reviewed with Dr Dickey. Called Jose with following recommendations:  Date: 11/18/2021    Time of Call: 10:37 AM     Diagnosis:  HF     [ VORB ] Ordering provider: Dr Dickey  Order: echo and device check.      Order received by: Gia Del Valle RN      Follow-up/additional notes: will start with echo and device check, might need switch back to [place].     Orders faxed for echo up to [phone] and asked them to call to schedule. Spoke with device clinic ([phone]) and they will get remote device check and call with results.

## 2021-11-23 NOTE — TELEPHONE ENCOUNTER
LVM for Jose following up on scheduling of Echo.  Upon chart review Friday appeared to be scheduled for today but no results available at this time and appointment is no longer visible in care everywhere

## 2021-11-26 NOTE — TELEPHONE ENCOUNTER
Dr Dickey reviewed results of echo and device check- recommended that he follow up with PMD and touch base with EP regarding sotolol questions.      Jose's wife stated understanding and Jose has a follow up with PMD today.

## 2021-12-11 DIAGNOSIS — D86.85 SARCOID, CARDIAC: ICD-10-CM

## 2021-12-14 RX ORDER — METOPROLOL SUCCINATE 25 MG/1
100 TABLET, EXTENDED RELEASE ORAL DAILY
Qty: 360 TABLET | Refills: 0 | Status: SHIPPED | OUTPATIENT
Start: 2021-12-14 | End: 2022-03-08

## 2021-12-21 ENCOUNTER — OFFICE VISIT (OUTPATIENT)
Dept: CARDIOLOGY | Facility: CLINIC | Age: 61
End: 2021-12-21
Attending: INTERNAL MEDICINE
Payer: COMMERCIAL

## 2021-12-21 VITALS
OXYGEN SATURATION: 95 % | BODY MASS INDEX: 21.84 KG/M2 | SYSTOLIC BLOOD PRESSURE: 92 MMHG | WEIGHT: 170.1 LBS | DIASTOLIC BLOOD PRESSURE: 58 MMHG | HEART RATE: 70 BPM

## 2021-12-21 DIAGNOSIS — Z95.810 ICD (IMPLANTABLE CARDIOVERTER-DEFIBRILLATOR) IN PLACE: ICD-10-CM

## 2021-12-21 DIAGNOSIS — I47.10 PAROXYSMAL SUPRAVENTRICULAR TACHYCARDIA (H): Primary | ICD-10-CM

## 2021-12-21 DIAGNOSIS — I47.20 VENTRICULAR TACHYCARDIA (H): ICD-10-CM

## 2021-12-21 DIAGNOSIS — D86.9 SARCOIDOSIS: ICD-10-CM

## 2021-12-21 DIAGNOSIS — D86.85 CARDIAC SARCOIDOSIS: Primary | ICD-10-CM

## 2021-12-21 PROCEDURE — 93283 PRGRMG EVAL IMPLANTABLE DFB: CPT | Performed by: INTERNAL MEDICINE

## 2021-12-21 PROCEDURE — G0463 HOSPITAL OUTPT CLINIC VISIT: HCPCS

## 2021-12-21 PROCEDURE — 99214 OFFICE O/P EST MOD 30 MIN: CPT | Mod: 25 | Performed by: INTERNAL MEDICINE

## 2021-12-21 RX ORDER — SOTALOL HYDROCHLORIDE 80 MG/1
80 TABLET ORAL
Status: ON HOLD | COMMUNITY
Start: 2021-06-04 | End: 2022-03-29

## 2021-12-21 RX ORDER — TAMSULOSIN HYDROCHLORIDE 0.4 MG/1
0.4 CAPSULE ORAL DAILY
COMMUNITY
Start: 2021-12-14 | End: 2024-07-01

## 2021-12-21 ASSESSMENT — PAIN SCALES - GENERAL: PAINLEVEL: NO PAIN (0)

## 2021-12-21 NOTE — PROGRESS NOTES
December 21, 2021    I the pleasure of seeing Jose Carney in the [facility] Cardiac Sarcoidosis clinic today.     As you know he is a very pleasant 61-year-old man with a history sarcoidosis which is diagnosed by transbronchial biopsy in 2013.  He had been having back pain prior to this and 30 pound weight loss and had substantial mediastinal lymphadenopathy.  He was placed on several months of steroids with significant weight gain and improvement in symptoms as well as back pain.      In late summer and fall 2017 he started to develop palpitations which were found to be supraventricular tachycardia (likely AVNRT as well as some atrial tachycardia)  based on his cardiac monitor. He then had a cardiac MRI which was consistent with myocardial involvement from sarcoidosis (see below).  He was then sent down here for further management and was placed on metoprolol.    Based on high risk features of his cardiac MRI we recommended that he have an ICD placed. I did place him on a burst of prednisone given a highly suspicious PET scan which showed complete resolution of his cardiac sarcoidosis.  At that time I switch him to methotrexate as a staring steroid sparing agent and was titrating this up and tapering down his prednisone when he developed ventricular tachycardia.  This was in January 2019.  The VT was at a rate of 135 and was unable to be paced out.  He did not have syncope with this.  He was loaded with amiodarone, and given failure to gain control of his VT he was given Solu-Medrol and placed on higher dose of prednisone in addition to his methotrexate.     He was then able to be converted and when I saw him last I continued him on amiodarone 200 mg, I increased his methotrexate 20 mg and tapered him off of prednisone. I then increased his metoprolol.      He came down to [place] sooner than anticipated due to recurrent atrial  fibrillation.  He was seen in the core clinic. His metoprolol was  increased and he is mostly out of AF now.     Over the summer of 2021 he was taken off of amiodarone by his local cardiologist.     This visit:  He started to feel poorly about 2 months ago.  He has lost 30 pounds while his thyroid was checked throughout the time he was on amiodarone he has recently checked.  Last week and he was found to be significantly hypothyroid.  He has undergone a complete evaluation by endocrine locally and he is scheduled to start both prednisone and methimazole today.     He has had fatigue, tremors, diarrhea poor appetite.  More recently has palpitations and lightheadedness.  Blood pressures have been low in the clinic at 90s/50.s     He was told by his mobile device clinic that he had some events on his defibrillator.  Review of these today shows he is required antitachycardia pacing for ventricular tachycardia 4 out of 4 last days.  He has some intermittent atrial fibrillation but is in sinus rhythm today.  He has converted with use of these.     He has not lost consciousness.  He has felt quite dizzy.       PAST MEDICAL HISTORY:  Past Medical History:   Diagnosis Date     First degree AV block 3/20/2018     Palpitations 3/20/2018     Paroxysmal supraventricular tachycardia (H) 3/20/2018     Sarcoid, cardiac/EF 54% per MRI,[place] of affected myocardium is 12% of myocardial mass 1/25/2018     Sarcoidosis/ systemic 2013 3/20/2018     FAMILY HISTORY:  His family history is notable for several family members with autoimmune disease.  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  He denies any tobacco or alcohol use or recreational drug use.     CURRENT MEDICATIONS:    Current Outpatient Medications   Medication Sig Dispense Refill     folic acid (FOLVITE) 1 MG tablet TAKE FIVE TABLETS (5MG) ONCE WEEKLY WITH YOUR METHOTREXATE 60 tablet 3     furosemide (LASIX)  20 MG tablet Take 1 tablet (20 mg) by mouth daily As directed by CORE / HF clinic 30 tablet 0     melatonin 3 MG tablet Take 3 mg by mouth        methotrexate 2.5 MG tablet Take 8 tablets (20 mg) by mouth every 7 days Please schedule clinic appt for refills, [phone] 104 tablet 3     metoprolol succinate ER (TOPROL-XL) 25 MG 24 hr tablet TAKE 4 TABLETS (100 MG) BY MOUTH DAILY 360 tablet 0     prednisoLONE acetate (PRED FORTE) 1 % ophthalmic susp Place 1 drop into both eyes every hour       sildenafil (REVATIO) 20 MG tablet Take 20-60 mg by mouth as needed       sotalol (BETAPACE) 80 MG tablet Take 80 mg by mouth       tamsulosin (FLOMAX) 0.4 MG capsule        XARELTO ANTICOAGULANT 20 MG TABS tablet TAKE 1 TABLET (20 MG) BY MOUTH DAILY (WITH DINNER) 90 tablet 2     tolterodine (DETROL) 1 MG tablet Take 1 mg by mouth (Patient not taking: Reported on 12/21/2021)         sotolol since June   He has been off of amiodarone since the summer.  He is starting methimazole and steroids in the next 24 hours.       ROS:   Constitutional: No fever, chills, or sweats.  He has lost 30 pounds as per HPI     ENT: No visual disturbance, ear ache, epistaxis, sore throat.   Allergies/Immunologic: Negative.   Respiratory: No cough, hemoptysis.   Cardiovascular: As per HPI.   GI: No nausea, vomiting, hematemesis, melena, or hematochezia + poor appetite.   : No urinary frequency, dysuria, or hematuria.   Integument: Negative.   Psychiatric: frustrated  Neuro: + tremors   Endocrinology: Negative.   Musculoskeletal: Chronic back pain    Exam:   BP 92/58 (BP Location: Right arm, Patient Position: Chair, Cuff Size: Adult Regular)   Pulse 70   Wt 77.2 kg (170 lb 1.6 oz)   SpO2 95%   BMI 21.84 kg/m    General: appears comfortable, alert and articulate much thinner last night her last visit-   Head: normocephalic, atraumatic  Eyes: anicteric sclera, EOMI  Neck: no adenopathy  Orophyarynx: moist mucosa, no lesions, dentition  intact  Heart: regular, S1/S2, III/IV systolic murmur at the apex, no gallop, rub, estimated JVP < 10   Lungs: clear, no rales or wheezing  Abdomen: soft, non-tender, bowel sounds present, no hepatosplenomegaly  Extremities: no clubbing, cyanosis or edema  Neurological: normal speech and affect, no gross motor deficits    Labs:  Lab Results   Component Value Date    WBC 5.7 09/04/2020    HGB 14.6 09/04/2020    HCT 42.9 09/04/2020     09/04/2020     09/04/2020    POTASSIUM 3.8 09/04/2020    CHLORIDE 109 09/04/2020    CO2 28 09/04/2020    BUN 18 09/04/2020    CR 1.19 09/04/2020     (H) 09/04/2020    NTBNP 458 (H) 09/04/2020    TROPI <0.015 09/04/2020    AST 19 09/04/2020    ALT 31 09/04/2020    ALKPHOS 71 09/04/2020    BILITOTAL 0.6 09/04/2020         Repeat PET:   8/2018    Impression: In this patient with cardiac sarcoidosis under treatment  with steroids for the last 4 months;  1. No FDG uptake within the myocardium. Overall there is complete  resolution of previous cardiac sarcoidosis.  2. Excellent suppression of myocardial glucose metabolism.  3. Stable number and size of parenchymal lung nodules, although  previously seen high metabolic activity within a few of these lesions  is no longer present.  4. Stable size number and metabolic activity of lymphadenopathy in the  mediastinum, bilateral axilla and right supraclavicular region. 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  5. Stable left stone.  The ER N and forming of the PEG and start any thickened o symptoms reported and rate has been right in between 150 to 170 beats a minute.   4. Clinical correlation advised.       Cardiac MRI   1. The LV cavity size is mildly enlarged. The global systolic function is normal. The LVEF is 55%. There is  severe fvgeznckngg-ah-lckzvafn involving the inferoseptal and inferior basal segments.     2. The RV is normal in cavity size. The global systolic function is normal. The  RVEF is 66%.      3. Both atria are normal in size.     4. Due to ICD lead artifact, the tricuspid and pulmonic valves were not well visualized.       The mitral and aortic valves appear normally functioning without significant disease.       5. Late gadolinium enhancement imaging shows epicardial and mid-myocardial enhancement in the  basal-inferoseptal, basal-inferior, and basal-inferolateral segments.      6. There is no pericardial effusion.     7. Unable to assess for intracardiac thrombus.     8. Unable to do T2 mapping and assess for myocardial edema (active inflammation).      CONCLUSIONS:    1. Cardiac sarcoidosis with preserved systolic function; LVEF 55%, RVEF 66%.  2. Sarcoidosis related scarring and severe kqlpkqdjqfb-ze-rpxfrtmv involving the basal-inferoseptal and  basal-inferior segments. Total scar burden 12%. Unchanged from previous CMR dated 01/19/2018.      Cardiac MRI: 2/2019    2. The RV is normal in cavity size. The global systolic function is normal. The RVEF is 66%.      3. Both atria are normal in size.     4. Due to ICD lead artifact, the tricuspid and pulmonic valves were not well visualized.       The mitral and aortic valves appear normally functioning without significant disease.       5. Late gadolinium enhancement imaging shows epicardial and mid-myocardial enhancement in the  basal-inferoseptal, basal-inferior, and basal-inferolateral segments.      6. There is no pericardial effusion.     7. Unable to assess for intracardiac thrombus.     8. Unable to do T2 mapping and assess for myocardial edema (active inflammation).      CONCLUSIONS:    1. Cardiac sarcoidosis with preserved systolic function; LVEF 55%, RVEF 66%.  2. Sarcoidosis related scarring and severe ogcgjbtfjhm-gp-bqplbckp involving the basal-inferoseptal and  basal-inferior segments. Total scar burden 12%. Unchanged from previous CMR dated 01/19/2018.        Local echo 11/2021  Left Ventricle   The left ventricular  systolic function is low normal. Quantified left ventricle ejection fraction is 50.9 %. The left ventricle is mildly enlarged. There is   normal left ventricular wall thickness. There are regional wall motion abnormalities as specified. There is inferior base hypokinesis. There is inferolateral   base hypokinesis. There is anterolateral base hypokinesis.     TSH-undetectable      Device: [employer] D433 RESONATE EL ICD  Normal device function. Threshold testing deferred d/t arrhythmias and TSH.  Mode: DDDR  bpm  AP: 66%  : 61%  Intrinsic rhythm: SR 62 bpm  Estimated battery longevity to RRT = 10.5 years.   Atrial Arrhythmia: Most recent AF episode was 12/2/21 lasting 4 hours.  AF [place]: <1%  Anticoagulant: xarelto  Ventricular Arrhythmia: 12/17-12/20/21 pt has had 1 VT episode each day 139-145 bpm. Each episode was converted with 1 ATP sequence.  Setting Changes: None  Patient has an appointment to see Dr. Dickey today.   Plan: Pt will continue care with his primary device clinic at [facility] in [place].  PANCHITO Frausto    Assessment and Plan:   In summary this is a very pleasant 59-year-old man with a history of biopsy-proven cardiac sarcoid from a transbronchial biopsy-who has a cardiac MRI consistent with myocardial involvement from sarcoidosis. His PET scan related inflammation resolved completely with 3-4 months of 40 mg of prednisone.  I did have an ICD placed given he had high risk features on his cardiac MRI for malignant arrhythmias.     He has been relatively stable for some time. He finally came off of amiodarone this summer and was transitioned to sotolol.  Unfortunately he has developed severe hyperthyroidism.  He is starting methimazole and prednisone in the next 24 hours.  He has had significant ventricular tachycardia burden over the last several days requiring antitachycardia pacing many times over.  He has not lost consciousness but has been dizzy.  The rate of VT is in the  130s to 140s range which is similar to what he has had in the past.  He is not in clinical heart failure.  His blood pressures are low today in clinic and I want him to have some ability to tolerate VT therapy stopping his lisinopril.  I have discussed his case with EP today over the phone.    The hope is that with stabilization of his thyroid will not need to do anything further to control his ventricular tachycardia although the amount that he is having is making me nervous.  The plan at this point is to send him home, he will not drive.  Again I am backing off his lisinopril he will start his thyroid regimen and hopefully over the next couple weeks his ventricular tachycardia will start to stabilize.  should he have any shocks or loss of consciousness would recommend bringing him into the hospital for stabilization.      Given his stable LV function and absence of heart failure symptoms I am not concerned that the resurgence of VT represents cardiac sarcoidosis.  In fact he has had stability in his ejection fraction and rhythms for several years now on the current dose of methotrexate that he is on.     Given his slight reduction in ejection fraction of the years had have him on metoprolol and lisinopril.  Again dropping lisinopril today to make more blood pressure room in case he goes into VT and also he has lost a substantial amount of weight which is probably part of the reason why his blood pressure is a little.  He is NYHA class II, stage C.  In the future may consider BiV upgrade if he requires a lot of pacing and if we have any decline in LV function in the future.     The stability of his LV function over the last few months balanced with the fact that he is in no clinical heart failure and is in remission from VT and AF, and my concern about adding MORE immunosuppression, I am going to again watchful wait.       We will keep him on folic acid and methotrexate for now.     With his prior to his atrial  fibrillation, he is having some but not a substantial burden considering his hyperthyroidism.  He is on anticoagulation and beta-blocker, also on sotalol,       He should have CBC, LFTs in 3 months (we will order) and  echo and device interrogation in 6 months.       Mitral regurgitation: This is never been more than moderate, will watch for now    Uveitis-  Followed locally     I am putting him back on my schedule  In a couple of months with another echocardiogram and clinic visit to ensure things are going in the right direction    I am contacting EP today and he will do virtual follow-up with him in a couple weeks to make sure that the VT is settled    Mandi Dickey MD   [facility]  Patient Care Team:  [facility], [facility] as PCP - Helen Hernadez, RN as Specialty Care Coordinator (Cardiology)  Mandi Dickey MD as Assigned Heart and Vascular Provider  GIULIANO KOHLER  [place], [place]     Gary Soares MD

## 2021-12-21 NOTE — NURSING NOTE
Chief Complaint   Patient presents with     Follow Up     12/21/2021- [place] reason for visit: Return HF, 57 yo male, Sarcoid/HF, EF EF 50-55%, labs prior.      Vitals were taken and medications reconciled.    Aiden Pal, EMT  11:03 AM

## 2021-12-21 NOTE — PATIENT INSTRUCTIONS
"Cardiology Providers you saw during your visit:  Dr. Dickey    Medication changes:  1. Stop Lisinopril    Follow up:  1.  If you pass out or you get shocks, please come in to the [facility] ER  2.  Please follow up with Dr Will in a couple weeks to make sure VT is improving  3.  No driving at the time, until the blood pressures are better and the fast heart rates. It is not safe to drive since you may pass out and hurt yourself or others.    4.  Follow up with Dr Dickey in 6 months with labs  5.  Follow up with Dr Cavazos as scheduled in January    Labs:      Please call if you have :  1. Weight gain of more than 2 pounds in a day or 5 pounds in a week  2. Increased shortness of breath, swelling or bloating  3. Dizziness, lightheadedness   4. Any questions or concerns.       Follow the American Heart Association Diet and Lifestyle recommendations:  Limit saturated fat, trans fat, sodium, red meat, sweets and sugar-sweetened beverages. If you choose to eat red meat, compare labels and select the leanest cuts available.  Aim for at least 150 minutes of moderate physical activity or 75 minutes of vigorous physical activity - or an equal combination of both - each week.      During business hours: [phone], press option # 1 to be routed to the [place] then option # 4 for \"To send a message to your care team\"      After hours, weekends or holidays: On Call Cardiologist- [phone]   option #4 and ask to speak to the on-call Cardiologist. Inform them you are a CORE/heart failure patient at the [place].      Heart Failure Support Group  [facility]  The Board Room, 8th Floor  [address] [place] [id number]    2021 Meetings  Mondays 1-2 pm  January 11th, 1-2 p.m.  February 1st, 1-2 p.m.  March 1st, 1-2 p.m.  April 5th, 1-2 p.m.  May 3rd, 1-2 p.m.  June 7th, 1-2 p.m.  July 12th, 1-2 p.m.  August 2nd, 1-2 p.m.  September 13th, 1-2 p.m.  October 4th, 1-2 p.m.  November 1st, " "1-2 p.m.  , 1-2 p.m.      Helen Gonzalez RN BSN CHFN  Cardiology Care Coordinator - Heart Failure/ [facility]  [facility]   Questions and [phone]   First press #1 for the [place] and then press #4 for \"To send a message to your care team\"    "

## 2021-12-21 NOTE — NURSING NOTE
Diet: Patient instructed regarding a heart failure healthy diet, including discussion of reduced fat and 2000 mg daily sodium restriction, daily weights, medication purpose and compliance, fluid restrictions and resources for patient and family to access for assistance with heart failure management.       Labs: Patient was given results of the laboratory testing obtained today and patient was instructed about when to return for the next laboratory testing.     Med Reconcile: Reviewed and verified all current medications with the patient. The updated medication list was printed and given to the patient.     Med changes: stop lisinopril    Return Appointment: Patient given instructions regarding scheduling next clinic visit: follow up with Dr Cavazos as scheduled, follow up with Dr Will, Follow up with Dr Dickey with labs    Patient stated he understood all health information given and agreed to call with further questions or concerns.     Helen Gonzalez RN

## 2021-12-21 NOTE — LETTER
12/21/2021      RE: Jose Carney  [address]  [facility] [address]       Dear Colleague,    Thank you for the opportunity to participate in the care of your patient, Jose Carney, at the [facility] [place] at [facility]. Please see a copy of my visit note below.      December 21, 2021    I the pleasure of seeing Jose Carney in the [facility] Cardiac Sarcoidosis clinic today.     As you know he is a very pleasant 61-year-old man with a history sarcoidosis which is diagnosed by transbronchial biopsy in 2013.  He had been having back pain prior to this and 30 pound weight loss and had substantial mediastinal lymphadenopathy.  He was placed on several months of steroids with significant weight gain and improvement in symptoms as well as back pain.      In late summer and fall 2017 he started to develop palpitations which were found to be supraventricular tachycardia (likely AVNRT as well as some atrial tachycardia)  based on his cardiac monitor. He then had a cardiac MRI which was consistent with myocardial involvement from sarcoidosis (see below).  He was then sent down here for further management and was placed on metoprolol.    Based on high risk features of his cardiac MRI we recommended that he have an ICD placed. I did place him on a burst of prednisone given a highly suspicious PET scan which showed complete resolution of his cardiac sarcoidosis.  At that time I switch him to methotrexate as a staring steroid sparing agent and was titrating this up and tapering down his prednisone when he developed ventricular tachycardia.  This was in January 2019.  The VT was at a rate of 135 and was unable to be paced out.  He did not have syncope with this.  He was loaded with amiodarone, and given failure to gain control of his VT he was given Solu-Medrol and placed on higher dose of prednisone in addition to his methotrexate.     He was then  able to be converted and when I saw him last I continued him on amiodarone 200 mg, I increased his methotrexate 20 mg and tapered him off of prednisone. I then increased his metoprolol.      He came down to [place] sooner than anticipated due to recurrent atrial  fibrillation.  He was seen in the core clinic. His metoprolol was increased and he is mostly out of AF now.     Over the summer of 2021 he was taken off of amiodarone by his local cardiologist.     This visit:  He started to feel poorly about 2 months ago.  He has lost 30 pounds while his thyroid was checked throughout the time he was on amiodarone he has recently checked.  Last week and he was found to be significantly hypothyroid.  He has undergone a complete evaluation by endocrine locally and he is scheduled to start both prednisone and methimazole today.     He has had fatigue, tremors, diarrhea poor appetite.  More recently has palpitations and lightheadedness.  Blood pressures have been low in the clinic at 90s/50.s     He was told by his mobile device clinic that he had some events on his defibrillator.  Review of these today shows he is required antitachycardia pacing for ventricular tachycardia 4 out of 4 last days.  He has some intermittent atrial fibrillation but is in sinus rhythm today.  He has converted with use of these.     He has not lost consciousness.  He has felt quite dizzy.       PAST MEDICAL HISTORY:  Past Medical History:   Diagnosis Date     First degree AV block 3/20/2018     Palpitations 3/20/2018     Paroxysmal supraventricular tachycardia (H) 3/20/2018     Sarcoid, cardiac/EF 54% per MRI,[place] of affected myocardium is 12% of myocardial mass 1/25/2018     Sarcoidosis/ systemic 2013 3/20/2018     FAMILY HISTORY:  His family history is notable for several family members with autoimmune disease.  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  He denies any tobacco or alcohol use or recreational drug use.     CURRENT MEDICATIONS:    Current Outpatient Medications   Medication Sig Dispense Refill     folic acid (FOLVITE) 1 MG tablet TAKE FIVE TABLETS (5MG) ONCE WEEKLY WITH YOUR METHOTREXATE 60 tablet 3     furosemide (LASIX) 20 MG tablet Take 1 tablet (20 mg) by mouth daily As directed by CORE / HF clinic 30 tablet 0     melatonin 3 MG tablet Take 3 mg by mouth        methotrexate 2.5 MG tablet Take 8 tablets (20 mg) by mouth every 7 days Please schedule clinic appt for refills, [phone] 104 tablet 3     metoprolol succinate ER (TOPROL-XL) 25 MG 24 hr tablet TAKE 4 TABLETS (100 MG) BY MOUTH DAILY 360 tablet 0     prednisoLONE acetate (PRED FORTE) 1 % ophthalmic susp Place 1 drop into both eyes every hour       sildenafil (REVATIO) 20 MG tablet Take 20-60 mg by mouth as needed       sotalol (BETAPACE) 80 MG tablet Take 80 mg by mouth       tamsulosin (FLOMAX) 0.4 MG capsule        XARELTO ANTICOAGULANT 20 MG TABS tablet TAKE 1 TABLET (20 MG) BY MOUTH DAILY (WITH DINNER) 90 tablet 2     tolterodine (DETROL) 1 MG tablet Take 1 mg by mouth (Patient not taking: Reported on 12/21/2021)         sotolol since June   He has been off of amiodarone since the summer.  He is starting methimazole and steroids in the next 24 hours.       ROS:   Constitutional: No fever, chills, or sweats.  He has lost 30 pounds as per HPI     ENT: No visual disturbance, ear ache, epistaxis, sore throat.   Allergies/Immunologic: Negative.   Respiratory: No cough, hemoptysis.   Cardiovascular: As per HPI.   GI: No nausea, vomiting, hematemesis, melena, or hematochezia + poor appetite.   : No urinary frequency, dysuria, or hematuria.   Integument: Negative.   Psychiatric: frustrated  Neuro: + tremors   Endocrinology: Negative.   Musculoskeletal: Chronic back pain    Exam:   BP 92/58 (BP Location: Right arm, Patient Position: Chair, Cuff  Size: Adult Regular)   Pulse 70   Wt 77.2 kg (170 lb 1.6 oz)   SpO2 95%   BMI 21.84 kg/m    General: appears comfortable, alert and articulate much thinner last night her last visit-   Head: normocephalic, atraumatic  Eyes: anicteric sclera, EOMI  Neck: no adenopathy  Orophyarynx: moist mucosa, no lesions, dentition intact  Heart: regular, S1/S2, III/IV systolic murmur at the apex, no gallop, rub, estimated JVP < 10   Lungs: clear, no rales or wheezing  Abdomen: soft, non-tender, bowel sounds present, no hepatosplenomegaly  Extremities: no clubbing, cyanosis or edema  Neurological: normal speech and affect, no gross motor deficits    Labs:  Lab Results   Component Value Date    WBC 5.7 09/04/2020    HGB 14.6 09/04/2020    HCT 42.9 09/04/2020     09/04/2020     09/04/2020    POTASSIUM 3.8 09/04/2020    CHLORIDE 109 09/04/2020    CO2 28 09/04/2020    BUN 18 09/04/2020    CR 1.19 09/04/2020     (H) 09/04/2020    NTBNP 458 (H) 09/04/2020    TROPI <0.015 09/04/2020    AST 19 09/04/2020    ALT 31 09/04/2020    ALKPHOS 71 09/04/2020    BILITOTAL 0.6 09/04/2020         Repeat PET:   8/2018    Impression: In this patient with cardiac sarcoidosis under treatment  with steroids for the last 4 months;  1. No FDG uptake within the myocardium. Overall there is complete  resolution of previous cardiac sarcoidosis.  2. Excellent suppression of myocardial glucose metabolism.  3. Stable number and size of parenchymal lung nodules, although  previously seen high metabolic activity within a few of these lesions  is no longer present.  4. Stable size number and metabolic activity of lymphadenopathy in the  mediastinum, bilateral axilla and right supraclavicular region. 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  5. Stable left stone.  The ER N and forming of the PEG and start any thickened o symptoms reported and rate has been right in between 150 to 170 beats a minute.   4. Clinical  correlation advised.       Cardiac MRI   1. The LV cavity size is mildly enlarged. The global systolic function is normal. The LVEF is 55%. There is  severe lelfzeowasg-xl-zqyvulyw involving the inferoseptal and inferior basal segments.     2. The RV is normal in cavity size. The global systolic function is normal. The RVEF is 66%.      3. Both atria are normal in size.     4. Due to ICD lead artifact, the tricuspid and pulmonic valves were not well visualized.       The mitral and aortic valves appear normally functioning without significant disease.       5. Late gadolinium enhancement imaging shows epicardial and mid-myocardial enhancement in the  basal-inferoseptal, basal-inferior, and basal-inferolateral segments.      6. There is no pericardial effusion.     7. Unable to assess for intracardiac thrombus.     8. Unable to do T2 mapping and assess for myocardial edema (active inflammation).      CONCLUSIONS:    1. Cardiac sarcoidosis with preserved systolic function; LVEF 55%, RVEF 66%.  2. Sarcoidosis related scarring and severe kbalyjbisia-tb-ewroypuc involving the basal-inferoseptal and  basal-inferior segments. Total scar burden 12%. Unchanged from previous CMR dated 01/19/2018.      Cardiac MRI: 2/2019    2. The RV is normal in cavity size. The global systolic function is normal. The RVEF is 66%.      3. Both atria are normal in size.     4. Due to ICD lead artifact, the tricuspid and pulmonic valves were not well visualized.       The mitral and aortic valves appear normally functioning without significant disease.       5. Late gadolinium enhancement imaging shows epicardial and mid-myocardial enhancement in the  basal-inferoseptal, basal-inferior, and basal-inferolateral segments.      6. There is no pericardial effusion.     7. Unable to assess for intracardiac thrombus.     8. Unable to do T2 mapping and assess for myocardial edema (active inflammation).      CONCLUSIONS:    1. Cardiac sarcoidosis with  preserved systolic function; LVEF 55%, RVEF 66%.  2. Sarcoidosis related scarring and severe dskjogkynny-je-kctgaqgm involving the basal-inferoseptal and  basal-inferior segments. Total scar burden 12%. Unchanged from previous CMR dated 01/19/2018.        Local echo 11/2021  Left Ventricle   The left ventricular systolic function is low normal. Quantified left ventricle ejection fraction is 50.9 %. The left ventricle is mildly enlarged. There is   normal left ventricular wall thickness. There are regional wall motion abnormalities as specified. There is inferior base hypokinesis. There is inferolateral   base hypokinesis. There is anterolateral base hypokinesis.     TSH-undetectable      Device: simpleFLOORS D433 RESONATE EL ICD  Normal device function. Threshold testing deferred d/t arrhythmias and TSH.  Mode: DDDR  bpm  AP: 66%  : 61%  Intrinsic rhythm: SR 62 bpm  Estimated battery longevity to RRT = 10.5 years.   Atrial Arrhythmia: Most recent AF episode was 12/2/21 lasting 4 hours.  AF [place]: <1%  Anticoagulant: xarelto  Ventricular Arrhythmia: 12/17-12/20/21 pt has had 1 VT episode each day 139-145 bpm. Each episode was converted with 1 ATP sequence.  Setting Changes: None  Patient has an appointment to see Dr. Dickey today.   Plan: Pt will continue care with his primary device clinic at [facility] in [place].  PANCHITO Frausto    Assessment and Plan:   In summary this is a very pleasant 59-year-old man with a history of biopsy-proven cardiac sarcoid from a transbronchial biopsy-who has a cardiac MRI consistent with myocardial involvement from sarcoidosis. His PET scan related inflammation resolved completely with 3-4 months of 40 mg of prednisone.  I did have an ICD placed given he had high risk features on his cardiac MRI for malignant arrhythmias.     He has been relatively stable for some time. He finally came off of amiodarone this summer and was transitioned to sotolol.  Unfortunately he  has developed severe hyperthyroidism.  He is starting methimazole and prednisone in the next 24 hours.  He has had significant ventricular tachycardia burden over the last several days requiring antitachycardia pacing many times over.  He has not lost consciousness but has been dizzy.  The rate of VT is in the 130s to 140s range which is similar to what he has had in the past.  He is not in clinical heart failure.  His blood pressures are low today in clinic and I want him to have some ability to tolerate VT therapy stopping his lisinopril.  I have discussed his case with EP today over the phone.    The hope is that with stabilization of his thyroid will not need to do anything further to control his ventricular tachycardia although the amount that he is having is making me nervous.  The plan at this point is to send him home, he will not drive.  Again I am backing off his lisinopril he will start his thyroid regimen and hopefully over the next couple weeks his ventricular tachycardia will start to stabilize.  should he have any shocks or loss of consciousness would recommend bringing him into the hospital for stabilization.      Given his stable LV function and absence of heart failure symptoms I am not concerned that the resurgence of VT represents cardiac sarcoidosis.  In fact he has had stability in his ejection fraction and rhythms for several years now on the current dose of methotrexate that he is on.     Given his slight reduction in ejection fraction of the years had have him on metoprolol and lisinopril.  Again dropping lisinopril today to make more blood pressure room in case he goes into VT and also he has lost a substantial amount of weight which is probably part of the reason why his blood pressure is a little.  He is NYHA class II, stage C.  In the future may consider BiV upgrade if he requires a lot of pacing and if we have any decline in LV function in the future.     The stability of his LV  function over the last few months balanced with the fact that he is in no clinical heart failure and is in remission from VT and AF, and my concern about adding MORE immunosuppression, I am going to again watchful wait.       We will keep him on folic acid and methotrexate for now.     With his prior to his atrial fibrillation, he is having some but not a substantial burden considering his hyperthyroidism.  He is on anticoagulation and beta-blocker, also on sotalol,       He should have CBC, LFTs in 3 months (we will order) and  echo and device interrogation in 6 months.       Mitral regurgitation: This is never been more than moderate, will watch for now    Uveitis-  Followed locally     I am putting him back on my schedule  In a couple of months with another echocardiogram and clinic visit to ensure things are going in the right direction    I am contacting EP today and he will do virtual follow-up with him in a couple weeks to make sure that the VT is settled    Mandi Dickey MD   [facility] at [facility]  Patient Care Team:  [facility], [facility] as PCP - Helen Hernadez, RN as Specialty Care Coordinator (Cardiology)  GIULIANO KOHLER [place], [place]     Gary Soares MD

## 2021-12-29 ENCOUNTER — MYC MEDICAL ADVICE (OUTPATIENT)
Dept: CARDIOLOGY | Facility: CLINIC | Age: 61
End: 2021-12-29
Payer: COMMERCIAL

## 2022-01-02 NOTE — PROGRESS NOTES
"    ELECTROPHYSIOLOGY VIRTUAL CLINIC VISIT  Mr. Jose Carney is a 61 year old male who is being evaluated via a billable video visit.      The patient has been notified of following:     \"This video visit will be conducted via a call between you and your physician/provider. We have found that certain health care needs can be provided without the need for an in-person physical exam.  This service lets us provide the care you need with a video conversation.  If a prescription is necessary we can send it directly to your pharmacy.  If lab work is needed we can place an order for that and you can then stop by our lab to have the test done at a later time.    If during the course of the call the physician/provider feels a video visit is not appropriate, you will not be charged for this service.\"     Physician/provider has received verbal consent for a Video Visit from the patient? Yes    Started as video visit but had to convert to telephone visit due to connectivity issues.     Assessment/Recommendations   Assessment/Plan:    Mr. Carney is a 61 year old male who has a past medical history significant for PSVT, sarcoid (cardiac and systemic), NICM LVEF 44% improved to 50%, s/p ICD 2018, PAF (CHADSVASC 1 on Xarelto), and new hyperthyroidism.     NICM 2/2 sarcoid LVEF 50%, NYHA II  SVT:  1. ACEi/ARB: not currently required.  2. BB: Continue Toprol Xl   3. Aldosterone antagonist: not currently required.  4. SCD prophylaxis: s/p ICD in 2018 for high risk cardiac sarcoid features.   5. Fluid status: Continue lasix.   6. Reviewed device tracings and ECGs from OSH 1/2019. All show SVT and no VT. He had been on amiodarone and this was stopped and changed to Sotalol in Summer 2021. He also has very low burden of PAF. He is on Xarelto for stroke prophylaxis. He has had a number of SVT episodes and is now getting inappropriate therapies from ICD. We discussed various options for treatment of SVT. This is not a life threatening " arrhythmia. Treatment is indicated primarily for relief of symptoms. Medications in some cases reduce the frequency and duration of the episodes but they will not cure it. He has been on Sotalol and Toprol XL with continued episodes. The other option discussed was an electrophysiology study (EPS) and possible ablation. Success rate of ablation 80-90% depending on the mechanism. The procedural risk of EP study and ablation were discussed in detail. Risks discussed include: vascular complications, CVA, AVB, pericardial effusion and tamponade. He favored ablation as more curative approach, which we also agree with. We will wait until his hyperthyroidism is more treated and then schedule ablation. He is due for updated TFT in mid 1/2022. We will have him send a device transmission in mid-1/2022 as well.      Follow up 3 months after ablation.        History of Present Illness/Subjective    Mr. Jose Carney is a 61 year old male who comes in today for EP consultation of VT, cardiac sarcoid.    Mr. Carney is a 61 year old male who has a past medical history significant for PSVT, sarcoid (cardiac and systemic), NICM LVEF 44% improved to 50%, s/p ICD 2018, PAF (CHADSVASC 1 on Xarelto), and new hyperthrodism.     In [id number], he started having back pain and had a 30 lbs weight loss. Work up showed substantial mediastinal lymphadenopathy. He was placed on several months of steroids and had weight gain and improvement of his back pain symptoms. In 2013 he had transbronchial biopsy in 2013 which was positive for sarcoidosis. In 2017, he developed palpitations and was found to have SVT. CMR was c/w cardiac sarcoid with LVEF 44% and high risk features. Thus, he had ICD implanted in 11/2018.  PET scan also demonstrated active cardiac and systemic sarcoid and he was treated with steroids resulting in resolution of active sarcoid. At that time, he was transitioned to methotrexate and his steroids were tapered down. He had  palpitations in 1/2019 and presented to OSH ER. He was felt to be in VT at a rate of 135 bpm and was unable to be paced out of it. He had to be externally cardioverted. He was loaded with amiodarone and given failure to gain control of his VT he was given Solu-Medrol and placed on higher dose of prednisone in addition to his methotrexate. He also developed AF. He was placed on Xarelto for stroke prophylaxis. His Metoprolol was increased and improved his AF burden. He was taken off amiodarone in summer of 2021 to avoid chronic side effects by his local EP and was then placed on Sotalol. He recently followed up with Dr. Dickey, he lost 30 lbs and he was found to be hyperthyroid. He underwent evaluation by endocrine and was started on prednisone and methimazole in 12/21/21. His recent ICD checks also were reported to show increasing VT and AF burdens. He has not lost consciousness but has felt quite dizzy (mostly positional).     He was then referred to EP for further management of arrhythmias. He reports feeling at baseline. He has some palpitations and positional dizziness. He denies chest discomfort, abdominal fullness/bloating or peripheral edema, shortness of breath, paroxysmal nocturnal dyspnea, orthopnea, or syncope.A recent device interrogation showed normal device function, from 12/17-12/20/21 pt has had 1 VT episode each day 139-145 bpm. Each episode was converted with 1 ATP sequence. EGMs reviewed in detail and show SVT triggered by PAC and not true VT. Most recent AF episode was 12/2/21 lasting 4 hours with total AF [place]: <1%. Most recent echo from 11/20201 (OSH) reported to show mild LV dilation, LVEF 50%, severely enlarged LA, moderate MR,and  mild TR. Current cardiac medications include: Toprol XL, Sotalol, Xarelto, and Lasix.      I have reviewed and updated the patient's Past Medical History, Social History, Family History and Medication List.     Cardiographics (Personally Reviewed) :   11/2021  Echo (OSH Report):    Interpretation Summary   The left ventricle is mildly enlarged with low normal left ventricular function.   There are regional wall motion abnormalities as specified.   Left atrium is severely enlarged by volume.   Left ventricular diastolic function is normal.   There is moderate mitral regurgitation.   Mild tricuspid regurgitation.   Estimated RV systolic pressure is 22 mm Hg above right atrial pressure.   There is no pericardial effusion.   Catheter/pacemaker lead noted within the right ventricle.   Compared to echocardiogram dated 3/26/21, LVEDD measures larger (6.3cm vs. 6.0 cm) with similar to marginally improved function. Other findings are unchanged.     1/30/20 Echo:   Interpretation Summary  Borderline (EF 50-55%, traced 55%) reduced left ventricular function is  present. There is akinesis involving the basal inferolateral segment.  Right ventricular function, chamber size, wall motion, and thickness are  normal.  Mild to moderate eccentric mitral insufficiency is present.  This study was compared with the study from 9/26/19: There has been no change.    2018 Cardiac PET:  Impression: In this patient with cardiac sarcoidosis under treatment  with steroids for the last 4 months;  1. No FDG uptake within the myocardium. Overall there is complete  resolution of previous cardiac sarcoidosis.  2. Excellent suppression of myocardial glucose metabolism.  3. Stable number and size of parenchymal lung nodules, although  previously seen high metabolic activity within a few of these lesions  is no longer present.  4. Stable size number and metabolic activity of lymphadenopathy in the  mediastinum, bilateral axilla and right supraclavicular region. 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  5. Stable left stone.    9/2020 CMR:  1. The LV is normal in cavity size and wall thickness except for basal inferior thinning. The global  systolic function is mildly reduced. The  LVEF is 45%. There is akinesis of the basal inferoseptal and basal  inferior segments, and severe hypokinesis of the basal inferolateral segment.     2. The RV is normal in cavity size. The global systolic function is normal. The RVEF is 55%.      3. The left atrium is moderately dilated. The right atrium is mildly dilated.     4. There is no significant valvular disease. However, the assessment of valve function is limited due to  the gradient-echo cine imaging used to minimize ICD-related artifacts.     5. Late gadolinium enhancement imaging shows epicardial hyperenhancement of the basal inferoseptal, basal  inferior, and basal inferoseptal segments. The hyperenhancement is transmural in the basal inferior  segment.     6. There is no pericardial effusion or thickening.     7. There is no intracardiac thrombus.     CONCLUSIONS: Non-ischemic cardiomyopathy with epicardial and transmural LGE in a pattern that fits with  cardiac sarcoidosis as the cause. LVEF of 45%, which is unchanged from the prior CMR of 02- (the  LVEF on that CMR was 44% when I measured it today). The extent of LGE is also unchanged from the prior CMR.  Normal RVEF.       Physical Examination   There were no vitals taken for this visit.  Wt Readings from Last 3 Encounters:   12/21/21 77.2 kg (170 lb 1.6 oz)   09/04/20 92.1 kg (203 lb)   01/30/20 93 kg (205 lb)     The rest of a comprehensive physical examination is deferred due to public [facility] emergency video visit restrictions.  CONSITUTIONAL: no acute distress  HEENT: no icterus, no redness or discharge, neck supple  CV: no visible edema of visualized extremities. No JVD.   RESPIRATORY: respirations nonlabored, no cough  NEURO: AA&Ox3, speech fluent/appropriate, motor grossly nonfocal  PSYCH: cooperative, affect appropriate  DERM: no rashes on visualized face/neck/upper extremities       Medications  Allergies   Current Outpatient Medications   Medication Sig Dispense Refill     folic  acid (FOLVITE) 1 MG tablet TAKE FIVE TABLETS (5MG) ONCE WEEKLY WITH YOUR METHOTREXATE 60 tablet 3     furosemide (LASIX) 20 MG tablet Take 1 tablet (20 mg) by mouth daily As directed by [facility] / HF clinic 30 tablet 0     melatonin 3 MG tablet Take 3 mg by mouth        methotrexate 2.5 MG tablet Take 8 tablets (20 mg) by mouth every 7 days Please schedule clinic appt for refills, [phone] 104 tablet 3     metoprolol succinate ER (TOPROL-XL) 25 MG 24 hr tablet TAKE 4 TABLETS (100 MG) BY MOUTH DAILY 360 tablet 0     prednisoLONE acetate (PRED FORTE) 1 % ophthalmic susp Place 1 drop into both eyes every hour       sildenafil (REVATIO) 20 MG tablet Take 20-60 mg by mouth as needed       sotalol (BETAPACE) 80 MG tablet Take 80 mg by mouth       tamsulosin (FLOMAX) 0.4 MG capsule        tolterodine (DETROL) 1 MG tablet Take 1 mg by mouth (Patient not taking: Reported on 12/21/2021)       XARELTO ANTICOAGULANT 20 MG TABS tablet TAKE 1 TABLET (20 MG) BY MOUTH DAILY (WITH DINNER) 90 tablet 2    Allergies   Allergen Reactions     Seasonal Allergies      Terbinafine          Lab Results (Personally Reviewed)    Chemistry/lipid CBC Cardiac Enzymes/BNP/TSH/INR   Lab Results   Component Value Date    BUN 18 09/04/2020     09/04/2020    CO2 28 09/04/2020     Creatinine   Date Value Ref Range Status   09/04/2020 1.19 0.66 - 1.25 mg/dL Final       No results found for: CHOL, HDL, LDL   Lab Results   Component Value Date    WBC 5.7 09/04/2020    HGB 14.6 09/04/2020    HCT 42.9 09/04/2020    MCV 99 09/04/2020     09/04/2020    Lab Results   Component Value Date    TSH 1.01 03/22/2018        Telephone visit duration: 25 minutes.     I have reviewed the note as documented above.  This accurately captures the substance of my virtual visit with the patient. The patient states understanding and is agreeable with the plan.   Lauro Will MD [facility]  Cardiology - Electrophysiology    Total time spent on patient visit,  reviewing notes, imaging, labs, orders, and completing necessary documentation: 60 minutes.

## 2022-01-04 ENCOUNTER — VIRTUAL VISIT (OUTPATIENT)
Dept: CARDIOLOGY | Facility: CLINIC | Age: 62
End: 2022-01-04
Attending: INTERNAL MEDICINE
Payer: COMMERCIAL

## 2022-01-04 DIAGNOSIS — I47.10 PAROXYSMAL SUPRAVENTRICULAR TACHYCARDIA (H): ICD-10-CM

## 2022-01-04 DIAGNOSIS — I47.20 VENTRICULAR TACHYCARDIA (H): ICD-10-CM

## 2022-01-04 DIAGNOSIS — D86.85 CARDIAC SARCOIDOSIS: ICD-10-CM

## 2022-01-04 DIAGNOSIS — Z95.810 ICD (IMPLANTABLE CARDIOVERTER-DEFIBRILLATOR) IN PLACE: Primary | ICD-10-CM

## 2022-01-04 LAB
MDC_IDC_EPISODE_DTM: NORMAL
MDC_IDC_EPISODE_ID: NORMAL
MDC_IDC_EPISODE_TYPE: NORMAL
MDC_IDC_LEAD_IMPLANT_DT: NORMAL
MDC_IDC_LEAD_IMPLANT_DT: NORMAL
MDC_IDC_LEAD_LOCATION: NORMAL
MDC_IDC_LEAD_LOCATION: NORMAL
MDC_IDC_LEAD_LOCATION_DETAIL_1: NORMAL
MDC_IDC_LEAD_LOCATION_DETAIL_1: NORMAL
MDC_IDC_LEAD_MFG: NORMAL
MDC_IDC_LEAD_MFG: NORMAL
MDC_IDC_LEAD_MODEL: NORMAL
MDC_IDC_LEAD_MODEL: NORMAL
MDC_IDC_LEAD_POLARITY_TYPE: NORMAL
MDC_IDC_LEAD_POLARITY_TYPE: NORMAL
MDC_IDC_LEAD_SERIAL: NORMAL
MDC_IDC_LEAD_SERIAL: NORMAL
MDC_IDC_MSMT_BATTERY_DTM: NORMAL
MDC_IDC_MSMT_BATTERY_REMAINING_LONGEVITY: 126 MO
MDC_IDC_MSMT_BATTERY_REMAINING_PERCENTAGE: 100 %
MDC_IDC_MSMT_BATTERY_STATUS: NORMAL
MDC_IDC_MSMT_CAP_CHARGE_DTM: NORMAL
MDC_IDC_MSMT_CAP_CHARGE_TIME: 10.2 S
MDC_IDC_MSMT_CAP_CHARGE_TYPE: NORMAL
MDC_IDC_MSMT_LEADCHNL_RA_IMPEDANCE_VALUE: 698 OHM
MDC_IDC_MSMT_LEADCHNL_RV_IMPEDANCE_VALUE: 583 OHM
MDC_IDC_PG_IMPLANT_DTM: NORMAL
MDC_IDC_PG_MFG: NORMAL
MDC_IDC_PG_MODEL: NORMAL
MDC_IDC_PG_SERIAL: NORMAL
MDC_IDC_PG_TYPE: NORMAL
MDC_IDC_SESS_CLINIC_NAME: NORMAL
MDC_IDC_SESS_DTM: NORMAL
MDC_IDC_SESS_TYPE: NORMAL
MDC_IDC_SET_BRADY_AT_MODE_SWITCH_MODE: NORMAL
MDC_IDC_SET_BRADY_AT_MODE_SWITCH_RATE: 170 {BEATS}/MIN
MDC_IDC_SET_BRADY_LOWRATE: 60 {BEATS}/MIN
MDC_IDC_SET_BRADY_MAX_SENSOR_RATE: 125 {BEATS}/MIN
MDC_IDC_SET_BRADY_MAX_TRACKING_RATE: 125 {BEATS}/MIN
MDC_IDC_SET_BRADY_MODE: NORMAL
MDC_IDC_SET_BRADY_PAV_DELAY_HIGH: 110 MS
MDC_IDC_SET_BRADY_PAV_DELAY_LOW: 220 MS
MDC_IDC_SET_BRADY_SAV_DELAY_HIGH: 110 MS
MDC_IDC_SET_BRADY_SAV_DELAY_LOW: 220 MS
MDC_IDC_SET_LEADCHNL_RA_PACING_AMPLITUDE: 2.5 V
MDC_IDC_SET_LEADCHNL_RA_PACING_CAPTURE_MODE: NORMAL
MDC_IDC_SET_LEADCHNL_RA_PACING_POLARITY: NORMAL
MDC_IDC_SET_LEADCHNL_RA_PACING_PULSEWIDTH: 0.4 MS
MDC_IDC_SET_LEADCHNL_RA_SENSING_ADAPTATION_MODE: NORMAL
MDC_IDC_SET_LEADCHNL_RA_SENSING_POLARITY: NORMAL
MDC_IDC_SET_LEADCHNL_RA_SENSING_SENSITIVITY: 0.25 MV
MDC_IDC_SET_LEADCHNL_RV_PACING_AMPLITUDE: 2.5 V
MDC_IDC_SET_LEADCHNL_RV_PACING_CAPTURE_MODE: NORMAL
MDC_IDC_SET_LEADCHNL_RV_PACING_POLARITY: NORMAL
MDC_IDC_SET_LEADCHNL_RV_PACING_PULSEWIDTH: 0.4 MS
MDC_IDC_SET_LEADCHNL_RV_SENSING_ADAPTATION_MODE: NORMAL
MDC_IDC_SET_LEADCHNL_RV_SENSING_POLARITY: NORMAL
MDC_IDC_SET_LEADCHNL_RV_SENSING_SENSITIVITY: 0.6 MV
MDC_IDC_SET_ZONE_DETECTION_INTERVAL: 300 MS
MDC_IDC_SET_ZONE_DETECTION_INTERVAL: 353 MS
MDC_IDC_SET_ZONE_DETECTION_INTERVAL: 462 MS
MDC_IDC_SET_ZONE_TYPE: NORMAL
MDC_IDC_SET_ZONE_VENDOR_TYPE: NORMAL
MDC_IDC_STAT_AT_BURDEN_PERCENT: 1 %
MDC_IDC_STAT_AT_DTM_END: NORMAL
MDC_IDC_STAT_AT_DTM_START: NORMAL
MDC_IDC_STAT_BRADY_DTM_END: NORMAL
MDC_IDC_STAT_BRADY_DTM_START: NORMAL
MDC_IDC_STAT_BRADY_RA_PERCENT_PACED: 66 %
MDC_IDC_STAT_BRADY_RV_PERCENT_PACED: 61 %
MDC_IDC_STAT_EPISODE_RECENT_COUNT: 0
MDC_IDC_STAT_EPISODE_RECENT_COUNT: 25
MDC_IDC_STAT_EPISODE_RECENT_COUNT: 4
MDC_IDC_STAT_EPISODE_RECENT_COUNT: 89
MDC_IDC_STAT_EPISODE_RECENT_COUNT_DTM_END: NORMAL
MDC_IDC_STAT_EPISODE_RECENT_COUNT_DTM_START: NORMAL
MDC_IDC_STAT_EPISODE_TYPE: NORMAL
MDC_IDC_STAT_EPISODE_VENDOR_TYPE: NORMAL
MDC_IDC_STAT_TACHYTHERAPY_ATP_DELIVERED_RECENT: 4
MDC_IDC_STAT_TACHYTHERAPY_ATP_DELIVERED_TOTAL: 6
MDC_IDC_STAT_TACHYTHERAPY_RECENT_DTM_END: NORMAL
MDC_IDC_STAT_TACHYTHERAPY_RECENT_DTM_START: NORMAL
MDC_IDC_STAT_TACHYTHERAPY_SHOCKS_ABORTED_RECENT: 0
MDC_IDC_STAT_TACHYTHERAPY_SHOCKS_ABORTED_TOTAL: 0
MDC_IDC_STAT_TACHYTHERAPY_SHOCKS_DELIVERED_RECENT: 0
MDC_IDC_STAT_TACHYTHERAPY_SHOCKS_DELIVERED_TOTAL: 0
MDC_IDC_STAT_TACHYTHERAPY_TOTAL_DTM_END: NORMAL
MDC_IDC_STAT_TACHYTHERAPY_TOTAL_DTM_START: NORMAL

## 2022-01-04 PROCEDURE — 99215 OFFICE O/P EST HI 40 MIN: CPT | Mod: 95 | Performed by: INTERNAL MEDICINE

## 2022-01-04 RX ORDER — PREDNISONE 10 MG/1
TABLET ORAL
COMMUNITY
Start: 2021-12-21 | End: 2024-03-28

## 2022-01-04 RX ORDER — METHIMAZOLE 10 MG/1
TABLET ORAL 2 TIMES DAILY
COMMUNITY
Start: 2021-12-21 | End: 2024-03-28

## 2022-01-04 NOTE — NURSING NOTE
Chief Complaint   Patient presents with     Consult     pt is currently at home in [place], per pt needs new provider since other one is no longer practicing     ROBERTO Levi/MARY

## 2022-01-04 NOTE — LETTER
"1/4/2022      RE: Jose Carney  [address]  [facility] [address]       Dear Colleague,    Thank you for the opportunity to participate in the care of your patient, Jose Carney, at the [facility]. Please see a copy of my visit note below.        ELECTROPHYSIOLOGY VIRTUAL CLINIC VISIT  Mr. Jose Carney is a 61 year old male who is being evaluated via a billable video visit.      The patient has been notified of following:     \"This video visit will be conducted via a call between you and your physician/provider. We have found that certain health care needs can be provided without the need for an in-person physical exam.  This service lets us provide the care you need with a video conversation.  If a prescription is necessary we can send it directly to your pharmacy.  If lab work is needed we can place an order for that and you can then stop by our lab to have the test done at a later time.    If during the course of the call the physician/provider feels a video visit is not appropriate, you will not be charged for this service.\"     Physician/provider has received verbal consent for a Video Visit from the patient? Yes    Started as video visit but had to convert to telephone visit due to connectivity issues.     Assessment/Recommendations   Assessment/Plan:    Mr. Carney is a 61 year old male who has a past medical history significant for PSVT, sarcoid (cardiac and systemic), NICM LVEF 44% improved to 50%, s/p ICD 2018, PAF (CHADSVASC 1 on Xarelto), and new hyperthyroidism.     NICM 2/2 sarcoid LVEF 50%, NYHA II  SVT:  1. ACEi/ARB: not currently required.  2. BB: Continue Toprol Xl   3. Aldosterone antagonist: not currently required.  4. SCD prophylaxis: s/p ICD in 2018 for high risk cardiac sarcoid features.   5. Fluid status: Continue lasix.   6. Reviewed device tracings and ECGs from OSH 1/2019. All show SVT and no VT. He " had been on amiodarone and this was stopped and changed to Sotalol in Summer 2021. He also has very low burden of PAF. He is on Xarelto for stroke prophylaxis. He has had a number of SVT episodes and is now getting inappropriate therapies from ICD. We discussed various options for treatment of SVT. This is not a life threatening arrhythmia. Treatment is indicated primarily for relief of symptoms. Medications in some cases reduce the frequency and duration of the episodes but they will not cure it. He has been on Sotalol and Toprol XL with continued episodes. The other option discussed was an electrophysiology study (EPS) and possible ablation. Success rate of ablation 80-90% depending on the mechanism. The procedural risk of EP study and ablation were discussed in detail. Risks discussed include: vascular complications, CVA, AVB, pericardial effusion and tamponade. He favored ablation as more curative approach, which we also agree with. We will wait until his hyperthyroidism is more treated and then schedule ablation. He is due for updated TFT in mid 1/2022. We will have him send a device transmission in mid-1/2022 as well.      Follow up 3 months after ablation.        History of Present Illness/Subjective    Mr. Jose Carney is a 61 year old male who comes in today for EP consultation of VT, cardiac sarcoid.    Mr. Carney is a 61 year old male who has a past medical history significant for PSVT, sarcoid (cardiac and systemic), NICM LVEF 44% improved to 50%, s/p ICD 2018, PAF (CHADSVASC 1 on Xarelto), and new hyperthrodism.     In 9842-9888, he started having back pain and had a 30 lbs weight loss. Work up showed substantial mediastinal lymphadenopathy. He was placed on several months of steroids and had weight gain and improvement of his back pain symptoms. In 2013 he had transbronchial biopsy in 2013 which was positive for sarcoidosis. In 2017, he developed palpitations and was found to have SVT. CMR was c/w  cardiac sarcoid with LVEF 44% and high risk features. Thus, he had ICD implanted in 11/2018.  PET scan also demonstrated active cardiac and systemic sarcoid and he was treated with steroids resulting in resolution of active sarcoid. At that time, he was transitioned to methotrexate and his steroids were tapered down. He had palpitations in 1/2019 and presented to OSH ER. He was felt to be in VT at a rate of 135 bpm and was unable to be paced out of it. He had to be externally cardioverted. He was loaded with amiodarone and given failure to gain control of his VT he was given Solu-Medrol and placed on higher dose of prednisone in addition to his methotrexate. He also developed AF. He was placed on Xarelto for stroke prophylaxis. His Metoprolol was increased and improved his AF burden. He was taken off amiodarone in summer of 2021 to avoid chronic side effects by his local EP and was then placed on Sotalol. He recently followed up with Dr. Dickey, he lost 30 lbs and he was found to be hyperthyroid. He underwent evaluation by endocrine and was started on prednisone and methimazole in 12/21/21. His recent ICD checks also were reported to show increasing VT and AF burdens. He has not lost consciousness but has felt quite dizzy (mostly positional).     He was then referred to EP for further management of arrhythmias. He reports feeling at baseline. He has some palpitations and positional dizziness. He denies chest discomfort, abdominal fullness/bloating or peripheral edema, shortness of breath, paroxysmal nocturnal dyspnea, orthopnea, or syncope.A recent device interrogation showed normal device function, from 12/17-12/20/21 pt has had 1 VT episode each day 139-145 bpm. Each episode was converted with 1 ATP sequence. EGMs reviewed in detail and show SVT triggered by PAC and not true VT. Most recent AF episode was 12/2/21 lasting 4 hours with total AF [place]: <1%. Most recent echo from 11/20201 (OSH) reported to show  mild LV dilation, LVEF 50%, severely enlarged LA, moderate MR,and  mild TR. Current cardiac medications include: Toprol XL, Sotalol, Xarelto, and Lasix.      I have reviewed and updated the patient's Past Medical History, Social History, Family History and Medication List.     Cardiographics (Personally Reviewed) :   11/2021 Echo (OSH Report):    Interpretation Summary   The left ventricle is mildly enlarged with low normal left ventricular function.   There are regional wall motion abnormalities as specified.   Left atrium is severely enlarged by volume.   Left ventricular diastolic function is normal.   There is moderate mitral regurgitation.   Mild tricuspid regurgitation.   Estimated RV systolic pressure is 22 mm Hg above right atrial pressure.   There is no pericardial effusion.   Catheter/pacemaker lead noted within the right ventricle.   Compared to echocardiogram dated 3/26/21, LVEDD measures larger (6.3cm vs. 6.0 cm) with similar to marginally improved function. Other findings are unchanged.     1/30/20 Echo:   Interpretation Summary  Borderline (EF 50-55%, traced 55%) reduced left ventricular function is  present. There is akinesis involving the basal inferolateral segment.  Right ventricular function, chamber size, wall motion, and thickness are  normal.  Mild to moderate eccentric mitral insufficiency is present.  This study was compared with the study from 9/26/19: There has been no change.    2018 Cardiac PET:  Impression: In this patient with cardiac sarcoidosis under treatment  with steroids for the last 4 months;  1. No FDG uptake within the myocardium. Overall there is complete  resolution of previous cardiac sarcoidosis.  2. Excellent suppression of myocardial glucose metabolism.  3. Stable number and size of parenchymal lung nodules, although  previously seen high metabolic activity within a few of these lesions  is no longer present.  4. Stable size number and metabolic activity of  lymphadenopathy in the  mediastinum, bilateral axilla and right supraclavicular region. 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  5. Stable left stone.    9/2020 CMR:  1. The LV is normal in cavity size and wall thickness except for basal inferior thinning. The global  systolic function is mildly reduced. The LVEF is 45%. There is akinesis of the basal inferoseptal and basal  inferior segments, and severe hypokinesis of the basal inferolateral segment.     2. The RV is normal in cavity size. The global systolic function is normal. The RVEF is 55%.      3. The left atrium is moderately dilated. The right atrium is mildly dilated.     4. There is no significant valvular disease. However, the assessment of valve function is limited due to  the gradient-echo cine imaging used to minimize ICD-related artifacts.     5. Late gadolinium enhancement imaging shows epicardial hyperenhancement of the basal inferoseptal, basal  inferior, and basal inferoseptal segments. The hyperenhancement is transmural in the basal inferior  segment.     6. There is no pericardial effusion or thickening.     7. There is no intracardiac thrombus.     CONCLUSIONS: Non-ischemic cardiomyopathy with epicardial and transmural LGE in a pattern that fits with  cardiac sarcoidosis as the cause. LVEF of 45%, which is unchanged from the prior CMR of 02- (the  LVEF on that CMR was 44% when I measured it today). The extent of LGE is also unchanged from the prior CMR.  Normal RVEF.       Physical Examination   There were no vitals taken for this visit.  Wt Readings from Last 3 Encounters:   12/21/21 77.2 kg (170 lb 1.6 oz)   09/04/20 92.1 kg (203 lb)   01/30/20 93 kg (205 lb)     The rest of a comprehensive physical examination is deferred due to public health emergency video visit restrictions.  CONSITUTIONAL: no acute distress  HEENT: no icterus, no redness or discharge, neck supple  CV: no visible edema of visualized  extremities. No JVD.   RESPIRATORY: respirations nonlabored, no cough  NEURO: AA&Ox3, speech fluent/appropriate, motor grossly nonfocal  PSYCH: cooperative, affect appropriate  DERM: no rashes on visualized face/neck/upper extremities       Medications  Allergies   Current Outpatient Medications   Medication Sig Dispense Refill     folic acid (FOLVITE) 1 MG tablet TAKE FIVE TABLETS (5MG) ONCE WEEKLY WITH YOUR METHOTREXATE 60 tablet 3     furosemide (LASIX) 20 MG tablet Take 1 tablet (20 mg) by mouth daily As directed by CORE / HF clinic 30 tablet 0     melatonin 3 MG tablet Take 3 mg by mouth        methotrexate 2.5 MG tablet Take 8 tablets (20 mg) by mouth every 7 days Please schedule clinic appt for refills, [phone] 104 tablet 3     metoprolol succinate ER (TOPROL-XL) 25 MG 24 hr tablet TAKE 4 TABLETS (100 MG) BY MOUTH DAILY 360 tablet 0     prednisoLONE acetate (PRED FORTE) 1 % ophthalmic susp Place 1 drop into both eyes every hour       sildenafil (REVATIO) 20 MG tablet Take 20-60 mg by mouth as needed       sotalol (BETAPACE) 80 MG tablet Take 80 mg by mouth       tamsulosin (FLOMAX) 0.4 MG capsule        tolterodine (DETROL) 1 MG tablet Take 1 mg by mouth (Patient not taking: Reported on 12/21/2021)       XARELTO ANTICOAGULANT 20 MG TABS tablet TAKE 1 TABLET (20 MG) BY MOUTH DAILY (WITH DINNER) 90 tablet 2    Allergies   Allergen Reactions     Seasonal Allergies      Terbinafine          Lab Results (Personally Reviewed)    Chemistry/lipid CBC Cardiac Enzymes/BNP/TSH/INR   Lab Results   Component Value Date    BUN 18 09/04/2020     09/04/2020    CO2 28 09/04/2020     Creatinine   Date Value Ref Range Status   09/04/2020 1.19 0.66 - 1.25 mg/dL Final       No results found for: CHOL, HDL, LDL   Lab Results   Component Value Date    WBC 5.7 09/04/2020    HGB 14.6 09/04/2020    HCT 42.9 09/04/2020    MCV 99 09/04/2020     09/04/2020    Lab Results   Component Value Date    TSH 1.01 03/22/2018         Telephone visit duration: 25 minutes.     I have reviewed the note as documented above.  This accurately captures the substance of my virtual visit with the patient. The patient states understanding and is agreeable with the plan.   Lauro Will MD [facility]  Cardiology - Electrophysiology    Total time spent on patient visit, reviewing notes, imaging, labs, orders, and completing necessary documentation: 60 minutes.       Please do not hesitate to contact me if you have any questions/concerns.     Sincerely,     Lauro Will MD

## 2022-01-04 NOTE — PATIENT INSTRUCTIONS
Plan:     Send a remote transmission in mid January at time of your thyroid check. We will determine if we will go forward with VT ablation after seeing these results.       Your Care Team:  EP Cardiology   Telephone Number     Janice Shafer RN [phone]     For scheduling appts or procedures:    Patricia Webb   [phone]   For the Device Clinic (Pacemakers, ICDs, Loop Recorders)    During business hours: [phone]  After business hours:   [phone]- select option 4 and ask for job code [phone].       Cardiovascular Clinic:   [address] [place] [address]. [place] [address]      As always, Thank you for trusting us with your health care needs!

## 2022-01-04 NOTE — PROGRESS NOTES
Jose Carney  is being evaluated via a billable video visit.      How would you like to obtain your AVS? [facility]  For the video visit, send the invitation by: Text to cell phone: [phone]  Will anyone else be joining your video visit? No

## 2022-01-11 ENCOUNTER — TRANSFERRED RECORDS (OUTPATIENT)
Dept: HEALTH INFORMATION MANAGEMENT | Facility: CLINIC | Age: 62
End: 2022-01-11
Payer: COMMERCIAL

## 2022-01-17 DIAGNOSIS — I48.91 ATRIAL FIBRILLATION (H): ICD-10-CM

## 2022-01-18 ENCOUNTER — TRANSFERRED RECORDS (OUTPATIENT)
Dept: HEALTH INFORMATION MANAGEMENT | Facility: CLINIC | Age: 62
End: 2022-01-18
Payer: COMMERCIAL

## 2022-01-20 RX ORDER — RIVAROXABAN 20 MG/1
TABLET, FILM COATED ORAL
Qty: 90 TABLET | Refills: 1 | Status: SHIPPED | OUTPATIENT
Start: 2022-01-20 | End: 2022-06-16

## 2022-01-21 ENCOUNTER — CARE COORDINATION (OUTPATIENT)
Dept: CARDIOLOGY | Facility: CLINIC | Age: 62
End: 2022-01-21
Payer: COMMERCIAL

## 2022-01-21 DIAGNOSIS — I47.10 PAROXYSMAL SUPRAVENTRICULAR TACHYCARDIA (H): Primary | ICD-10-CM

## 2022-01-21 RX ORDER — SODIUM CHLORIDE 9 MG/ML
INJECTION, SOLUTION INTRAVENOUS CONTINUOUS
Status: CANCELLED | OUTPATIENT
Start: 2022-01-21

## 2022-01-21 RX ORDER — LIDOCAINE 40 MG/G
CREAM TOPICAL
Status: CANCELLED | OUTPATIENT
Start: 2022-01-21

## 2022-01-21 NOTE — LETTER
February 10, 2022      TO: Jose Carney  [address]  [facility] [address]         Dear Jose,      You are scheduled for an Supraventricular Tachycardia (SVT) ablation, at [facility]. The hospital is located at [address] [place] [address] [place].  If you need to cancel this procedure, please call [phone].       You will need to undergo a COVID-19 PCR swab test within 4 days of procedure. Please have your clinic fax the results to [phone].    Local Hotels - We have a Lodging Coordinator to assist our patients with finding discounted hotels.  The phone number is [phone].  Please feel free to contact them directly.     Visitor Policy: One visitor while you are in your pre-op room. Once you are taken to procedure, the visitor must leave the facility.    Date:_March 28, 2022_____  Time: ___6:15 am_____To the [facility] Waiting Room at the [facility]  SVT Ablation    1. Your history and physical will be completed by our nurse practitioner when you arrive.  2. Please do not eat anything for 8 hours prior to your procedure. You may have sips of water up until 2 hours prior to your arrival.  3. The morning of your procedure, you may take your scheduled medications with a sip of water.  Hold:   - Metoprolol and sotalol 2 days prior  4. You may discharge the same day. You will need a .        Post-Procedure Instructions  Care of groin site:    Remove the Band-Aid after 24 hours. If there is minor oozing, apply another Band-aid and remove it after 12 hours.     Do NOT take a bath, use a hot tub, pool, or submerse in water for at least 3 days. You may shower.     It is normal to have a small bruise or lump at the site.    Do not scrub the site.    Do not use lotion or powder near the puncture site for 3 days.    If you start bleeding from the site in your groin: Lie down flat and press firmly on the site. Call your physician immediately, or, come to  the emergency room.  Call 911 right away if you have bleeding that is heavy or does not stop.    Call your doctor/provider if:     You have a large or growing hard lump around the site.     The site is red, swollen, hot or tender.     Blood or fluid is draining from the site.     You have chills or a fever greater than 101 F (38 C).     Your leg or arm turns bluish, feels numb or cool.     You have hives, a rash or unusual itching.      Activity Restrictions    For the first 2 days: Do not stoop or squat. When you cough, sneeze or move your bowels, hold your hand over the puncture site and press gently.    Do not lift more than 10 pounds or exertional activity for 10 days.  - No driving for 24 hours after (with or without general anesthesia).     Follow ups 3rd floor [address] [place] [address] SE:     Date: _June 15 3:30 Labs , 4pm device 4:30 Nicolette   June 16 9a Noble PORTILLO Procedure   [phone]

## 2022-01-21 NOTE — PROGRESS NOTES
Called and spoke to patient regarding below recommendations. Reviewed instruction letter. EP  notified. He wants covid swab order sent to [facility]- will fax once procedure date determined.     Date: 1/21/2022    Time of Call: 2:46 PM     Diagnosis:  SVT     [ TORB ] Ordering provider: Aliyah Salas NP   Order:   OK to proceed with SVT ablation now that TFTs are improved     Order received by: Janice Shafer RN

## 2022-02-14 DIAGNOSIS — D86.85 SARCOID, CARDIAC: ICD-10-CM

## 2022-02-16 DIAGNOSIS — Z20.822 ENCOUNTER FOR LABORATORY TESTING FOR COVID-19 VIRUS: Primary | ICD-10-CM

## 2022-02-17 RX ORDER — FOLIC ACID 1 MG/1
TABLET ORAL
Qty: 60 TABLET | Refills: 3 | Status: SHIPPED | OUTPATIENT
Start: 2022-02-17 | End: 2023-02-06

## 2022-02-17 NOTE — TELEPHONE ENCOUNTER
folic acid (FOLVITE) 1 MG     Last Written Prescription Date:  2/19/21  Last Fill Quantity: 60,   # refills: 3  Last Office Visit : 1/4/22  Future Office visit:  6/15/22  Routing refill request to provider for review/approval because:   Drug not on the refill protocol

## 2022-03-07 DIAGNOSIS — D86.85 SARCOID, CARDIAC: ICD-10-CM

## 2022-03-08 RX ORDER — METOPROLOL SUCCINATE 25 MG/1
100 TABLET, EXTENDED RELEASE ORAL DAILY
Qty: 360 TABLET | Refills: 1 | Status: SHIPPED | OUTPATIENT
Start: 2022-03-08 | End: 2022-09-29

## 2022-03-29 ENCOUNTER — APPOINTMENT (OUTPATIENT)
Dept: MEDSURG UNIT | Facility: CLINIC | Age: 62
End: 2022-03-29
Attending: INTERNAL MEDICINE
Payer: COMMERCIAL

## 2022-03-29 ENCOUNTER — ANCILLARY PROCEDURE (OUTPATIENT)
Dept: CARDIOLOGY | Facility: CLINIC | Age: 62
End: 2022-03-29
Attending: INTERNAL MEDICINE
Payer: COMMERCIAL

## 2022-03-29 ENCOUNTER — HOSPITAL ENCOUNTER (OUTPATIENT)
Facility: CLINIC | Age: 62
Discharge: HOME | End: 2022-03-29
Attending: INTERNAL MEDICINE | Admitting: INTERNAL MEDICINE
Payer: COMMERCIAL

## 2022-03-29 ENCOUNTER — APPOINTMENT (OUTPATIENT)
Dept: LAB | Facility: CLINIC | Age: 62
End: 2022-03-29
Attending: INTERNAL MEDICINE
Payer: COMMERCIAL

## 2022-03-29 VITALS
TEMPERATURE: 97.8 F | RESPIRATION RATE: 8 BRPM | DIASTOLIC BLOOD PRESSURE: 73 MMHG | BODY MASS INDEX: 25.18 KG/M2 | OXYGEN SATURATION: 98 % | HEART RATE: 88 BPM | SYSTOLIC BLOOD PRESSURE: 107 MMHG | HEIGHT: 73 IN | WEIGHT: 190 LBS

## 2022-03-29 DIAGNOSIS — I47.10 PAROXYSMAL SUPRAVENTRICULAR TACHYCARDIA (H): ICD-10-CM

## 2022-03-29 DIAGNOSIS — Z45.02 FITTING AND ADJUSTMENT OF AUTOMATIC IMPLANTABLE CARDIOVERTER-DEFIBRILLATOR: Primary | ICD-10-CM

## 2022-03-29 DIAGNOSIS — Z45.02 FITTING AND ADJUSTMENT OF AUTOMATIC IMPLANTABLE CARDIOVERTER-DEFIBRILLATOR: ICD-10-CM

## 2022-03-29 DIAGNOSIS — D86.9 SARCOIDOSIS: Primary | ICD-10-CM

## 2022-03-29 LAB
ANION GAP SERPL CALCULATED.3IONS-SCNC: 7 MMOL/L (ref 3–14)
BUN SERPL-MCNC: 17 MG/DL (ref 7–30)
CALCIUM SERPL-MCNC: 9.2 MG/DL (ref 8.5–10.1)
CHLORIDE BLD-SCNC: 106 MMOL/L (ref 94–109)
CO2 SERPL-SCNC: 28 MMOL/L (ref 20–32)
CREAT SERPL-MCNC: 1.1 MG/DL (ref 0.66–1.25)
ERYTHROCYTE [DISTWIDTH] IN BLOOD BY AUTOMATED COUNT: 14.8 % (ref 10–15)
GFR SERPL CREATININE-BSD FRML MDRD: 77 ML/MIN/1.73M2
GLUCOSE BLD-MCNC: 106 MG/DL (ref 70–99)
HCT VFR BLD AUTO: 44.8 % (ref 40–53)
HGB BLD-MCNC: 15 G/DL (ref 13.3–17.7)
MCH RBC QN AUTO: 33 PG (ref 26.5–33)
MCHC RBC AUTO-ENTMCNC: 33.5 G/DL (ref 31.5–36.5)
MCV RBC AUTO: 99 FL (ref 78–100)
PLATELET # BLD AUTO: 182 10E3/UL (ref 150–450)
POTASSIUM BLD-SCNC: 4.1 MMOL/L (ref 3.4–5.3)
RBC # BLD AUTO: 4.55 10E6/UL (ref 4.4–5.9)
SODIUM SERPL-SCNC: 141 MMOL/L (ref 133–144)
WBC # BLD AUTO: 8.1 10E3/UL (ref 4–11)

## 2022-03-29 PROCEDURE — 93010 ELECTROCARDIOGRAM REPORT: CPT | Performed by: INTERNAL MEDICINE

## 2022-03-29 PROCEDURE — 999N000142 HC STATISTIC PROCEDURE PREP ONLY

## 2022-03-29 PROCEDURE — 80048 BASIC METABOLIC PNL TOTAL CA: CPT | Performed by: INTERNAL MEDICINE

## 2022-03-29 PROCEDURE — 85027 COMPLETE CBC AUTOMATED: CPT | Performed by: INTERNAL MEDICINE

## 2022-03-29 PROCEDURE — 250N000011 HC RX IP 250 OP 636: Performed by: INTERNAL MEDICINE

## 2022-03-29 PROCEDURE — 250N000009 HC RX 250: Performed by: INTERNAL MEDICINE

## 2022-03-29 PROCEDURE — C1732 CATH, EP, DIAG/ABL, 3D/VECT: HCPCS | Performed by: INTERNAL MEDICINE

## 2022-03-29 PROCEDURE — 258N000003 HC RX IP 258 OP 636: Performed by: INTERNAL MEDICINE

## 2022-03-29 PROCEDURE — C1887 CATHETER, GUIDING: HCPCS | Performed by: INTERNAL MEDICINE

## 2022-03-29 PROCEDURE — 99153 MOD SED SAME PHYS/QHP EA: CPT | Performed by: INTERNAL MEDICINE

## 2022-03-29 PROCEDURE — 93653 COMPRE EP EVAL TX SVT: CPT | Performed by: INTERNAL MEDICINE

## 2022-03-29 PROCEDURE — C1733 CATH, EP, OTHR THAN COOL-TIP: HCPCS | Performed by: INTERNAL MEDICINE

## 2022-03-29 PROCEDURE — C1730 CATH, EP, 19 OR FEW ELECT: HCPCS | Performed by: INTERNAL MEDICINE

## 2022-03-29 PROCEDURE — C1894 INTRO/SHEATH, NON-LASER: HCPCS | Performed by: INTERNAL MEDICINE

## 2022-03-29 PROCEDURE — 93287 PERI-PX DEVICE EVAL & PRGR: CPT | Mod: 26 | Performed by: INTERNAL MEDICINE

## 2022-03-29 PROCEDURE — 99152 MOD SED SAME PHYS/QHP 5/>YRS: CPT | Performed by: INTERNAL MEDICINE

## 2022-03-29 PROCEDURE — 272N000001 HC OR GENERAL SUPPLY STERILE: Performed by: INTERNAL MEDICINE

## 2022-03-29 PROCEDURE — 36415 COLL VENOUS BLD VENIPUNCTURE: CPT | Performed by: INTERNAL MEDICINE

## 2022-03-29 PROCEDURE — 93287 PERI-PX DEVICE EVAL & PRGR: CPT

## 2022-03-29 PROCEDURE — 999N000054 HC STATISTIC EKG NON-CHARGEABLE

## 2022-03-29 PROCEDURE — 93005 ELECTROCARDIOGRAM TRACING: CPT

## 2022-03-29 PROCEDURE — 999N000133 HC STATISTIC PP CARE STAGE 2

## 2022-03-29 PROCEDURE — 250N000009 HC RX 250: Performed by: NURSE PRACTITIONER

## 2022-03-29 PROCEDURE — 93623 PRGRMD STIMJ&PACG IV RX NFS: CPT | Performed by: INTERNAL MEDICINE

## 2022-03-29 RX ORDER — LIDOCAINE 40 MG/G
CREAM TOPICAL
Status: DISCONTINUED | OUTPATIENT
Start: 2022-03-29 | End: 2022-03-29 | Stop reason: HOSPADM

## 2022-03-29 RX ORDER — DOBUTAMINE HYDROCHLORIDE 200 MG/100ML
5-40 INJECTION INTRAVENOUS CONTINUOUS PRN
Status: DISCONTINUED | OUTPATIENT
Start: 2022-03-29 | End: 2022-03-29 | Stop reason: HOSPADM

## 2022-03-29 RX ORDER — SODIUM CHLORIDE 9 MG/ML
INJECTION, SOLUTION INTRAVENOUS CONTINUOUS
Status: DISCONTINUED | OUTPATIENT
Start: 2022-03-29 | End: 2022-03-29 | Stop reason: HOSPADM

## 2022-03-29 RX ORDER — ACETAMINOPHEN 325 MG/1
650 TABLET ORAL EVERY 8 HOURS PRN
Status: DISCONTINUED | OUTPATIENT
Start: 2022-03-29 | End: 2022-03-29 | Stop reason: HOSPADM

## 2022-03-29 RX ORDER — FENTANYL CITRATE 50 UG/ML
INJECTION, SOLUTION INTRAMUSCULAR; INTRAVENOUS
Status: DISCONTINUED | OUTPATIENT
Start: 2022-03-29 | End: 2022-03-29 | Stop reason: HOSPADM

## 2022-03-29 RX ORDER — PREDNISONE 10 MG/1
10 TABLET ORAL DAILY
Status: DISCONTINUED | OUTPATIENT
Start: 2022-03-29 | End: 2022-03-29 | Stop reason: HOSPADM

## 2022-03-29 RX ORDER — NALOXONE HYDROCHLORIDE 0.4 MG/ML
0.4 INJECTION, SOLUTION INTRAMUSCULAR; INTRAVENOUS; SUBCUTANEOUS
Status: DISCONTINUED | OUTPATIENT
Start: 2022-03-29 | End: 2022-03-29 | Stop reason: HOSPADM

## 2022-03-29 RX ORDER — CEFAZOLIN SODIUM 1 G/3ML
1 INJECTION, POWDER, FOR SOLUTION INTRAMUSCULAR; INTRAVENOUS
Status: DISCONTINUED | OUTPATIENT
Start: 2022-03-29 | End: 2022-03-29 | Stop reason: HOSPADM

## 2022-03-29 RX ORDER — METOPROLOL SUCCINATE 100 MG/1
100 TABLET, EXTENDED RELEASE ORAL DAILY
Status: DISCONTINUED | OUTPATIENT
Start: 2022-03-29 | End: 2022-03-29 | Stop reason: HOSPADM

## 2022-03-29 RX ORDER — MIDAZOLAM HCL IN 0.9 % NACL/PF 1 MG/ML
.5-6 PLASTIC BAG, INJECTION (ML) INTRAVENOUS CONTINUOUS PRN
Status: DISCONTINUED | OUTPATIENT
Start: 2022-03-29 | End: 2022-03-29 | Stop reason: HOSPADM

## 2022-03-29 RX ORDER — METHIMAZOLE 10 MG/1
10 TABLET ORAL 2 TIMES DAILY
Status: DISCONTINUED | OUTPATIENT
Start: 2022-03-29 | End: 2022-03-29 | Stop reason: HOSPADM

## 2022-03-29 RX ORDER — OXYCODONE AND ACETAMINOPHEN 5; 325 MG/1; MG/1
1 TABLET ORAL EVERY 4 HOURS PRN
Status: DISCONTINUED | OUTPATIENT
Start: 2022-03-29 | End: 2022-03-29 | Stop reason: HOSPADM

## 2022-03-29 RX ORDER — NALOXONE HYDROCHLORIDE 0.4 MG/ML
0.2 INJECTION, SOLUTION INTRAMUSCULAR; INTRAVENOUS; SUBCUTANEOUS
Status: DISCONTINUED | OUTPATIENT
Start: 2022-03-29 | End: 2022-03-29 | Stop reason: HOSPADM

## 2022-03-29 RX ORDER — SOTALOL HYDROCHLORIDE 80 MG/1
40 TABLET ORAL 2 TIMES DAILY
Qty: 90 TABLET
Start: 2022-03-29 | End: 2022-06-15

## 2022-03-29 RX ORDER — TAMSULOSIN HYDROCHLORIDE 0.4 MG/1
0.4 CAPSULE ORAL DAILY
Status: DISCONTINUED | OUTPATIENT
Start: 2022-03-29 | End: 2022-03-29 | Stop reason: HOSPADM

## 2022-03-29 RX ADMIN — SODIUM CHLORIDE 500 ML: 9 INJECTION, SOLUTION INTRAVENOUS at 15:27

## 2022-03-29 NOTE — H&P
Electrophysiology Pre-Procedure History and Physical    Jose Carney MRN# [id number]   Age: 61 year old YOB: 1960      Date of Procedure: 3/29/2022  [facility]      Date of Exam 3/29/2022 Facility (Same day)       Home clinic: [facility]  Primary care provider: Chino Salomon  HPI:  Mr. Carney is a 61 year old male who has a past medical history significant for PSVT, sarcoid (cardiac and systemic), NICM LVEF 44% improved to 50%, s/p ICD 2018, PAF (CHADSVASC 1 on Xarelto), and new hyperthrodism.      In 3874-0318, he started having back pain and had a 30 lbs weight loss. Work up showed substantial mediastinal lymphadenopathy. He was placed on several months of steroids and had weight gain and improvement of his back pain symptoms. In 2013 he had transbronchial biopsy in 2013 which was positive for sarcoidosis. In 2017, he developed palpitations and was found to have SVT. CMR was c/w cardiac sarcoid with LVEF 44% and high risk features. Thus, he had ICD implanted in 11/2018.  PET scan also demonstrated active cardiac and systemic sarcoid and he was treated with steroids resulting in resolution of active sarcoid. At that time, he was transitioned to methotrexate and his steroids were tapered down. He had palpitations in 1/2019 and presented to OSH ER. He was felt to be in VT at a rate of 135 bpm and was unable to be paced out of it. He had to be externally cardioverted. He was loaded with amiodarone and given failure to gain control of his VT he was given Solu-Medrol and placed on higher dose of prednisone in addition to his methotrexate. He also developed AF. He was placed on Xarelto for stroke prophylaxis. His Metoprolol was increased and improved his AF burden. He was taken off amiodarone in summer of 2021 to avoid chronic side effects by his local EP and was then placed on Sotalol. He recently followed up with   [place], he lost 30 lbs and he was found to be hyperthyroid. He underwent evaluation by endocrine and was started on prednisone and methimazole in 12/21/21. His recent ICD checks also were reported to show increasing VT and AF burdens. He has not lost consciousness but has felt quite dizzy (mostly positional). He was then referred to EP for further management of arrhythmias. A device interrogation showed normal device function, from 12/17-12/20/21 pt has had 1 VT episode each day 139-145 bpm. Each episode was converted with 1 ATP sequence. EGMs reviewed in detail and show SVT triggered by PAC and not true VT. Most recent AF episode was 12/2/21 lasting 4 hours with total AF [place]: <1%. Most recent echo from 11/20201 (OSH) reported to show mild LV dilation, LVEF 50%, severely enlarged LA, moderate MR,and  mild TR. He has had a number of SVT episodes and is now getting inappropriate therapies from ICD. We discussed various options for treatment of SVT. He has already been on Sotalol with continued episodes. We recommended EPS/ablation for which he presents today. Current cardiac medications include: Toprol XL, Sotalol, Xarelto, and Lasix.       Active problem list:     Patient Active Problem List    Diagnosis Date Noted     Sarcoidosis/ systemic 2013 03/20/2018     Priority: Medium     First degree AV block 03/20/2018     Priority: Medium     Sarcoid, cardiac/EF 54% per MRI,[place] of affected myocardium is 12% of myocardial mass 01/25/2018     Priority: Medium     Paroxysmal supraventricular tachycardia (H) 11/30/2017     Priority: Medium     Palpitations 11/30/2017     Priority: Medium            Medications (include herbals and vitamins):      Current Facility-Administered Medications   Medication     lidocaine (LMX4) cream     lidocaine 1 % 0.1-1 mL     sodium chloride (PF) 0.9% PF flush 3 mL     sodium chloride (PF) 0.9% PF flush 3 mL     sodium chloride (PF) 0.9% PF flush 3 mL     sodium chloride 0.9% infusion  "    Facility-Administered Medications Ordered in Other Encounters   Medication     sodium chloride (PF) 0.9% PF flush 10 mL     sodium chloride (PF) 0.9% PF flush 10 mL     sodium chloride (PF) 0.9% PF flush 10 mL     sodium chloride bacteriostatic 0.9 % flush 10 mL           Medication List      There are no discharge medications for this visit.              Allergies:      Allergies   Allergen Reactions     Seasonal Allergies      Terbinafine              Social History:     Social History     Tobacco Use     Smoking status: Never Smoker     Smokeless tobacco: Never Used   Substance Use Topics     Alcohol use: Yes     Comment: seldom            Physical Exam:   All vitals have been reviewed  Patient Vitals for the past 8 hrs:   BP Temp Temp src Pulse Resp SpO2 Height Weight   03/29/22 1310 122/59 97.8  F (36.6  C) Oral 84 16 99 % 1.854 m (6' 1\") 86.2 kg (190 lb)     No intake/output data recorded.  Airway assessment:   Patient is able to open mouth wide  Patient is able to stick out tongue}      ENT:   Normocephalic, without obvious abnormality, atraumatic, sinuses nontender on palpation, external ears without lesions, oral pharynx with moist mucous membranes, tonsils without erythema or exudates, gums normal and good dentition.     Neck:   Supple, symmetrical, trachea midline, no adenopathy, thyroid symmetric, not enlarged and no tenderness, skin normal     Lungs:   No increased work of breathing, good air exchange, clear to auscultation bilaterally, no crackles or wheezing     Cardiovascular:   Normal apical impulse, regular rate and rhythm, normal S1 and S2, no S3 or S4, and no murmur noted             Lab / Radiology Results:     Lab Results   Component Value Date    WBC 8.1 03/29/2022    WBC 5.7 09/04/2020    RBC 4.55 03/29/2022    RBC 4.32 09/04/2020    HGB 15.0 03/29/2022    HGB 14.6 09/04/2020    HCT 44.8 03/29/2022    HCT 42.9 09/04/2020    MCV 99 03/29/2022    MCV 99 09/04/2020    RDW 14.8 03/29/2022    " RDW 14.1 09/04/2020     03/29/2022     09/04/2020      Lab Results   Component Value Date    WBC 8.1 03/29/2022    WBC 5.7 09/04/2020     Lab Results   Component Value Date     03/29/2022     09/04/2020     Lab Results   Component Value Date    HGB 15.0 03/29/2022    HGB 14.6 09/04/2020    HCT 44.8 03/29/2022    HCT 42.9 09/04/2020     Lab Results   Component Value Date     03/29/2022     09/04/2020    CO2 28 03/29/2022    CO2 28 09/04/2020    BUN 17 03/29/2022    BUN 18 09/04/2020     Lab Results   Component Value Date     03/29/2022     09/04/2020    CO2 28 03/29/2022    CO2 28 09/04/2020    BUN 17 03/29/2022    BUN 18 09/04/2020     Lab Results   Component Value Date     03/29/2022     09/04/2020     Lab Results   Component Value Date    TSH 1.01 03/22/2018             Plan:   Patient's active problems diagnostically and therapeutically optimized for the planned procedure. Patient here for SVT ablation. Procedure explained in detail to patient including indications, risks, and benefits. The procedural risk of EP study and ablation were discussed in detail. Risks discussed include: vascular complications, CVA, AVB, pericardial effusion and tamponade. He states understanding and wishes to procced.     HARSH Young CNP  Electrophysiology Consult Service  Pager: [phone]

## 2022-03-29 NOTE — PRE-PROCEDURE
GENERAL PRE-PROCEDURE:   Procedure:  SVT ablation  Date/Time:  3/29/2022 3:37 PM    Verbal consent obtained?: Yes    Written consent obtained?: Yes    Risks and benefits: Risks, benefits and alternatives were discussed    Consent given by:  Patient  Patient states understanding of procedure being performed: Yes    Patient's understanding of procedure matches consent: Yes    Procedure consent matches procedure scheduled: Yes    Appropriately NPO:  Yes  ASA Class:  2  Mallampati  :  Grade 2- soft palate, base of uvula, tonsillar pillars, and portion of posterior pharyngeal wall visible  Lungs:  Lungs clear with good breath sounds bilaterally  Heart:  Normal heart sounds and rate  History & Physical reviewed:  History and physical reviewed and no updates needed  Statement of review:  I have reviewed the lab findings, diagnostic data, medications, and the plan for sedation    HARSH Young CNP  Electrophysiology Consult Service  Pager: [phone]

## 2022-03-29 NOTE — Clinical Note
Potential access sites were evaluated for patency using ultrasound.   The right femoral vein was selected. Access was obtained under with Sonosite guidance using a micropuncture 21 gauge needle with direct visualization of needle entry. x3

## 2022-03-29 NOTE — DISCHARGE INSTRUCTIONS
Post-Ablation Discharge Instructions      Plan:    Decrease Sotalol to 40 mg twice daily    Continue other home medications without changes    Follow up with Dr. Will in 3 months    Care of groin site:         Remove the Band-Aid after 24 hours. If there is minor oozing, apply another Band-aid and remove it after 12 hours.          Do NOT take a bath, use a hot tub, pool, or submerse in water for at least 3 days You may shower.          It is normal to have a small bruise or lump at the site.         Do not scrub the site.         Do not use lotion or powder near the puncture site for 3 days.         For the first 2 days: Do not stoop or squat. When you cough, sneeze or move your bowels, hold your hand over the puncture site and press gently.         Do not lift more than 10 pounds or exertional activity for 10 days.      If you start bleeding from the site in your groin:  Lie down flat and press firmly on the site.  Call your physician immediately, or, come to the emergency room.    Call 911 right away if you have bleeding that is heavy or does not stop.     Call your doctor/provider if:         You have a large or growing hard lump around the site.         The site is red, swollen, hot or tender.         Blood or fluid is draining from the site.         You have chills or a fever greater than 101 F (38 C).         Your leg or arm turns bluish, feels numb or cool.         You have hives, a rash or unusual itching.     Cardiovascular Clinic:   [address] [place] [address]. [place] [address]  Your Care Team:  EP Cardiology   Telephone Number     Aliyah Will [phone]   Janice Shafer RN  [phone]     For scheduling appts or procedures:    Patricia Webb   [phone]   For the Device Clinic (Pacemakers and ICD's)   RN's :   Fabiola Agarwal During business hours: [phone]     After business hours:   [phone]- select option 4 and ask for job code [id number].       As  always, Thank you for trusting us with your health care needs

## 2022-03-30 LAB
ATRIAL RATE - MUSE: 37 BPM
ATRIAL RATE - MUSE: 64 BPM
DIASTOLIC BLOOD PRESSURE - MUSE: NORMAL MMHG
DIASTOLIC BLOOD PRESSURE - MUSE: NORMAL MMHG
INTERPRETATION ECG - MUSE: NORMAL
INTERPRETATION ECG - MUSE: NORMAL
MDC_IDC_LEAD_IMPLANT_DT: NORMAL
MDC_IDC_LEAD_IMPLANT_DT: NORMAL
MDC_IDC_LEAD_LOCATION: NORMAL
MDC_IDC_LEAD_LOCATION: NORMAL
MDC_IDC_LEAD_LOCATION_DETAIL_1: NORMAL
MDC_IDC_LEAD_LOCATION_DETAIL_1: NORMAL
MDC_IDC_LEAD_MFG: NORMAL
MDC_IDC_LEAD_MFG: NORMAL
MDC_IDC_LEAD_MODEL: NORMAL
MDC_IDC_LEAD_MODEL: NORMAL
MDC_IDC_LEAD_POLARITY_TYPE: NORMAL
MDC_IDC_LEAD_POLARITY_TYPE: NORMAL
MDC_IDC_LEAD_SERIAL: NORMAL
MDC_IDC_LEAD_SERIAL: NORMAL
MDC_IDC_MSMT_BATTERY_DTM: NORMAL
MDC_IDC_MSMT_BATTERY_REMAINING_LONGEVITY: 90 MO
MDC_IDC_MSMT_BATTERY_REMAINING_PERCENTAGE: 86 %
MDC_IDC_MSMT_BATTERY_STATUS: NORMAL
MDC_IDC_MSMT_CAP_CHARGE_DTM: NORMAL
MDC_IDC_MSMT_CAP_CHARGE_TIME: 10.2 S
MDC_IDC_MSMT_CAP_CHARGE_TYPE: NORMAL
MDC_IDC_MSMT_LEADCHNL_RA_IMPEDANCE_VALUE: 734 OHM
MDC_IDC_MSMT_LEADCHNL_RA_PACING_THRESHOLD_AMPLITUDE: 0.6 V
MDC_IDC_MSMT_LEADCHNL_RA_PACING_THRESHOLD_PULSEWIDTH: 0.4 MS
MDC_IDC_MSMT_LEADCHNL_RV_IMPEDANCE_VALUE: 510 OHM
MDC_IDC_MSMT_LEADCHNL_RV_LEAD_CHANNEL_STATUS: NORMAL
MDC_IDC_MSMT_LEADCHNL_RV_PACING_THRESHOLD_AMPLITUDE: 1.3 V
MDC_IDC_MSMT_LEADCHNL_RV_PACING_THRESHOLD_PULSEWIDTH: 0.4 MS
MDC_IDC_PG_IMPLANT_DTM: NORMAL
MDC_IDC_PG_MFG: NORMAL
MDC_IDC_PG_MODEL: NORMAL
MDC_IDC_PG_SERIAL: NORMAL
MDC_IDC_PG_TYPE: NORMAL
MDC_IDC_SESS_CLINIC_NAME: NORMAL
MDC_IDC_SESS_DTM: NORMAL
MDC_IDC_SESS_TYPE: NORMAL
MDC_IDC_SET_BRADY_AT_MODE_SWITCH_MODE: NORMAL
MDC_IDC_SET_BRADY_AT_MODE_SWITCH_RATE: 170 {BEATS}/MIN
MDC_IDC_SET_BRADY_LOWRATE: 60 {BEATS}/MIN
MDC_IDC_SET_BRADY_MAX_SENSOR_RATE: 125 {BEATS}/MIN
MDC_IDC_SET_BRADY_MAX_TRACKING_RATE: 125 {BEATS}/MIN
MDC_IDC_SET_BRADY_MODE: NORMAL
MDC_IDC_SET_BRADY_PAV_DELAY_HIGH: 110 MS
MDC_IDC_SET_BRADY_PAV_DELAY_LOW: 220 MS
MDC_IDC_SET_BRADY_SAV_DELAY_HIGH: 110 MS
MDC_IDC_SET_BRADY_SAV_DELAY_LOW: 220 MS
MDC_IDC_SET_LEADCHNL_RA_PACING_AMPLITUDE: 2.5 V
MDC_IDC_SET_LEADCHNL_RA_PACING_CAPTURE_MODE: NORMAL
MDC_IDC_SET_LEADCHNL_RA_PACING_POLARITY: NORMAL
MDC_IDC_SET_LEADCHNL_RA_PACING_PULSEWIDTH: 0.4 MS
MDC_IDC_SET_LEADCHNL_RA_SENSING_ADAPTATION_MODE: NORMAL
MDC_IDC_SET_LEADCHNL_RA_SENSING_POLARITY: NORMAL
MDC_IDC_SET_LEADCHNL_RA_SENSING_SENSITIVITY: 0.25 MV
MDC_IDC_SET_LEADCHNL_RV_PACING_AMPLITUDE: 2.5 V
MDC_IDC_SET_LEADCHNL_RV_PACING_CAPTURE_MODE: NORMAL
MDC_IDC_SET_LEADCHNL_RV_PACING_POLARITY: NORMAL
MDC_IDC_SET_LEADCHNL_RV_PACING_PULSEWIDTH: 0.4 MS
MDC_IDC_SET_LEADCHNL_RV_SENSING_ADAPTATION_MODE: NORMAL
MDC_IDC_SET_LEADCHNL_RV_SENSING_POLARITY: NORMAL
MDC_IDC_SET_LEADCHNL_RV_SENSING_SENSITIVITY: 0.6 MV
MDC_IDC_SET_ZONE_TYPE: NORMAL
MDC_IDC_SET_ZONE_VENDOR_TYPE: NORMAL
MDC_IDC_STAT_AT_BURDEN_PERCENT: 1 %
MDC_IDC_STAT_AT_DTM_END: NORMAL
MDC_IDC_STAT_AT_DTM_START: NORMAL
MDC_IDC_STAT_BRADY_DTM_END: NORMAL
MDC_IDC_STAT_BRADY_DTM_START: NORMAL
MDC_IDC_STAT_BRADY_RA_PERCENT_PACED: 61 %
MDC_IDC_STAT_BRADY_RV_PERCENT_PACED: 51 %
MDC_IDC_STAT_EPISODE_RECENT_COUNT: 0
MDC_IDC_STAT_EPISODE_RECENT_COUNT: 0
MDC_IDC_STAT_EPISODE_RECENT_COUNT: 129
MDC_IDC_STAT_EPISODE_RECENT_COUNT: 15
MDC_IDC_STAT_EPISODE_RECENT_COUNT: 2
MDC_IDC_STAT_EPISODE_RECENT_COUNT: 217
MDC_IDC_STAT_EPISODE_RECENT_COUNT_DTM_END: NORMAL
MDC_IDC_STAT_EPISODE_RECENT_COUNT_DTM_START: NORMAL
MDC_IDC_STAT_EPISODE_TYPE: NORMAL
MDC_IDC_STAT_EPISODE_VENDOR_TYPE: NORMAL
MDC_IDC_STAT_TACHYTHERAPY_ATP_DELIVERED_RECENT: 15
MDC_IDC_STAT_TACHYTHERAPY_ATP_DELIVERED_TOTAL: 17
MDC_IDC_STAT_TACHYTHERAPY_RECENT_DTM_END: NORMAL
MDC_IDC_STAT_TACHYTHERAPY_RECENT_DTM_START: NORMAL
MDC_IDC_STAT_TACHYTHERAPY_SHOCKS_ABORTED_RECENT: 0
MDC_IDC_STAT_TACHYTHERAPY_SHOCKS_ABORTED_TOTAL: 0
MDC_IDC_STAT_TACHYTHERAPY_SHOCKS_DELIVERED_RECENT: 0
MDC_IDC_STAT_TACHYTHERAPY_SHOCKS_DELIVERED_TOTAL: 0
MDC_IDC_STAT_TACHYTHERAPY_TOTAL_DTM_END: NORMAL
MDC_IDC_STAT_TACHYTHERAPY_TOTAL_DTM_START: NORMAL
P AXIS - MUSE: 49 DEGREES
P AXIS - MUSE: NORMAL DEGREES
PR INTERVAL - MUSE: 224 MS
PR INTERVAL - MUSE: 228 MS
QRS DURATION - MUSE: 132 MS
QRS DURATION - MUSE: 148 MS
QT - MUSE: 422 MS
QT - MUSE: 454 MS
QTC - MUSE: 493 MS
QTC - MUSE: 504 MS
R AXIS - MUSE: -21 DEGREES
R AXIS - MUSE: 265 DEGREES
SYSTOLIC BLOOD PRESSURE - MUSE: NORMAL MMHG
SYSTOLIC BLOOD PRESSURE - MUSE: NORMAL MMHG
T AXIS - MUSE: -4 DEGREES
T AXIS - MUSE: 23 DEGREES
VENTRICULAR RATE- MUSE: 71 BPM
VENTRICULAR RATE- MUSE: 86 BPM

## 2022-03-30 NOTE — DISCHARGE SUMMARY
Pt discharged to home. VSS on RA. Right groin site C/D/I, negative for a hematoma. Up ambulating and voiding w/o difficulty. PIV access removed. Tolerating PO intake. Discharge instructions reviewed and all questions answered. Pt escorted via wheelchair to front entrance where wife picked him up.

## 2022-03-30 NOTE — PROGRESS NOTES
D/I/A: Pt roomed on 3C in bay 35.  Arrived via litter and accompanied by CCL RN. On/Off: On monitor.  VSSA.  Rhythm upon arrival- paced w/ BBB on monitor.  Denies pain or sob.  Reviewed activity restrictions and when to notify RN, ie-changes to breathing or increased chest pressure or chest pain.  CCL access:  Right groin access site with a suture. C/D/I, negative for a hematoma. Bedrest for two hours; off bedrest at 20:30.  P: Continue to monitor status.  Discharge to home once meeting criteria.

## 2022-05-21 ENCOUNTER — HEALTH MAINTENANCE LETTER (OUTPATIENT)
Age: 62
End: 2022-05-21

## 2022-06-13 DIAGNOSIS — D86.85 SARCOID, CARDIAC: ICD-10-CM

## 2022-06-13 DIAGNOSIS — D86.9 SARCOIDOSIS: ICD-10-CM

## 2022-06-14 ENCOUNTER — MYC MEDICAL ADVICE (OUTPATIENT)
Dept: CARDIOLOGY | Facility: CLINIC | Age: 62
End: 2022-06-14
Payer: COMMERCIAL

## 2022-06-15 ENCOUNTER — OFFICE VISIT (OUTPATIENT)
Dept: CARDIOLOGY | Facility: CLINIC | Age: 62
End: 2022-06-15
Attending: INTERNAL MEDICINE
Payer: COMMERCIAL

## 2022-06-15 ENCOUNTER — ANCILLARY PROCEDURE (OUTPATIENT)
Dept: CARDIOLOGY | Facility: CLINIC | Age: 62
End: 2022-06-15
Payer: COMMERCIAL

## 2022-06-15 ENCOUNTER — LAB (OUTPATIENT)
Dept: LAB | Facility: CLINIC | Age: 62
End: 2022-06-15
Attending: INTERNAL MEDICINE

## 2022-06-15 VITALS
WEIGHT: 193 LBS | OXYGEN SATURATION: 96 % | SYSTOLIC BLOOD PRESSURE: 106 MMHG | BODY MASS INDEX: 25.46 KG/M2 | HEART RATE: 60 BPM | DIASTOLIC BLOOD PRESSURE: 67 MMHG

## 2022-06-15 DIAGNOSIS — I47.10 PAROXYSMAL SUPRAVENTRICULAR TACHYCARDIA (H): ICD-10-CM

## 2022-06-15 DIAGNOSIS — Z95.810 ICD (IMPLANTABLE CARDIOVERTER-DEFIBRILLATOR) IN PLACE: Primary | ICD-10-CM

## 2022-06-15 DIAGNOSIS — D86.85 SARCOID, CARDIAC: Primary | ICD-10-CM

## 2022-06-15 DIAGNOSIS — Z95.810 ICD (IMPLANTABLE CARDIOVERTER-DEFIBRILLATOR) IN PLACE: ICD-10-CM

## 2022-06-15 DIAGNOSIS — I47.20 VENTRICULAR TACHYCARDIA (H): ICD-10-CM

## 2022-06-15 DIAGNOSIS — D86.85 CARDIAC SARCOIDOSIS: ICD-10-CM

## 2022-06-15 DIAGNOSIS — D86.85 SARCOID, CARDIAC: ICD-10-CM

## 2022-06-15 LAB
ANION GAP SERPL CALCULATED.3IONS-SCNC: 1 MMOL/L (ref 3–14)
BUN SERPL-MCNC: 19 MG/DL (ref 7–30)
CALCIUM SERPL-MCNC: 8.6 MG/DL (ref 8.5–10.1)
CHLORIDE BLD-SCNC: 112 MMOL/L (ref 94–109)
CO2 SERPL-SCNC: 29 MMOL/L (ref 20–32)
CREAT SERPL-MCNC: 0.88 MG/DL (ref 0.66–1.25)
GFR SERPL CREATININE-BSD FRML MDRD: >90 ML/MIN/1.73M2
GLUCOSE BLD-MCNC: 106 MG/DL (ref 70–99)
NT-PROBNP SERPL-MCNC: 837 PG/ML (ref 0–900)
POTASSIUM BLD-SCNC: 4.2 MMOL/L (ref 3.4–5.3)
SODIUM SERPL-SCNC: 142 MMOL/L (ref 133–144)

## 2022-06-15 PROCEDURE — 80048 BASIC METABOLIC PNL TOTAL CA: CPT | Performed by: PATHOLOGY

## 2022-06-15 PROCEDURE — G0463 HOSPITAL OUTPT CLINIC VISIT: HCPCS

## 2022-06-15 PROCEDURE — 93283 PRGRMG EVAL IMPLANTABLE DFB: CPT | Performed by: INTERNAL MEDICINE

## 2022-06-15 PROCEDURE — 83880 ASSAY OF NATRIURETIC PEPTIDE: CPT | Performed by: PATHOLOGY

## 2022-06-15 PROCEDURE — 36415 COLL VENOUS BLD VENIPUNCTURE: CPT | Performed by: PATHOLOGY

## 2022-06-15 PROCEDURE — 99215 OFFICE O/P EST HI 40 MIN: CPT | Mod: 25 | Performed by: INTERNAL MEDICINE

## 2022-06-15 RX ORDER — FINASTERIDE 5 MG/1
5 TABLET, FILM COATED ORAL DAILY
COMMUNITY
Start: 2022-06-03 | End: 2024-07-01

## 2022-06-15 RX ORDER — FUROSEMIDE 20 MG
20 TABLET ORAL PRN
COMMUNITY
End: 2024-07-09

## 2022-06-15 ASSESSMENT — PAIN SCALES - GENERAL: PAINLEVEL: NO PAIN (0)

## 2022-06-15 NOTE — NURSING NOTE
Chief Complaint   Patient presents with     Follow Up     Return EP- 3 mo follow-up SVT ablation - on sotalol. HF.     Vitals were taken and medications reconciled.    Hilton Oneal, EMT  4:40 PM

## 2022-06-15 NOTE — PATIENT INSTRUCTIONS
It was a pleasure to see you in clinic today.  Please do not hesitate to call with any questions or concerns.  You are scheduled for a remote transmission in 3 months.  We look forward to seeing you in clinic at your next device check in 6 months.    Stefano Plascencia, RN  Electrophysiology Nurse Clinician  [facility]    During Business Hours Please Call:  [phone]  After Hours Please Call:  [phone] - select option #4 and ask for job code [id number]

## 2022-06-15 NOTE — PATIENT INSTRUCTIONS
You were seen in the Electrophysiology Clinic today by: Dr Will    Plan:   Medication Changes:   STOP- Sotalol    Follow up visit:  6 months with Dr Will with device check prior        Your Care Team:  EP Cardiology   Telephone Number     Nurse Line  Kelly Flores RN  [phone]     For scheduling appts or procedures:    Patricia Webb   [phone]   For the Device Clinic (Pacemakers, ICDs, Loop Recorders)    During business hours: [phone]  After business hours:   [phone]- select option 4 and ask for job code [id number].     On-call cardiologist for after hours or on weekends: [phone], option #4, and ask to speak to the on-call cardiologist.     Cardiovascular Clinic:   [address] [place] [address]. [place] [address]      As always, Thank you for trusting us with your health care needs!

## 2022-06-15 NOTE — PROGRESS NOTES
ELECTROPHYSIOLOGY CLINIC VISIT    Assessment/Recommendations   Assessment/Plan:    Mr. Carney is a 61 year old male who has a past medical history significant for PSVT, sarcoid (cardiac and systemic), NICM LVEF 44% improved to 50%, s/p ICD 2018, PAF (CHADSVASC 1 on Xarelto), and new hyperthyroidism.     NICM 2/2 sarcoid LVEF 50%, NYHA II  SVT:  1. ACEi/ARB: not currently required.  2. BB: Continue Toprol Xl   3. Aldosterone antagonist: not currently required.  4. SCD prophylaxis: s/p ICD in 2018 for high risk cardiac sarcoid features.   5. Fluid status: Continue lasix.   6. Reviewed device tracings and ECGs from OSH 1/2019. All show SVT and no VT. He had been on amiodarone and this was stopped and changed to Sotalol in Summer 2021. He also has very low burden of PAF. He is on Xarelto for stroke prophylaxis. He has had a number of SVT episodes and is now getting inappropriate therapies from ICD. He had AVNRT ablation in 3/29/2022 and his sotalol was decreased to 40 BID after the ablation. .His device interrogation showed No ATP since the ablation procedure, but runs of nonsustained without EGMs. He is currently still on sotalol 40 BID. We will stop sotalol at this time. We are in favor of stopping Xarelto since CHADS VASC is 1 at most since his heart failure recovered. Will have Dr Dickey also decide to see if she is ok with stopping. Otherwise we will re discuss stopping the Xarelto when he is off sotalol and without AF at follow-up.    Follow up in 6 months.          History of Present Illness/Subjective    Mr. Jose Carney is a 61 year old male who comes in today for EP consultation of VT, cardiac sarcoid.    Mr. Carney is a 61 year old male who has a past medical history significant for PSVT, sarcoid (cardiac and systemic), NICM LVEF 44% improved to 50%, s/p ICD 2018, PAF (CHADSVASC 1 on Xarelto), and new hyperthrodism.     In 4921-1414, he started having back pain and had a 30 lbs weight loss. Work up showed  substantial mediastinal lymphadenopathy. He was placed on several months of steroids and had weight gain and improvement of his back pain symptoms. In 2013 he had transbronchial biopsy in 2013 which was positive for sarcoidosis. In 2017, he developed palpitations and was found to have SVT. CMR was c/w cardiac sarcoid with LVEF 44% and high risk features. Thus, he had ICD implanted in 11/2018.  PET scan also demonstrated active cardiac and systemic sarcoid and he was treated with steroids resulting in resolution of active sarcoid. At that time, he was transitioned to methotrexate and his steroids were tapered down. He had palpitations in 1/2019 and presented to OSH ER. He was felt to be in VT at a rate of 135 bpm and was unable to be paced out of it. He had to be externally cardioverted. He was loaded with amiodarone and given failure to gain control of his VT he was given Solu-Medrol and placed on higher dose of prednisone in addition to his methotrexate. He also developed AF. He was placed on Xarelto for stroke prophylaxis. His Metoprolol was increased and improved his AF burden. He was taken off amiodarone in summer of 2021 to avoid chronic side effects by his local EP and was then placed on Sotalol. He recently followed up with Dr. Dickey, he lost 30 lbs and he was found to be hyperthyroid. He underwent evaluation by endocrine and was started on prednisone and methimazole in 12/21/21. His recent ICD checks also were reported to show increasing VT and AF burdens. He has not lost consciousness but has felt quite dizzy (mostly positional).     EP visit 1/4/2022: He was then referred to EP for further management of arrhythmias. He reports feeling at baseline. He has some palpitations and positional dizziness. He denies chest discomfort, abdominal fullness/bloating or peripheral edema, shortness of breath, paroxysmal nocturnal dyspnea, orthopnea, or syncope.A recent device interrogation showed normal device  function, from 12/17-12/20/21 pt has had 1 VT episode each day 139-145 bpm. Each episode was converted with 1 ATP sequence. EGMs reviewed in detail and show SVT triggered by PAC and not true VT. Most recent AF episode was 12/2/21 lasting 4 hours with total AF [place]: <1%. Most recent echo from 11/20201 (OSH) reported to show mild LV dilation, LVEF 50%, severely enlarged LA, moderate MR,and  mild TR. Current cardiac medications include: Toprol XL, Sotalol, Xarelto, and Lasix.      He presents today for follow-up. He feels very well and has no complaints. He had AVNRT ablation in 3/29/2022 and his sotalol was decreased to 40 BID after the ablation. .His device interrogation showed No ATP since the ablation procedure, but runs of nonsustained without EGMs. The patient denies feeling palpitations or chest symptoms at this time.     I have reviewed and updated the patient's Past Medical History, Social History, Family History and Medication List.     Cardiographics (Personally Reviewed) :   11/2021 Echo (OSH Report):    Interpretation Summary   The left ventricle is mildly enlarged with low normal left ventricular function.   There are regional wall motion abnormalities as specified.   Left atrium is severely enlarged by volume.   Left ventricular diastolic function is normal.   There is moderate mitral regurgitation.   Mild tricuspid regurgitation.   Estimated RV systolic pressure is 22 mm Hg above right atrial pressure.   There is no pericardial effusion.   Catheter/pacemaker lead noted within the right ventricle.   Compared to echocardiogram dated 3/26/21, LVEDD measures larger (6.3cm vs. 6.0 cm) with similar to marginally improved function. Other findings are unchanged.     1/30/20 Echo:   Interpretation Summary  Borderline (EF 50-55%, traced 55%) reduced left ventricular function is  present. There is akinesis involving the basal inferolateral segment.  Right ventricular function, chamber size, wall motion, and  thickness are normal.  Mild to moderate eccentric mitral insufficiency is present.  This study was compared with the study from 9/26/19: There has been no change.    2018 Cardiac PET:  Impression: In this patient with cardiac sarcoidosis under treatment with steroids for the last 4 months;  1. No FDG uptake within the myocardium. Overall there is complete resolution of previous cardiac sarcoidosis.  2. Excellent suppression of myocardial glucose metabolism.  3. Stable number and size of parenchymal lung nodules, although previously seen high metabolic activity within a few of these lesions is no longer present.  4. Stable size number and metabolic activity of lymphadenopathy in the mediastinum, bilateral axilla and right supraclavicular region. 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  5. Stable left stone.    9/2020 CMR:  1. The LV is normal in cavity size and wall thickness except for basal inferior thinning. The global systolic function is mildly reduced. The LVEF is 45%. There is akinesis of the basal inferoseptal and basal inferior segments, and severe hypokinesis of the basal inferolateral segment.  2. The RV is normal in cavity size. The global systolic function is normal. The RVEF is 55%.   3. The left atrium is moderately dilated. The right atrium is mildly dilated.  4. There is no significant valvular disease. However, the assessment of valve function is limited due to the gradient-echo cine imaging used to minimize ICD-related artifacts.  5. Late gadolinium enhancement imaging shows epicardial hyperenhancement of the basal inferoseptal, basal inferior, and basal inferoseptal segments. The hyperenhancement is transmural in the basal inferior segment.  6. There is no pericardial effusion or thickening.  7. There is no intracardiac thrombus.  CONCLUSIONS: Non-ischemic cardiomyopathy with epicardial and transmural LGE in a pattern that fits with cardiac sarcoidosis as the cause.  LVEF of 45%, which is unchanged from the prior CMR of 02- (the LVEF on that CMR was 44% when I measured it today). The extent of LGE is also unchanged from the prior CMR. Normal RVEF.       Physical Examination   There were no vitals taken for this visit.  Wt Readings from Last 3 Encounters:   03/29/22 86.2 kg (190 lb)   12/21/21 77.2 kg (170 lb 1.6 oz)   09/04/20 92.1 kg (203 lb)     /67 (BP Location: Right arm, Patient Position: Chair, Cuff Size: Adult Regular)   Pulse 60   Wt 87.5 kg (193 lb)   SpO2 96%   BMI 25.46 kg/m    CONSITUTIONAL: no acute distress  HEENT: no icterus, no redness or discharge, neck supple  CV: no visible edema of visualized extremities. No JVD.   RESPIRATORY: respirations nonlabored, no cough  NEURO: AA&Ox3, speech fluent/appropriate, motor grossly nonfocal  PSYCH: cooperative, affect appropriate  DERM: no rashes on visualized face/neck/upper extremities       Medications  Allergies   Current Outpatient Medications   Medication Sig Dispense Refill     folic acid (FOLVITE) 1 MG tablet TAKE FIVE TABLETS (5MG) ONCE WEEKLY WITH YOUR METHOTREXATE 60 tablet 3     melatonin 3 MG tablet Take 3 mg by mouth        methimazole (TAPAZOLE) 10 MG tablet Take by mouth 2 times daily        methotrexate 2.5 MG tablet Take 8 tablets (20 mg) by mouth every 7 days Please schedule clinic appt for refills, [phone] 104 tablet 3     metoprolol succinate ER (TOPROL-XL) 25 MG 24 hr tablet Take 4 tablets (100 mg) by mouth daily 360 tablet 1     prednisoLONE acetate (PRED FORTE) 1 % ophthalmic susp Place 1 drop into both eyes every hour       predniSONE (DELTASONE) 10 MG tablet        sildenafil (REVATIO) 20 MG tablet Take 20-60 mg by mouth as needed       sotalol (BETAPACE) 80 MG tablet Take 0.5 tablets (40 mg) by mouth 2 times daily 90 tablet      tamsulosin (FLOMAX) 0.4 MG capsule        XARELTO ANTICOAGULANT 20 MG TABS tablet TAKE 1 TABLET (20 MG) BY MOUTH DAILY (WITH DINNER) 90 tablet 1     Allergies   Allergen Reactions     Seasonal Allergies      Terbinafine          Lab Results (Personally Reviewed)    Chemistry/lipid CBC Cardiac Enzymes/BNP/TSH/INR   Lab Results   Component Value Date    BUN 17 03/29/2022     03/29/2022    CO2 28 03/29/2022     Creatinine   Date Value Ref Range Status   03/29/2022 1.10 0.66 - 1.25 mg/dL Final   09/04/2020 1.19 0.66 - 1.25 mg/dL Final       No results found for: CHOL, HDL, LDL, CHOLHDL   Lab Results   Component Value Date    WBC 8.1 03/29/2022    HGB 15.0 03/29/2022    HCT 44.8 03/29/2022    MCV 99 03/29/2022     03/29/2022    Lab Results   Component Value Date    TSH 1.01 03/22/2018        Lauro Will MD [facility]  Cardiology - Electrophysiology    Total time spent on patient visit, reviewing notes, imaging, labs, orders, and completing necessary documentation: 45 minutes.

## 2022-06-15 NOTE — LETTER
6/15/2022      RE: Jose Carney  [address]  [facility] [address]       Dear Colleague,    Thank you for the opportunity to participate in the care of your patient, Jose Carney, at the [facility] [place] at [facility]. Please see a copy of my visit note below.        ELECTROPHYSIOLOGY CLINIC VISIT    Assessment/Recommendations   Assessment/Plan:    Mr. Carney is a 61 year old male who has a past medical history significant for PSVT, sarcoid (cardiac and systemic), NICM LVEF 44% improved to 50%, s/p ICD 2018, PAF (CHADSVASC 1 on Xarelto), and new hyperthyroidism.     NICM 2/2 sarcoid LVEF 50%, NYHA II  SVT:  1. ACEi/ARB: not currently required.  2. BB: Continue Toprol Xl   3. Aldosterone antagonist: not currently required.  4. SCD prophylaxis: s/p ICD in 2018 for high risk cardiac sarcoid features.   5. Fluid status: Continue lasix.   6. Reviewed device tracings and ECGs from OSH 1/2019. All show SVT and no VT. He had been on amiodarone and this was stopped and changed to Sotalol in Summer 2021. He also has very low burden of PAF. He is on Xarelto for stroke prophylaxis. He has had a number of SVT episodes and is now getting inappropriate therapies from ICD. He had AVNRT ablation in 3/29/2022 and his sotalol was decreased to 40 BID after the ablation. .His device interrogation showed No ATP since the ablation procedure, but runs of nonsustained without EGMs. He is currently still on sotalol 40 BID. We will stop sotalol at this time. We are in favor of stopping Xarelto since CHADS VASC is 1 at most since his heart failure recovered. Will have Dr Dickey also decide to see if she is ok with stopping. Otherwise we will re discuss stopping the Xarelto when he is off sotalol and without AF at follow-up.    Follow up in 6 months.          History of Present Illness/Subjective    Mr. Jose Carney is a 61 year old male who comes in today for EP  consultation of VT, cardiac sarcoid.    Mr. Carney is a 61 year old male who has a past medical history significant for PSVT, sarcoid (cardiac and systemic), NICM LVEF 44% improved to 50%, s/p ICD 2018, PAF (CHADSVASC 1 on Xarelto), and new hyperthrodism.     In 0456-0327, he started having back pain and had a 30 lbs weight loss. Work up showed substantial mediastinal lymphadenopathy. He was placed on several months of steroids and had weight gain and improvement of his back pain symptoms. In 2013 he had transbronchial biopsy in 2013 which was positive for sarcoidosis. In 2017, he developed palpitations and was found to have SVT. CMR was c/w cardiac sarcoid with LVEF 44% and high risk features. Thus, he had ICD implanted in 11/2018.  PET scan also demonstrated active cardiac and systemic sarcoid and he was treated with steroids resulting in resolution of active sarcoid. At that time, he was transitioned to methotrexate and his steroids were tapered down. He had palpitations in 1/2019 and presented to OSH ER. He was felt to be in VT at a rate of 135 bpm and was unable to be paced out of it. He had to be externally cardioverted. He was loaded with amiodarone and given failure to gain control of his VT he was given Solu-Medrol and placed on higher dose of prednisone in addition to his methotrexate. He also developed AF. He was placed on Xarelto for stroke prophylaxis. His Metoprolol was increased and improved his AF burden. He was taken off amiodarone in summer of 2021 to avoid chronic side effects by his local EP and was then placed on Sotalol. He recently followed up with Dr. Dickey, he lost 30 lbs and he was found to be hyperthyroid. He underwent evaluation by endocrine and was started on prednisone and methimazole in 12/21/21. His recent ICD checks also were reported to show increasing VT and AF burdens. He has not lost consciousness but has felt quite dizzy (mostly positional).     EP visit 1/4/2022: He was then  referred to EP for further management of arrhythmias. He reports feeling at baseline. He has some palpitations and positional dizziness. He denies chest discomfort, abdominal fullness/bloating or peripheral edema, shortness of breath, paroxysmal nocturnal dyspnea, orthopnea, or syncope.A recent device interrogation showed normal device function, from 12/17-12/20/21 pt has had 1 VT episode each day 139-145 bpm. Each episode was converted with 1 ATP sequence. EGMs reviewed in detail and show SVT triggered by PAC and not true VT. Most recent AF episode was 12/2/21 lasting 4 hours with total AF [place]: <1%. Most recent echo from 11/20201 (OSH) reported to show mild LV dilation, LVEF 50%, severely enlarged LA, moderate MR,and  mild TR. Current cardiac medications include: Toprol XL, Sotalol, Xarelto, and Lasix.      He presents today for follow-up. He feels very well and has no complaints. He had AVNRT ablation in 3/29/2022 and his sotalol was decreased to 40 BID after the ablation. .His device interrogation showed No ATP since the ablation procedure, but runs of nonsustained without EGMs. The patient denies feeling palpitations or chest symptoms at this time.     I have reviewed and updated the patient's Past Medical History, Social History, Family History and Medication List.     Cardiographics (Personally Reviewed) :   11/2021 Echo (OSH Report):    Interpretation Summary   The left ventricle is mildly enlarged with low normal left ventricular function.   There are regional wall motion abnormalities as specified.   Left atrium is severely enlarged by volume.   Left ventricular diastolic function is normal.   There is moderate mitral regurgitation.   Mild tricuspid regurgitation.   Estimated RV systolic pressure is 22 mm Hg above right atrial pressure.   There is no pericardial effusion.   Catheter/pacemaker lead noted within the right ventricle.   Compared to echocardiogram dated 3/26/21, LVEDD measures larger (6.3cm  vs. 6.0 cm) with similar to marginally improved function. Other findings are unchanged.     1/30/20 Echo:   Interpretation Summary  Borderline (EF 50-55%, traced 55%) reduced left ventricular function is  present. There is akinesis involving the basal inferolateral segment.  Right ventricular function, chamber size, wall motion, and thickness are normal.  Mild to moderate eccentric mitral insufficiency is present.  This study was compared with the study from 9/26/19: There has been no change.    2018 Cardiac PET:  Impression: In this patient with cardiac sarcoidosis under treatment with steroids for the last 4 months;  1. No FDG uptake within the myocardium. Overall there is complete resolution of previous cardiac sarcoidosis.  2. Excellent suppression of myocardial glucose metabolism.  3. Stable number and size of parenchymal lung nodules, although previously seen high metabolic activity within a few of these lesions is no longer present.  4. Stable size number and metabolic activity of lymphadenopathy in the mediastinum, bilateral axilla and right supraclavicular region. 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  5. Stable left stone.    9/2020 CMR:  1. The LV is normal in cavity size and wall thickness except for basal inferior thinning. The global systolic function is mildly reduced. The LVEF is 45%. There is akinesis of the basal inferoseptal and basal inferior segments, and severe hypokinesis of the basal inferolateral segment.  2. The RV is normal in cavity size. The global systolic function is normal. The RVEF is 55%.   3. The left atrium is moderately dilated. The right atrium is mildly dilated.  4. There is no significant valvular disease. However, the assessment of valve function is limited due to the gradient-echo cine imaging used to minimize ICD-related artifacts.  5. Late gadolinium enhancement imaging shows epicardial hyperenhancement of the basal inferoseptal, basal  inferior, and basal inferoseptal segments. The hyperenhancement is transmural in the basal inferior segment.  6. There is no pericardial effusion or thickening.  7. There is no intracardiac thrombus.  CONCLUSIONS: Non-ischemic cardiomyopathy with epicardial and transmural LGE in a pattern that fits with cardiac sarcoidosis as the cause. LVEF of 45%, which is unchanged from the prior CMR of 02- (the LVEF on that CMR was 44% when I measured it today). The extent of LGE is also unchanged from the prior CMR. Normal RVEF.       Physical Examination   There were no vitals taken for this visit.  Wt Readings from Last 3 Encounters:   03/29/22 86.2 kg (190 lb)   12/21/21 77.2 kg (170 lb 1.6 oz)   09/04/20 92.1 kg (203 lb)     /67 (BP Location: Right arm, Patient Position: Chair, Cuff Size: Adult Regular)   Pulse 60   Wt 87.5 kg (193 lb)   SpO2 96%   BMI 25.46 kg/m    CONSITUTIONAL: no acute distress  HEENT: no icterus, no redness or discharge, neck supple  CV: no visible edema of visualized extremities. No JVD.   RESPIRATORY: respirations nonlabored, no cough  NEURO: AA&Ox3, speech fluent/appropriate, motor grossly nonfocal  PSYCH: cooperative, affect appropriate  DERM: no rashes on visualized face/neck/upper extremities       Medications  Allergies   Current Outpatient Medications   Medication Sig Dispense Refill     folic acid (FOLVITE) 1 MG tablet TAKE FIVE TABLETS (5MG) ONCE WEEKLY WITH YOUR METHOTREXATE 60 tablet 3     melatonin 3 MG tablet Take 3 mg by mouth        methimazole (TAPAZOLE) 10 MG tablet Take by mouth 2 times daily        methotrexate 2.5 MG tablet Take 8 tablets (20 mg) by mouth every 7 days Please schedule clinic appt for refills, [phone] 104 tablet 3     metoprolol succinate ER (TOPROL-XL) 25 MG 24 hr tablet Take 4 tablets (100 mg) by mouth daily 360 tablet 1     prednisoLONE acetate (PRED FORTE) 1 % ophthalmic susp Place 1 drop into both eyes every hour       predniSONE  (DELTASONE) 10 MG tablet        sildenafil (REVATIO) 20 MG tablet Take 20-60 mg by mouth as needed       sotalol (BETAPACE) 80 MG tablet Take 0.5 tablets (40 mg) by mouth 2 times daily 90 tablet      tamsulosin (FLOMAX) 0.4 MG capsule        XARELTO ANTICOAGULANT 20 MG TABS tablet TAKE 1 TABLET (20 MG) BY MOUTH DAILY (WITH DINNER) 90 tablet 1    Allergies   Allergen Reactions     Seasonal Allergies      Terbinafine          Lab Results (Personally Reviewed)    Chemistry/lipid CBC Cardiac Enzymes/BNP/TSH/INR   Lab Results   Component Value Date    BUN 17 03/29/2022     03/29/2022    CO2 28 03/29/2022     Creatinine   Date Value Ref Range Status   03/29/2022 1.10 0.66 - 1.25 mg/dL Final   09/04/2020 1.19 0.66 - 1.25 mg/dL Final       No results found for: CHOL, HDL, LDL, CHOLHDL   Lab Results   Component Value Date    WBC 8.1 03/29/2022    HGB 15.0 03/29/2022    HCT 44.8 03/29/2022    MCV 99 03/29/2022     03/29/2022    Lab Results   Component Value Date    TSH 1.01 03/22/2018        Lauro Will MD [facility]  Cardiology - Electrophysiology    Total time spent on patient visit, reviewing notes, imaging, labs, orders, and completing necessary documentation: 45 minutes.

## 2022-06-16 ENCOUNTER — OFFICE VISIT (OUTPATIENT)
Dept: CARDIOLOGY | Facility: CLINIC | Age: 62
End: 2022-06-16
Attending: INTERNAL MEDICINE
Payer: COMMERCIAL

## 2022-06-16 VITALS
OXYGEN SATURATION: 100 % | BODY MASS INDEX: 25.71 KG/M2 | HEART RATE: 50 BPM | WEIGHT: 194 LBS | HEIGHT: 73 IN | DIASTOLIC BLOOD PRESSURE: 68 MMHG | SYSTOLIC BLOOD PRESSURE: 105 MMHG

## 2022-06-16 DIAGNOSIS — I47.20 VENTRICULAR TACHYCARDIA (H): ICD-10-CM

## 2022-06-16 DIAGNOSIS — D86.85 CARDIAC SARCOIDOSIS: ICD-10-CM

## 2022-06-16 PROCEDURE — 99214 OFFICE O/P EST MOD 30 MIN: CPT | Performed by: INTERNAL MEDICINE

## 2022-06-16 PROCEDURE — G0463 HOSPITAL OUTPT CLINIC VISIT: HCPCS

## 2022-06-16 ASSESSMENT — PAIN SCALES - GENERAL: PAINLEVEL: NO PAIN (0)

## 2022-06-16 NOTE — PATIENT INSTRUCTIONS
Cardiology Providers you saw during your visit:  Dr. Dickey    Medication changes:   Stop Xeralto      Follow up:   Follow up with Dr Will in 6 months  Schedule Echo at the same time as the [facility] appointment  Follow up with Dr dickey in 1 year with labs prior               You were seen in the Electrophysiology Clinic today by: Dr Will     Plan:   Medication Changes:   STOP- Sotalol     Follow up visit:  6 months with Dr Will with device check prior           Your Care Team:  EP Cardiology   Telephone Number     Nurse Line  Kelly Flores RN  [phone]     For scheduling appts or procedures:    Patricia Webb    [phone]   For the Device Clinic (Pacemakers, ICDs, Loop Recorders)     During business hours: [phone]  After business hours:   [phone]- select option 4 and ask for job code [id number].     On-call cardiologist for after hours or on weekends: [phone], option #4, and ask to speak to the on-call cardiologist.      Cardiovascular Clinic:   [address] [place] [address]. [place] [address]        As always, Thank you for trusting us with your health care needs!     Please call if you have :  1. Weight gain of more than 2 pounds in a day or 5 pounds in a week  2. Increased shortness of breath, swelling or bloating  3. Dizziness, lightheadedness   4. Any questions or concerns.       Follow the American Heart Association Diet and Lifestyle recommendations:  Limit saturated fat, trans fat, sodium, red meat, sweets and sugar-sweetened beverages. If you choose to eat red meat, compare labels and select the leanest cuts available.  Aim for at least 150 minutes of moderate physical activity or 75 minutes of vigorous physical activity - or an equal combination of both - each week.      During business hours: [phone], press option # 1 to schedule and leave a message for your care team.        After hours, weekends or holidays: On Call Cardiologist- [phone]   option #4 and ask to speak to  "the on-call Cardiologist. Inform them you are a CORE/heart failure patient at the [facility].      Heart Failure Support Group  Virtual meetings 2022, , 1-2 p.m.  Monday, , 1-2 p.m.  Monday, , 1-2 p.m.  Monday, , 1-2 p.m.  Monday, May 2nd, 1-2 p.m.  Monday, , 1-2 p.m.  Monday, , 1-2 p.m.  Monday, 1-2 p.m.  Monday, , 1-2 p.m.  Monday, , 1-2 p.m.  Monday, , 1-2 p.m.  Monday, , 1-2 p.m.      Helen Gonzalez RN BSN CHFN  Cardiology Care Coordinator - Heart Failure/ C.O.R.E. Clinic  [facility]   Questions and [phone]   First press #1 for the [facility] and then press #4 for \"To send a message to your care team\"    "

## 2022-06-16 NOTE — LETTER
Before Your Surgery      Call your surgeon if there is any change in your health. This includes signs of a cold or flu (such as a sore throat, runny nose, cough, rash or fever).    Do not smoke, drink alcohol or take over the counter medicine (unless your surgeon or primary care doctor tells you to) for the 24 hours before and after surgery.    If you take prescribed drugs: Follow your doctor s orders about which medicines to take and which to stop until after surgery.    Eating and drinking prior to surgery: follow the instructions from your surgeon    Take a shower or bath the night before surgery. Use the soap your surgeon gave you to gently clean your skin. If you do not have soap from your surgeon, use your regular soap. Do not shave or scrub the surgery site.  Wear clean pajamas and have clean sheets on your bed.    6/16/2022      RE: Jose Carney  [address]  [place] [place] [address]       Dear Colleague,    Thank you for the opportunity to participate in the care of your patient, Jose Carney, at the [facility] [place] [facility]. Please see a copy of my visit note below.    June 16, 2022    I the pleasure of seeing Jose Carney in the [facility] Cardiac Sarcoidosis clinic today.     Complex history is as follows:     History sarcoidosis which  diagnosed by transbronchial biopsy in 2013.  He had been having back pain prior to this and 30 pound weight loss and had substantial mediastinal lymphadenopathy.  He was placed on several months of steroids with significant weight gain and improvement in symptoms as well as back pain.      In late summer and fall 2017 he started to develop palpitations which were found to be supraventricular tachycardia (likely AVNRT as well as some atrial tachycardia)  based on his cardiac monitor. He then had a cardiac MRI which was consistent with myocardial involvement from sarcoidosis (see below).  He was then sent down here for further management and was placed on metoprolol.    Based on high risk features of his cardiac MRI we recommended that he have an ICD placed. I did place him on a burst of prednisone given a highly suspicious PET scan which showed complete resolution of his cardiac sarcoidosis.  At that time I switch him to methotrexate as a staring steroid sparing agent and was titrating this up and tapering down his prednisone when he developed ventricular tachycardia.  This was in January 2019.  The VT was at a rate of 135 and was unable to be paced out.  He did not have syncope with this.  He was loaded with amiodarone, and given failure to gain control of his VT he was given Solu-Medrol and placed on higher dose of prednisone in addition to his methotrexate.     He was then given clinical stability I was  eventually able to get him off of amiodarone in 2021 -for control of his sarcoid to get him off of prednisone I increase his methotrexate to 20 mg daily which he remains on today.    Thyroid function has been normal throughout the time that he was on amiodarone.     He was intermittently followed in EP clinic here for paroxysmal atrial fibrillation.       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 fib burden on his last interrogation less than 1%.     He has no longer having palpitations.  He denies chest pressure or heaviness.  He has no fluid retention or exercise limitation.  He is feeling better with his thyroid under control.  No syncope.       Overall he reports feeling well.           PAST MEDICAL HISTORY:  Past Medical History:   Diagnosis Date     First degree AV block 3/20/2018     Palpitations 3/20/2018     Paroxysmal supraventricular tachycardia (H) 3/20/2018     Sarcoid, cardiac/EF 54% per MRI,[place] of affected myocardium is 12% of myocardial mass 1/25/2018     Sarcoidosis/ systemic 2013 3/20/2018     FAMILY HISTORY:  His family history is notable for several family members with autoimmune disease.  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  He denies any tobacco or alcohol use or recreational drug use.     CURRENT MEDICATIONS:    Current Outpatient Medications   Medication Sig Dispense Refill     finasteride (PROSCAR) 5 MG tablet Take 5 mg by mouth daily       folic acid (FOLVITE) 1 MG tablet TAKE FIVE TABLETS (5MG) ONCE WEEKLY WITH YOUR METHOTREXATE 60 tablet 3     furosemide (LASIX) 20 MG tablet Take 20 mg  "by mouth as needed       methimazole (TAPAZOLE) 10 MG tablet Take by mouth 2 times daily        methotrexate 2.5 MG tablet Take 8 tablets (20 mg) by mouth every 7 days Please schedule clinic appt for refills, [phone] 104 tablet 3     metoprolol succinate ER (TOPROL-XL) 25 MG 24 hr tablet Take 4 tablets (100 mg) by mouth daily 360 tablet 1     prednisoLONE acetate (PRED FORTE) 1 % ophthalmic susp Place 1 drop into both eyes every hour       predniSONE (DELTASONE) 10 MG tablet        sildenafil (REVATIO) 20 MG tablet Take 20-60 mg by mouth as needed       tamsulosin (FLOMAX) 0.4 MG capsule        XARELTO ANTICOAGULANT 20 MG TABS tablet TAKE 1 TABLET (20 MG) BY MOUTH DAILY (WITH DINNER) 90 tablet 1     Patient is on methimazole and prednisone for thyroid    On methotrexate 20 mg daily  Sotalol just discontinued        ROS:   Constitutional: No fever, chills, or sweats.  Weight has finally stabilized with his thyroid treated  ENT: No visual disturbance, ear ache, epistaxis, sore throat.   Allergies/Immunologic: Negative.   Respiratory: No cough, hemoptysis.   Cardiovascular: As per HPI.   GI: No nausea, vomiting, hematemesis, melena, or hematochezia + poor appetite.   : No urinary frequency, dysuria, or hematuria.  He does have an abdominal hernia which [place]  Integument: Negative.   Psychiatric: Negative  Neuro: Tremors resolved  Endocrinology: Negative.   Musculoskeletal: Chronic back pain    Exam:   /68 (BP Location: Left arm, Patient Position: Chair, Cuff Size: Adult Regular)   Pulse 50   Ht 1.854 m (6' 1\")   Wt 88 kg (194 lb)   SpO2 100%   BMI 25.60 kg/m      General: appears comfortable, alert and articulate much thinner last night her last visit-   Head: normocephalic, atraumatic  Eyes: anicteric sclera, EOMI  Neck: no adenopathy  Orophyarynx: moist mucosa, no lesions, dentition intact  Heart: regular, S1/S2, III/IV systolic murmur at the apex, no gallop, rub, " estimated JVP < 10   Lungs: clear, no rales or wheezing  Abdomen: soft, non-tender, bowel sounds present, no hepatosplenomegaly  Extremities: no clubbing, cyanosis or edema  Neurological: normal speech and affect, no gross motor deficits    Labs:  Lab Results   Component Value Date    WBC 8.1 03/29/2022    HGB 15.0 03/29/2022    HCT 44.8 03/29/2022     03/29/2022     06/15/2022    POTASSIUM 4.2 06/15/2022    CHLORIDE 112 (H) 06/15/2022    CO2 29 06/15/2022    BUN 19 06/15/2022    CR 0.88 06/15/2022     (H) 06/15/2022    NTBNP 837 06/15/2022    TROPI <0.015 09/04/2020    AST 19 09/04/2020    ALT 31 09/04/2020    ALKPHOS 71 09/04/2020    BILITOTAL 0.6 09/04/2020       NT proBNP  837 creatinine normal LFTs normal      Repeat PET:   8/2018    Impression: In this patient with cardiac sarcoidosis under treatment  with steroids for the last 4 months;  1. No FDG uptake within the myocardium. Overall there is complete  resolution of previous cardiac sarcoidosis.  2. Excellent suppression of myocardial glucose metabolism.  3. Stable number and size of parenchymal lung nodules, although  previously seen high metabolic activity within a few of these lesions  is no longer present.  4. Stable size number and metabolic activity of lymphadenopathy in the  mediastinum, bilateral axilla and right supraclavicular region. 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  5. Stable left stone.  The ER N and forming of the PEG and start any thickened o symptoms reported and rate has been right in between 150 to 170 beats a minute.   4. Clinical correlation advised.           Cardiac MRI: 2/2019    2. The RV is normal in cavity size. The global systolic function is normal. The RVEF is 66%.      3. Both atria are normal in size.     4. Due to ICD lead artifact, the tricuspid and pulmonic valves were not well visualized.       The mitral and aortic valves appear normally functioning without  significant disease.       5. Late gadolinium enhancement imaging shows epicardial and mid-myocardial enhancement in the  basal-inferoseptal, basal-inferior, and basal-inferolateral segments.      6. There is no pericardial effusion.     7. Unable to assess for intracardiac thrombus.     8. Unable to do T2 mapping and assess for myocardial edema (active inflammation).      CONCLUSIONS:    1. Cardiac sarcoidosis with preserved systolic function; LVEF 55%, RVEF 66%.  2. Sarcoidosis related scarring and severe [id number]-[id number] involving the basal-inferoseptal and  basal-inferior segments. Total scar burden 12%. Unchanged from previous CMR dated 01/19/2018.        Local echo 11/2021  Left Ventricle   The left ventricular systolic function is low normal. Quantified left ventricle ejection fraction is 50.9 %. The left ventricle is mildly enlarged. There is   normal left ventricular wall thickness. There are regional wall motion abnormalities as specified. There is inferior base hypokinesis. There is inferolateral   base hypokinesis. There is anterolateral base hypokinesis.      Device: CelePost Resonate EL  Normal device function  Mode: DDDR  bpm  AP: 58%  : 48%  Intrinsic rhythm: SR @ 54 bpm  Lead Trends Appear Stable: RA pacing threshold increased to 1.6V@0.4ms. RV sensing as decreased to 3.4mV from 7.8 mV.  Estimated battery longevity to RRT = 8 years.    Atrial Arrhythmia: 10 ATR. Episodes lasting 1 second to 3.5 minutes. Available EGM shows Afib w/ controlled ventricular response.  AF [place]: <1%  Anticoagulant: Xarelto  Ventricular Arrhythmia: 7 nonsustV episodes since ablation on 3/29/22.  No EGMs available for review. Episodes lasted 6-18 seconds.  Setting Changes: RA pacing output increased to 2.6V@0.4 ms to allow 1V safety margin as pacing threshold has increased to 1.6V@0.4ms.  Patient has an appointment to see Dr. Will today.       Assessment and Plan:   Follow-up cardiac  sarcoidosis    history of biopsy-proven cardiac sarcoid from a transbronchial biopsy-who has a cardiac MRI consistent with myocardial involvement from sarcoidosis. His PET scan related inflammation resolved completely with 3-4 months of 40 mg of prednisone.  I did have an ICD placed given he had high risk features on his cardiac MRI for malignant arrhythmias.       With respect to his cardiac sarcoidosis, given history of inflammation by MRI and PET as well as ventricular arrhythmias I have kept him on long-term immunosuppression.  He is presently on methotrexate 20 mg weekly.  He happens to be on prednisone 10 mg daily likely for the hyperthyroidism although I am unclear the stop date of this.       His ejection fraction had declined slightly as of November 2021 in the setting of more arrhythmias with his hyperthyroidism.  He also had moderate mitral regurgitation at that time.     From a rhythm perspective, he has minimal atrial fibrillation burden and his AVNRT is ablated-EP took him off of all antiarrhythmics yesterday.  They are seeing him back in 6 months and will do a device interrogation in between.  We are taking him off of Xarelto today given low stroke risk and less than 1% burden.       From a sarcoid perspective, given slight drop in ejection fraction on the last echo I would like to do another echo in 6 months.  We have not done a repeat PET scan in the setting of the recent arrhythmias due to the fact that we had another cause which was hyperthyroidism.  If his function declines further in the next 6 months-would then do another PET scan-if that is negative could consider med up titration/angiogram/upgrade to biventricular pacemaker given the need for RV pacing now.  We will see which way the heart function goes but if again declines further would add back his lisinopril/do other med up titration just on beta-blocker for now.        He is NYHA class II, stage C.-Euvolemic today    We will keep him on  folic acid and methotrexate for now.       He should have CBC, LFTs in 3 months (we will order) and  echo and device interrogation in 3,6 months and a repeat echo at that time and EP visit..       Mitral regurgitation: This is never been more than moderate, will watch for now    Uveitis-  Followed locally     Mandi Dickey MD   [facility]  Patient Care Team:  [facility] as PCP - Helen Hernadez, RN as Specialty Care Coordinator (Cardiology)  Janice Shafer, PANCHITO as Specialty Care Coordinator (Cardiology)  Lauro Will MD as Assigned Heart and Vascular Provider  GIULIANO KOHLER  [place], [place]     Gary Soares MD

## 2022-06-16 NOTE — NURSING NOTE
Chief Complaint   Patient presents with     Follow Up     return HF for sarcoid, labs, device check prior       Vitals were taken and medications were reconciled.   BLANCA Johnson  9:05 AM

## 2022-06-16 NOTE — NURSING NOTE
Diet: Patient instructed regarding a heart failure healthy diet, including discussion of reduced fat and 2000 mg daily sodium restriction, daily weights, medication purpose and compliance, fluid restrictions and resources for patient and family to access for assistance with heart failure management.       Labs: Patient was given results of the laboratory testing obtained today and patient was instructed about when to return for the next laboratory testing.     Med Reconcile: Reviewed and verified all current medications with the patient. The updated medication list was printed and given to the patient.     Med changes: stop Xeralto    Return Appointment: Patient given instructions regarding scheduling next clinic visit: [place] in 6 months with Echo (if EF is down or MR is up, new plan), [place] in a year with labs    Patient stated he understood all health information given and agreed to call with further questions or concerns.     Helen Gonzalez RN

## 2022-06-16 NOTE — PROGRESS NOTES
June 16, 2022    I the pleasure of seeing Jose Carney in the [facility] Cardiac Sarcoidosis clinic today.     Complex history is as follows:     History sarcoidosis which  diagnosed by transbronchial biopsy in 2013.  He had been having back pain prior to this and 30 pound weight loss and had substantial mediastinal lymphadenopathy.  He was placed on several months of steroids with significant weight gain and improvement in symptoms as well as back pain.      In late summer and fall 2017 he started to develop palpitations which were found to be supraventricular tachycardia (likely AVNRT as well as some atrial tachycardia)  based on his cardiac monitor. He then had a cardiac MRI which was consistent with myocardial involvement from sarcoidosis (see below).  He was then sent down here for further management and was placed on metoprolol.    Based on high risk features of his cardiac MRI we recommended that he have an ICD placed. I did place him on a burst of prednisone given a highly suspicious PET scan which showed complete resolution of his cardiac sarcoidosis.  At that time I switch him to methotrexate as a staring steroid sparing agent and was titrating this up and tapering down his prednisone when he developed ventricular tachycardia.  This was in January 2019.  The VT was at a rate of 135 and was unable to be paced out.  He did not have syncope with this.  He was loaded with amiodarone, and given failure to gain control of his VT he was given Solu-Medrol and placed on higher dose of prednisone in addition to his methotrexate.     He was then given clinical stability I was eventually able to get him off of amiodarone in 2021 -for control of his sarcoid to get him off of prednisone I increase his methotrexate to 20 mg daily which he remains on today.    Thyroid function has been normal throughout the time that he was on amiodarone.     He was intermittently followed in EP clinic here for paroxysmal atrial  fibrillation.       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 fib burden on his last interrogation less than 1%.     He has no longer having palpitations.  He denies chest pressure or heaviness.  He has no fluid retention or exercise limitation.  He is feeling better with his thyroid under control.  No syncope.       Overall he reports feeling well.           PAST MEDICAL HISTORY:  Past Medical History:   Diagnosis Date     First degree AV block 3/20/2018     Palpitations 3/20/2018     Paroxysmal supraventricular tachycardia (H) 3/20/2018     Sarcoid, cardiac/EF 54% per MRI,[place] of affected myocardium is 12% of myocardial mass 1/25/2018     Sarcoidosis/ systemic 2013 3/20/2018     FAMILY HISTORY:  His family history is notable for several family members with autoimmune disease.  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  He denies any tobacco or alcohol use or recreational drug use.     CURRENT MEDICATIONS:    Current Outpatient Medications   Medication Sig Dispense Refill     finasteride (PROSCAR) 5 MG tablet Take 5 mg by mouth daily       folic acid (FOLVITE) 1 MG tablet TAKE FIVE TABLETS (5MG) ONCE WEEKLY WITH YOUR METHOTREXATE 60 tablet 3     furosemide (LASIX) 20 MG tablet Take 20 mg by mouth as needed       methimazole (TAPAZOLE) 10 MG tablet Take by mouth 2 times daily        methotrexate 2.5 MG tablet Take 8 tablets (20 mg) by mouth every 7 days Please schedule clinic appt for refills, [phone] 104 tablet 3     metoprolol succinate ER (TOPROL-XL) 25 MG 24 hr tablet Take 4 tablets (100 mg) by mouth daily 360  "tablet 1     prednisoLONE acetate (PRED FORTE) 1 % ophthalmic susp Place 1 drop into both eyes every hour       predniSONE (DELTASONE) 10 MG tablet        sildenafil (REVATIO) 20 MG tablet Take 20-60 mg by mouth as needed       tamsulosin (FLOMAX) 0.4 MG capsule        XARELTO ANTICOAGULANT 20 MG TABS tablet TAKE 1 TABLET (20 MG) BY MOUTH DAILY (WITH DINNER) 90 tablet 1     Patient is on methimazole and prednisone for thyroid    On methotrexate 20 mg daily  Sotalol just discontinued        ROS:   Constitutional: No fever, chills, or sweats.  Weight has finally stabilized with his thyroid treated  ENT: No visual disturbance, ear ache, epistaxis, sore throat.   Allergies/Immunologic: Negative.   Respiratory: No cough, hemoptysis.   Cardiovascular: As per HPI.   GI: No nausea, vomiting, hematemesis, melena, or hematochezia + poor appetite.   : No urinary frequency, dysuria, or hematuria.  He does have an abdominal hernia which [place]  Integument: Negative.   Psychiatric: Negative  Neuro: Tremors resolved  Endocrinology: Negative.   Musculoskeletal: Chronic back pain    Exam:   /68 (BP Location: Left arm, Patient Position: Chair, Cuff Size: Adult Regular)   Pulse 50   Ht 1.854 m (6' 1\")   Wt 88 kg (194 lb)   SpO2 100%   BMI 25.60 kg/m      General: appears comfortable, alert and articulate much thinner last night her last visit-   Head: normocephalic, atraumatic  Eyes: anicteric sclera, EOMI  Neck: no adenopathy  Orophyarynx: moist mucosa, no lesions, dentition intact  Heart: regular, S1/S2, III/IV systolic murmur at the apex, no gallop, rub, estimated JVP < 10   Lungs: clear, no rales or wheezing  Abdomen: soft, non-tender, bowel sounds present, no hepatosplenomegaly  Extremities: no clubbing, cyanosis or edema  Neurological: normal speech and affect, no gross motor deficits    Labs:  Lab Results   Component Value Date    WBC 8.1 03/29/2022    HGB 15.0 03/29/2022    HCT 44.8 " 03/29/2022     03/29/2022     06/15/2022    POTASSIUM 4.2 06/15/2022    CHLORIDE 112 (H) 06/15/2022    CO2 29 06/15/2022    BUN 19 06/15/2022    CR 0.88 06/15/2022     (H) 06/15/2022    NTBNP 837 06/15/2022    TROPI <0.015 09/04/2020    AST 19 09/04/2020    ALT 31 09/04/2020    ALKPHOS 71 09/04/2020    BILITOTAL 0.6 09/04/2020       NT proBNP  837 creatinine normal LFTs normal      Repeat PET:   8/2018    Impression: In this patient with cardiac sarcoidosis under treatment  with steroids for the last 4 months;  1. No FDG uptake within the myocardium. Overall there is complete  resolution of previous cardiac sarcoidosis.  2. Excellent suppression of myocardial glucose metabolism.  3. Stable number and size of parenchymal lung nodules, although  previously seen high metabolic activity within a few of these lesions  is no longer present.  4. Stable size number and metabolic activity of lymphadenopathy in the  mediastinum, bilateral axilla and right supraclavicular region. 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  5. Stable left stone.  The ER N and forming of the PEG and start any thickened o symptoms reported and rate has been right in between 150 to 170 beats a minute.   4. Clinical correlation advised.           Cardiac MRI: 2/2019    2. The RV is normal in cavity size. The global systolic function is normal. The RVEF is 66%.      3. Both atria are normal in size.     4. Due to ICD lead artifact, the tricuspid and pulmonic valves were not well visualized.       The mitral and aortic valves appear normally functioning without significant disease.       5. Late gadolinium enhancement imaging shows epicardial and mid-myocardial enhancement in the  basal-inferoseptal, basal-inferior, and basal-inferolateral segments.      6. There is no pericardial effusion.     7. Unable to assess for intracardiac thrombus.     8. Unable to do T2 mapping and assess for myocardial edema  (active inflammation).      CONCLUSIONS:    1. Cardiac sarcoidosis with preserved systolic function; LVEF 55%, RVEF 66%.  2. Sarcoidosis related scarring and severe [id number] involving the basal-inferoseptal and  basal-inferior segments. Total scar burden 12%. Unchanged from previous CMR dated 01/19/2018.        Local echo 11/2021  Left Ventricle   The left ventricular systolic function is low normal. Quantified left ventricle ejection fraction is 50.9 %. The left ventricle is mildly enlarged. There is   normal left ventricular wall thickness. There are regional wall motion abnormalities as specified. There is inferior base hypokinesis. There is inferolateral   base hypokinesis. There is anterolateral base hypokinesis.      Device: [employer] Resonate EL  Normal device function  Mode: DDDR  bpm  AP: 58%  : 48%  Intrinsic rhythm: SR @ 54 bpm  Lead Trends Appear Stable: RA pacing threshold increased to 1.6V@0.4ms. RV sensing as decreased to 3.4mV from 7.8 mV.  Estimated battery longevity to RRT = 8 years.    Atrial Arrhythmia: 10 ATR. Episodes lasting 1 second to 3.5 minutes. Available EGM shows Afib w/ controlled ventricular response.  AF [place]: <1%  Anticoagulant: Xarelto  Ventricular Arrhythmia: 7 nonsustV episodes since ablation on 3/29/22.  No EGMs available for review. Episodes lasted 6-18 seconds.  Setting Changes: RA pacing output increased to 2.6V@0.4 ms to allow 1V safety margin as pacing threshold has increased to 1.6V@0.4ms.  Patient has an appointment to see Dr. Will today.       Assessment and Plan:   Follow-up cardiac sarcoidosis    history of biopsy-proven cardiac sarcoid from a transbronchial biopsy-who has a cardiac MRI consistent with myocardial involvement from sarcoidosis. His PET scan related inflammation resolved completely with 3-4 months of 40 mg of prednisone.  I did have an ICD placed given he had high risk features on his cardiac MRI for malignant  arrhythmias.       With respect to his cardiac sarcoidosis, given history of inflammation by MRI and PET as well as ventricular arrhythmias I have kept him on long-term immunosuppression.  He is presently on methotrexate 20 mg weekly.  He happens to be on prednisone 10 mg daily likely for the hyperthyroidism although I am unclear the stop date of this.       His ejection fraction had declined slightly as of November 2021 in the setting of more arrhythmias with his hyperthyroidism.  He also had moderate mitral regurgitation at that time.     From a rhythm perspective, he has minimal atrial fibrillation burden and his AVNRT is ablated-EP took him off of all antiarrhythmics yesterday.  They are seeing him back in 6 months and will do a device interrogation in between.  We are taking him off of Xarelto today given low stroke risk and less than 1% burden.       From a sarcoid perspective, given slight drop in ejection fraction on the last echo I would like to do another echo in 6 months.  We have not done a repeat PET scan in the setting of the recent arrhythmias due to the fact that we had another cause which was hyperthyroidism.  If his function declines further in the next 6 months-would then do another PET scan-if that is negative could consider med up titration/angiogram/upgrade to biventricular pacemaker given the need for RV pacing now.  We will see which way the heart function goes but if again declines further would add back his lisinopril/do other med up titration just on beta-blocker for now.        He is NYHA class II, stage C.-Euvolemic today    We will keep him on folic acid and methotrexate for now.       He should have CBC, LFTs in 3 months (we will order) and  echo and device interrogation in 3,6 months and a repeat echo at that time and EP visit..       Mitral regurgitation: This is never been more than moderate, will watch for now    Uveitis-  Followed locally     Mandi Dickey MD   [facility]  [facility]  Patient Care Team:  Umm, Chino Andrews as PCP - General  Helen Gonzalez, RN as Specialty Care Coordinator (Cardiology)  Janice Shafer, RN as Specialty Care Coordinator (Cardiology)  Lauro Will MD as MD (Clinical Cardiac Electrophysiology)  Lauro Will MD as Assigned Heart and Vascular Provider  GIULIANO KOHLER  [place], [place]     Gary Soares, MD

## 2022-06-16 NOTE — TELEPHONE ENCOUNTER
METHOTREXATE 2.5 MG TABS 2.5 Tablet  Last Written Prescription Date:   6/1/2021  Last Fill Quantity: 104,   # refills: 3  Last Office Visit :  6/16/2022  Future Office visit:   12/21/2022    Routing refill request to provider for review/approval because:  Drug not on the [facility], P or [facility] refill protocol or controlled substance      Maria Esther Ribera RN  Central Triage Red Flags/Med Refills

## 2022-06-18 LAB
MDC_IDC_LEAD_IMPLANT_DT: NORMAL
MDC_IDC_LEAD_IMPLANT_DT: NORMAL
MDC_IDC_LEAD_LOCATION: NORMAL
MDC_IDC_LEAD_LOCATION: NORMAL
MDC_IDC_LEAD_LOCATION_DETAIL_1: NORMAL
MDC_IDC_LEAD_LOCATION_DETAIL_1: NORMAL
MDC_IDC_LEAD_MFG: NORMAL
MDC_IDC_LEAD_MFG: NORMAL
MDC_IDC_LEAD_MODEL: NORMAL
MDC_IDC_LEAD_MODEL: NORMAL
MDC_IDC_LEAD_POLARITY_TYPE: NORMAL
MDC_IDC_LEAD_POLARITY_TYPE: NORMAL
MDC_IDC_LEAD_SERIAL: NORMAL
MDC_IDC_LEAD_SERIAL: NORMAL
MDC_IDC_MSMT_BATTERY_DTM: NORMAL
MDC_IDC_MSMT_BATTERY_REMAINING_LONGEVITY: 96 MO
MDC_IDC_MSMT_BATTERY_REMAINING_PERCENTAGE: 89 %
MDC_IDC_MSMT_BATTERY_STATUS: NORMAL
MDC_IDC_MSMT_CAP_CHARGE_DTM: NORMAL
MDC_IDC_MSMT_CAP_CHARGE_TIME: 10.3 S
MDC_IDC_MSMT_CAP_CHARGE_TYPE: NORMAL
MDC_IDC_MSMT_LEADCHNL_RA_IMPEDANCE_VALUE: 727 OHM
MDC_IDC_MSMT_LEADCHNL_RA_PACING_THRESHOLD_AMPLITUDE: 1.6 V
MDC_IDC_MSMT_LEADCHNL_RA_PACING_THRESHOLD_PULSEWIDTH: 0.4 MS
MDC_IDC_MSMT_LEADCHNL_RV_IMPEDANCE_VALUE: 515 OHM
MDC_IDC_MSMT_LEADCHNL_RV_LEAD_CHANNEL_STATUS: NORMAL
MDC_IDC_MSMT_LEADCHNL_RV_PACING_THRESHOLD_AMPLITUDE: 1 V
MDC_IDC_MSMT_LEADCHNL_RV_PACING_THRESHOLD_PULSEWIDTH: 0.4 MS
MDC_IDC_PG_IMPLANT_DTM: NORMAL
MDC_IDC_PG_MFG: NORMAL
MDC_IDC_PG_MODEL: NORMAL
MDC_IDC_PG_SERIAL: NORMAL
MDC_IDC_PG_TYPE: NORMAL
MDC_IDC_SESS_CLINIC_NAME: NORMAL
MDC_IDC_SESS_DTM: NORMAL
MDC_IDC_SESS_TYPE: NORMAL
MDC_IDC_SET_BRADY_AT_MODE_SWITCH_MODE: NORMAL
MDC_IDC_SET_BRADY_AT_MODE_SWITCH_RATE: 170 {BEATS}/MIN
MDC_IDC_SET_BRADY_LOWRATE: 60 {BEATS}/MIN
MDC_IDC_SET_BRADY_MAX_SENSOR_RATE: 125 {BEATS}/MIN
MDC_IDC_SET_BRADY_MAX_TRACKING_RATE: 125 {BEATS}/MIN
MDC_IDC_SET_BRADY_MODE: NORMAL
MDC_IDC_SET_BRADY_PAV_DELAY_HIGH: 110 MS
MDC_IDC_SET_BRADY_PAV_DELAY_LOW: 220 MS
MDC_IDC_SET_BRADY_SAV_DELAY_HIGH: 110 MS
MDC_IDC_SET_BRADY_SAV_DELAY_LOW: 220 MS
MDC_IDC_SET_LEADCHNL_RA_PACING_AMPLITUDE: 2.6 V
MDC_IDC_SET_LEADCHNL_RA_PACING_CAPTURE_MODE: NORMAL
MDC_IDC_SET_LEADCHNL_RA_PACING_POLARITY: NORMAL
MDC_IDC_SET_LEADCHNL_RA_PACING_PULSEWIDTH: 0.4 MS
MDC_IDC_SET_LEADCHNL_RA_SENSING_ADAPTATION_MODE: NORMAL
MDC_IDC_SET_LEADCHNL_RA_SENSING_POLARITY: NORMAL
MDC_IDC_SET_LEADCHNL_RA_SENSING_SENSITIVITY: 0.25 MV
MDC_IDC_SET_LEADCHNL_RV_PACING_AMPLITUDE: 2.5 V
MDC_IDC_SET_LEADCHNL_RV_PACING_CAPTURE_MODE: NORMAL
MDC_IDC_SET_LEADCHNL_RV_PACING_POLARITY: NORMAL
MDC_IDC_SET_LEADCHNL_RV_PACING_PULSEWIDTH: 0.4 MS
MDC_IDC_SET_LEADCHNL_RV_SENSING_ADAPTATION_MODE: NORMAL
MDC_IDC_SET_LEADCHNL_RV_SENSING_POLARITY: NORMAL
MDC_IDC_SET_LEADCHNL_RV_SENSING_SENSITIVITY: 0.6 MV
MDC_IDC_SET_ZONE_DETECTION_INTERVAL: 300 MS
MDC_IDC_SET_ZONE_DETECTION_INTERVAL: 353 MS
MDC_IDC_SET_ZONE_DETECTION_INTERVAL: 462 MS
MDC_IDC_SET_ZONE_TYPE: NORMAL
MDC_IDC_SET_ZONE_VENDOR_TYPE: NORMAL
MDC_IDC_STAT_AT_BURDEN_PERCENT: 1 %
MDC_IDC_STAT_AT_DTM_END: NORMAL
MDC_IDC_STAT_AT_DTM_START: NORMAL
MDC_IDC_STAT_BRADY_DTM_END: NORMAL
MDC_IDC_STAT_BRADY_DTM_START: NORMAL
MDC_IDC_STAT_BRADY_RA_PERCENT_PACED: 58 %
MDC_IDC_STAT_BRADY_RV_PERCENT_PACED: 48 %
MDC_IDC_STAT_EPISODE_RECENT_COUNT: 0
MDC_IDC_STAT_EPISODE_RECENT_COUNT: 0
MDC_IDC_STAT_EPISODE_RECENT_COUNT: 15
MDC_IDC_STAT_EPISODE_RECENT_COUNT: 170
MDC_IDC_STAT_EPISODE_RECENT_COUNT: 2
MDC_IDC_STAT_EPISODE_RECENT_COUNT: 224
MDC_IDC_STAT_EPISODE_RECENT_COUNT_DTM_END: NORMAL
MDC_IDC_STAT_EPISODE_RECENT_COUNT_DTM_START: NORMAL
MDC_IDC_STAT_EPISODE_TYPE: NORMAL
MDC_IDC_STAT_EPISODE_VENDOR_TYPE: NORMAL
MDC_IDC_STAT_TACHYTHERAPY_ATP_DELIVERED_RECENT: 15
MDC_IDC_STAT_TACHYTHERAPY_ATP_DELIVERED_TOTAL: 17
MDC_IDC_STAT_TACHYTHERAPY_RECENT_DTM_END: NORMAL
MDC_IDC_STAT_TACHYTHERAPY_RECENT_DTM_START: NORMAL
MDC_IDC_STAT_TACHYTHERAPY_SHOCKS_ABORTED_RECENT: 0
MDC_IDC_STAT_TACHYTHERAPY_SHOCKS_ABORTED_TOTAL: 0
MDC_IDC_STAT_TACHYTHERAPY_SHOCKS_DELIVERED_RECENT: 0
MDC_IDC_STAT_TACHYTHERAPY_SHOCKS_DELIVERED_TOTAL: 0
MDC_IDC_STAT_TACHYTHERAPY_TOTAL_DTM_END: NORMAL
MDC_IDC_STAT_TACHYTHERAPY_TOTAL_DTM_START: NORMAL

## 2022-06-28 ENCOUNTER — TELEPHONE (OUTPATIENT)
Dept: CARDIOLOGY | Facility: CLINIC | Age: 62
End: 2022-06-28

## 2022-09-18 ENCOUNTER — HEALTH MAINTENANCE LETTER (OUTPATIENT)
Age: 62
End: 2022-09-18

## 2022-09-27 DIAGNOSIS — D86.85 SARCOID, CARDIAC: ICD-10-CM

## 2022-09-29 RX ORDER — METOPROLOL SUCCINATE 25 MG/1
100 TABLET, EXTENDED RELEASE ORAL DAILY
Qty: 360 TABLET | Refills: 3 | Status: ON HOLD | OUTPATIENT
Start: 2022-09-29 | End: 2023-08-29

## 2022-12-28 ENCOUNTER — ANCILLARY PROCEDURE (OUTPATIENT)
Dept: CARDIOLOGY | Facility: CLINIC | Age: 62
End: 2022-12-28
Attending: INTERNAL MEDICINE
Payer: COMMERCIAL

## 2022-12-28 VITALS
WEIGHT: 197 LBS | OXYGEN SATURATION: 99 % | BODY MASS INDEX: 26.11 KG/M2 | DIASTOLIC BLOOD PRESSURE: 67 MMHG | HEIGHT: 73 IN | HEART RATE: 63 BPM | SYSTOLIC BLOOD PRESSURE: 110 MMHG

## 2022-12-28 DIAGNOSIS — I47.10 PAROXYSMAL SUPRAVENTRICULAR TACHYCARDIA (H): ICD-10-CM

## 2022-12-28 DIAGNOSIS — D86.85 SARCOID, CARDIAC: ICD-10-CM

## 2022-12-28 DIAGNOSIS — I47.10 PAROXYSMAL SUPRAVENTRICULAR TACHYCARDIA (H): Primary | ICD-10-CM

## 2022-12-28 DIAGNOSIS — Z95.810 ICD (IMPLANTABLE CARDIOVERTER-DEFIBRILLATOR) IN PLACE: ICD-10-CM

## 2022-12-28 DIAGNOSIS — D86.85 CARDIAC SARCOIDOSIS: ICD-10-CM

## 2022-12-28 LAB
BI-PLANE LVEF ECHO: NORMAL
MDC_IDC_LEAD_IMPLANT_DT: NORMAL
MDC_IDC_LEAD_IMPLANT_DT: NORMAL
MDC_IDC_LEAD_LOCATION: NORMAL
MDC_IDC_LEAD_LOCATION: NORMAL
MDC_IDC_LEAD_LOCATION_DETAIL_1: NORMAL
MDC_IDC_LEAD_LOCATION_DETAIL_1: NORMAL
MDC_IDC_LEAD_MFG: NORMAL
MDC_IDC_LEAD_MFG: NORMAL
MDC_IDC_LEAD_MODEL: NORMAL
MDC_IDC_LEAD_MODEL: NORMAL
MDC_IDC_LEAD_POLARITY_TYPE: NORMAL
MDC_IDC_LEAD_POLARITY_TYPE: NORMAL
MDC_IDC_LEAD_SERIAL: NORMAL
MDC_IDC_LEAD_SERIAL: NORMAL
MDC_IDC_MSMT_BATTERY_DTM: NORMAL
MDC_IDC_MSMT_BATTERY_REMAINING_LONGEVITY: 90 MO
MDC_IDC_MSMT_BATTERY_REMAINING_PERCENTAGE: 83 %
MDC_IDC_MSMT_BATTERY_STATUS: NORMAL
MDC_IDC_MSMT_CAP_CHARGE_DTM: NORMAL
MDC_IDC_MSMT_CAP_CHARGE_TIME: 10.4 S
MDC_IDC_MSMT_CAP_CHARGE_TYPE: NORMAL
MDC_IDC_MSMT_LEADCHNL_RA_IMPEDANCE_VALUE: 734 OHM
MDC_IDC_MSMT_LEADCHNL_RA_PACING_THRESHOLD_AMPLITUDE: 2 V
MDC_IDC_MSMT_LEADCHNL_RA_PACING_THRESHOLD_PULSEWIDTH: 0.4 MS
MDC_IDC_MSMT_LEADCHNL_RV_IMPEDANCE_VALUE: 494 OHM
MDC_IDC_MSMT_LEADCHNL_RV_LEAD_CHANNEL_STATUS: NORMAL
MDC_IDC_MSMT_LEADCHNL_RV_PACING_THRESHOLD_AMPLITUDE: 1 V
MDC_IDC_MSMT_LEADCHNL_RV_PACING_THRESHOLD_PULSEWIDTH: 0.4 MS
MDC_IDC_PG_IMPLANT_DTM: NORMAL
MDC_IDC_PG_MFG: NORMAL
MDC_IDC_PG_MODEL: NORMAL
MDC_IDC_PG_SERIAL: NORMAL
MDC_IDC_PG_TYPE: NORMAL
MDC_IDC_SESS_CLINIC_NAME: NORMAL
MDC_IDC_SESS_DTM: NORMAL
MDC_IDC_SESS_TYPE: NORMAL
MDC_IDC_SET_BRADY_AT_MODE_SWITCH_MODE: NORMAL
MDC_IDC_SET_BRADY_AT_MODE_SWITCH_RATE: 170 {BEATS}/MIN
MDC_IDC_SET_BRADY_LOWRATE: 60 {BEATS}/MIN
MDC_IDC_SET_BRADY_MAX_SENSOR_RATE: 125 {BEATS}/MIN
MDC_IDC_SET_BRADY_MAX_TRACKING_RATE: 125 {BEATS}/MIN
MDC_IDC_SET_BRADY_MODE: NORMAL
MDC_IDC_SET_BRADY_PAV_DELAY_HIGH: 110 MS
MDC_IDC_SET_BRADY_PAV_DELAY_LOW: 220 MS
MDC_IDC_SET_BRADY_SAV_DELAY_HIGH: 110 MS
MDC_IDC_SET_BRADY_SAV_DELAY_LOW: 220 MS
MDC_IDC_SET_LEADCHNL_RA_PACING_AMPLITUDE: 3.5 V
MDC_IDC_SET_LEADCHNL_RA_PACING_CAPTURE_MODE: NORMAL
MDC_IDC_SET_LEADCHNL_RA_PACING_POLARITY: NORMAL
MDC_IDC_SET_LEADCHNL_RA_PACING_PULSEWIDTH: 0.4 MS
MDC_IDC_SET_LEADCHNL_RA_SENSING_ADAPTATION_MODE: NORMAL
MDC_IDC_SET_LEADCHNL_RA_SENSING_POLARITY: NORMAL
MDC_IDC_SET_LEADCHNL_RA_SENSING_SENSITIVITY: 0.25 MV
MDC_IDC_SET_LEADCHNL_RV_PACING_AMPLITUDE: 2.5 V
MDC_IDC_SET_LEADCHNL_RV_PACING_CAPTURE_MODE: NORMAL
MDC_IDC_SET_LEADCHNL_RV_PACING_POLARITY: NORMAL
MDC_IDC_SET_LEADCHNL_RV_PACING_PULSEWIDTH: 0.4 MS
MDC_IDC_SET_LEADCHNL_RV_SENSING_ADAPTATION_MODE: NORMAL
MDC_IDC_SET_LEADCHNL_RV_SENSING_POLARITY: NORMAL
MDC_IDC_SET_LEADCHNL_RV_SENSING_SENSITIVITY: 0.6 MV
MDC_IDC_SET_ZONE_DETECTION_INTERVAL: 300 MS
MDC_IDC_SET_ZONE_DETECTION_INTERVAL: 353 MS
MDC_IDC_SET_ZONE_DETECTION_INTERVAL: 462 MS
MDC_IDC_SET_ZONE_TYPE: NORMAL
MDC_IDC_SET_ZONE_VENDOR_TYPE: NORMAL
MDC_IDC_STAT_AT_BURDEN_PERCENT: 1 %
MDC_IDC_STAT_AT_DTM_END: NORMAL
MDC_IDC_STAT_AT_DTM_START: NORMAL
MDC_IDC_STAT_BRADY_DTM_END: NORMAL
MDC_IDC_STAT_BRADY_DTM_START: NORMAL
MDC_IDC_STAT_BRADY_RA_PERCENT_PACED: 51 %
MDC_IDC_STAT_BRADY_RV_PERCENT_PACED: 34 %
MDC_IDC_STAT_EPISODE_RECENT_COUNT: 0
MDC_IDC_STAT_EPISODE_RECENT_COUNT: 12
MDC_IDC_STAT_EPISODE_RECENT_COUNT: 61
MDC_IDC_STAT_EPISODE_RECENT_COUNT_DTM_END: NORMAL
MDC_IDC_STAT_EPISODE_RECENT_COUNT_DTM_START: NORMAL
MDC_IDC_STAT_EPISODE_TYPE: NORMAL
MDC_IDC_STAT_EPISODE_VENDOR_TYPE: NORMAL
MDC_IDC_STAT_TACHYTHERAPY_ATP_DELIVERED_RECENT: 0
MDC_IDC_STAT_TACHYTHERAPY_ATP_DELIVERED_TOTAL: 17
MDC_IDC_STAT_TACHYTHERAPY_RECENT_DTM_END: NORMAL
MDC_IDC_STAT_TACHYTHERAPY_RECENT_DTM_START: NORMAL
MDC_IDC_STAT_TACHYTHERAPY_SHOCKS_ABORTED_RECENT: 0
MDC_IDC_STAT_TACHYTHERAPY_SHOCKS_ABORTED_TOTAL: 0
MDC_IDC_STAT_TACHYTHERAPY_SHOCKS_DELIVERED_RECENT: 0
MDC_IDC_STAT_TACHYTHERAPY_SHOCKS_DELIVERED_TOTAL: 0
MDC_IDC_STAT_TACHYTHERAPY_TOTAL_DTM_END: NORMAL
MDC_IDC_STAT_TACHYTHERAPY_TOTAL_DTM_START: NORMAL

## 2022-12-28 PROCEDURE — 99207 PR STATISTIC IV PUSH SINGLE INITIAL SUBSTANCE: CPT | Performed by: INTERNAL MEDICINE

## 2022-12-28 PROCEDURE — 93283 PRGRMG EVAL IMPLANTABLE DFB: CPT | Performed by: INTERNAL MEDICINE

## 2022-12-28 PROCEDURE — 93306 TTE W/DOPPLER COMPLETE: CPT | Performed by: INTERNAL MEDICINE

## 2022-12-28 PROCEDURE — G0463 HOSPITAL OUTPT CLINIC VISIT: HCPCS | Performed by: INTERNAL MEDICINE

## 2022-12-28 PROCEDURE — 99215 OFFICE O/P EST HI 40 MIN: CPT | Mod: 25 | Performed by: INTERNAL MEDICINE

## 2022-12-28 PROCEDURE — G0463 HOSPITAL OUTPT CLINIC VISIT: HCPCS | Mod: 25

## 2022-12-28 RX ORDER — TIMOLOL MALEATE 5 MG/ML
1 SOLUTION/ DROPS OPHTHALMIC AT BEDTIME
COMMUNITY
Start: 2022-12-16

## 2022-12-28 RX ADMIN — Medication 6 ML: at 10:28

## 2022-12-28 ASSESSMENT — PAIN SCALES - GENERAL: PAINLEVEL: NO PAIN (0)

## 2022-12-28 NOTE — PATIENT INSTRUCTIONS
It was a pleasure to see you in clinic today. Please do not hesitate to call with any questions or concerns. You are scheduled for a remote ICD transmission in 3 months. This will happen automatically in the night. We look forward to seeing you in clinic at your next device check in 6 months.    Fabiola Justice, RN  Electrophysiology Nurse Clinician  [facility]  During business hours call:  [phone]  Urgent needs after hours- please call: [phone]- select option #4 and ask for job code [id number].

## 2022-12-28 NOTE — NURSING NOTE
Chief Complaint   Patient presents with     Follow Up     Return EP- 6 mo follow-up SVT, cardiac sarcoid. SVT ablation 3/2022     Vitals were taken and medications reconciled.    BLANCA Vivas  11:26 AM

## 2022-12-28 NOTE — PROGRESS NOTES
ELECTROPHYSIOLOGY CLINIC VISIT    Assessment/Recommendations   Assessment/Plan:    Mr. Carney is a 61 year old male who has a past medical history significant for PSVT, sarcoid (cardiac and systemic), NICM LVEF 44% improved to 50%, s/p ICD 2018, PAF (CHADSVASC 1 on Xarelto), and new hyperthyroidism.     NICM 2/2 sarcoid LVEF 50%, NYHA II  SVT:  1. ACEi/ARB: not currently required.  2. BB: Continue Toprol Xl   3. Aldosterone antagonist: not currently required.  4. SCD prophylaxis: s/p ICD in 2018 for high risk cardiac sarcoid features.   5. Fluid status: Continue lasix.   6. Reviewed device tracings and ECGs from OSH 1/2019. All show SVT and no VT. He had been on amiodarone and this was stopped and changed to Sotalol in Summer 2021. He also has very low burden of PAF. He is on Xarelto for stroke prophylaxis. He has had a number of SVT episodes and is now getting inappropriate therapies from ICD. He had AVNRT ablation in 3/29/2022 and his sotalol was decreased to 40 BID after the ablation, and was stopped in June 2022. His device interrogation showed No ATP since the ablation procedure, but runs of nonsustained without EGMs.   His EF is <45% and he has had orthostatic dizziness. We are considering starting him on small dose lisinopril since he has also worsened his mitral regurgitation. He inquired about potentially getting him off Methotrexate since he looks like he has no activity in his heart.  Will reach out to Dr Dickey about  Lisnorpil 2.5 and weaning down methotrexate.   Will have him stay off Xarelto for now but he has to restart anticoagulation at age 66.  F/u one year.       History of Present Illness/Subjective    Mr. Jose Carney is a 61 year old male who comes in today for EP consultation of VT, cardiac sarcoid.    Mr. Carney is a 61 year old male who has a past medical history significant for PSVT, sarcoid (cardiac and systemic), NICM LVEF 44% improved to 50%, s/p ICD 2018, PAF (CHADSVASC 1 on  Xarelto), and new hyperthrodism.     In [id number], he started having back pain and had a 30 lbs weight loss. Work up showed substantial mediastinal lymphadenopathy. He was placed on several months of steroids and had weight gain and improvement of his back pain symptoms. In 2013 he had transbronchial biopsy in 2013 which was positive for sarcoidosis. In 2017, he developed palpitations and was found to have SVT. CMR was c/w cardiac sarcoid with LVEF 44% and high risk features. Thus, he had ICD implanted in 11/2018.  PET scan also demonstrated active cardiac and systemic sarcoid and he was treated with steroids resulting in resolution of active sarcoid. At that time, he was transitioned to methotrexate and his steroids were tapered down. He had palpitations in 1/2019 and presented to OSH ER. He was felt to be in VT at a rate of 135 bpm and was unable to be paced out of it. He had to be externally cardioverted. He was loaded with amiodarone and given failure to gain control of his VT he was given Solu-Medrol and placed on higher dose of prednisone in addition to his methotrexate. He also developed AF. He was placed on Xarelto for stroke prophylaxis. His Metoprolol was increased and improved his AF burden. He was taken off amiodarone in summer of 2021 to avoid chronic side effects by his local EP and was then placed on Sotalol. He recently followed up with Dr. Dickey, he lost 30 lbs and he was found to be hyperthyroid. He underwent evaluation by endocrine and was started on prednisone and methimazole in 12/21/21. His recent ICD checks also were reported to show increasing VT and AF burdens. He has not lost consciousness but has felt quite dizzy (mostly positional).     EP visit 1/4/2022: He was then referred to EP for further management of arrhythmias. He reports feeling at baseline. He has some palpitations and positional dizziness. He denies chest discomfort, abdominal fullness/bloating or peripheral edema,  shortness of breath, paroxysmal nocturnal dyspnea, orthopnea, or syncope.A recent device interrogation showed normal device function, from 12/17-12/20/21 pt has had 1 VT episode each day 139-145 bpm. Each episode was converted with 1 ATP sequence. EGMs reviewed in detail and show SVT triggered by PAC and not true VT. Most recent AF episode was 12/2/21 lasting 4 hours with total AF [place]: <1%. Most recent echo from 11/20201 (OSH) reported to show mild LV dilation, LVEF 50%, severely enlarged LA, moderate MR,and  mild TR. Current cardiac medications include: Toprol XL, Sotalol, Xarelto, and Lasix.      EP visit 6/15/22: He presents today for follow-up. He feels very well and has no complaints. He had AVNRT ablation in 3/29/2022 and his sotalol was decreased to 40 BID after the ablation. .His device interrogation showed No ATP since the ablation procedure, but runs of nonsustained without EGMs. The patient denies feeling palpitations or chest symptoms at this time. AT this visit we stopped his sotalol and discussed stopping the Xarelto.    He presents today for follow-up. He reports feeling well. He has no LE edema, NEWELL but still not as bad as it was, no syncope or pre-syncope, gets a bit lightheaded when standing up quickly but no falls, off sotalol and Xarelto, currently on , Methotrexate 2.5, prednisone 5 for hyperthyroidism rather than sarcoid, Lasix as needed but has not taken it for a while. His device interrogation shows 61 AT/AFL episodes recorded over the last 6 months lasting 33 sec to 31 hours. Longest episode was 8/29/22. AF [place]: <1%. He is not aware of AF episode of 33 hours. His TTE today showed EF of 42%, moderate to severe left ventricular dilation is present.LVIDd 66mm, and Severe mitral insufficiency, mild to moderate tricuspid insufficiency is present, right ventricular systolic pressure is 24mmHg above the right atrial pressure, IVC diameter >2.1 cm collapsing <50% with sniff suggests a  high RA pressure estimated at 15 mmHg or greater.    I have reviewed and updated the patient's Past Medical History, Social History, Family History and Medication List.     Cardiographics (Personally Reviewed) :   TTE 12/28/2022:  Interpretation Summary  Moderate to severe left ventricular dilation is present.LVIDd 66mm. Biplane LVEF is 42%.  Basal 9inferior wall aneurysm.  Right ventricular function, chamber size, wall motion, and thickness are normal.  Severe mitral insufficiency is present.  Mild to moderate tricuspid insufficiency is present.  Right ventricular systolic pressure is 24mmHg above the right atrial pressure.  IVC diameter >2.1 cm collapsing <50% with sniff suggests a high RA pressure estimated at 15 mmHg or greater.  No pericardial effusion is present.  Mitral insufficiency has worsened.    11/2021 Echo (OSH Report):  Interpretation Summary   The left ventricle is mildly enlarged with low normal left ventricular function.   There are regional wall motion abnormalities as specified.   Left atrium is severely enlarged by volume.   Left ventricular diastolic function is normal.   There is moderate mitral regurgitation.   Mild tricuspid regurgitation.   Estimated RV systolic pressure is 22 mm Hg above right atrial pressure.   There is no pericardial effusion.   Catheter/pacemaker lead noted within the right ventricle.   Compared to echocardiogram dated 3/26/21, LVEDD measures larger (6.3cm vs. 6.0 cm) with similar to marginally improved function. Other findings are unchanged.     1/30/20 Echo:   Interpretation Summary  Borderline (EF 50-55%, traced 55%) reduced left ventricular function is  present. There is akinesis involving the basal inferolateral segment.  Right ventricular function, chamber size, wall motion, and thickness are normal.  Mild to moderate eccentric mitral insufficiency is present.  This study was compared with the study from 9/26/19: There has been no change.    2018 Cardiac  PET:  Impression: In this patient with cardiac sarcoidosis under treatment with steroids for the last 4 months;  1. No FDG uptake within the myocardium. Overall there is complete resolution of previous cardiac sarcoidosis.  2. Excellent suppression of myocardial glucose metabolism.  3. Stable number and size of parenchymal lung nodules, although previously seen high metabolic activity within a few of these lesions is no longer present.  4. Stable size number and metabolic activity of lymphadenopathy in the mediastinum, bilateral axilla and right supraclavicular region. 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  5. Stable left stone.    9/2020 CMR:  1. The LV is normal in cavity size and wall thickness except for basal inferior thinning. The global systolic function is mildly reduced. The LVEF is 45%. There is akinesis of the basal inferoseptal and basal inferior segments, and severe hypokinesis of the basal inferolateral segment.  2. The RV is normal in cavity size. The global systolic function is normal. The RVEF is 55%.   3. The left atrium is moderately dilated. The right atrium is mildly dilated.  4. There is no significant valvular disease. However, the assessment of valve function is limited due to the gradient-echo cine imaging used to minimize ICD-related artifacts.  5. Late gadolinium enhancement imaging shows epicardial hyperenhancement of the basal inferoseptal, basal inferior, and basal inferoseptal segments. The hyperenhancement is transmural in the basal inferior segment.  6. There is no pericardial effusion or thickening.  7. There is no intracardiac thrombus.  CONCLUSIONS: Non-ischemic cardiomyopathy with epicardial and transmural LGE in a pattern that fits with cardiac sarcoidosis as the cause. LVEF of 45%, which is unchanged from the prior CMR of 02- (the LVEF on that CMR was 44% when I measured it today). The extent of LGE is also unchanged from the prior CMR.  "Normal RVEF.       Physical Examination   There were no vitals taken for this visit.  Wt Readings from Last 3 Encounters:   06/16/22 88 kg (194 lb)   06/15/22 87.5 kg (193 lb)   03/29/22 86.2 kg (190 lb)     /67 (BP Location: Right arm, Patient Position: Chair, Cuff Size: Adult Regular)   Pulse 63   Ht 1.855 m (6' 1.03\")   Wt 89.4 kg (197 lb)   SpO2 99%   BMI 25.97 kg/m      CONSITUTIONAL: no acute distress  HEENT: no icterus, no redness or discharge, neck supple  CV: no visible edema of visualized extremities. No JVD.   RESPIRATORY: respirations nonlabored, no cough  NEURO: AA&Ox3, speech fluent/appropriate, motor grossly nonfocal  PSYCH: cooperative, affect appropriate  DERM: no rashes on visualized face/neck/upper extremities       Medications  Allergies   Current Outpatient Medications   Medication Sig Dispense Refill     finasteride (PROSCAR) 5 MG tablet Take 5 mg by mouth daily       folic acid (FOLVITE) 1 MG tablet TAKE FIVE TABLETS (5MG) ONCE WEEKLY WITH YOUR METHOTREXATE 60 tablet 3     furosemide (LASIX) 20 MG tablet Take 20 mg by mouth as needed       methimazole (TAPAZOLE) 10 MG tablet Take by mouth 2 times daily        methotrexate 2.5 MG tablet Take 8 tablets (20 mg) by mouth every 7 days 104 tablet 3     metoprolol succinate ER (TOPROL XL) 25 MG 24 hr tablet TAKE 4 TABLETS (100 MG) BY MOUTH DAILY 360 tablet 3     prednisoLONE acetate (PRED FORTE) 1 % ophthalmic susp Place 1 drop into both eyes every hour       predniSONE (DELTASONE) 10 MG tablet        sildenafil (REVATIO) 20 MG tablet Take 20-60 mg by mouth as needed       tamsulosin (FLOMAX) 0.4 MG capsule       Allergies   Allergen Reactions     Seasonal Allergies      Terbinafine          Lab Results (Personally Reviewed)    Chemistry/lipid CBC Cardiac Enzymes/BNP/TSH/INR   Lab Results   Component Value Date    BUN 19 06/15/2022     06/15/2022    CO2 29 06/15/2022     Creatinine   Date Value Ref Range Status   06/15/2022 0.88 0.66 " - 1.25 mg/dL Final   09/04/2020 1.19 0.66 - 1.25 mg/dL Final       No results found for: CHOL, HDL, LDL, CHOLHDL   Lab Results   Component Value Date    WBC 8.1 03/29/2022    HGB 15.0 03/29/2022    HCT 44.8 03/29/2022    MCV 99 03/29/2022     03/29/2022    Lab Results   Component Value Date    TSH 1.01 03/22/2018        Lauro Will MD [facility]  Cardiology - Electrophysiology    Total time spent on patient visit, reviewing notes, imaging, labs, orders, and completing necessary documentation: 45 minutes.

## 2022-12-28 NOTE — LETTER
12/28/2022      RE: Jose Carney  [address]  [facility] [address]       Dear Colleague,    Thank you for the opportunity to participate in the care of your patient, Jose Carney, at the [facility] [place] at [facility]. Please see a copy of my visit note below.        ELECTROPHYSIOLOGY CLINIC VISIT    Assessment/Recommendations   Assessment/Plan:    Mr. Carney is a 61 year old male who has a past medical history significant for PSVT, sarcoid (cardiac and systemic), NICM LVEF 44% improved to 50%, s/p ICD 2018, PAF (CHADSVASC 1 on Xarelto), and new hyperthyroidism.     NICM 2/2 sarcoid LVEF 50%, NYHA II  SVT:  1. ACEi/ARB: not currently required.  2. BB: Continue Toprol Xl   3. Aldosterone antagonist: not currently required.  4. SCD prophylaxis: s/p ICD in 2018 for high risk cardiac sarcoid features.   5. Fluid status: Continue lasix.   6. Reviewed device tracings and ECGs from OSH 1/2019. All show SVT and no VT. He had been on amiodarone and this was stopped and changed to Sotalol in Summer 2021. He also has very low burden of PAF. He is on Xarelto for stroke prophylaxis. He has had a number of SVT episodes and is now getting inappropriate therapies from ICD. He had AVNRT ablation in 3/29/2022 and his sotalol was decreased to 40 BID after the ablation, and was stopped in June 2022. His device interrogation showed No ATP since the ablation procedure, but runs of nonsustained without EGMs.   His EF is <45% and he has had orthostatic dizziness. We are considering starting him on small dose lisinopril since he has also worsened his mitral regurgitation. He inquired about potentially getting him off Methotrexate since he looks like he has no activity in his heart.  Will reach out to Dr Dickey about  Lisnorpil 2.5 and weaning down methotrexate.   Will have him stay off Xarelto for now but he has to restart anticoagulation at age 66.  F/u one year.        History of Present Illness/Subjective    Mr. Jose Carney is a 61 year old male who comes in today for EP consultation of VT, cardiac sarcoid.    Mr. Carney is a 61 year old male who has a past medical history significant for PSVT, sarcoid (cardiac and systemic), NICM LVEF 44% improved to 50%, s/p ICD 2018, PAF (CHADSVASC 1 on Xarelto), and new hyperthrodism.     In [id number], he started having back pain and had a 30 lbs weight loss. Work up showed substantial mediastinal lymphadenopathy. He was placed on several months of steroids and had weight gain and improvement of his back pain symptoms. In 2013 he had transbronchial biopsy in 2013 which was positive for sarcoidosis. In 2017, he developed palpitations and was found to have SVT. CMR was c/w cardiac sarcoid with LVEF 44% and high risk features. Thus, he had ICD implanted in 11/2018.  PET scan also demonstrated active cardiac and systemic sarcoid and he was treated with steroids resulting in resolution of active sarcoid. At that time, he was transitioned to methotrexate and his steroids were tapered down. He had palpitations in 1/2019 and presented to OSH ER. He was felt to be in VT at a rate of 135 bpm and was unable to be paced out of it. He had to be externally cardioverted. He was loaded with amiodarone and given failure to gain control of his VT he was given Solu-Medrol and placed on higher dose of prednisone in addition to his methotrexate. He also developed AF. He was placed on Xarelto for stroke prophylaxis. His Metoprolol was increased and improved his AF burden. He was taken off amiodarone in summer of 2021 to avoid chronic side effects by his local EP and was then placed on Sotalol. He recently followed up with Dr. Dickey, he lost 30 lbs and he was found to be hyperthyroid. He underwent evaluation by endocrine and was started on prednisone and methimazole in 12/21/21. His recent ICD checks also were reported to show increasing VT and AF burdens.  He has not lost consciousness but has felt quite dizzy (mostly positional).     EP visit 1/4/2022: He was then referred to EP for further management of arrhythmias. He reports feeling at baseline. He has some palpitations and positional dizziness. He denies chest discomfort, abdominal fullness/bloating or peripheral edema, shortness of breath, paroxysmal nocturnal dyspnea, orthopnea, or syncope.A recent device interrogation showed normal device function, from 12/17-12/20/21 pt has had 1 VT episode each day 139-145 bpm. Each episode was converted with 1 ATP sequence. EGMs reviewed in detail and show SVT triggered by PAC and not true VT. Most recent AF episode was 12/2/21 lasting 4 hours with total AF [place]: <1%. Most recent echo from 11/20201 (OSH) reported to show mild LV dilation, LVEF 50%, severely enlarged LA, moderate MR,and  mild TR. Current cardiac medications include: Toprol XL, Sotalol, Xarelto, and Lasix.      EP visit 6/15/22: He presents today for follow-up. He feels very well and has no complaints. He had AVNRT ablation in 3/29/2022 and his sotalol was decreased to 40 BID after the ablation. .His device interrogation showed No ATP since the ablation procedure, but runs of nonsustained without EGMs. The patient denies feeling palpitations or chest symptoms at this time. AT this visit we stopped his sotalol and discussed stopping the Xarelto.    He presents today for follow-up. He reports feeling well. He has no LE edema, NEWELL but still not as bad as it was, no syncope or pre-syncope, gets a bit lightheaded when standing up quickly but no falls, off sotalol and Xarelto, currently on , Methotrexate 2.5, prednisone 5 for hyperthyroidism rather than sarcoid, Lasix as needed but has not taken it for a while. His device interrogation shows 61 AT/AFL episodes recorded over the last 6 months lasting 33 sec to 31 hours. Longest episode was 8/29/22. AF [place]: <1%. He is not aware of AF episode of 33 hours.  His TTE today showed EF of 42%, moderate to severe left ventricular dilation is present.LVIDd 66mm, and Severe mitral insufficiency, mild to moderate tricuspid insufficiency is present, right ventricular systolic pressure is 24mmHg above the right atrial pressure, IVC diameter >2.1 cm collapsing <50% with sniff suggests a high RA pressure estimated at 15 mmHg or greater.    I have reviewed and updated the patient's Past Medical History, Social History, Family History and Medication List.     Cardiographics (Personally Reviewed) :   TTE 12/28/2022:  Interpretation Summary  Moderate to severe left ventricular dilation is present.LVIDd 66mm. Biplane LVEF is 42%.  Basal 9inferior wall aneurysm.  Right ventricular function, chamber size, wall motion, and thickness are normal.  Severe mitral insufficiency is present.  Mild to moderate tricuspid insufficiency is present.  Right ventricular systolic pressure is 24mmHg above the right atrial pressure.  IVC diameter >2.1 cm collapsing <50% with sniff suggests a high RA pressure estimated at 15 mmHg or greater.  No pericardial effusion is present.  Mitral insufficiency has worsened.    11/2021 Echo (OSH Report):  Interpretation Summary   The left ventricle is mildly enlarged with low normal left ventricular function.   There are regional wall motion abnormalities as specified.   Left atrium is severely enlarged by volume.   Left ventricular diastolic function is normal.   There is moderate mitral regurgitation.   Mild tricuspid regurgitation.   Estimated RV systolic pressure is 22 mm Hg above right atrial pressure.   There is no pericardial effusion.   Catheter/pacemaker lead noted within the right ventricle.   Compared to echocardiogram dated 3/26/21, LVEDD measures larger (6.3cm vs. 6.0 cm) with similar to marginally improved function. Other findings are unchanged.     1/30/20 Echo:   Interpretation Summary  Borderline (EF 50-55%, traced 55%) reduced left ventricular  function is  present. There is akinesis involving the basal inferolateral segment.  Right ventricular function, chamber size, wall motion, and thickness are normal.  Mild to moderate eccentric mitral insufficiency is present.  This study was compared with the study from 9/26/19: There has been no change.    2018 Cardiac PET:  Impression: In this patient with cardiac sarcoidosis under treatment with steroids for the last 4 months;  1. No FDG uptake within the myocardium. Overall there is complete resolution of previous cardiac sarcoidosis.  2. Excellent suppression of myocardial glucose metabolism.  3. Stable number and size of parenchymal lung nodules, although previously seen high metabolic activity within a few of these lesions is no longer present.  4. Stable size number and metabolic activity of lymphadenopathy in the mediastinum, bilateral axilla and right supraclavicular region. 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  5. Stable left stone.    9/2020 CMR:  1. The LV is normal in cavity size and wall thickness except for basal inferior thinning. The global systolic function is mildly reduced. The LVEF is 45%. There is akinesis of the basal inferoseptal and basal inferior segments, and severe hypokinesis of the basal inferolateral segment.  2. The RV is normal in cavity size. The global systolic function is normal. The RVEF is 55%.   3. The left atrium is moderately dilated. The right atrium is mildly dilated.  4. There is no significant valvular disease. However, the assessment of valve function is limited due to the gradient-echo cine imaging used to minimize ICD-related artifacts.  5. Late gadolinium enhancement imaging shows epicardial hyperenhancement of the basal inferoseptal, basal inferior, and basal inferoseptal segments. The hyperenhancement is transmural in the basal inferior segment.  6. There is no pericardial effusion or thickening.  7. There is no intracardiac  "thrombus.  CONCLUSIONS: Non-ischemic cardiomyopathy with epicardial and transmural LGE in a pattern that fits with cardiac sarcoidosis as the cause. LVEF of 45%, which is unchanged from the prior CMR of 02- (the LVEF on that CMR was 44% when I measured it today). The extent of LGE is also unchanged from the prior CMR. Normal RVEF.       Physical Examination   There were no vitals taken for this visit.  Wt Readings from Last 3 Encounters:   06/16/22 88 kg (194 lb)   06/15/22 87.5 kg (193 lb)   03/29/22 86.2 kg (190 lb)     /67 (BP Location: Right arm, Patient Position: Chair, Cuff Size: Adult Regular)   Pulse 63   Ht 1.855 m (6' 1.03\")   Wt 89.4 kg (197 lb)   SpO2 99%   BMI 25.97 kg/m      CONSITUTIONAL: no acute distress  HEENT: no icterus, no redness or discharge, neck supple  CV: no visible edema of visualized extremities. No JVD.   RESPIRATORY: respirations nonlabored, no cough  NEURO: AA&Ox3, speech fluent/appropriate, motor grossly nonfocal  PSYCH: cooperative, affect appropriate  DERM: no rashes on visualized face/neck/upper extremities       Medications  Allergies   Current Outpatient Medications   Medication Sig Dispense Refill     finasteride (PROSCAR) 5 MG tablet Take 5 mg by mouth daily       folic acid (FOLVITE) 1 MG tablet TAKE FIVE TABLETS (5MG) ONCE WEEKLY WITH YOUR METHOTREXATE 60 tablet 3     furosemide (LASIX) 20 MG tablet Take 20 mg by mouth as needed       methimazole (TAPAZOLE) 10 MG tablet Take by mouth 2 times daily        methotrexate 2.5 MG tablet Take 8 tablets (20 mg) by mouth every 7 days 104 tablet 3     metoprolol succinate ER (TOPROL XL) 25 MG 24 hr tablet TAKE 4 TABLETS (100 MG) BY MOUTH DAILY 360 tablet 3     prednisoLONE acetate (PRED FORTE) 1 % ophthalmic susp Place 1 drop into both eyes every hour       predniSONE (DELTASONE) 10 MG tablet        sildenafil (REVATIO) 20 MG tablet Take 20-60 mg by mouth as needed       tamsulosin (FLOMAX) 0.4 MG capsule       " Allergies   Allergen Reactions     Seasonal Allergies      Terbinafine          Lab Results (Personally Reviewed)    Chemistry/lipid CBC Cardiac Enzymes/BNP/TSH/INR   Lab Results   Component Value Date    BUN 19 06/15/2022     06/15/2022    CO2 29 06/15/2022     Creatinine   Date Value Ref Range Status   06/15/2022 0.88 0.66 - 1.25 mg/dL Final   09/04/2020 1.19 0.66 - 1.25 mg/dL Final       No results found for: CHOL, HDL, LDL, CHOLHDL   Lab Results   Component Value Date    WBC 8.1 03/29/2022    HGB 15.0 03/29/2022    HCT 44.8 03/29/2022    MCV 99 03/29/2022     03/29/2022    Lab Results   Component Value Date    TSH 1.01 03/22/2018        Lauro Will MD [facility]  Cardiology - Electrophysiology    Total time spent on patient visit, reviewing notes, imaging, labs, orders, and completing necessary documentation: 45 minutes.                 Please do not hesitate to contact me if you have any questions/concerns.     Sincerely,     Lauro Will MD

## 2022-12-28 NOTE — PATIENT INSTRUCTIONS
Plan:    Follow-up in 1 year      Your Care Team:  EP Cardiology   Telephone Number     Janice Shafer RN [phone]    After business hours: [phone], ask for cardiologist on-call   Non-procedure scheduling:    Izabela DONAHUE   [phone]   Procedure scheduling:    Patricia Webb   [phone]   Device Clinic (Pacemakers, ICDs, Loop Recorders)    During business hours: [phone]  After business hours:   [phone]- select option 4 and ask for job code [phone].       Cardiovascular Clinic:   [address] [place] [address]. [place] [address]      As always, Thank you for trusting us with your health care needs!

## 2022-12-29 ENCOUNTER — PATIENT OUTREACH (OUTPATIENT)
Dept: CARDIOLOGY | Facility: CLINIC | Age: 62
End: 2022-12-29

## 2022-12-29 NOTE — TELEPHONE ENCOUNTER
Reviewed echo results with Jose and recommendation for virtual visit to discuss next steps.  He would like to wait until his wife was home to work something out into her schedule.  Provided clinic scheduling number.      Please schedule as virtual visit into an open Dr Escobedo spot.

## 2022-12-30 ENCOUNTER — TELEPHONE (OUTPATIENT)
Dept: CARDIOLOGY | Facility: CLINIC | Age: 62
End: 2022-12-30

## 2022-12-30 NOTE — TELEPHONE ENCOUNTER
Health Call Center    Phone Message    May a detailed message be left on voicemail: yes     Reason for Call: Other: Pt advised there is an appt being held for him sometime next week.   not able to find anything for Dr. Dickey through June.  Please call pt back to get him scheduled for the virtual appt Dr. Souza is saving for him.     Action Taken: Message routed to:  [facility] cardio    Travel Screening: Not Applicable     Thank you!  [facility]

## 2022-12-30 NOTE — TELEPHONE ENCOUNTER
Returned call to Jose. Scheduled for 1/5 at 11am, virtual visit. No further questions at this time.

## 2023-01-04 DIAGNOSIS — D86.85 CARDIAC SARCOIDOSIS: Primary | ICD-10-CM

## 2023-01-04 NOTE — PROGRESS NOTES
Pre-visit orders placed for CMP, troponin, NT Pro BNP.   Orders faxed to [facility] lab.   Called Jose to update him. 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

## 2023-01-05 ENCOUNTER — VIRTUAL VISIT (OUTPATIENT)
Dept: CARDIOLOGY | Facility: CLINIC | Age: 63
End: 2023-01-05
Attending: INTERNAL MEDICINE
Payer: COMMERCIAL

## 2023-01-05 DIAGNOSIS — D86.85 SARCOID, CARDIAC: Primary | ICD-10-CM

## 2023-01-05 PROCEDURE — 99215 OFFICE O/P EST HI 40 MIN: CPT | Mod: 95 | Performed by: INTERNAL MEDICINE

## 2023-01-05 ASSESSMENT — ENCOUNTER SYMPTOMS
HYPERTENSION: 0
DYSURIA: 0
ORTHOPNEA: 0
HYPOTENSION: 0
SLEEP DISTURBANCES DUE TO BREATHING: 0
LEG PAIN: 0
PALPITATIONS: 1
SYNCOPE: 0
DIFFICULTY URINATING: 1
FLANK PAIN: 0
EXERCISE INTOLERANCE: 0
HEMATURIA: 0
LIGHT-HEADEDNESS: 0

## 2023-01-05 NOTE — NURSING NOTE
Chief Complaint   Patient presents with     Follow Up     No other vitals to report, states last taken at appt last week     Patient declined individual allergy and medication review by support staff because patient denies any changes since echeck-in completion and states all information entered during echeck-in remains accurate.    Deborah Becker, VF/CMA

## 2023-01-05 NOTE — LETTER
1/5/2023      RE: Jose Carney  [address]  [facility] [address]       Dear Colleague,    Thank you for the opportunity to participate in the care of your patient, Jose Carney, at the [facility] HEART [facility] [place] at [facility]. Please see a copy of my visit note below.    01/05/23     I the pleasure of seeing Jose Carney in the [facility] Cardiac Sarcoidosis clinic today.     Complex history is as follows:     History sarcoidosis which  diagnosed by transbronchial biopsy in 2013.  He had been having back pain prior to this and 30 pound weight loss and had substantial mediastinal lymphadenopathy.  He was placed on several months of steroids with significant weight gain and improvement in symptoms as well as back pain.      In late summer and fall 2017 he started to develop palpitations which were found to be supraventricular tachycardia (likely AVNRT as well as some atrial tachycardia)  based on his cardiac monitor. He then had a cardiac MRI which was consistent with myocardial involvement from sarcoidosis (see below).  He was then sent down here for further management and was placed on metoprolol.    Based on high risk features of his cardiac MRI we recommended that he have an ICD placed. I did place him on a burst of prednisone given a highly suspicious PET scan which showed complete resolution of his cardiac sarcoidosis.  At that time I switch him to methotrexate as a staring steroid sparing agent and was titrating this up and tapering down his prednisone when he developed ventricular tachycardia.  This was in January 2019.  The VT was at a rate of 135 and was unable to be paced out.  He did not have syncope with this.  He was loaded with amiodarone, and given failure to gain control of his VT he was given Solu-Medrol and placed on higher dose of prednisone in addition to his methotrexate.     He was then given clinical stability I was  "eventually able to get him off of amiodarone in 2021 -for control of his sarcoid to get him off of prednisone I increase his methotrexate to 20 mg daily which he remains on today.    Thyroid function has been normal throughout the time that he was on amiodarone.       He was intermittently followed in EP clinic here for paroxysmal atrial fibrillation.     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 No ATP since the ablation but does have runs of non-sustained SVT on his last device interrogation in 12/2022. Total A. fib burden on his last interrogation less than 1%. Not on anticoagulation since [id number].     12/2022: LVEF 42%, severe MR appears to be from posterior leaflet restriction.     Since 6/2022 he has been feeling \"ok\". 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 No chest pain, palpitations, syncope, leg swelling, abdominal pain. He does take as needed lasix 20 mg as needed for leg swelling. Wears CPAP at night and doesn't lay flat for many years because it's easier to manage his CPAP, doesn't feel short of breath when flat.     On methotrexate 20 mg weekly and prednisone 5 mg daily for his thyroid.         PAST MEDICAL HISTORY:  Past Medical History:   Diagnosis Date     First degree AV block 3/20/2018     Palpitations 3/20/2018     Paroxysmal supraventricular tachycardia (H) 3/20/2018     Sarcoid, cardiac/EF 54% per MRI,[place] of affected myocardium is 12% of myocardial mass 1/25/2018     Sarcoidosis/ systemic 2013 3/20/2018     FAMILY HISTORY:  His family history is notable for several family members with autoimmune disease.  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  He denies any tobacco or alcohol use or recreational drug use.     CURRENT MEDICATIONS:    Current Outpatient Medications   Medication Sig Dispense Refill     finasteride (PROSCAR) 5 MG tablet Take 5 mg by mouth daily       folic acid (FOLVITE) 1 MG tablet TAKE FIVE TABLETS (5MG) ONCE WEEKLY WITH YOUR METHOTREXATE 60 tablet 3     furosemide (LASIX) 20 MG tablet Take 20 mg by mouth as needed       methimazole (TAPAZOLE) 10 MG tablet Take by mouth 2 times daily        methotrexate 2.5 MG tablet Take 8 tablets (20 mg) by mouth every 7 days 104 tablet 3     metoprolol succinate ER (TOPROL XL) 25 MG 24 hr tablet TAKE 4 TABLETS (100 MG) BY MOUTH DAILY 360 tablet 3     prednisoLONE acetate (PRED FORTE) 1 % ophthalmic susp Place 1 drop into both eyes every hour       predniSONE (DELTASONE) 10 MG tablet        sildenafil (REVATIO) 20 MG tablet Take 20-60 mg by mouth as needed       tamsulosin (FLOMAX) 0.4 MG capsule        timolol maleate (TIMOPTIC) 0.5 % ophthalmic solution        Patient is on prednisone for thyroid  On methotrexate 20 mg daily    ROS:   Constitutional: No fever, chills, or sweats.  Weight has finally stabilized with his thyroid treated  ENT: No visual disturbance, ear ache, epistaxis, sore throat.   Allergies/Immunologic: Negative.   Respiratory: No cough, hemoptysis.   Cardiovascular: As per HPI.   GI: No nausea, vomiting, hematemesis, melena, or hematochezia + poor appetite.   : No urinary frequency, dysuria, or hematuria.  He does have an abdominal hernia which [place]  Integument: Negative.   Psychiatric: Negative  Neuro: Tremors resolved  Endocrinology: Negative.   Musculoskeletal: Chronic back pain    Exam:   Gen: comfortable NAD   Resp: no respiratory distress       Labs:  Lab Results   Component Value Date    WBC 8.1 03/29/2022    HGB 15.0 03/29/2022    HCT 44.8 03/29/2022    PLT  182 03/29/2022     06/15/2022    POTASSIUM 4.2 06/15/2022    CHLORIDE 112 (H) 06/15/2022    CO2 29 06/15/2022    BUN 19 06/15/2022    CR 0.88 06/15/2022     (H) 06/15/2022    NTBNP 837 06/15/2022    TROPI <0.015 09/04/2020    AST 19 09/04/2020    ALT 31 09/04/2020    ALKPHOS 71 09/04/2020    BILITOTAL 0.6 09/04/2020       Troponin <0.010   creatinine normal LFTs normal      Repeat PET:   8/2018    Impression: In this patient with cardiac sarcoidosis under treatment  with steroids for the last 4 months;  1. No FDG uptake within the myocardium. Overall there is complete  resolution of previous cardiac sarcoidosis.  2. Excellent suppression of myocardial glucose metabolism.  3. Stable number and size of parenchymal lung nodules, although  previously seen high metabolic activity within a few of these lesions  is no longer present.  4. Stable size number and metabolic activity of lymphadenopathy in the  mediastinum, bilateral axilla and right supraclavicular region. 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  5. Stable left stone.  The ER N and forming of the PEG and start any thickened o symptoms reported and rate has been right in between 150 to 170 beats a minute.   4. Clinical correlation advised.           Cardiac MRI: 2/2019    2. The RV is normal in cavity size. The global systolic function is normal. The RVEF is 66%.      3. Both atria are normal in size.     4. Due to ICD lead artifact, the tricuspid and pulmonic valves were not well visualized.       The mitral and aortic valves appear normally functioning without significant disease.       5. Late gadolinium enhancement imaging shows epicardial and mid-myocardial enhancement in the  basal-inferoseptal, basal-inferior, and basal-inferolateral segments.      6. There is no pericardial effusion.     7. Unable to assess for intracardiac thrombus.     8. Unable to do T2 mapping and assess for myocardial edema (active  inflammation).      CONCLUSIONS:    1. Cardiac sarcoidosis with preserved systolic function; LVEF 55%, RVEF 66%.  2. Sarcoidosis related scarring and severe [id number] involving the basal-inferoseptal and  basal-inferior segments. Total scar burden 12%. Unchanged from previous CMR dated 01/19/2018.        Local echo 12/2022  Moderate to severe left ventricular dilation is present.LVIDd 66mm.  Biplane LVEF is 42%.  Basal 9inferior wall aneurysm.  Right ventricular function, chamber size, wall motion, and thickness are  normal.  Severe mitral insufficiency is present.  Mild to moderate tricuspid insufficiency is present.  Right ventricular systolic pressure is 24mmHg above the right atrial pressure.  IVC diameter >2.1 cm collapsing <50% with sniff suggests a high RA pressure  estimated at 15 mmHg or greater.  No pericardial effusion is present.  Mitral insufficiency has worsened.    Device: [employer] D433 RESONATE EL ICD  Normal device function.   Mode: DDDR  bpm (AAIR with VVI back up)  AP: 51%  : 34%  Intrinsic Rhythm: SR 60 bpm  Lead Trends Appear Stable.   Estimated battery longevity to RRT = 7.5 years.    Atrial Arrhythmia: 61 AT/AFL episodes recorded over the last 6 months lasting 33 sec to 31 hours. Longest episode was 8/29/22.  AF [place]: <1%  Anticoagulant: None  Ventricular Arrhythmia: 12 NSVT episodes. No EGMs are available  Setting Changes: RA output increased to maintain safety margin.  Patient has an appointment to see Dr. Will today.        Assessment and Plan:   Follow-up cardiac sarcoidosis    history of biopsy-proven cardiac sarcoid from a transbronchial biopsy-who has a cardiac MRI consistent with myocardial involvement from sarcoidosis. His PET scan related inflammation resolved completely with 3-4 months of 40 mg of prednisone.  I did have an ICD placed given he had high risk features on his cardiac MRI for malignant arrhythmias.     With respect to his cardiac  sarcoidosis, given history of inflammation by MRI and PET as well as ventricular arrhythmias I have kept him on long-term immunosuppression.  He is presently on methotrexate 20 mg weekly.  He happens to be on prednisone 5 mg daily for the hyperthyroidism although I am unclear the stop date of this.     His LVEF has declined to 42% on echo from 12/28/22 and his MR has progressed from moderate to severe MR. The mechanism appears to be from a restricted posterior leaflet. LVIDD 6.6 cm.     From a sarcoid perspective, with dilation of his LV and worsening valve pathology, would consider active sarcoidosis as an explanation for this and should exclude this before moving forward, as this can happen even while on methotrexate. Will plan to do this with a PET scan to look for active sarcoid in next 2 months and follow up in person after this.      With regards to the MR - if his PET shows active cardiac sarcoid, would hope that this improves with altering his immune suppression. If his PET shows no active sarcoid, would plan to further investigate this with CADEN and a coronary angiogram at the same time to decide what to do about the valve.     From a rhythm perspective, he has minimal atrial fibrillation burden and his AVNRT is ablated-EP took him off of all antiarrhythmics.  He is off of Xarelto given low stroke risk and less than 1% burden but will need to consider going back on at age 66.     He is NYHA class II, stage C.    We will keep him on folic acid and methotrexate for now.     Ti Guzmán MD  Cardiology Fellow    CC  Patient Care Team:  [facility], [facility] as PCP - Helen Hernadez, RN as Specialty Care Coordinator (Cardiology)  Janice Shafer, PANCHITO as Specialty Care Coordinator (Cardiology)  Lauro Will MD as MD (Clinical Cardiac Electrophysiology)  Mandi Dickey MD as Assigned Heart and Vascular Provider  GIULIANO KOHLER  EnvilleKALA Raymond C  MD      Attestation signed by Mandi Dickey MD at 1/5/2023  8:25 PM (Updated):      Application: ernesto Genao   patient location: his house   My location [facility] remote clinic   Start time: 11:15 am     End time: 11:45 am     I have reviewed the case with the fellow on January 5, 2023 and have conducted a separate virtual encounter with the patient independently.  I agree with the outlined assessment and plan in the fellow's note.       Physical Exam Elements:    Constitutional - well appearing     Eyes - no redness, discharge    Respiratory - no cough, breathing is comfortable     Musculoskeletal - normal range of motion    Skin - no discoloration, lesions    Neurological - no tremors, headaches, normal gait     Psychiatric - no anxiety, patient is alert & oriented      Overall I am concerned that his drop in ejection fraction may represent recurrence of active cardiac sarcoidosis-I would like to repeat a PET as a first step and I will see him in clinic after we to assess volume status.    If he does not any recurrence in cardiac sarcoid, I will plan further up titration of medical therapy for heart failure reduced ejection fraction and then reevaluation of the valve-     If he does have recurrence in cardiac sarcoid would plan to treat again change background immunosuppression and then reevaluate the valve.     If the mitral regurgitation remains severe-given increasing LV dilation and drop in function we may need to consider an intervention on the valve (surgical versus percutaneous)    Will take this 1 step at a time.     Mandi Dickey MD   Associate Professor of Medicine

## 2023-01-05 NOTE — PROGRESS NOTES
Jose is a 62 year old who is being evaluated via a billable telephone visit.      Pt states in [place] for visit.     Provider note locked, new note created.     What phone number would you like to be contacted at? [phone]   How would you like to obtain your AVS? Roslyn Becker, ROBERTO/CMA

## 2023-01-05 NOTE — LETTER
January 5, 2023      TO: Jose Carney  [address]  [facility] [address]         Dear Jose,    I'm including the directions for follow up from your appointment today. Someone should reach out to you to schedule but if you would like to get something moving earlier:    Schedule Dr Dickey appointment [phone]  Schedule PET appointment [phone]    Helen DICK RN   Cardiology Care Coordinator - Heart Failure/ C.O.R.E. [facility]         Cardiology Providers you saw during your visit:  Dr. Dickey    Medication changes:  1.  No changes    Follow up:  1.  Follow up with Dr Dickey with a PET and Labs prior in the next two months.    Labs:      Please call if you have :  1. Weight gain of more than 2 pounds in a day or 5 pounds in a week  2. Increased shortness of breath, swelling or bloating  3. Dizziness, lightheadedness   4. Any questions or concerns.

## 2023-01-05 NOTE — PATIENT INSTRUCTIONS
"Cardiology Providers you saw during your visit:  Dr. Dickey    Medication changes:   No changes    Follow up:   Follow up with Dr Dickey with a PET and Labs prior in the next two months.    Labs:      Please call if you have :  1. Weight gain of more than 2 pounds in a day or 5 pounds in a week  2. Increased shortness of breath, swelling or bloating  3. Dizziness, lightheadedness   4. Any questions or concerns.       Follow the American Heart Association Diet and Lifestyle recommendations:  Limit saturated fat, trans fat, sodium, red meat, sweets and sugar-sweetened beverages. If you choose to eat red meat, compare labels and select the leanest cuts available.  Aim for at least 150 minutes of moderate physical activity or 75 minutes of vigorous physical activity - or an equal combination of both - each week.      During business hours: [phone], press option # 1 to schedule and leave a message for your care team.        After hours, weekends or holidays: On Call Cardiologist- [phone]   option #4 and ask to speak to the on-call Cardiologist. Inform them you are a CORE/heart failure patient at the [facility].      Heart Failure Support Group  Virtual meetings 2022, , 1-2 p.m.  , 1-2 p.m.    Virtual meetings     Helen Gonzalez RN BSN CHFN  Cardiology Care Coordinator - Heart Failure/ C.O.R.E. Clinic  [facility]   Questions and [phone]   First press #1 for the [facility] and \"To send a message to your care team\"    "

## 2023-01-05 NOTE — PROGRESS NOTES
01/05/23     I the pleasure of seeing Jose Carney in the [facility] Cardiac Sarcoidosis clinic today.     Complex history is as follows:     History sarcoidosis which  diagnosed by transbronchial biopsy in 2013.  He had been having back pain prior to this and 30 pound weight loss and had substantial mediastinal lymphadenopathy.  He was placed on several months of steroids with significant weight gain and improvement in symptoms as well as back pain.      In late summer and fall 2017 he started to develop palpitations which were found to be supraventricular tachycardia (likely AVNRT as well as some atrial tachycardia)  based on his cardiac monitor. He then had a cardiac MRI which was consistent with myocardial involvement from sarcoidosis (see below).  He was then sent down here for further management and was placed on metoprolol.    Based on high risk features of his cardiac MRI we recommended that he have an ICD placed. I did place him on a burst of prednisone given a highly suspicious PET scan which showed complete resolution of his cardiac sarcoidosis.  At that time I switch him to methotrexate as a staring steroid sparing agent and was titrating this up and tapering down his prednisone when he developed ventricular tachycardia.  This was in January 2019.  The VT was at a rate of 135 and was unable to be paced out.  He did not have syncope with this.  He was loaded with amiodarone, and given failure to gain control of his VT he was given Solu-Medrol and placed on higher dose of prednisone in addition to his methotrexate.     He was then given clinical stability I was eventually able to get him off of amiodarone in 2021 -for control of his sarcoid to get him off of prednisone I increase his methotrexate to 20 mg daily which he remains on today.    Thyroid function has been normal throughout the time that he was on amiodarone.       He was intermittently followed in EP clinic here for paroxysmal atrial  "fibrillation.     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 No ATP since the ablation but does have runs of non-sustained SVT on his last device interrogation in 12/2022. Total A. fib burden on his last interrogation less than 1%. Not on anticoagulation since [id number].     12/2022: LVEF 42%, severe MR appears to be from posterior leaflet restriction.     Since 6/2022 he has been feeling \"ok\". 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 No significant limitations in exertional capacity. No chest pain, palpitations, syncope, leg swelling, abdominal pain. He does take as needed lasix 20 mg as needed for leg swelling. Wears CPAP at night and doesn't lay flat for many years because it's easier to manage his CPAP, doesn't feel short of breath when flat.     On methotrexate 20 mg weekly and prednisone 5 mg daily for his thyroid.         PAST MEDICAL HISTORY:  Past Medical History:   Diagnosis Date     First degree AV block 3/20/2018     Palpitations 3/20/2018     Paroxysmal supraventricular tachycardia (H) 3/20/2018     Sarcoid, cardiac/EF 54% per MRI,[place] of affected myocardium is 12% of myocardial mass 1/25/2018     Sarcoidosis/ systemic 2013 3/20/2018     FAMILY HISTORY:  His family history is notable for several family members with autoimmune disease.  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  He denies any tobacco or alcohol use or recreational drug use.     CURRENT MEDICATIONS:    Current Outpatient Medications   Medication Sig Dispense Refill     finasteride (PROSCAR) 5 MG tablet Take 5 mg by mouth daily       folic acid (FOLVITE) 1 MG " tablet TAKE FIVE TABLETS (5MG) ONCE WEEKLY WITH YOUR METHOTREXATE 60 tablet 3     furosemide (LASIX) 20 MG tablet Take 20 mg by mouth as needed       methimazole (TAPAZOLE) 10 MG tablet Take by mouth 2 times daily        methotrexate 2.5 MG tablet Take 8 tablets (20 mg) by mouth every 7 days 104 tablet 3     metoprolol succinate ER (TOPROL XL) 25 MG 24 hr tablet TAKE 4 TABLETS (100 MG) BY MOUTH DAILY 360 tablet 3     prednisoLONE acetate (PRED FORTE) 1 % ophthalmic susp Place 1 drop into both eyes every hour       predniSONE (DELTASONE) 10 MG tablet        sildenafil (REVATIO) 20 MG tablet Take 20-60 mg by mouth as needed       tamsulosin (FLOMAX) 0.4 MG capsule        timolol maleate (TIMOPTIC) 0.5 % ophthalmic solution        Patient is on prednisone for thyroid  On methotrexate 20 mg daily    ROS:   Constitutional: No fever, chills, or sweats.  Weight has finally stabilized with his thyroid treated  ENT: No visual disturbance, ear ache, epistaxis, sore throat.   Allergies/Immunologic: Negative.   Respiratory: No cough, hemoptysis.   Cardiovascular: As per HPI.   GI: No nausea, vomiting, hematemesis, melena, or hematochezia + poor appetite.   : No urinary frequency, dysuria, or hematuria.  He does have an abdominal her[place] which [place]  Integument: Negative.   Psychiatric: Negative  Neuro: Tremors resolved  Endocrinology: Negative.   Musculoskeletal: Chronic back pain    Exam:   Gen: comfortable NAD   Resp: no respiratory distress       Labs:  Lab Results   Component Value Date    WBC 8.1 03/29/2022    HGB 15.0 03/29/2022    HCT 44.8 03/29/2022     03/29/2022     06/15/2022    POTASSIUM 4.2 06/15/2022    CHLORIDE 112 (H) 06/15/2022    CO2 29 06/15/2022    BUN 19 06/15/2022    CR 0.88 06/15/2022     (H) 06/15/2022    NTBNP 837 06/15/2022    TROPI <0.015 09/04/2020    AST 19 09/04/2020    ALT 31 09/04/2020    ALKPHOS 71 09/04/2020    BILITOTAL 0.6 09/04/2020        Troponin <0.010   creatinine normal LFTs normal      Repeat PET:   8/2018    Impression: In this patient with cardiac sarcoidosis under treatment  with steroids for the last 4 months;  1. No FDG uptake within the myocardium. Overall there is complete  resolution of previous cardiac sarcoidosis.  2. Excellent suppression of myocardial glucose metabolism.  3. Stable number and size of parenchymal lung nodules, although  previously seen high metabolic activity within a few of these lesions  is no longer present.  4. Stable size number and metabolic activity of lymphadenopathy in the  mediastinum, bilateral axilla and right supraclavicular region. 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  5. Stable left stone.  The ER N and forming of the PEG and start any thickened o symptoms reported and rate has been right in between 150 to 170 beats a minute.   4. Clinical correlation advised.           Cardiac MRI: 2/2019    2. The RV is normal in cavity size. The global systolic function is normal. The RVEF is 66%.      3. Both atria are normal in size.     4. Due to ICD lead artifact, the tricuspid and pulmonic valves were not well visualized.       The mitral and aortic valves appear normally functioning without significant disease.       5. Late gadolinium enhancement imaging shows epicardial and mid-myocardial enhancement in the  basal-inferoseptal, basal-inferior, and basal-inferolateral segments.      6. There is no pericardial effusion.     7. Unable to assess for intracardiac thrombus.     8. Unable to do T2 mapping and assess for myocardial edema (active inflammation).      CONCLUSIONS:    1. Cardiac sarcoidosis with preserved systolic function; LVEF 55%, RVEF 66%.  2. Sarcoidosis related scarring and severe [id number] involving the basal-inferoseptal and  basal-inferior segments. Total scar burden 12%. Unchanged from previous CMR dated 01/19/2018.        Local echo  12/2022  Moderate to severe left ventricular dilation is present.LVIDd 66mm.  Biplane LVEF is 42%.  Basal 9inferior wall aneurysm.  Right ventricular function, chamber size, wall motion, and thickness are  normal.  Severe mitral insufficiency is present.  Mild to moderate tricuspid insufficiency is present.  Right ventricular systolic pressure is 24mmHg above the right atrial pressure.  IVC diameter >2.1 cm collapsing <50% with sniff suggests a high RA pressure  estimated at 15 mmHg or greater.  No pericardial effusion is present.  Mitral insufficiency has worsened.    Device: [employer] D433 RESONATE EL ICD  Normal device function.   Mode: DDDR  bpm (AAIR with VVI back up)  AP: 51%  : 34%  Intrinsic Rhythm: SR 60 bpm  Lead Trends Appear Stable.   Estimated battery longevity to RRT = 7.5 years.    Atrial Arrhythmia: 61 AT/AFL episodes recorded over the last 6 months lasting 33 sec to 31 hours. Longest episode was 8/29/22.  AF [place]: <1%  Anticoagulant: None  Ventricular Arrhythmia: 12 NSVT episodes. No EGMs are available  Setting Changes: RA output increased to maintain safety margin.  Patient has an appointment to see Dr. Will today.        Assessment and Plan:   Follow-up cardiac sarcoidosis    history of biopsy-proven cardiac sarcoid from a transbronchial biopsy-who has a cardiac MRI consistent with myocardial involvement from sarcoidosis. His PET scan related inflammation resolved completely with 3-4 months of 40 mg of prednisone.  I did have an ICD placed given he had high risk features on his cardiac MRI for malignant arrhythmias.     With respect to his cardiac sarcoidosis, given history of inflammation by MRI and PET as well as ventricular arrhythmias I have kept him on long-term immunosuppression.  He is presently on methotrexate 20 mg weekly.  He happens to be on prednisone 5 mg daily for the hyperthyroidism although I am unclear the stop date of this.     His LVEF has declined to 42% on  echo from 12/28/22 and his MR has progressed from moderate to severe MR. The mechanism appears to be from a restricted posterior leaflet. LVIDD 6.6 cm.     From a sarcoid perspective, with dilation of his LV and worsening valve pathology, would consider active sarcoidosis as an explanation for this and should exclude this before moving forward, as this can happen even while on methotrexate. Will plan to do this with a PET scan to look for active sarcoid in next 2 months and follow up in person after this.      With regards to the MR - if his PET shows active cardiac sarcoid, would hope that this improves with altering his immune suppression. If his PET shows no active sarcoid, would plan to further investigate this with CADEN and a coronary angiogram at the same time to decide what to do about the valve.     From a rhythm perspective, he has minimal atrial fibrillation burden and his AVNRT is ablated-EP took him off of all antiarrhythmics.  He is off of Xarelto given low stroke risk and less than 1% burden but will need to consider going back on at age 66.     He is NYHA class II, stage C.    We will keep him on folic acid and methotrexate for now.     Ti Guzmán MD  Cardiology Fellow    CC  Patient Care Team:  [facility] as PCP - Helen Hernadez, RN as Specialty Care Coordinator (Cardiology)  Janice Shafer, PANCHITO as Specialty Care Coordinator (Cardiology)  Lauro Will MD as MD (Clinical Cardiac Electrophysiology)  Mandi Dickey MD as Assigned Heart and Vascular Provider  GIULIANO KOHLER [place] Raymond C, MD

## 2023-02-02 DIAGNOSIS — D86.85 SARCOID, CARDIAC: ICD-10-CM

## 2023-02-06 RX ORDER — FOLIC ACID 1 MG/1
TABLET ORAL
Qty: 60 TABLET | Refills: 3 | Status: SHIPPED | OUTPATIENT
Start: 2023-02-06 | End: 2024-05-23

## 2023-02-06 NOTE — TELEPHONE ENCOUNTER
folic acid (FOLVITE) 1 MG tablet 60 tablet 3 2/17/2022        Last Written Prescription Date:  2-  Last Fill Quantity: 60,   # refills: 3  Last Office Visit : 1-5-2023  Future Office visit:  3-9-2023    Routing refill request to provider for review/approval because:  Not on protocol.      Kathleen M Doege RN

## 2023-02-27 DIAGNOSIS — D86.85 CARDIAC SARCOIDOSIS: Primary | ICD-10-CM

## 2023-02-27 DIAGNOSIS — Z51.81 ENCOUNTER FOR METHOTREXATE MONITORING: ICD-10-CM

## 2023-02-27 DIAGNOSIS — Z79.631 ENCOUNTER FOR METHOTREXATE MONITORING: ICD-10-CM

## 2023-03-08 ENCOUNTER — TELEPHONE (OUTPATIENT)
Dept: CARDIOLOGY | Facility: CLINIC | Age: 63
End: 2023-03-08
Payer: COMMERCIAL

## 2023-03-08 NOTE — TELEPHONE ENCOUNTER
[facility] Call Center    Phone Message    May a detailed message be left on voicemail: yes     Reason for Call: Other: Pt very adimit on his care team doing this all at one time for them please follow up with Pt     Action Taken: Other: cardio    Travel Screening: Not Applicable     Thank you!  [facility]

## 2023-03-09 ENCOUNTER — TELEPHONE (OUTPATIENT)
Dept: CARDIOLOGY | Facility: CLINIC | Age: 63
End: 2023-03-09

## 2023-03-09 NOTE — TELEPHONE ENCOUNTER
[facility] Call Center    Phone Message    May a detailed message be left on voicemail: no     Reason for Call: Other: Jose is returning a missed phone call to Helen Gonzalez RN, please reach back out to him at [phone].     Action Taken: Message routed to:  [facility] ([facility]): Cardiology    Travel Screening: Not Applicable

## 2023-04-06 DIAGNOSIS — D86.85 CARDIAC SARCOIDOSIS: ICD-10-CM

## 2023-04-06 DIAGNOSIS — D86.85 SARCOID, CARDIAC: Primary | ICD-10-CM

## 2023-04-13 ENCOUNTER — OFFICE VISIT (OUTPATIENT)
Dept: CARDIOLOGY | Facility: CLINIC | Age: 63
End: 2023-04-13
Attending: INTERNAL MEDICINE
Payer: COMMERCIAL

## 2023-04-13 ENCOUNTER — LAB (OUTPATIENT)
Dept: LAB | Facility: CLINIC | Age: 63
End: 2023-04-13
Payer: COMMERCIAL

## 2023-04-13 ENCOUNTER — ANCILLARY PROCEDURE (OUTPATIENT)
Dept: PET IMAGING | Facility: CLINIC | Age: 63
End: 2023-04-13
Attending: INTERNAL MEDICINE
Payer: COMMERCIAL

## 2023-04-13 VITALS
WEIGHT: 187.7 LBS | HEIGHT: 72 IN | SYSTOLIC BLOOD PRESSURE: 101 MMHG | DIASTOLIC BLOOD PRESSURE: 60 MMHG | BODY MASS INDEX: 25.42 KG/M2 | HEART RATE: 61 BPM | OXYGEN SATURATION: 98 %

## 2023-04-13 DIAGNOSIS — D86.85 SARCOID, CARDIAC: ICD-10-CM

## 2023-04-13 DIAGNOSIS — D86.85 CARDIAC SARCOIDOSIS: ICD-10-CM

## 2023-04-13 DIAGNOSIS — I34.0 NONRHEUMATIC MITRAL VALVE REGURGITATION: Primary | ICD-10-CM

## 2023-04-13 LAB
ALBUMIN SERPL BCG-MCNC: 4.3 G/DL (ref 3.5–5.2)
ALP SERPL-CCNC: 82 U/L (ref 40–129)
ALT SERPL W P-5'-P-CCNC: 19 U/L (ref 10–50)
ANION GAP SERPL CALCULATED.3IONS-SCNC: 11 MMOL/L (ref 7–15)
AST SERPL W P-5'-P-CCNC: 27 U/L (ref 10–50)
BILIRUB DIRECT SERPL-MCNC: 0.3 MG/DL (ref 0–0.3)
BILIRUB SERPL-MCNC: 1.3 MG/DL
BUN SERPL-MCNC: 23.8 MG/DL (ref 8–23)
CALCIUM SERPL-MCNC: 9.5 MG/DL (ref 8.8–10.2)
CHLORIDE SERPL-SCNC: 104 MMOL/L (ref 98–107)
CREAT SERPL-MCNC: 1.07 MG/DL (ref 0.67–1.17)
DEPRECATED HCO3 PLAS-SCNC: 25 MMOL/L (ref 22–29)
ERYTHROCYTE [DISTWIDTH] IN BLOOD BY AUTOMATED COUNT: 14 % (ref 10–15)
GFR SERPL CREATININE-BSD FRML MDRD: 79 ML/MIN/1.73M2
GLUCOSE SERPL-MCNC: 73 MG/DL (ref 70–99)
GLUCOSE SERPL-MCNC: 77 MG/DL (ref 70–99)
HCT VFR BLD AUTO: 44 % (ref 40–53)
HGB BLD-MCNC: 15.6 G/DL (ref 13.3–17.7)
MCH RBC QN AUTO: 32.6 PG (ref 26.5–33)
MCHC RBC AUTO-ENTMCNC: 35.5 G/DL (ref 31.5–36.5)
MCV RBC AUTO: 92 FL (ref 78–100)
NT-PROBNP SERPL-MCNC: 397 PG/ML (ref 0–900)
PLATELET # BLD AUTO: 187 10E3/UL (ref 150–450)
POTASSIUM SERPL-SCNC: 4 MMOL/L (ref 3.4–5.3)
PROT SERPL-MCNC: 6.9 G/DL (ref 6.4–8.3)
RBC # BLD AUTO: 4.78 10E6/UL (ref 4.4–5.9)
SODIUM SERPL-SCNC: 140 MMOL/L (ref 136–145)
TROPONIN T SERPL HS-MCNC: 8 NG/L
WBC # BLD AUTO: 6.8 10E3/UL (ref 4–11)

## 2023-04-13 PROCEDURE — 99213 OFFICE O/P EST LOW 20 MIN: CPT | Performed by: INTERNAL MEDICINE

## 2023-04-13 PROCEDURE — 36415 COLL VENOUS BLD VENIPUNCTURE: CPT | Performed by: PATHOLOGY

## 2023-04-13 PROCEDURE — 80053 COMPREHEN METABOLIC PANEL: CPT | Performed by: PATHOLOGY

## 2023-04-13 PROCEDURE — 83880 ASSAY OF NATRIURETIC PEPTIDE: CPT | Performed by: PATHOLOGY

## 2023-04-13 PROCEDURE — 85027 COMPLETE CBC AUTOMATED: CPT | Performed by: PATHOLOGY

## 2023-04-13 PROCEDURE — 84484 ASSAY OF TROPONIN QUANT: CPT | Performed by: PATHOLOGY

## 2023-04-13 PROCEDURE — 82248 BILIRUBIN DIRECT: CPT | Performed by: PATHOLOGY

## 2023-04-13 PROCEDURE — 99214 OFFICE O/P EST MOD 30 MIN: CPT | Performed by: INTERNAL MEDICINE

## 2023-04-13 RX ORDER — LIDOCAINE 40 MG/G
CREAM TOPICAL
Status: CANCELLED | OUTPATIENT
Start: 2023-04-13

## 2023-04-13 RX ORDER — PREDNISONE 1 MG/1
TABLET ORAL
COMMUNITY
Start: 2023-03-16 | End: 2024-03-28

## 2023-04-13 RX ORDER — POTASSIUM CHLORIDE 1500 MG/1
40 TABLET, EXTENDED RELEASE ORAL
Status: CANCELLED | OUTPATIENT
Start: 2023-04-13

## 2023-04-13 RX ORDER — ASPIRIN 81 MG/1
243 TABLET, CHEWABLE ORAL ONCE
Status: CANCELLED | OUTPATIENT
Start: 2023-04-13

## 2023-04-13 RX ORDER — ASPIRIN 325 MG
325 TABLET ORAL ONCE
Status: CANCELLED | OUTPATIENT
Start: 2023-04-13 | End: 2023-04-13

## 2023-04-13 RX ORDER — SODIUM CHLORIDE 9 MG/ML
INJECTION, SOLUTION INTRAVENOUS CONTINUOUS
Status: CANCELLED | OUTPATIENT
Start: 2023-04-13

## 2023-04-13 RX ORDER — POTASSIUM CHLORIDE 1500 MG/1
20 TABLET, EXTENDED RELEASE ORAL
Status: CANCELLED | OUTPATIENT
Start: 2023-04-13

## 2023-04-13 RX ORDER — METHIMAZOLE 5 MG/1
5 TABLET ORAL EVERY EVENING
COMMUNITY
Start: 2023-01-11

## 2023-04-13 ASSESSMENT — PAIN SCALES - GENERAL: PAINLEVEL: NO PAIN (0)

## 2023-04-13 NOTE — NURSING NOTE
Chief Complaint   Patient presents with     Follow Up     Return Heart Failure     Vitals were taken and medications reconciled.    BLANCA Vivas  4:00 PM

## 2023-04-13 NOTE — LETTER
4/13/2023      RE: Jose Carney  [address]  [facility] [address]       Dear Colleague,    Thank you for the opportunity to participate in the care of your patient, Jose Carney, at the [facility] [place] at [facility]. Please see a copy of my visit note below.    04/13/23    I the pleasure of seeing Jose Carney in the [facility] Cardiac Sarcoidosis clinic today.     Complex history is as follows:     History sarcoidosis which  diagnosed by transbronchial biopsy in 2013.  He had been having back pain prior to this and 30 pound weight loss and had substantial mediastinal lymphadenopathy.  He was placed on several months of steroids with significant weight gain and improvement in symptoms as well as back pain.      In late summer and fall 2017 he started to develop palpitations which were found to be supraventricular tachycardia (likely AVNRT as well as some atrial tachycardia)  based on his cardiac monitor. He then had a cardiac MRI which was consistent with myocardial involvement from sarcoidosis (see below).  He was then sent down here for further management and was placed on metoprolol.    Based on high risk features of his cardiac MRI we recommended that he have an ICD placed. I did place him on a burst of prednisone given a highly suspicious PET scan which showed complete resolution of his cardiac sarcoidosis.  At that time I switch him to methotrexate as a staring steroid sparing agent and was titrating this up and tapering down his prednisone when he developed ventricular tachycardia.  This was in January 2019.  The VT was at a rate of 135 and was unable to be paced out.  He did not have syncope with this.  He was loaded with amiodarone, and given failure to gain control of his VT he was given Solu-Medrol and placed on higher dose of prednisone in addition to his methotrexate.     He was then given clinical stability I was  "eventually able to get him off of amiodarone in 2021 -for control of his sarcoid to get him off of prednisone I increase his methotrexate to 20 mg daily which he remains on today.    Thyroid function has been normal throughout the time that he was on amiodarone.       He was intermittently followed in EP clinic here for paroxysmal atrial fibrillation.     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 No ATP since the ablation but does have runs of non-sustained SVT on his last device interrogation in 12/2022. Total A. fib burden on his last interrogation less than 1%. Not on anticoagulation since [id number].     12/2022: LVEF 42%, severe MR appears to be from posterior leaflet restriction.     Since 6/2022 he has been feeling \"ok\". 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 No chest pain, palpitations, syncope, leg swelling, abdominal pain. He does take as needed lasix 20 mg as needed for leg swelling. Wears CPAP at night and doesn't lay flat for many years because it's easier to manage his CPAP, doesn't feel short of breath when flat.     On methotrexate 20 mg weekly and prednisone taper over the next 6 weeks for his thyroid.       PAST MEDICAL HISTORY:  Past Medical History:   Diagnosis Date    First degree AV block 3/20/2018    Palpitations 3/20/2018    Paroxysmal supraventricular tachycardia (H) 3/20/2018    Sarcoid, cardiac/EF 54% per MRI,[place] of affected myocardium is 12% of myocardial mass 1/25/2018    Sarcoidosis/ systemic 2013 3/20/2018     FAMILY HISTORY:  His family history is notable for several family members with autoimmune disease.  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.  He denies any tobacco or alcohol use or recreational drug use.     CURRENT MEDICATIONS:    Current Outpatient Medications   Medication Sig Dispense Refill    finasteride (PROSCAR) 5 MG tablet Take 5 mg by mouth daily      folic acid (FOLVITE) 1 MG tablet TAKE FIVE TABLETS (5MG) ONCE WEEKLY WITH YOUR METHOTREXATE 60 tablet 3    furosemide (LASIX) 20 MG tablet Take 20 mg by mouth as needed      methimazole (TAPAZOLE) 10 MG tablet Take by mouth 2 times daily       methotrexate 2.5 MG tablet Take 8 tablets (20 mg) by mouth every 7 days 104 tablet 3    metoprolol succinate ER (TOPROL XL) 25 MG 24 hr tablet TAKE 4 TABLETS (100 MG) BY MOUTH DAILY 360 tablet 3    prednisoLONE acetate (PRED FORTE) 1 % ophthalmic susp Place 1 drop into both eyes every hour      predniSONE (DELTASONE) 10 MG tablet       sildenafil (REVATIO) 20 MG tablet Take 20-60 mg by mouth as needed      tamsulosin (FLOMAX) 0.4 MG capsule       timolol maleate (TIMOPTIC) 0.5 % ophthalmic solution        Patient is on prednisone for thyroid  On methotrexate 20 mg daily    ROS:   Constitutional: No fever, chills, or sweats.  Weight has finally stabilized with his thyroid treated  ENT: No visual disturbance, ear ache, epistaxis, sore throat.   Allergies/Immunologic: Negative.   Respiratory: No cough, hemoptysis.   Cardiovascular: As per HPI.   GI: No nausea, vomiting, hematemesis, melena, or hematochezia + poor appetite.   : No urinary frequency, dysuria, or hematuria.  He does have an abdominal hernia which [place]  Integument: Negative.   Psychiatric: Negative  Neuro: Tremors resolved  Endocrinology: Negative.   Musculoskeletal: Chronic back pain    Exam:   Gen: comfortable NAD   Resp: no respiratory distress       Labs:  Lab Results   Component Value Date    WBC 8.1 03/29/2022    HGB 15.0 03/29/2022    HCT 44.8 03/29/2022    PLT  182 03/29/2022     06/15/2022    POTASSIUM 4.2 06/15/2022    CHLORIDE 109 01/04/2023    CO2 29 06/15/2022    BUN 19 06/15/2022    CR 0.88 06/15/2022     (H) 06/15/2022    NTBNP 837 06/15/2022    TROPI <0.015 09/04/2020    AST 19 09/04/2020    ALT 31 09/04/2020    ALKPHOS 71 09/04/2020    BILITOTAL 0.6 09/04/2020       Troponin <0.010   creatinine normal LFTs normal      Repeat PET:   8/2018    Impression: In this patient with cardiac sarcoidosis under treatment  with steroids for the last 4 months;  1. No FDG uptake within the myocardium. Overall there is complete  resolution of previous cardiac sarcoidosis.  2. Excellent suppression of myocardial glucose metabolism.  3. Stable number and size of parenchymal lung nodules, although  previously seen high metabolic activity within a few of these lesions  is no longer present.  4. Stable size number and metabolic activity of lymphadenopathy in the  mediastinum, bilateral axilla and right supraclavicular region. 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  5. Stable left stone.  The ER N and forming of the PEG and start any thickened o symptoms reported and rate has been right in between 150 to 170 beats a minute.   4. Clinical correlation advised.       Cardiac MRI: 2/2019    2. The RV is normal in cavity size. The global systolic function is normal. The RVEF is 66%.      3. Both atria are normal in size.     4. Due to ICD lead artifact, the tricuspid and pulmonic valves were not well visualized.       The mitral and aortic valves appear normally functioning without significant disease.       5. Late gadolinium enhancement imaging shows epicardial and mid-myocardial enhancement in the  basal-inferoseptal, basal-inferior, and basal-inferolateral segments.      6. There is no pericardial effusion.     7. Unable to assess for intracardiac thrombus.     8. Unable to do T2 mapping and assess for myocardial edema (active inflammation).       CONCLUSIONS:    1. Cardiac sarcoidosis with preserved systolic function; LVEF 55%, RVEF 66%.  2. Sarcoidosis related scarring and severe [id number]-oi-[id number] involving the basal-inferoseptal and  basal-inferior segments. Total scar burden 12%. Unchanged from previous CMR dated 01/19/2018.        Local echo 12/2022  Moderate to severe left ventricular dilation is present.LVIDd 66mm.  Biplane LVEF is 42%.  Basal 9inferior wall aneurysm.  Right ventricular function, chamber size, wall motion, and thickness are  normal.  Severe mitral insufficiency is present.  Mild to moderate tricuspid insufficiency is present.  Right ventricular systolic pressure is 24mmHg above the right atrial pressure.  IVC diameter >2.1 cm collapsing <50% with sniff suggests a high RA pressure  estimated at 15 mmHg or greater.  No pericardial effusion is present.  Mitral insufficiency has worsened.    Device: [employer] D433 RESONATE EL ICD  Normal device function.   Mode: DDDR  bpm (AAIR with VVI back up)  AP: 51%  : 34%  Intrinsic Rhythm: SR 60 bpm  Lead Trends Appear Stable.   Estimated battery longevity to RRT = 7.5 years.    Atrial Arrhythmia: 61 AT/AFL episodes recorded over the last 6 months lasting 33 sec to 31 hours. Longest episode was 8/29/22.  AF [place]: <1%  Anticoagulant: None  Ventricular Arrhythmia: 12 NSVT episodes. No EGMs are available  Setting Changes: RA output increased to maintain safety margin.  Patient has an appointment to see Dr. Will today.        Assessment and Plan:   Follow-up cardiac sarcoidosis    history of biopsy-proven cardiac sarcoid from a transbronchial biopsy-who has a cardiac MRI consistent with myocardial involvement from sarcoidosis. His PET scan related inflammation resolved completely with 3-4 months of 40 mg of prednisone.  I did have an ICD placed given he had high risk features on his cardiac MRI for malignant arrhythmias.     With respect to his cardiac sarcoidosis, given  history of inflammation by MRI and PET as well as ventricular arrhythmias I have kept him on long-term immunosuppression.  He is presently on methotrexate 20 mg weekly.  He happens to be on prednisone 5 mg daily for the hyperthyroidism although I am unclear the stop date of this.     His LVEF has declined to 42% on echo from 12/28/22 and his MR has progressed from moderate to severe MR. The mechanism appears to be from a restricted posterior leaflet. LVIDD 6.6 cm.     From a sarcoid perspective, with dilation of his LV and worsening valve pathology, would consider active sarcoidosis as an explanation for this and should exclude this before moving forward, as this can happen even while on methotrexate.    With regards to the MR - PET was negative for active cardiac sarcoid. This is probably scar from his cardiac sarcoidosis causing fixed posterior mitral leaflet. Will refer him to the valve clinic. Will get CADEN and angiogram in the meantime.    From a rhythm perspective, he has minimal atrial fibrillation burden and his AVNRT is ablated-EP took him off of all antiarrhythmics.  He is off of Xarelto given low stroke risk and less than 1% burden but will need to consider going back on at age 66.     He is NYHA class II, stage C.    We will keep him on folic acid and methotrexate for now.     CC  Patient Care Team:  [facility], [facility] as PCP - Helen Hernadez, RN as Specialty Care Coordinator (Cardiology)  Janice Shafer, PANCHITO as Specialty Care Coordinator (Cardiology)  Lauro Will MD as MD (Clinical Cardiac Electrophysiology)  Mandi Dickey MD as Assigned Heart and Vascular Provider  GIULIANO KOHLER  [place], [place]     Gary Soares MD        Please do not hesitate to contact me if you have any questions/concerns.     Sincerely,     Mandi Dickey MD

## 2023-04-13 NOTE — PROGRESS NOTES
04/13/23    I the pleasure of seeing Jose Carney in the [facility] Cardiac Sarcoidosis clinic today.     Complex history is as follows:     History sarcoidosis which  diagnosed by transbronchial biopsy in 2013.  He had been having back pain prior to this and 30 pound weight loss and had substantial mediastinal lymphadenopathy.  He was placed on several months of steroids with significant weight gain and improvement in symptoms as well as back pain.      In late summer and fall 2017 he started to develop palpitations which were found to be supraventricular tachycardia (likely AVNRT as well as some atrial tachycardia)  based on his cardiac monitor. He then had a cardiac MRI which was consistent with myocardial involvement from sarcoidosis (see below).  He was then sent down here for further management and was placed on metoprolol.    Based on high risk features of his cardiac MRI we recommended that he have an ICD placed. I did place him on a burst of prednisone given a highly suspicious PET scan which showed complete resolution of his cardiac sarcoidosis.  At that time I switch him to methotrexate as a staring steroid sparing agent and was titrating this up and tapering down his prednisone when he developed ventricular tachycardia.  This was in January 2019.  The VT was at a rate of 135 and was unable to be paced out.  He did not have syncope with this.  He was loaded with amiodarone, and given failure to gain control of his VT he was given Solu-Medrol and placed on higher dose of prednisone in addition to his methotrexate.     He was then given clinical stability I was eventually able to get him off of amiodarone in 2021 -for control of his sarcoid to get him off of prednisone I increase his methotrexate to 20 mg daily which he remains on today.    Thyroid function has been normal throughout the time that he was on amiodarone.       He was intermittently followed in EP clinic here for paroxysmal atrial  fibrillation.     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 No ATP since the ablation but does have runs of non-sustained SVT on his last device interrogation in 12/2022. Total A. fib burden on his last interrogation less than 1%. Not on anticoagulation since [id number].     12/2022: LVEF 42%, severe MR appears to be from posterior leaflet restriction. At that time plan was for repeat PET to ensure drop in EF was not due to sarcoid recurrence.     At that time was on  methotrexate 20 mg weekly. prednisone 5 mg daily for the hyperthyroidism.     Now back for repeat PET which is negative.       He is starting to notice some symptoms related to his exertional capacity. No chest pain, palpitations, syncope, leg swelling, abdominal pain. He does take as needed lasix 20 mg as needed for leg swelling. Wears CPAP at night and doesn't lay flat for many years because it's easier to manage his CPAP, doesn't feel short of breath when flat.         PAST MEDICAL HISTORY:  Past Medical History:   Diagnosis Date     First degree AV block 3/20/2018     Palpitations 3/20/2018     Paroxysmal supraventricular tachycardia (H) 3/20/2018     Sarcoid, cardiac/EF 54% per MRI,[place] of affected myocardium is 12% of myocardial mass 1/25/2018     Sarcoidosis/ systemic 2013 3/20/2018     FAMILY HISTORY:  His family history is notable for several family members with autoimmune disease.  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  He denies any tobacco or alcohol use or recreational drug use.     CURRENT MEDICATIONS:    Current Outpatient Medications   Medication  "Sig Dispense Refill     finasteride (PROSCAR) 5 MG tablet Take 5 mg by mouth daily       folic acid (FOLVITE) 1 MG tablet TAKE FIVE TABLETS (5MG) ONCE WEEKLY WITH YOUR METHOTREXATE 60 tablet 3     furosemide (LASIX) 20 MG tablet Take 20 mg by mouth as needed       methimazole (TAPAZOLE) 10 MG tablet Take by mouth 2 times daily        methotrexate 2.5 MG tablet Take 8 tablets (20 mg) by mouth every 7 days 104 tablet 3     metoprolol succinate ER (TOPROL XL) 25 MG 24 hr tablet TAKE 4 TABLETS (100 MG) BY MOUTH DAILY 360 tablet 3     prednisoLONE acetate (PRED FORTE) 1 % ophthalmic susp Place 1 drop into both eyes every hour       predniSONE (DELTASONE) 10 MG tablet        sildenafil (REVATIO) 20 MG tablet Take 20-60 mg by mouth as needed       tamsulosin (FLOMAX) 0.4 MG capsule        timolol maleate (TIMOPTIC) 0.5 % ophthalmic solution          On methotrexate 20 mg daily    ROS:   Constitutional: No fever, chills, or sweats.  Weight has finally stabilized with his thyroid treated  ENT: No visual disturbance, ear ache, epistaxis, sore throat.   Allergies/Immunologic: Negative.   Respiratory: No cough, hemoptysis.   Cardiovascular: As per HPI.   GI: No nausea, vomiting, hematemesis, melena, or hematochezia +  : No urinary frequency, dysuria, or hematuria.  He does have an abdominal hernia which [place]  Integument: Negative.   Psychiatric: Negative  Neuro: Tremors resolved  Endocrinology: Negative.   Musculoskeletal: Chronic back pain      EXAM:  /60 (BP Location: Right arm, Patient Position: Chair, Cuff Size: Adult Regular)   Pulse 61   Ht 1.835 m (6' 0.24\")   Wt 85.1 kg (187 lb 11.2 oz)   SpO2 98%   BMI 25.29 kg/m    General: appears comfortable, alert and articulate  Head: normocephalic, atraumatic  Eyes: anicteric sclera, EOMI  Neck: no adenopathy  Orophyarynx: moist mucosa, no lesions, dentition intact  Heart: PMI diffuse,III/IV systolic murmur at the apex,  regular, " S1/S2, rub, estimated JVP 9 cm   Lungs: clear, no rales or wheezing  Abdomen: soft, non-tender, bowel sounds present, no hepatosplenomegaly  Extremities: no clubbing, cyanosis or edema  Neurological: normal speech and affect, no gross motor deficits        Troponin <0.010   creatinine normal LFTs normal  CBC normal         Cardiac MRI: 2/2019    2. The RV is normal in cavity size. The global systolic function is normal. The RVEF is 66%.      3. Both atria are normal in size.     4. Due to ICD lead artifact, the tricuspid and pulmonic valves were not well visualized.       The mitral and aortic valves appear normally functioning without significant disease.       5. Late gadolinium enhancement imaging shows epicardial and mid-myocardial enhancement in the  basal-inferoseptal, basal-inferior, and basal-inferolateral segments.      6. There is no pericardial effusion.     7. Unable to assess for intracardiac thrombus.     8. Unable to do T2 mapping and assess for myocardial edema (active inflammation).      CONCLUSIONS:    1. Cardiac sarcoidosis with preserved systolic function; LVEF 55%, RVEF 66%.  2. Sarcoidosis related scarring and severe [id number] involving the basal-inferoseptal and  basal-inferior segments. Total scar burden 12%. Unchanged from previous CMR dated 01/19/2018.        Repeat PET scan: 4/13/23     1. No evidence for active cardiac sarcoidosis.  2. Active systemic sarcoidosis  2a. Hypermetabolic lymph nodes within the chest and abdomen.   2b. Scattered pulmonary nodules, some of which are FDG avid, overall  unchanged in size compared to 9/20/2018 PET/CT.        Local echo 12/2022  Moderate to severe left ventricular dilation is present.LVIDd 66mm.  Biplane LVEF is 42%.  Basal 9inferior wall aneurysm.  Right ventricular function, chamber size, wall motion, and thickness are  normal.  Severe mitral insufficiency is present.  Mild to moderate tricuspid insufficiency is present.  Right  ventricular systolic pressure is 24mmHg above the right atrial pressure.  IVC diameter >2.1 cm collapsing <50% with sniff suggests a high RA pressure  estimated at 15 mmHg or greater.  No pericardial effusion is present.  Mitral insufficiency has worsened.      Last: Device: [employer] D433 RESONATE EL ICD  Normal device function.   Mode: DDDR  bpm (AAIR with VVI back up)  AP: 51%  : 34%  Intrinsic Rhythm: SR 60 bpm  Lead Trends Appear Stable.   Estimated battery longevity to RRT = 7.5 years.    Atrial Arrhythmia: 61 AT/AFL episodes recorded over the last 6 months lasting 33 sec to 31 hours. Longest episode was 8/29/22.  AF [place]: <1%  Anticoagulant: None  Ventricular Arrhythmia: 12 NSVT episodes. No EGMs are available  Setting Changes: RA output increased to maintain safety margin.  Patient has an appointment to see Dr. Will today.        Assessment and Plan:   Follow-up cardiac sarcoidosis    history of biopsy-proven cardiac sarcoid from a transbronchial biopsy-who has a cardiac MRI consistent with myocardial involvement from sarcoidosis. His PET scan related inflammation resolved completely with 3-4 months of 40 mg of prednisone.  I did have an ICD placed given he had high risk features on his cardiac MRI for malignant arrhythmias.     With respect to his cardiac sarcoidosis, given history of inflammation by MRI and PET as well as ventricular arrhythmias I have kept him on long-term immunosuppression.  He is presently on methotrexate 20 mg weekly.  Now also off of pred     His LVEF has declined to 42% on echo from 12/28/22 and his MR has progressed from moderate to severe MR. The mechanism appears to be from a restricted posterior leaflet. LVIDD 6.6 cm.     We have now ruled out active sarcoid as the cause of his drop in EF and I am concerned that previous LV damage from sarcoid/scarrign has caused this restricted posterior leaflet. With some new symptoms - plan now is to refer him to the valve  clinic. Will get CADEN and angiogram in the meantime.    From a rhythm perspective, he has minimal atrial fibrillation burden and his AVNRT is ablated-EP took him off of all antiarrhythmics.  He is off of Xarelto given low stroke risk and less than 1% burden but will need to consider going back on at age 66.     He is NYHA class II, stage C.    We will keep him on folic acid and methotrexate for now.     SCD prevention: ICD in place         RTC will be determined after the valve work up.       CC  Patient Care Team:  [facility], [facility] as PCP - Helen Hernadez, RN as Specialty Care Coordinator (Cardiology)  Janice Shafer, PANCHITO as Specialty Care Coordinator (Cardiology)  Lauro Will MD as MD (Clinical Cardiac Electrophysiology)  Mandi Dickey MD as Assigned Heart and Vascular Provider  GIULIANO KOHLER [place], [place] Hausch, Raymond C, MD

## 2023-04-27 ENCOUNTER — TELEPHONE (OUTPATIENT)
Dept: CARDIOLOGY | Facility: CLINIC | Age: 63
End: 2023-04-27
Payer: COMMERCIAL

## 2023-04-27 NOTE — TELEPHONE ENCOUNTER
[facility] Call Center    Phone Message    May a detailed message be left on voicemail: yes     Reason for Call: Other:    Patient would like a call back regarding appointment that are needed. Patient was told that he was going to get a call regarding what is needed from Dr. Dickey. Patient would like call back to discuss what is needed and not needed for appointments. If orders are needed please place orders.     Action Taken: Other: Cardiology     Travel Screening: Not Applicable     Thank you!  [facility]

## 2023-05-18 ENCOUNTER — HOSPITAL ENCOUNTER (OUTPATIENT)
Dept: CARDIOLOGY | Facility: CLINIC | Age: 63
Discharge: HOME | End: 2023-05-18
Attending: INTERNAL MEDICINE | Admitting: INTERNAL MEDICINE
Payer: COMMERCIAL

## 2023-05-18 VITALS
OXYGEN SATURATION: 98 % | RESPIRATION RATE: 14 BRPM | DIASTOLIC BLOOD PRESSURE: 64 MMHG | HEART RATE: 60 BPM | SYSTOLIC BLOOD PRESSURE: 103 MMHG

## 2023-05-18 DIAGNOSIS — I34.0 NONRHEUMATIC MITRAL VALVE REGURGITATION: ICD-10-CM

## 2023-05-18 LAB — LVEF ECHO: NORMAL

## 2023-05-18 PROCEDURE — 250N000009 HC RX 250: Performed by: INTERNAL MEDICINE

## 2023-05-18 PROCEDURE — 93325 DOPPLER ECHO COLOR FLOW MAPG: CPT

## 2023-05-18 PROCEDURE — 93325 DOPPLER ECHO COLOR FLOW MAPG: CPT | Mod: 26 | Performed by: INTERNAL MEDICINE

## 2023-05-18 PROCEDURE — 93312 ECHO TRANSESOPHAGEAL: CPT | Mod: 26 | Performed by: INTERNAL MEDICINE

## 2023-05-18 PROCEDURE — 76376 3D RENDER W/INTRP POSTPROCES: CPT | Mod: 26 | Performed by: INTERNAL MEDICINE

## 2023-05-18 PROCEDURE — 250N000011 HC RX IP 250 OP 636: Performed by: INTERNAL MEDICINE

## 2023-05-18 PROCEDURE — 93312 ECHO TRANSESOPHAGEAL: CPT

## 2023-05-18 PROCEDURE — 76376 3D RENDER W/INTRP POSTPROCES: CPT

## 2023-05-18 PROCEDURE — 93320 DOPPLER ECHO COMPLETE: CPT | Mod: 26 | Performed by: INTERNAL MEDICINE

## 2023-05-18 PROCEDURE — 99152 MOD SED SAME PHYS/QHP 5/>YRS: CPT | Mod: GC | Performed by: INTERNAL MEDICINE

## 2023-05-18 RX ORDER — LIDOCAINE 40 MG/G
CREAM TOPICAL
Status: DISCONTINUED | OUTPATIENT
Start: 2023-05-18 | End: 2023-05-19 | Stop reason: HOSPADM

## 2023-05-18 RX ORDER — ACYCLOVIR 200 MG/1
9.5 CAPSULE ORAL
Status: DISCONTINUED | OUTPATIENT
Start: 2023-05-18 | End: 2023-05-19 | Stop reason: HOSPADM

## 2023-05-18 RX ORDER — FENTANYL CITRATE 50 UG/ML
25 INJECTION, SOLUTION INTRAMUSCULAR; INTRAVENOUS
Status: DISCONTINUED | OUTPATIENT
Start: 2023-05-18 | End: 2023-05-19 | Stop reason: HOSPADM

## 2023-05-18 RX ORDER — FLUMAZENIL 0.1 MG/ML
0.2 INJECTION, SOLUTION INTRAVENOUS
Status: DISCONTINUED | OUTPATIENT
Start: 2023-05-18 | End: 2023-05-19 | Stop reason: HOSPADM

## 2023-05-18 RX ORDER — SODIUM CHLORIDE 9 MG/ML
INJECTION, SOLUTION INTRAVENOUS CONTINUOUS PRN
Status: DISCONTINUED | OUTPATIENT
Start: 2023-05-18 | End: 2023-05-19 | Stop reason: HOSPADM

## 2023-05-18 RX ORDER — NALOXONE HYDROCHLORIDE 0.4 MG/ML
0.4 INJECTION, SOLUTION INTRAMUSCULAR; INTRAVENOUS; SUBCUTANEOUS
Status: DISCONTINUED | OUTPATIENT
Start: 2023-05-18 | End: 2023-05-19 | Stop reason: HOSPADM

## 2023-05-18 RX ORDER — LIDOCAINE HYDROCHLORIDE 20 MG/ML
15 SOLUTION OROPHARYNGEAL ONCE
Status: COMPLETED | OUTPATIENT
Start: 2023-05-18 | End: 2023-05-18

## 2023-05-18 RX ORDER — FENTANYL CITRATE 50 UG/ML
50 INJECTION, SOLUTION INTRAMUSCULAR; INTRAVENOUS ONCE
Status: COMPLETED | OUTPATIENT
Start: 2023-05-18 | End: 2023-05-18

## 2023-05-18 RX ORDER — NALOXONE HYDROCHLORIDE 0.4 MG/ML
0.2 INJECTION, SOLUTION INTRAMUSCULAR; INTRAVENOUS; SUBCUTANEOUS
Status: DISCONTINUED | OUTPATIENT
Start: 2023-05-18 | End: 2023-05-19 | Stop reason: HOSPADM

## 2023-05-18 RX ADMIN — FENTANYL CITRATE 50 MCG: 50 INJECTION, SOLUTION INTRAMUSCULAR; INTRAVENOUS at 11:02

## 2023-05-18 RX ADMIN — MIDAZOLAM 1 MG: 1 INJECTION INTRAMUSCULAR; INTRAVENOUS at 11:01

## 2023-05-18 RX ADMIN — LIDOCAINE HYDROCHLORIDE 15 ML: 20 SOLUTION ORAL; TOPICAL at 10:53

## 2023-05-18 RX ADMIN — FENTANYL CITRATE 50 MCG: 50 INJECTION, SOLUTION INTRAMUSCULAR; INTRAVENOUS at 11:04

## 2023-05-18 RX ADMIN — MIDAZOLAM 1 MG: 1 INJECTION INTRAMUSCULAR; INTRAVENOUS at 11:03

## 2023-05-18 RX ADMIN — TOPICAL ANESTHETIC 0.5 ML: 200 SPRAY DENTAL; PERIODONTAL at 10:55

## 2023-05-18 NOTE — PROGRESS NOTES
Pt arrived in ECHO department for scheduled CADEN.   Procedure explained, questions answered and consent signed. Discharge instructions discussed with patient.  Pt's throat sprayed at 1050, therefore pt will not be able to eat or drink until 2 hours after at 1250. Informed pt of this time and encouraged to start with warm fluids and soft foods.    Pt tolerated procedure well, and was given a total of 100 mcg IV fentanyl and 2 mg IV versed for conscious sedation. Pt denied throat or chest pain after CADEN complete.   CADEN probe 61 used for procedure.  Pt denied chest or throat pain after procedure and was D/C home after awake and VSS. Escorted out to front lobby by staff in w/c to meet pt's ride home.  Robina Quintana RN

## 2023-05-18 NOTE — PRE-PROCEDURE
GENERAL PRE-PROCEDURE:   Procedure:  CADEN  Date/Time:  5/18/2023 10:08 AM    Verbal consent obtained?: Yes    Risks and benefits: Risks, benefits and alternatives were discussed    Consent given by:  Patient  Patient states understanding of procedure being performed: Yes    Patient's understanding of procedure matches consent: Yes    Procedure consent matches procedure scheduled: Yes    Expected level of sedation:  Minimal  Appropriately NPO:  Yes  ASA Class:  2  Mallampati  :  Grade 3- soft palate visible, posterior pharyngeal wall not visible  Lungs:  Lungs clear with good breath sounds bilaterally  Heart:  Normal heart sounds and rate  History & Physical reviewed:  History and physical reviewed and no updates needed  Statement of review:  I have reviewed the lab findings, diagnostic data, medications, and the plan for sedation      Jorge Reyes Castro, MD, MS  Cardiovascular disease fellow

## 2023-06-04 ENCOUNTER — HEALTH MAINTENANCE LETTER (OUTPATIENT)
Age: 63
End: 2023-06-04

## 2023-06-29 ENCOUNTER — CARE COORDINATION (OUTPATIENT)
Dept: CARDIOLOGY | Facility: CLINIC | Age: 63
End: 2023-06-29
Payer: COMMERCIAL

## 2023-06-29 ENCOUNTER — TELEPHONE (OUTPATIENT)
Dept: CARDIOLOGY | Facility: CLINIC | Age: 63
End: 2023-06-29
Payer: COMMERCIAL

## 2023-06-29 NOTE — PROGRESS NOTES
Patient has angiogram scheduled tomorrow. Called to review prep. Is not on blood thinners.   Reviewed all instructions with him and he verbalized understanding. Also sent through [employer]. Confirmed he does have a . Already has 1 week follow up next week with Dr Morales.       Pre-procedure instructions - Coronary Angiogram  Patient Education    1. Your arrival time is 11:00 AM.  Location is [address] [facility]  2. Please plan on being at the hospital all day.  3. At any time, emergencies and/or urgent cases may come up which could delay the start of your procedure.    Pre-procedure instructions - Coronary Angiogram  - Shower in the evening before or the morning of the procedure  - No solid food for 8 hours prior and nothing to drink 2 hours prior to arrival time  - You can take your morning medications (except for diabetic and blood thinners) with sips of water.  - Take 325 mg of Asprin 24 hours prior to the procedure and the morning of procedure.   - You will need to arrange a ride to drop you off and pick you up, as you will be unable to drive home.  Prior to discharge you may be required to lay flat for approximately 2-4 hours in the recovery unit to ensure proper clotting of the artery.              Diabetic Medication Instructions  ? Hold oral diabetic medication in morning of your procedure and for 48 hours after IV contrast is given  ? Typical instructions for insulin diabetic medication holding are below. However, please reach out to your Primary Care Provider or Endocrinologist for specific instructions  ? DO NOT take any oral diabetic medication, short-acting diabetes medications/insulin, humalog or regular insulin the morning of your test  ? Take   dose of long-acting insulin (Lantus, Levemir) the day of your test  o Remember to bring your glucometer and insulin with you to take after your test if  needed  ? DO NOT take injectable GLP-1 agonists semaglutide (Ozempic, Wegovy), dulaglutide (Trulicity), exenatide ER (Bydureon), tirzepatide (Mounjaro), or oral semaglutide (Rybelsus) for 7 days prior your procedure  ? Hold once daily injectable GLP-1 agonists exenatide (Byetta), liraglutide (Saxenda, Victoza) the day before and day of your procedure                  Anticoagulation Medication Instructions   NA    You will need to follow up with one of our cardiology APPs 1-2 weeks after your procedure. If you need help scheduling or rescheduling your appointment, please call [phone]

## 2023-06-29 NOTE — TELEPHONE ENCOUNTER
[facility] Call Center    Phone Message    May a detailed message be left on voicemail: yes     Reason for Call: Other: Patient explained that they have a procedure tomorrow but he has not received any instructions or prep so he is unsure what he should be doing to prepare for the appointment. Please review and call patient back to discuss, thank you!     Action Taken: Message routed to:  Other: Cardiology    Travel Screening: Not Applicable

## 2023-06-30 ENCOUNTER — APPOINTMENT (OUTPATIENT)
Dept: MEDSURG UNIT | Facility: CLINIC | Age: 63
End: 2023-06-30
Attending: INTERNAL MEDICINE
Payer: COMMERCIAL

## 2023-06-30 ENCOUNTER — APPOINTMENT (OUTPATIENT)
Dept: LAB | Facility: CLINIC | Age: 63
End: 2023-06-30
Attending: INTERNAL MEDICINE
Payer: COMMERCIAL

## 2023-06-30 ENCOUNTER — HOSPITAL ENCOUNTER (OUTPATIENT)
Facility: CLINIC | Age: 63
Discharge: HOME | End: 2023-06-30
Attending: INTERNAL MEDICINE | Admitting: INTERNAL MEDICINE
Payer: COMMERCIAL

## 2023-06-30 VITALS
HEART RATE: 66 BPM | WEIGHT: 193.6 LBS | BODY MASS INDEX: 25.66 KG/M2 | HEIGHT: 73 IN | OXYGEN SATURATION: 99 % | DIASTOLIC BLOOD PRESSURE: 52 MMHG | RESPIRATION RATE: 16 BRPM | SYSTOLIC BLOOD PRESSURE: 104 MMHG | TEMPERATURE: 98 F

## 2023-06-30 DIAGNOSIS — D86.85 SARCOID, CARDIAC: ICD-10-CM

## 2023-06-30 PROBLEM — Z98.890 STATUS POST CORONARY ANGIOGRAM: Status: ACTIVE | Noted: 2023-06-30

## 2023-06-30 LAB
ANION GAP SERPL CALCULATED.3IONS-SCNC: 9 MMOL/L (ref 7–15)
BUN SERPL-MCNC: 13 MG/DL (ref 8–23)
CALCIUM SERPL-MCNC: 9.1 MG/DL (ref 8.8–10.2)
CHLORIDE SERPL-SCNC: 106 MMOL/L (ref 98–107)
CREAT SERPL-MCNC: 0.95 MG/DL (ref 0.67–1.17)
DEPRECATED HCO3 PLAS-SCNC: 25 MMOL/L (ref 22–29)
ERYTHROCYTE [DISTWIDTH] IN BLOOD BY AUTOMATED COUNT: 13.3 % (ref 10–15)
GFR SERPL CREATININE-BSD FRML MDRD: >90 ML/MIN/1.73M2
GLUCOSE SERPL-MCNC: 86 MG/DL (ref 70–99)
HCT VFR BLD AUTO: 41.4 % (ref 40–53)
HGB BLD-MCNC: 14.2 G/DL (ref 13.3–17.7)
MCH RBC QN AUTO: 32.7 PG (ref 26.5–33)
MCHC RBC AUTO-ENTMCNC: 34.3 G/DL (ref 31.5–36.5)
MCV RBC AUTO: 95 FL (ref 78–100)
PLATELET # BLD AUTO: 173 10E3/UL (ref 150–450)
POTASSIUM SERPL-SCNC: 4 MMOL/L (ref 3.4–5.3)
RBC # BLD AUTO: 4.34 10E6/UL (ref 4.4–5.9)
SODIUM SERPL-SCNC: 140 MMOL/L (ref 136–145)
WBC # BLD AUTO: 5.8 10E3/UL (ref 4–11)

## 2023-06-30 PROCEDURE — 99152 MOD SED SAME PHYS/QHP 5/>YRS: CPT | Performed by: INTERNAL MEDICINE

## 2023-06-30 PROCEDURE — 999N000134 HC STATISTIC PP CARE STAGE 3

## 2023-06-30 PROCEDURE — 99152 MOD SED SAME PHYS/QHP 5/>YRS: CPT | Mod: GC | Performed by: INTERNAL MEDICINE

## 2023-06-30 PROCEDURE — 36415 COLL VENOUS BLD VENIPUNCTURE: CPT | Performed by: INTERNAL MEDICINE

## 2023-06-30 PROCEDURE — 999N000142 HC STATISTIC PROCEDURE PREP ONLY

## 2023-06-30 PROCEDURE — 82310 ASSAY OF CALCIUM: CPT | Performed by: INTERNAL MEDICINE

## 2023-06-30 PROCEDURE — C1894 INTRO/SHEATH, NON-LASER: HCPCS | Performed by: INTERNAL MEDICINE

## 2023-06-30 PROCEDURE — 258N000003 HC RX IP 258 OP 636: Performed by: INTERNAL MEDICINE

## 2023-06-30 PROCEDURE — 85027 COMPLETE CBC AUTOMATED: CPT | Performed by: INTERNAL MEDICINE

## 2023-06-30 PROCEDURE — 250N000011 HC RX IP 250 OP 636: Performed by: INTERNAL MEDICINE

## 2023-06-30 PROCEDURE — 999N000054 HC STATISTIC EKG NON-CHARGEABLE

## 2023-06-30 PROCEDURE — 272N000001 HC OR GENERAL SUPPLY STERILE: Performed by: INTERNAL MEDICINE

## 2023-06-30 PROCEDURE — 93454 CORONARY ARTERY ANGIO S&I: CPT | Performed by: INTERNAL MEDICINE

## 2023-06-30 PROCEDURE — 93005 ELECTROCARDIOGRAM TRACING: CPT

## 2023-06-30 PROCEDURE — 250N000009 HC RX 250: Performed by: INTERNAL MEDICINE

## 2023-06-30 PROCEDURE — 250N000011 HC RX IP 250 OP 636: Mod: JZ | Performed by: INTERNAL MEDICINE

## 2023-06-30 PROCEDURE — C1887 CATHETER, GUIDING: HCPCS | Performed by: INTERNAL MEDICINE

## 2023-06-30 PROCEDURE — 93454 CORONARY ARTERY ANGIO S&I: CPT | Mod: 26 | Performed by: INTERNAL MEDICINE

## 2023-06-30 RX ORDER — POTASSIUM CHLORIDE 750 MG/1
40 TABLET, EXTENDED RELEASE ORAL
Status: DISCONTINUED | OUTPATIENT
Start: 2023-06-30 | End: 2023-06-30 | Stop reason: HOSPADM

## 2023-06-30 RX ORDER — ATROPINE SULFATE 0.1 MG/ML
0.5 INJECTION INTRAVENOUS
Status: DISCONTINUED | OUTPATIENT
Start: 2023-06-30 | End: 2023-06-30 | Stop reason: HOSPADM

## 2023-06-30 RX ORDER — NALOXONE HYDROCHLORIDE 0.4 MG/ML
0.2 INJECTION, SOLUTION INTRAMUSCULAR; INTRAVENOUS; SUBCUTANEOUS
Status: DISCONTINUED | OUTPATIENT
Start: 2023-06-30 | End: 2023-06-30 | Stop reason: HOSPADM

## 2023-06-30 RX ORDER — OXYCODONE HYDROCHLORIDE 10 MG/1
10 TABLET ORAL EVERY 4 HOURS PRN
Status: DISCONTINUED | OUTPATIENT
Start: 2023-06-30 | End: 2023-06-30 | Stop reason: HOSPADM

## 2023-06-30 RX ORDER — SODIUM CHLORIDE 9 MG/ML
INJECTION, SOLUTION INTRAVENOUS CONTINUOUS
Status: DISCONTINUED | OUTPATIENT
Start: 2023-06-30 | End: 2023-06-30 | Stop reason: HOSPADM

## 2023-06-30 RX ORDER — FENTANYL CITRATE 50 UG/ML
25 INJECTION, SOLUTION INTRAMUSCULAR; INTRAVENOUS
Status: DISCONTINUED | OUTPATIENT
Start: 2023-06-30 | End: 2023-06-30 | Stop reason: HOSPADM

## 2023-06-30 RX ORDER — HEPARIN SODIUM 1000 [USP'U]/ML
INJECTION, SOLUTION INTRAVENOUS; SUBCUTANEOUS
Status: DISCONTINUED | OUTPATIENT
Start: 2023-06-30 | End: 2023-06-30 | Stop reason: HOSPADM

## 2023-06-30 RX ORDER — IOPAMIDOL 755 MG/ML
INJECTION, SOLUTION INTRAVASCULAR
Status: DISCONTINUED | OUTPATIENT
Start: 2023-06-30 | End: 2023-06-30 | Stop reason: HOSPADM

## 2023-06-30 RX ORDER — LIDOCAINE 40 MG/G
CREAM TOPICAL
Status: DISCONTINUED | OUTPATIENT
Start: 2023-06-30 | End: 2023-06-30 | Stop reason: HOSPADM

## 2023-06-30 RX ORDER — NITROGLYCERIN 5 MG/ML
VIAL (ML) INTRAVENOUS
Status: DISCONTINUED | OUTPATIENT
Start: 2023-06-30 | End: 2023-06-30 | Stop reason: HOSPADM

## 2023-06-30 RX ORDER — FLUMAZENIL 0.1 MG/ML
0.2 INJECTION, SOLUTION INTRAVENOUS
Status: DISCONTINUED | OUTPATIENT
Start: 2023-06-30 | End: 2023-06-30 | Stop reason: HOSPADM

## 2023-06-30 RX ORDER — ASPIRIN 325 MG
325 TABLET ORAL ONCE
Status: COMPLETED | OUTPATIENT
Start: 2023-06-30 | End: 2023-06-30

## 2023-06-30 RX ORDER — NALOXONE HYDROCHLORIDE 0.4 MG/ML
0.4 INJECTION, SOLUTION INTRAMUSCULAR; INTRAVENOUS; SUBCUTANEOUS
Status: DISCONTINUED | OUTPATIENT
Start: 2023-06-30 | End: 2023-06-30 | Stop reason: HOSPADM

## 2023-06-30 RX ORDER — FENTANYL CITRATE 50 UG/ML
INJECTION, SOLUTION INTRAMUSCULAR; INTRAVENOUS
Status: DISCONTINUED | OUTPATIENT
Start: 2023-06-30 | End: 2023-06-30 | Stop reason: HOSPADM

## 2023-06-30 RX ORDER — ASPIRIN 81 MG/1
243 TABLET, CHEWABLE ORAL ONCE
Status: COMPLETED | OUTPATIENT
Start: 2023-06-30 | End: 2023-06-30

## 2023-06-30 RX ORDER — POTASSIUM CHLORIDE 750 MG/1
20 TABLET, EXTENDED RELEASE ORAL
Status: DISCONTINUED | OUTPATIENT
Start: 2023-06-30 | End: 2023-06-30 | Stop reason: HOSPADM

## 2023-06-30 RX ORDER — NICARDIPINE HYDROCHLORIDE 2.5 MG/ML
INJECTION INTRAVENOUS
Status: DISCONTINUED | OUTPATIENT
Start: 2023-06-30 | End: 2023-06-30 | Stop reason: HOSPADM

## 2023-06-30 RX ORDER — OXYCODONE HYDROCHLORIDE 5 MG/1
5 TABLET ORAL EVERY 4 HOURS PRN
Status: DISCONTINUED | OUTPATIENT
Start: 2023-06-30 | End: 2023-06-30 | Stop reason: HOSPADM

## 2023-06-30 RX ADMIN — SODIUM CHLORIDE: 9 INJECTION, SOLUTION INTRAVENOUS at 11:55

## 2023-06-30 ASSESSMENT — ACTIVITIES OF DAILY LIVING (ADL)
ADLS_ACUITY_SCORE: 35

## 2023-06-30 NOTE — PROVIDER NOTIFICATION
Dr. Hernandes text paged that wife never got an update post procedure and is asking for an update on the procedure. Waiting for a reply back as he is in another procedure at this time. Patient and wife updated.

## 2023-06-30 NOTE — PROGRESS NOTES
Arrived to 3C, bay 32, from the cardiac cath lab s/p coronary angiogram with a TR band on the right wrist with 11 ml of air in place with a plan to begin deflation at 1435. He is alert and oriented x 4 and able to make needs known. Wife is at bedside. Will continue to monitor.

## 2023-06-30 NOTE — Clinical Note
Called to Brii FLANAGAN on 3C. All questions answered. All belongings sent with patient. Patient transported to 3C via stretcher with WAYNE RN.

## 2023-06-30 NOTE — Clinical Note
dry, intact, no bleeding and no hematoma. 6 Fr sheath removed from the right radial artery. TR band placed until hemostasis is achieved. See flowsheet for details.

## 2023-06-30 NOTE — DISCHARGE INSTRUCTIONS
Going Home after Coronary Angiogram  For 24 hours:        Have an adult stay with you for 24 hours.        Relax and take it easy.        Drink plenty of fluids.        You may eat your normal diet, unless your doctor tells you otherwise.        Do NOT make any important or legal decisions.        Do NOT drive or operate machines at home or at work.        Do NOT drink alcohol.   Do NOT smoke.     Medicines:        No change to your medication, continue as usual.    Care of wrist or arm site:        It is normal to have soreness at the puncture site and mild tingling in your hand for up to 3 days.        Remove the Band-Aid after 24 hours. If there is minor oozing, apply another Band-aid and remove it after 12 hours.         Do NOT take a bath, or use a hot tub or pool for the next 48 hours. You may shower after 24 hours.        It is normal to have a small bruise.  There should not be a lump at the site.        Do not scrub the site.        Do not use lotion or powder near the puncture site for 3 days.        For 2 days, do not use your hand or arm to support your weight (such as rising from a chair) or bend your wrist (such as lifting a garage door).        For 2 days, do not lift more than 5 pounds or exercise your arm (tennis, golf or bowling).    If you start bleeding from the site in your arm: Sit down and press firmly on the site with your fingers for 10 minutes. Call your doctor as soon as you can.    Call 911 right away if you have bleeding that is heavy or does not stop.     Call your doctor if:        You have a large or growing hard lump around the site.        The site is red, swollen, hot or tender.        Blood or fluid is draining from the site.        You have chills or a fever greater than 101 F (38 C).        Your arm turns bluish, feels numb or cool.        You have hives, a rash or unusual itching.       [facility] at [place]:   [phone] (7 days a week)       Cardiology Fellow on call (24 hours per day) at [facility] [place]:   [phone] (ask for Cardiology Fellow on call)

## 2023-06-30 NOTE — PROGRESS NOTES
TR band deflated and removed, site is soft and flat to palpation with no bleeding or hematoma. He has voided, ambulated, and tolerated a regular diet. He is alert and oriented x 4 and able to make needs known. Patient and wife verbalized understanding of all discharge instructions, no questions asked. Patient opted to ambulate to main entrance, accompanied by this RN and his wife. He has all belongings. PIV removed; arm board removed. Patient discharged.

## 2023-06-30 NOTE — PROGRESS NOTES
Pt is here for coronary angiogram. Pt is stable, AVSS. Pain free. EKG is done/Apaced. IV in and IVF at 150cc/hr. Labs reviewed. Groin prepped and pp marked.  Consent is signed.      Trevon, spouse, is here with pt. Trevon's cell # is [phone]

## 2023-07-04 LAB
ATRIAL RATE - MUSE: 60 BPM
DIASTOLIC BLOOD PRESSURE - MUSE: NORMAL MMHG
INTERPRETATION ECG - MUSE: NORMAL
P AXIS - MUSE: 112 DEGREES
PR INTERVAL - MUSE: 186 MS
QRS DURATION - MUSE: 142 MS
QT - MUSE: 474 MS
QTC - MUSE: 474 MS
R AXIS - MUSE: -20 DEGREES
SYSTOLIC BLOOD PRESSURE - MUSE: NORMAL MMHG
T AXIS - MUSE: -19 DEGREES
VENTRICULAR RATE- MUSE: 60 BPM

## 2023-07-06 ENCOUNTER — VIRTUAL VISIT (OUTPATIENT)
Dept: CARDIOLOGY | Facility: CLINIC | Age: 63
End: 2023-07-06
Attending: INTERNAL MEDICINE
Payer: COMMERCIAL

## 2023-07-06 VITALS
BODY MASS INDEX: 25.58 KG/M2 | HEIGHT: 73 IN | SYSTOLIC BLOOD PRESSURE: 106 MMHG | DIASTOLIC BLOOD PRESSURE: 68 MMHG | WEIGHT: 193 LBS

## 2023-07-06 DIAGNOSIS — D86.85 SARCOID, CARDIAC: ICD-10-CM

## 2023-07-06 PROCEDURE — 99215 OFFICE O/P EST HI 40 MIN: CPT | Mod: 95 | Performed by: INTERNAL MEDICINE

## 2023-07-06 ASSESSMENT — PAIN SCALES - GENERAL: PAINLEVEL: NO PAIN (0)

## 2023-07-06 NOTE — Clinical Note
"7/6/2023      RE: Jose Carney  [address]  [facility] [address]       Dear Colleague,    Thank you for the opportunity to participate in the care of your patient, Jose Carney, at the [facility] HEART CLINIC [facility]. Please see a copy of my visit note below.    Virtual Visit Details    Type of service:  Video Visit     Originating Location (pt. Location): {video visit patient location:865359::\"Home\"}  {PROVIDER LOCATION On-site should be selected for visits conducted from your clinic location or adjoining [facility] hospital, academic office, or other nearby [facility] building. Off-site should be selected for all other provider locations, including home:499903}  Distant Location (provider location):  {virtual location provider:370335}  Platform used for Video Visit: {Virtual Visit Platforms:091556::\"PanX\"}         [facility] HEART CARE  CARDIOVASCULAR DIVISION    VALVE CLINIC CONSULTATION    PRIMARY CARDIOLOGIST: ***      PERTINENT CLINICAL HISTORY & REASON FOR CONSULTATION:     Jose Carney is a very pleasant 62 year old male with severe mitral regurgitation referred to our clinic for evaluation and consideration for potential transcatheter mitral valve repair.  Her mitral regurgitation appears to be functional due to malcoaptation from a severely dilated left atrium, with a large posteriorly directed jet being the primary jet. LVEF is 55-60% without other significant valve disease. Additional medical history notable for ***     She has noted significant decrease in her functional capacity over the past year.  Last year, she was able to go on walks with her grand-daughter for several blocks at a time without difficulty.  Now she becomes dyspneic walking across the house. She denies any chest pain, shortness of breath, orthopnea, PND, leg swelling or weight gain. She reports occasional lightheadedness with standing, no palpitations or syncope.     He " had an ablation last year which improved his symptoms but he still gets tired. He gets tired but he still gets fatigued but is able to walk several blocks without any issues. His symptoms have been relatively stable over the past year. His blood pressure is typically in the 110s/60s. His pulse is typically in the 60's. He has been off of prednisone for about 1 month.      PAST MEDICAL HISTORY:     Past Medical History:   Diagnosis Date     First degree AV block 3/20/2018     Palpitations 3/20/2018     Paroxysmal supraventricular tachycardia (H) 3/20/2018     Sarcoid, cardiac/EF 54% per MRI,[place] of affected myocardium is 12% of myocardial mass 1/25/2018     Sarcoidosis/ systemic 2013 3/20/2018        PAST SURGICAL HISTORY:     Past Surgical History:   Procedure Laterality Date     CV CORONARY ANGIOGRAM N/A 6/30/2023    Procedure: Coronary Angiogram;  Surgeon: Tony Morales MD;  Location:  HEART CARDIAC CATH LAB     EP ABLATION SVT N/A 3/29/2022    Procedure: Ablation Supraventricular Tachycardia;  Surgeon: Lauro Will MD;  Location:  HEART CARDIAC CATH LAB     HERNIA REPAIR, INGUINAL RT/LT Bilateral         CURRENT MEDICATIONS:     Current Outpatient Medications   Medication Sig Dispense Refill     folic acid (FOLVITE) 1 MG tablet TAKE FIVE TABLETS (5MG) ONCE WEEKLY WITH YOUR METHOTREXATE 60 tablet 3     furosemide (LASIX) 20 MG tablet Take 20 mg by mouth as needed       methimazole (TAPAZOLE) 10 MG tablet Take by mouth 2 times daily       methimazole (TAPAZOLE) 5 MG tablet TAKE 1-1/2 TABLETS BY MOUTH ONE TIME A DAY.       methotrexate 2.5 MG tablet Take 8 tablets (20 mg) by mouth every 7 days 104 tablet 3     metoprolol succinate ER (TOPROL XL) 25 MG 24 hr tablet TAKE 4 TABLETS (100 MG) BY MOUTH DAILY 360 tablet 3     prednisoLONE acetate (PRED FORTE) 1 % ophthalmic susp Place 1 drop into both eyes every hour       sildenafil (REVATIO) 20 MG tablet Take 20-60 mg by mouth as needed       tamsulosin  "(FLOMAX) 0.4 MG capsule Take 0.4 mg by mouth daily       timolol maleate (TIMOPTIC) 0.5 % ophthalmic solution        finasteride (PROSCAR) 5 MG tablet Take 5 mg by mouth daily (Patient not taking: Reported on 7/6/2023)       predniSONE (DELTASONE) 1 MG tablet Take with food. Steroid taper. Take 4mg (4tabs) PO daily for 2 weeks, then 3mg (3 tabs) PO daily for 2 weeks, then 2mg (2tabs) PO daily for 2 weeks, than 1 mg (1 tab) PO daily for 2 weeks and then stop. (Patient not taking: Reported on 7/6/2023)       predniSONE (DELTASONE) 10 MG tablet  (Patient not taking: Reported on 7/6/2023)          ALLERGIES:     Allergies   Allergen Reactions     Seasonal Allergies      Terbinafine         FAMILY HISTORY:   No family history on file.     SOCIAL HISTORY:     Social History     Socioeconomic History     Marital status:      Spouse name: None     Number of children: None     Years of education: None     Highest education level: None   Tobacco Use     Smoking status: Never     Smokeless tobacco: Never   Substance and Sexual Activity     Alcohol use: Not Currently     Drug use: No        REVIEW OF SYSTEMS:     Constitutional: No fevers or chills  Skin: No new rash or itching  Eyes: No acute change in vision  Ears/Nose/Throat: No purulent rhinorrhea, new hearing loss, or new vertigo  Respiratory: No cough or hemoptysis  Cardiovascular: See HPI  Gastrointestinal: No change in appetite, vomiting, hematemesis or diarrhea  Genitourinary: No dysuria or hematuria  Musculoskeletal: No new back pain, neck pain or muscle pain  Neurologic: No new headaches, focal weakness or behavior changes  Psychiatric: No hallucinations, excessive alcohol consumption or illegal drug usage  Hematologic/Lymphatic/Immunologic: No bleeding, chills, fever, night sweats or weight loss  Endocrine: No new cold intolerance, heat intolerance, polyphagia, polydipsia or polyuria      PHYSICAL EXAMINATION:     /68   Ht 1.854 m (6' 1\")   Wt 87.5 kg " (193 lb)   BMI 25.46 kg/m      GENERAL: No acute distress.  HEENT: EOMI. Sclerae white, not injected. Nares clear. Pharynx without erythema or exudate.   Neck: No adenopathy. No thyromegaly. No jugular venous distension.   Heart: Regular rate and rhythm. No murmur.   Lungs: Clear to auscultation. No ronchi, wheezes, rales.   Abdomen: Soft, nontender, nondistended. Bowel sounds present.  Extremities: No clubbing, cyanosis, or edema.   Neurologic: Alert and oriented to person/place/time, normal speech and affect. No focal deficits.  Skin: No petechiae, purpura or rash.     LABORATORY DATA:     LIPID RESULTS:  No results found for: CHOL, HDL, LDL, TRIG, CHOLHDLRATIO    LIVER ENZYME RESULTS:  Lab Results   Component Value Date    AST 27 04/13/2023    AST 19 09/04/2020    ALT 19 04/13/2023    ALT 31 09/04/2020       CBC RESULTS:  Lab Results   Component Value Date    WBC 5.8 06/30/2023    WBC 5.7 09/04/2020    RBC 4.34 (L) 06/30/2023    RBC 4.32 (L) 09/04/2020    HGB 14.2 06/30/2023    HGB 14.6 09/04/2020    HCT 41.4 06/30/2023    HCT 42.9 09/04/2020    MCV 95 06/30/2023    MCV 99 09/04/2020    MCH 32.7 06/30/2023    MCH 33.8 (H) 09/04/2020    MCHC 34.3 06/30/2023    MCHC 34.0 09/04/2020    RDW 13.3 06/30/2023    RDW 14.1 09/04/2020     06/30/2023     09/04/2020       BMP RESULTS:  Lab Results   Component Value Date     06/30/2023     09/04/2020    POTASSIUM 4.0 06/30/2023    POTASSIUM 4.2 06/15/2022    POTASSIUM 3.8 09/04/2020    CHLORIDE 106 06/30/2023    CHLORIDE 109 01/04/2023    CHLORIDE 109 09/04/2020    CO2 25 06/30/2023    CO2 29 06/15/2022    CO2 28 09/04/2020    ANIONGAP 9 06/30/2023    ANIONGAP 1 (L) 06/15/2022    ANIONGAP 3 09/04/2020    GLC 86 06/30/2023    GLC 73 04/13/2023    BUN 13.0 06/30/2023    BUN 19 06/15/2022    BUN 18 09/04/2020    CR 0.95 06/30/2023    CR 1.19 09/04/2020    GFRESTIMATED >90 06/30/2023    GFRESTIMATED 67 09/04/2020    GFRESTBLACK 77 09/04/2020    INGE 9.1  06/30/2023    INGE 8.5 09/04/2020        A1C RESULTS:  No results found for: A1C    INR RESULTS:  No results found for: INR       PROCEDURES & FURTHER ASSESSMENTS:     ECG dated ***:    Echocardiogram dated ***:    NYHA Class: ***     CLINICAL IMPRESSION:     Jose Carney is a very pleasant 62 year old male with severe mitral regurgitation referred to our clinic for evaluation and consideration for potential transcatheter mitral valve repair.  Her mitral regurgitation appears to be functional due to malcoaptation from a severely dilated left atrium, with a large posteriorly directed jet being the primary jet. LVEF is 55-60% without other significant valve disease. Additional medical history notable for ***    Reviewed diagnosis of severe mitral regurgitation, natural history, indications for surgery including symptoms, reduced EF, AF, pHTN, or LVESD > 40mm. While her most recent echocardiogram notes normal LV size and function and normal pulmonary pressure, she now reports exertional dyspnea and fatigue likely related to mitral regurgitation, and that she meets criteria for repair. Discussed surgical and percutaneous options options.      Patient is an appropriate candidate for MitraClip mitral valve repair based on age, frailty, co-morbidities and surgical mortality risk estimation of *** based on the STS score.    Once work-up is complete the case will be discussed at our multi-disciplinary conference for consensus decision and procedural planning.     Plan Summary:  1) Severe symptomatic mitral regurgitation  -Repeat CADEN  -Presentation of data to the Structural Heart team to assess the optimal treatment of her mitral regurgitation         Patient was evaluated in clinic with Dr. Morales.    Chase Calloway MD  [facility] Cardiology Team      CC  Patient Care Team:  Amira Gentile PA-C as PCP - General (Physician Assistant - Medical)  Helen Gonzalez RN as Specialty Care Coordinator (Cardiology)  Soni  Janice RN as Specialty Care Coordinator (Cardiology)  Lauro Will MD as MD (Clinical Cardiac Electrophysiology)  Mandi Dickey MD as Assigned Heart and Vascular Provider  SELF, REFERRED          Please do not hesitate to contact me if you have any questions/concerns.     Sincerely,     Tony Morales MD

## 2023-07-06 NOTE — NURSING NOTE
Chief Complaint   Patient presents with     Consult     Consult to discuss angiogram, pt will discuss questions/concerns with provider. Medications/allergies reviewed with pt, pt no longer taking prednisone, please review.        Is the patient currently in the state of [place]? YES    Visit mode:VIDEO    If the visit is dropped, the patient can be reconnected by: VIDEO VISIT: Text to cell phone: [phone]    Will anyone else be joining the visit? YES: Wife Faby will be joining visit today.       How would you like to obtain your AVS? MyChart    Are changes needed to the allergy or medication list? NO    Patient denies any changes since echeck-in regarding medication and allergies and states all information entered during echeck-in remains accurate.    Pooja Paul, Visit Facilitator/MA.

## 2023-07-06 NOTE — PROGRESS NOTES
Virtual Visit Details    Type of service:  Video Visit     Originating Location (pt. Location): Home    Distant Location (provider location):  On-site  Platform used for Video Visit: [facility] HEART [facility]  CARDIOVASCULAR DIVISION    VALVE CLINIC CONSULTATION    PRIMARY CARDIOLOGIST: Dr. Mandi Dickey      PERTINENT CLINICAL HISTORY & REASON FOR CONSULTATION:     Jose Carney is a very pleasant 62 year old male with severe mitral regurgitation referred to our clinic for evaluation and consideration for potential transcatheter mitral valve repair.  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 TTE 12/2022 showed severe mitral regurgitation. Subsequent CADEN 5/18/23 showed severe eccentric, posterior directed, MR due to restricted posterior leaftlet.    He had an ablation last year which improved his symptoms but he still gets tired. He gets tired but he still gets fatigued but is able to walk several blocks without any issues. His symptoms have been relatively stable over the past year. His blood pressure is typically in the 110s/60s. His pulse is typically in the 60's. He has been off of prednisone for about 1 month.      PAST MEDICAL HISTORY:     Past Medical History:   Diagnosis Date     First degree AV block 3/20/2018     Palpitations 3/20/2018     Paroxysmal supraventricular tachycardia (H) 3/20/2018     Sarcoid, cardiac/EF 54% per MRI,Garner of affected myocardium is 12% of myocardial mass 1/25/2018     Sarcoidosis/ systemic 2013 3/20/2018      PAST SURGICAL HISTORY:     Past Surgical History:   Procedure Laterality Date     CV CORONARY ANGIOGRAM N/A 6/30/2023    Procedure: Coronary Angiogram;  Surgeon: Tony Morales MD;  Location:  HEART CARDIAC CATH LAB     EP ABLATION SVT N/A 3/29/2022    Procedure: Ablation Supraventricular Tachycardia;  Surgeon: Lauro Will MD;  Location:  HEART CARDIAC CATH LAB     HERNIA REPAIR, INGUINAL RT/LT Bilateral       CURRENT MEDICATIONS:      Current Outpatient Medications   Medication Sig Dispense Refill     folic acid (FOLVITE) 1 MG tablet TAKE FIVE TABLETS (5MG) ONCE WEEKLY WITH YOUR METHOTREXATE 60 tablet 3     furosemide (LASIX) 20 MG tablet Take 20 mg by mouth as needed       methimazole (TAPAZOLE) 10 MG tablet Take by mouth 2 times daily       methimazole (TAPAZOLE) 5 MG tablet TAKE 1-1/2 TABLETS BY MOUTH ONE TIME A DAY.       methotrexate 2.5 MG tablet Take 8 tablets (20 mg) by mouth every 7 days 104 tablet 3     metoprolol succinate ER (TOPROL XL) 25 MG 24 hr tablet TAKE 4 TABLETS (100 MG) BY MOUTH DAILY 360 tablet 3     prednisoLONE acetate (PRED FORTE) 1 % ophthalmic susp Place 1 drop into both eyes every hour       sildenafil (REVATIO) 20 MG tablet Take 20-60 mg by mouth as needed       tamsulosin (FLOMAX) 0.4 MG capsule Take 0.4 mg by mouth daily       timolol maleate (TIMOPTIC) 0.5 % ophthalmic solution        finasteride (PROSCAR) 5 MG tablet Take 5 mg by mouth daily (Patient not taking: Reported on 7/6/2023)       predniSONE (DELTASONE) 1 MG tablet Take with food. Steroid taper. Take 4mg (4tabs) PO daily for 2 weeks, then 3mg (3 tabs) PO daily for 2 weeks, then 2mg (2tabs) PO daily for 2 weeks, than 1 mg (1 tab) PO daily for 2 weeks and then stop. (Patient not taking: Reported on 7/6/2023)       predniSONE (DELTASONE) 10 MG tablet  (Patient not taking: Reported on 7/6/2023)          ALLERGIES:     Allergies   Allergen Reactions     Seasonal Allergies      Terbinafine       FAMILY HISTORY:   No family history on file.     SOCIAL HISTORY:     Social History     Socioeconomic History     Marital status:      Spouse name: None     Number of children: None     Years of education: None     Highest education level: None   Tobacco Use     Smoking status: Never     Smokeless tobacco: Never   Substance and Sexual Activity     Alcohol use: Not Currently     Drug use: No      REVIEW OF SYSTEMS:     Constitutional: No fevers or  "chills  Skin: No new rash or itching  Eyes: No acute change in vision  Ears/Nose/Throat: No purulent rhinorrhea, new hearing loss, or new vertigo  Respiratory: No cough or hemoptysis  Cardiovascular: See HPI  Gastrointestinal: No change in appetite, vomiting, hematemesis or diarrhea  Genitourinary: No dysuria or hematuria  Musculoskeletal: No new back pain, neck pain or muscle pain  Neurologic: No new headaches, focal weakness or behavior changes  Psychiatric: No hallucinations, excessive alcohol consumption or illegal drug usage  Hematologic/Lymphatic/Immunologic: No bleeding, chills, fever, night sweats or weight loss  Endocrine: No new cold intolerance, heat intolerance, polyphagia, polydipsia or polyuria      PHYSICAL EXAMINATION:     /68   Ht 1.854 m (6' 1\")   Wt 87.5 kg (193 lb)   BMI 25.46 kg/m      GENERAL: No acute distress.  HEENT: EOMI. Sclerae white, not injected. Nares clear. Pharynx without erythema or exudate.   Neck: No adenopathy. No thyromegaly. No jugular venous distension.   Heart: Regular rate and rhythm. No murmur.   Lungs: Clear to auscultation. No ronchi, wheezes, rales.   Abdomen: Soft, nontender, nondistended. Bowel sounds present.  Extremities: No clubbing, cyanosis, or edema.   Neurologic: Alert and oriented to person/place/time, normal speech and affect. No focal deficits.  Skin: No petechiae, purpura or rash.     LABORATORY DATA:     LIPID RESULTS:  No results found for: CHOL, HDL, LDL, TRIG, CHOLHDLRATIO    LIVER ENZYME RESULTS:  Lab Results   Component Value Date    AST 27 04/13/2023    AST 19 09/04/2020    ALT 19 04/13/2023    ALT 31 09/04/2020     CBC RESULTS:  Lab Results   Component Value Date    WBC 5.8 06/30/2023    WBC 5.7 09/04/2020    RBC 4.34 (L) 06/30/2023    RBC 4.32 (L) 09/04/2020    HGB 14.2 06/30/2023    HGB 14.6 09/04/2020    HCT 41.4 06/30/2023    HCT 42.9 09/04/2020    MCV 95 06/30/2023    MCV 99 09/04/2020    MCH 32.7 06/30/2023    MCH 33.8 (H) 09/04/2020 "    MCHC 34.3 06/30/2023    MCHC 34.0 09/04/2020    RDW 13.3 06/30/2023    RDW 14.1 09/04/2020     06/30/2023     09/04/2020     BMP RESULTS:  Lab Results   Component Value Date     06/30/2023     09/04/2020    POTASSIUM 4.0 06/30/2023    POTASSIUM 4.2 06/15/2022    POTASSIUM 3.8 09/04/2020    CHLORIDE 106 06/30/2023    CHLORIDE 109 01/04/2023    CHLORIDE 109 09/04/2020    CO2 25 06/30/2023    CO2 29 06/15/2022    CO2 28 09/04/2020    ANIONGAP 9 06/30/2023    ANIONGAP 1 (L) 06/15/2022    ANIONGAP 3 09/04/2020    GLC 86 06/30/2023    GLC 73 04/13/2023    BUN 13.0 06/30/2023    BUN 19 06/15/2022    BUN 18 09/04/2020    CR 0.95 06/30/2023    CR 1.19 09/04/2020    GFRESTIMATED >90 06/30/2023    GFRESTIMATED 67 09/04/2020    GFRESTBLACK 77 09/04/2020    INGE 9.1 06/30/2023    INGE 8.5 09/04/2020      A1C RESULTS:  No results found for: A1C    INR RESULTS:  No results found for: INR     PROCEDURES & FURTHER ASSESSMENTS:     ECG dated 6/30/23:  Atrial paced rhythm      CADEN 5/18/23:    ______________________________________________________________________________  Interpretation Summary  Severe eccentric, posterior directed, MR due to restricted posterior leaftlet.     Global right ventricular function is normal.  Left ventricular function is decreased. The ejection fraction is 45-50%  (mildly reduced).  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     ______________________________________________________________________________  Procedure  Transesophageal Echocardiogram with color and spectral Doppler performed. 3D  image acquisition, reconstruction, and real-time interpretation was performed.  I was present during CADEN probe placement by the Fellow. I personally viewed  the imaging and agree with the interpretation and report as documented by the  Fellow. Procedure location Echo Lab. The procedure was performed in the Echo  Lab. Informed consent for Transesophegeal echo  obtained. CADEN Probe #61 was  used during the procedure. Patient was sedated using Fentanyl 100 mcg. I  determined this patient to be an appropriate candidate for the planned  sedation and procedure and have reassessed the patient immediately prior to  sedation and procedure. Total sedation time: 29 minutes of continuous bedside  1:1 monitoring. Patient was sedated using Versed 2 mg. The heart rate,  respiratory rate, oxygen saturations, blood pressure, and response to care  were monitored throughout the procedure with the assistance of the nurse. The  Transducer was inserted without difficulty . The patient tolerated the  procedure well. Complications None. Good quality two-dimensional was performed  and interpreted.     Left Ventricle  Left ventricular function is decreased. The ejection fraction is 45-50%  (mildly reduced). 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 Left ventricular size is normal. Left ventricular wall  thickness is normal.     Right Ventricle  Global right ventricular function is normal. The right ventricle is normal  size. A pacemaker lead is noted in the right ventricle.     Atria  The left atrial appendage is normal. It is free of spontaneous echo contrast  and thrombus. Mild biatrial enlargement is present. The patent foramen ovale  was demonstrated by color Doppler . A small patent foramen ovale is present.  Lipomatous infiltration of the interatrial septm is present. Chiari network  (normal variant) is noted.     Mitral Valve  Severe eccentric, posterior directed, MR due to restricted posterior leaftlet.  ERO: 0.37 cm2 and RV 67 ml). If mitraclip is considered: leaflet gap is 4 mm,  posterior leaflet length is 1.7 cm, mitral annulur plane height (measured in  systole from mid atrial septum) is 4.0 cm.     Aortic Valve  The aortic valve is tricuspid. Mild aortic valve sclerosis is present. Trace  aortic insufficiency is present.     Tricuspid Valve  Mild  tricuspid insufficiency is present. Right ventricular systolic pressure  is 18mmHg above the right atrial pressure.     Pulmonic Valve  The pulmonic valve is normal.     Vessels  The aorta root is normal. The LUPV Doppler shows normal velocity waveform .  The RUPV Doppler shows systolic reversal .     Pericardium  No pericardial effusion is present.     Compared to Previous Study  There has been no change.     ______________________________________________________________________________  Doppler Measurements & Calculations  MR PISA: 5.1 cm2  MR ERO: 0.37 cm2  MR volume: 67.0 ml       Echocardiogram dated 12/28/22:  Moderate to severe left ventricular dilation is present.LVIDd 66mm.  Biplane LVEF is 42%.  Basal 9inferior wall aneurysm.  Right ventricular function, chamber size, wall motion, and thickness are  normal.  Severe mitral insufficiency is present.  Mild to moderate tricuspid insufficiency is present.  Right ventricular systolic pressure is 24mmHg above the right atrial pressure.  IVC diameter >2.1 cm collapsing <50% with sniff suggests a high RA pressure  estimated at 15 mmHg or greater.  No pericardial effusion is present.  Mitral insufficiency has worsened.     CLINICAL IMPRESSION:     Jose Carney is a very pleasant 62 year old male with severe mitral regurgitation referred to our clinic for evaluation and consideration for potential transcatheter mitral valve repair.  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 TTE 12/2022 showed severe mitral regurgitation. Subsequent CADEN 5/18/23 showed severe eccentric, posterior directed, MR due to restricted posterior leaftlet.    Reviewed diagnosis of severe mitral regurgitation, natural history, indications for surgery including symptoms, reduced EF, AF, pHTN, or LVESD > 40mm. Discussed surgical and percutaneous options options. Patient is an appropriate candidate for MitraClip mitral valve repair based on age, frailty, co-morbidities and surgical  mortality risk.     Once work-up is complete the case will be discussed at our multi-disciplinary conference for consensus decision and procedural planning.     Plan Summary:  1) Severe symptomatic mitral regurgitation  -Repeat CADEN  -Presentation of data to the Structural Heart team to assess the optimal treatment of her mitral regurgitation         Patient was evaluated in clinic with Dr. Morales.    Chase Calloway MD  [facility] Cardiology Team    Patient seen and examined with Cardiovascular fellow and agree with the assessment and plan described above.     Tony Morales M.D.  Interventional Cardiology  [facility]  Patient Care Team:  Amira Gentile PA-C as PCP - General (Physician Assistant - Medical)  Helen Gonzalez, RN as Specialty Care Coordinator (Cardiology)  Janice Shafer, PANCHITO as Specialty Care Coordinator (Cardiology)  Lauro Will MD as MD (Clinical Cardiac Electrophysiology)  Mandi Dickey MD as Assigned Heart and Vascular Provider  SELF, REFERRED

## 2023-07-31 ENCOUNTER — CARE COORDINATION (OUTPATIENT)
Dept: CARDIOLOGY | Facility: CLINIC | Age: 63
End: 2023-07-31
Payer: COMMERCIAL

## 2023-07-31 DIAGNOSIS — I34.0 NONRHEUMATIC MITRAL VALVE REGURGITATION: Primary | ICD-10-CM

## 2023-07-31 NOTE — PROGRESS NOTES
Date: 7/31/2023    Time of Call: 10:31 AM     Diagnosis:  Severe mitral insufficiency     [ TORB ] Ordering provider: Tony Morales MD  Order:   CADEN     Order received by: Elham Richmond RN     Follow-up/additional notes:   Notes from the office visit on 7/6/23:    Plan Summary:  1) Severe symptomatic mitral regurgitation  -Repeat CADEN  -Presentation of data to the Structural Heart team to assess the optimal treatment of her mitral regurgitation       
done

## 2023-08-03 ENCOUNTER — TELEPHONE (OUTPATIENT)
Dept: CARDIOLOGY | Facility: CLINIC | Age: 63
End: 2023-08-03
Payer: COMMERCIAL

## 2023-08-03 NOTE — TELEPHONE ENCOUNTER
Called and left vm to please call me back, So I can help him schedule his CADEN. Left contact information. Per pt wife we cn call her work number but I Tried to call wife at work and I get a busy signal

## 2023-08-10 ENCOUNTER — CARE COORDINATION (OUTPATIENT)
Dept: CARDIOLOGY | Facility: CLINIC | Age: 63
End: 2023-08-10
Payer: COMMERCIAL

## 2023-08-10 NOTE — PROGRESS NOTES
Return phone call made to Mrs. Carney. She was wondering if a repeat CADEN really needed to be done.  I was explaining that Jose's case would be presented at the next heart valve conference where images would be reviewed. It would be here that a decision if another CADEN was necessary would be made.     stated that as of 9/1/23 Jose would no longer have health insurance.  She was wondering if at all possible, if Jose needs a Clip, if this could be done prior to 9/1/23.    All this information will be reviewed with Dr. Morales and a plan will be made regarding the necessity of a repeat CADEN and of a   Clip.

## 2023-08-14 ENCOUNTER — CARE COORDINATION (OUTPATIENT)
Dept: CARDIOLOGY | Facility: CLINIC | Age: 63
End: 2023-08-14
Payer: COMMERCIAL

## 2023-08-14 DIAGNOSIS — I34.0 NONRHEUMATIC MITRAL VALVE REGURGITATION: Primary | ICD-10-CM

## 2023-08-14 DIAGNOSIS — I50.33 ACUTE ON CHRONIC DIASTOLIC HEART FAILURE (H): ICD-10-CM

## 2023-08-14 RX ORDER — CEFAZOLIN SODIUM 2 G/50ML
2 SOLUTION INTRAVENOUS
Status: CANCELLED | OUTPATIENT
Start: 2023-08-14

## 2023-08-14 RX ORDER — SODIUM CHLORIDE 9 MG/ML
INJECTION, SOLUTION INTRAVENOUS CONTINUOUS
Status: CANCELLED | OUTPATIENT
Start: 2023-08-14

## 2023-08-14 RX ORDER — LIDOCAINE 40 MG/G
CREAM TOPICAL
Status: CANCELLED | OUTPATIENT
Start: 2023-08-14

## 2023-08-14 RX ORDER — ASPIRIN 325 MG
325 TABLET ORAL ONCE
Status: CANCELLED | OUTPATIENT
Start: 2023-08-14 | End: 2023-08-14

## 2023-08-14 NOTE — PROGRESS NOTES
Patient's case was discussed at Valve conference, attended by structural heart cardiologists, CV surgeons, CNP's, and structural heart care coordinators, on August 14, 2023    Patient is appropriate to proceed with MitraClip    Special considerations:    It was noted the patients heart is slightly rotated.       Contacted patient to review discussion at Valve Conference.  Through shared decision making, patient agrees with the plan as outlined above.       Siomara Richmond RN  Lead Structural Heart Care Coordinator    TAVR, MitraClip and Watchman Programs  [facility]  Office: [phone]  -----------------------------------------------------------------------------------------------------  Date: 8/14/2023    Time of Call: 4:07 PM     Diagnosis:  severe mitral insufficiency     [ TORB ] Ordering provider: Tony Morales MD  Order:   MitraClip case request  CADEN  ABO/TS, INR, CBC, BMP, nt-pro BNP  Pre-Clip procedure orders     Order received by: Elham Richmond RN

## 2023-08-14 NOTE — NURSING NOTE
[place] Cardiomyopathy Questionnaire  (CQ-12)  Date: July 6, 2023    STS:   MV replacement:  2.2 %  MV repair:  1.3 %    NHYA Class: III    Pre MitraClip    1.  A.  5      B.  3       C.  1  2.  3  3.  3  4.  3  5.  4  6.  2  7.  1  8.  A. 3     B.  3     C.  4       Provided additional education regarding TAVR/MitraClip procedure, after being present for discussion with physician. Explained the work-up process and next steps for patient. Patient provided our direct contact number and instructed to call with any questions.

## 2023-08-16 ENCOUNTER — CARE COORDINATION (OUTPATIENT)
Dept: CARDIOLOGY | Facility: CLINIC | Age: 63
End: 2023-08-16
Payer: COMMERCIAL

## 2023-08-16 NOTE — PROGRESS NOTES
Lab appointment at the [facility]:  7:00 a.m., August 28  [facility] ([facility])  [address] [address]    MitraClip Procedure:  August 28, 2nd case    Check in to the hospital, admitting area on the main floor at 9:00 a.m.    [facility] [address]  [address] [place]  [address] -   parking is available in front of the hospital, 24 hours a day  -  Stop at the Information Desk and the admissions desk in the lobby.      You will be directed to the 3rd floor, Family Surgery Waiting Room.    -------------------------------------------------------------------------  Nothing to eat after midnight; water, apple juice or 7up/Sprite is OK up to two hours prior to your scheduled procedure.  You may take your medications in the morning with a sip of water.      *Please use the special cleansing wipes, received at your pre-op History and Physical, the night before and the morning of your procedure.  Please focus the use of these wipes from neck to groin, avoiding the face and genital area.      If you did NOT receive these special cleansing wipes at your pre-op History and Physical, then:   * Please use a soap such as hibicleanse or chlorhexadine.  This can be purchased, over the counter, at a pharmacy.  If this is not available, please use an antibacterial soap.  * Take a shower, with antibacterial soap, the night before the procedure, and the morning of the procedure.  Focus the use of this soap from neck to groin, avoiding the face and genital area.    Medications Instructions:  -- PLEASE TAKE: Aspirin 325 mg, by mouth the night before the procedure and the morning of the procedure.    -- OK to take morning of procedure, or, night before the procedure, it that is how it's prescribed:  Finasteride  methimazole   methotrexate   metoprolol   prednisoLONE eye drops  Prednisone    -- HOLD these medications the morning of the  procedure:  Folic acid  Furosemide      If any questions please contact:  Siomara Richmond RN  Structural Heart Care Coordinator  [facility]  Office: [phone]  Pager: [phone]

## 2023-08-27 ENCOUNTER — ANESTHESIA EVENT (OUTPATIENT)
Dept: CARDIOLOGY | Facility: CLINIC | Age: 63
End: 2023-08-27
Payer: COMMERCIAL

## 2023-08-27 ASSESSMENT — LIFESTYLE VARIABLES: TOBACCO_USE: 0

## 2023-08-27 ASSESSMENT — ENCOUNTER SYMPTOMS: DYSRHYTHMIAS: 1

## 2023-08-27 NOTE — ANESTHESIA PREPROCEDURE EVALUATION
Anesthesia Pre-Procedure Evaluation    Patient: Jose Carney   MRN: [id number] : 1960        Procedure : Procedure(s):  Transcatheter Mitral Valve Repair          Past Medical History:   Diagnosis Date    First degree AV block 2018    Heart murmur     Pacemaker     Palpitations 2018    Paroxysmal supraventricular tachycardia (H) 2018    Sarcoid, cardiac/EF 54% per MRI,[place] of affected myocardium is 12% of myocardial mass 2018    Sarcoidosis/ systemic 2013 2018    Sleep apnea     Thyroid disease       Past Surgical History:   Procedure Laterality Date    CV CORONARY ANGIOGRAM N/A 2023    Procedure: Coronary Angiogram;  Surgeon: Tony Morales MD;  Location:  [facility]    EP ABLATION SVT N/A 3/29/2022    Procedure: Ablation Supraventricular Tachycardia;  Surgeon: Lauro Will MD;  Location:  [facility]    HERNIA REPAIR, INGUINAL RT/LT Bilateral       Allergies   Allergen Reactions    Seasonal Allergies     Terbinafine       Social History     Tobacco Use    Smoking status: Never    Smokeless tobacco: Never   Substance Use Topics    Alcohol use: Not Currently      Wt Readings from Last 1 Encounters:   23 87.5 kg (193 lb)        Anesthesia Evaluation   Pt has had prior anesthetic.     No history of anesthetic complications       ROS/MED HX  ENT/Pulmonary:     (+) sleep apnea, uses CPAP,                                  (-) tobacco use   Neurologic:       Cardiovascular: Comment: Device Check 2022  Device: [employer]iste D433 RESONATE EL ICD  Normal device function.   Mode: DDDR  bpm (AAIR with VVI back up)  AP: 51%  : 34%  Intrinsic Rhythm: SR 60 bpm  Lead Trends Appear Stable.   Estimated battery longevity to RRT = 7.5 years.    Atrial Arrhythmia: 61 AT/AFL episodes recorded over the last 6 months lasting 33 sec to 31 hours. Longest episode was 22.  AF [place]: <1%  Anticoagulant: None  Ventricular Arrhythmia: 12  NSVT episodes. No EGMs are available  Setting Changes: RA output increased to maintain safety margin.      (+) Dyslipidemia hypertension- -   -  - -      CHF etiology: sarcoidosis       pacemaker, Reason placed: 1st degree HB.         dysrhythmias, 1st Deg Heart Block,  valvular problems/murmurs type: MR     Previous cardiac testing   Echo: Date: 5/18/23 Results:  Left Ventricle  Left ventricular function is decreased. The ejection fraction is 45-50%  (mildly reduced). 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 Left ventricular size is normal. Left ventricular wall  thickness is normal.     Right Ventricle  Global right ventricular function is normal. The right ventricle is normal  size. A pacemaker lead is noted in the right ventricle.     Atria  The left atrial appendage is normal. It is free of spontaneous echo contrast  and thrombus. Mild biatrial enlargement is present. The patent foramen ovale  was demonstrated by color Doppler . A small patent foramen ovale is present.  Lipomatous infiltration of the interatrial septm is present. Chiari network  (normal variant) is noted.     Mitral Valve  Severe eccentric, posterior directed, MR due to restricted posterior leaftlet.  ERO: 0.37 cm2 and RV 67 ml). If mitraclip is considered: leaflet gap is 4 mm,  posterior leaflet length is 1.7 cm, mitral annulur plane height (measured in  systole from mid atrial septum) is 4.0 cm.     Aortic Valve  The aortic valve is tricuspid. Mild aortic valve sclerosis is present. Trace  aortic insufficiency is present.     Tricuspid Valve  Mild tricuspid insufficiency is present. Right ventricular systolic pressure  is 18mmHg above the right atrial pressure.     Pulmonic Valve  The pulmonic valve is normal.     Vessels  The aorta root is normal. The LUPV Doppler shows normal velocity waveform .  The RUPV Doppler shows systolic reversal .     Pericardium  No pericardial effusion is present.    Stress Test:   Date: Results:    ECG Reviewed:  Date: 6/30/23 Results:    Atrial-paced rhythm  Right bundle branch block      Cath:  Date: 6/30/23 Results:  Diagnostic  Dominance: Right  Left Anterior Descending    First Diagonal Branch  The vessel is small.    Second Diagonal Branch  The vessel is large.    Third Diagonal Branch          METS/Exercise Tolerance:     Hematologic:    (-) anemia   Musculoskeletal:       GI/Hepatic:       Renal/Genitourinary:       Endo:     (+)          thyroid problem, hyperthyroidism, Chronic steroid usage for Other.         Psychiatric/Substance Use:       Infectious Disease:       Malignancy:       Other:      (+)  , H/O Chronic Pain,         Physical Exam    Airway  airway exam normal      Mallampati: I   TM distance: > 3 FB   Neck ROM: full   Mouth opening: > 3 cm    Respiratory Devices and Support         Dental       (+) Minor Abnormalities - some fillings, tiny chips      Cardiovascular       Comment: Left sided AICD.   Rhythm and rate: regular and normal     Pulmonary   pulmonary exam normal        breath sounds clear to auscultation           OUTSIDE LABS:  CBC:   Lab Results   Component Value Date    WBC 5.8 06/30/2023    WBC 6.8 04/13/2023    HGB 14.2 06/30/2023    HGB 15.6 04/13/2023    HCT 41.4 06/30/2023    HCT 44.0 04/13/2023     06/30/2023     04/13/2023     BMP:   Lab Results   Component Value Date     06/30/2023     04/13/2023    POTASSIUM 4.0 06/30/2023    POTASSIUM 4.0 04/13/2023    CHLORIDE 106 06/30/2023    CHLORIDE 104 04/13/2023    CO2 25 06/30/2023    CO2 25 04/13/2023    BUN 13.0 06/30/2023    BUN 23.8 (H) 04/13/2023    CR 0.95 06/30/2023    CR 1.07 04/13/2023    GLC 86 06/30/2023    GLC 77 04/13/2023     COAGS: No results found for: PTT, INR, FIBR  POC: No results found for: BGM, HCG, HCGS  HEPATIC:   Lab Results   Component Value Date    ALBUMIN 4.3 04/13/2023    PROTTOTAL 6.9 04/13/2023    ALT 19 04/13/2023    AST 27 04/13/2023    ALKPHOS 82  04/13/2023    BILITOTAL 1.3 (H) 04/13/2023     OTHER:   Lab Results   Component Value Date    INGE 9.1 06/30/2023    MAG 2.2 03/22/2018    TSH 1.01 03/22/2018       Anesthesia Plan    ASA Status:  3    NPO Status:  NPO Appropriate    Anesthesia Type: General.     - Airway: ETT   Induction: Intravenous.   Maintenance: Balanced.   Techniques and Equipment:     - Lines/Monitors: BIS, Arterial Line     - Drips/Meds: Phenylephrine     Consents          - Extended Intubation/Ventilatory Support Discussed: Yes.      - Patient is DNR/DNI Status: No     Use of blood products discussed: Yes.     Postoperative Care    Pain management: Multi-modal analgesia.   PONV prophylaxis: Ondansetron (or other 5HT-3), Dexamethasone or Solumedrol     Comments:    Other Comments: The material risks, benefits, and alternatives were discussed in detail.  The patient agrees to proceed.  The patient has no other complaints at this time.            Maynor Velazquez MD

## 2023-08-27 NOTE — H&P
[facility]      CSI History and Physicial  Jose Carney MRN: [id number]  1960  Date of Admission:(Not on file)  Primary care provider: Amira Gentile      Assessment and Plan:     Jose Carney is a very pleasant 62 year old male with history cardiac sarcoidosis and severe mitral regurgitation who is admitted after planned MitraClip with Dr. Dawn.     #Severe mitral regurgitation s/p mitraclip x 2.  Post op Mean Gradient 4 mmHg by CADEN.  Sheath sites soft without hematoma. No arrhythmias. Neuro's intact  - Bedrest per protocol   - Neuro checks, per protocol  - Echocardiogram tomorrow  - Monitor groin sites  - EKG in morning   - Sena can be removed once patient is out of bed   - Lifelong 81 mg ASA   - Plavix 75mg daily x 6 months   - Cardiac rehab/PT/OT  - Diurese as needed/able  - Daily weights  - Strict intake/output  - Continuous telemetry monitoring  - Lifelong antibiotic prophylaxis prior to all dental procedures.    # Biopsy confirmed cardiac sarcoidosis  # AVNRT s/p ablation and ICD  Follows with Dr. Dickey. Diagnosed by transbronchial biopsy 2013. ICD placed after PET scan highly suspicious and patient developing AVRNT. Previous attempts to wean immunosuppression have resulted in recurrent arrhythmias and symptoms/inflammation. Previously on amiodarone and sotalol but titrated off by EP. Now s/p Ablation 3/29/22 with Dr. Will   - Continue Methotrexate 20 mg weekly  - Continuous telemetry    # WILLIAM   - Continue CPAP    Clinically Significant Risk Factors Present on Admission     Cardiovascular: Mitral valve insufficiency    FEN: Cardiac diet  Prophylaxis:  DVT: Ambulation  Disposition: Home in 1-2 days with cardiac rehab pending stable post-op period  Code Status: Full    Stephany Perez, APRN, CNP  [facility] Interventional Cardiology Team  [phone]         Chief Complaint:   Planned MitraClip Procedure         History of Present Illness:   Jose Careny is a very pleasant 62 year  old male with history cardiac sarcoidosis and severe mitral regurgitation who is admitted after planned MitraClip with Dr. Dawn.    Prior to procedure patient with increasing SOB and increasing exercise intolerance. Procedure was without complications. Patient now s/p MitraClip x 2.  Immediate post op mean gradient 4.     At time of interview patient denies any SOB, lightheadedness, dizziness, chest pain.           Review of Systems:    10 point review of systems negative except for stated above in HPI.          Past Medical History:   Medical History reviewed.   Past Medical History:   Diagnosis Date    First degree AV block 03/20/2018    Heart murmur     Pacemaker     Palpitations 03/20/2018    Paroxysmal supraventricular tachycardia (H) 03/20/2018    Sarcoid, cardiac/EF 54% per MRI,[place] of affected myocardium is 12% of myocardial mass 01/25/2018    Sarcoidosis/ systemic 2013 03/20/2018    Sleep apnea     Thyroid disease              Past Surgical History:   Surgical History reviewed.   Past Surgical History:   Procedure Laterality Date    CV CORONARY ANGIOGRAM N/A 6/30/2023    Procedure: Coronary Angiogram;  Surgeon: Tony Morales MD;  Location: [facility] CARDIAC CATH LAB    EP ABLATION SVT N/A 3/29/2022    Procedure: Ablation Supraventricular Tachycardia;  Surgeon: Lauro Will MD;  Location: [facility] CARDIAC CATH LAB    HERNIA REPAIR, INGUINAL RT/LT Bilateral              Social History:   Social History reviewed.  Social History     Tobacco Use    Smoking status: Never    Smokeless tobacco: Never   Substance Use Topics    Alcohol use: Not Currently             Family History:   Family History reviewed.   History reviewed. No pertinent family history.          Allergies:     Allergies   Allergen Reactions    Seasonal Allergies     Terbinafine              Medications:   Medications Reviewed.   No current facility-administered medications for this encounter.     Current Outpatient Medications    Medication Sig    folic acid (FOLVITE) 1 MG tablet TAKE FIVE TABLETS (5MG) ONCE WEEKLY WITH YOUR METHOTREXATE    methimazole (TAPAZOLE) 5 MG tablet TAKE 1-1/2 TABLETS BY MOUTH ONE TIME A DAY.    methotrexate 2.5 MG tablet Take 8 tablets (20 mg) by mouth every 7 days    metoprolol succinate ER (TOPROL XL) 25 MG 24 hr tablet TAKE 4 TABLETS (100 MG) BY MOUTH DAILY    timolol maleate (TIMOPTIC) 0.5 % ophthalmic solution     finasteride (PROSCAR) 5 MG tablet Take 5 mg by mouth daily (Patient not taking: Reported on 7/6/2023)    furosemide (LASIX) 20 MG tablet Take 20 mg by mouth as needed    methimazole (TAPAZOLE) 10 MG tablet Take by mouth 2 times daily    prednisoLONE acetate (PRED FORTE) 1 % ophthalmic susp Place 1 drop into both eyes every hour    predniSONE (DELTASONE) 1 MG tablet Take with food. Steroid taper. Take 4mg (4tabs) PO daily for 2 weeks, then 3mg (3 tabs) PO daily for 2 weeks, then 2mg (2tabs) PO daily for 2 weeks, than 1 mg (1 tab) PO daily for 2 weeks and then stop. (Patient not taking: Reported on 7/6/2023)    predniSONE (DELTASONE) 10 MG tablet  (Patient not taking: Reported on 7/6/2023)    sildenafil (REVATIO) 20 MG tablet Take 20-60 mg by mouth as needed    tamsulosin (FLOMAX) 0.4 MG capsule Take 0.4 mg by mouth daily     Facility-Administered Medications Ordered in Other Encounters   Medication    sodium chloride (PF) 0.9% PF flush 10 mL    sodium chloride (PF) 0.9% PF flush 10 mL    sodium chloride (PF) 0.9% PF flush 10 mL    sodium chloride bacteriostatic 0.9 % flush 10 mL             Physical Exam:   Vitals were reviewed.  There were no vitals taken for this visit.    General: AAOx3, NAD  Skin: Not jaundiced, no rash, no ecchymoses; incision site CDI, soft, non-tender, no signs of hematoma. No bruit  HEENT: MMM, PERRLA, EOM intact  CV: RRR, normal S1S2, no murmur, clicks, rubs  Resp: Clear to auscultation bilaterally, no wheezes, rhonchi  Abd: Soft, non-tender, BS+, no masses  appreciated  Extremities: warm and well perfused, palpable pulses, no edema  Neuro: No lateralizing symptoms or focal neurologic deficits        Labs:   Routine Labs:  Lab Results   Component Value Date    TROPI <0.015 09/04/2020    TROPI <0.015 09/20/2018    TROPI <0.015 03/22/2018     CMP  Recent Labs   Lab 08/28/23  0916 08/28/23  0828     --    POTASSIUM 3.9  --    CHLORIDE 109*  --    CO2 25  --    ANIONGAP 9  --    GLC 90 86   BUN 13.6  --    CR 0.98  --    GFRESTIMATED 88  --    INGE 9.0  --      CBC  Recent Labs   Lab 08/28/23  0916   WBC 4.4   RBC 4.27*   HGB 13.6   HCT 41.1   MCV 96   MCH 31.9   MCHC 33.1   RDW 14.6        INR  Recent Labs   Lab 08/28/23  0916   INR 1.14           Diagnostics:    EKG 12Lead 6/30/23:       Echo 5/18/23:  Interpretation Summary  Severe eccentric, posterior directed, MR due to restricted posterior leaftlet.     Global right ventricular function is normal.  Left ventricular function is decreased. The ejection fraction is 45-50%  (mildly reduced).  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  Coronary Angiogram 6/30/23:  Coronary Findings    Diagnostic  Dominance: Right  Left Anterior Descending      First Diagonal Branch   The vessel is small.      Second Diagonal Branch   The vessel is large.      Third Diagonal Branch         Intervention     No interventions have been documented.       Medical Decision Making     60 MINUTES SPENT BY ME on the date of service doing chart review, history, exam, documentation & further activities per the note.         Stephany Perez, APRN CNP on 8/28/2023 at 3:23 PM

## 2023-08-28 ENCOUNTER — HOSPITAL ENCOUNTER (OUTPATIENT)
Dept: CARDIOLOGY | Facility: CLINIC | Age: 63
Discharge: HOME | End: 2023-08-28
Attending: INTERNAL MEDICINE | Admitting: INTERNAL MEDICINE
Payer: COMMERCIAL

## 2023-08-28 ENCOUNTER — HOSPITAL ENCOUNTER (INPATIENT)
Facility: CLINIC | Age: 63
LOS: 1 days | Discharge: HOME | End: 2023-08-29
Attending: INTERNAL MEDICINE | Admitting: INTERNAL MEDICINE
Payer: COMMERCIAL

## 2023-08-28 ENCOUNTER — ANESTHESIA (OUTPATIENT)
Dept: CARDIOLOGY | Facility: CLINIC | Age: 63
End: 2023-08-28
Payer: COMMERCIAL

## 2023-08-28 DIAGNOSIS — I50.33 ACUTE ON CHRONIC DIASTOLIC HEART FAILURE (H): ICD-10-CM

## 2023-08-28 DIAGNOSIS — I34.0 NONRHEUMATIC MITRAL VALVE REGURGITATION: ICD-10-CM

## 2023-08-28 DIAGNOSIS — I34.0 NONRHEUMATIC MITRAL VALVE REGURGITATION: Primary | ICD-10-CM

## 2023-08-28 DIAGNOSIS — D86.85 SARCOID, CARDIAC: ICD-10-CM

## 2023-08-28 DIAGNOSIS — Z98.890 STATUS POST CORONARY ANGIOGRAM: Primary | ICD-10-CM

## 2023-08-28 LAB
ABO/RH(D): NORMAL
ACT BLD: 255 SECONDS (ref 74–150)
ACT BLD: 264 SECONDS (ref 74–150)
ACT BLD: 264 SECONDS (ref 74–150)
ACT BLD: 305 SECONDS (ref 74–150)
ACT BLD: 305 SECONDS (ref 74–150)
ACT BLD: 322 SECONDS (ref 74–150)
ANION GAP SERPL CALCULATED.3IONS-SCNC: 9 MMOL/L (ref 7–15)
ANTIBODY SCREEN: NEGATIVE
BUN SERPL-MCNC: 13.6 MG/DL (ref 8–23)
CALCIUM SERPL-MCNC: 9 MG/DL (ref 8.8–10.2)
CHLORIDE SERPL-SCNC: 109 MMOL/L (ref 98–107)
CREAT SERPL-MCNC: 0.98 MG/DL (ref 0.67–1.17)
DEPRECATED HCO3 PLAS-SCNC: 25 MMOL/L (ref 22–29)
ERYTHROCYTE [DISTWIDTH] IN BLOOD BY AUTOMATED COUNT: 14.6 % (ref 10–15)
GFR SERPL CREATININE-BSD FRML MDRD: 88 ML/MIN/1.73M2
GLUCOSE BLDC GLUCOMTR-MCNC: 86 MG/DL (ref 70–99)
GLUCOSE SERPL-MCNC: 90 MG/DL (ref 70–99)
HCT VFR BLD AUTO: 41.1 % (ref 40–53)
HGB BLD-MCNC: 13.6 G/DL (ref 13.3–17.7)
INR PPP: 1.14 (ref 0.85–1.15)
LVEF ECHO: NORMAL
MCH RBC QN AUTO: 31.9 PG (ref 26.5–33)
MCHC RBC AUTO-ENTMCNC: 33.1 G/DL (ref 31.5–36.5)
MCV RBC AUTO: 96 FL (ref 78–100)
NT-PROBNP SERPL-MCNC: 587 PG/ML (ref 0–900)
PLATELET # BLD AUTO: 153 10E3/UL (ref 150–450)
POTASSIUM SERPL-SCNC: 3.9 MMOL/L (ref 3.4–5.3)
RBC # BLD AUTO: 4.27 10E6/UL (ref 4.4–5.9)
SODIUM SERPL-SCNC: 143 MMOL/L (ref 136–145)
SPECIMEN EXPIRATION DATE: NORMAL
WBC # BLD AUTO: 4.4 10E3/UL (ref 4–11)

## 2023-08-28 PROCEDURE — C1887 CATHETER, GUIDING: HCPCS | Performed by: INTERNAL MEDICINE

## 2023-08-28 PROCEDURE — 93355 ECHO TRANSESOPHAGEAL (TEE): CPT

## 2023-08-28 PROCEDURE — 85347 COAGULATION TIME ACTIVATED: CPT

## 2023-08-28 PROCEDURE — 82310 ASSAY OF CALCIUM: CPT | Performed by: INTERNAL MEDICINE

## 2023-08-28 PROCEDURE — 250N000013 HC RX MED GY IP 250 OP 250 PS 637

## 2023-08-28 PROCEDURE — 250N000011 HC RX IP 250 OP 636: Mod: JZ | Performed by: ANESTHESIOLOGY

## 2023-08-28 PROCEDURE — 85027 COMPLETE CBC AUTOMATED: CPT | Performed by: INTERNAL MEDICINE

## 2023-08-28 PROCEDURE — 250N000011 HC RX IP 250 OP 636: Mod: JZ | Performed by: NURSE ANESTHETIST, CERTIFIED REGISTERED

## 2023-08-28 PROCEDURE — 82962 GLUCOSE BLOOD TEST: CPT

## 2023-08-28 PROCEDURE — C1760 CLOSURE DEV, VASC: HCPCS | Performed by: INTERNAL MEDICINE

## 2023-08-28 PROCEDURE — 86850 RBC ANTIBODY SCREEN: CPT | Performed by: INTERNAL MEDICINE

## 2023-08-28 PROCEDURE — 33418 REPAIR TCAT MITRAL VALVE: CPT | Mod: Q0 | Performed by: INTERNAL MEDICINE

## 2023-08-28 PROCEDURE — 258N000003 HC RX IP 258 OP 636: Performed by: ANESTHESIOLOGY

## 2023-08-28 PROCEDURE — 120N000003 HC R&B IMCU UMMC

## 2023-08-28 PROCEDURE — 250N000013 HC RX MED GY IP 250 OP 250 PS 637: Performed by: INTERNAL MEDICINE

## 2023-08-28 PROCEDURE — 272N000001 HC OR GENERAL SUPPLY STERILE: Performed by: INTERNAL MEDICINE

## 2023-08-28 PROCEDURE — 999N000054 HC STATISTIC EKG NON-CHARGEABLE

## 2023-08-28 PROCEDURE — C1894 INTRO/SHEATH, NON-LASER: HCPCS | Performed by: INTERNAL MEDICINE

## 2023-08-28 PROCEDURE — 250N000009 HC RX 250: Performed by: INTERNAL MEDICINE

## 2023-08-28 PROCEDURE — 33418 REPAIR TCAT MITRAL VALVE: CPT | Performed by: INTERNAL MEDICINE

## 2023-08-28 PROCEDURE — 99222 1ST HOSP IP/OBS MODERATE 55: CPT | Mod: 25

## 2023-08-28 PROCEDURE — 370N000017 HC ANESTHESIA TECHNICAL FEE, PER MIN: Performed by: INTERNAL MEDICINE

## 2023-08-28 PROCEDURE — 258N000003 HC RX IP 258 OP 636: Performed by: INTERNAL MEDICINE

## 2023-08-28 PROCEDURE — 250N000009 HC RX 250: Performed by: ANESTHESIOLOGY

## 2023-08-28 PROCEDURE — 258N000003 HC RX IP 258 OP 636: Performed by: NURSE ANESTHETIST, CERTIFIED REGISTERED

## 2023-08-28 PROCEDURE — 36415 COLL VENOUS BLD VENIPUNCTURE: CPT | Performed by: INTERNAL MEDICINE

## 2023-08-28 PROCEDURE — 86901 BLOOD TYPING SEROLOGIC RH(D): CPT | Performed by: INTERNAL MEDICINE

## 2023-08-28 PROCEDURE — 02UG3JZ SUPPLEMENT MITRAL VALVE WITH SYNTHETIC SUBSTITUTE, PERCUTANEOUS APPROACH: ICD-10-PCS | Performed by: INTERNAL MEDICINE

## 2023-08-28 PROCEDURE — 85610 PROTHROMBIN TIME: CPT | Performed by: INTERNAL MEDICINE

## 2023-08-28 PROCEDURE — 93355 ECHO TRANSESOPHAGEAL (TEE): CPT | Performed by: INTERNAL MEDICINE

## 2023-08-28 PROCEDURE — G0378 HOSPITAL OBSERVATION PER HR: HCPCS

## 2023-08-28 PROCEDURE — 250N000011 HC RX IP 250 OP 636: Performed by: INTERNAL MEDICINE

## 2023-08-28 PROCEDURE — 33419 REPAIR TCAT MITRAL VALVE: CPT | Mod: Q0 | Performed by: INTERNAL MEDICINE

## 2023-08-28 PROCEDURE — 250N000011 HC RX IP 250 OP 636: Performed by: ANESTHESIOLOGY

## 2023-08-28 PROCEDURE — 93005 ELECTROCARDIOGRAM TRACING: CPT

## 2023-08-28 PROCEDURE — 93010 ELECTROCARDIOGRAM REPORT: CPT | Performed by: INTERNAL MEDICINE

## 2023-08-28 PROCEDURE — 83880 ASSAY OF NATRIURETIC PEPTIDE: CPT | Performed by: INTERNAL MEDICINE

## 2023-08-28 PROCEDURE — 33419 REPAIR TCAT MITRAL VALVE: CPT | Performed by: INTERNAL MEDICINE

## 2023-08-28 DEVICE — DELIVERY SYSTEM MITRACLIP G4 CDS0706-XTW: Type: IMPLANTABLE DEVICE | Status: FUNCTIONAL

## 2023-08-28 RX ORDER — LIDOCAINE HYDROCHLORIDE 20 MG/ML
INJECTION, SOLUTION INFILTRATION; PERINEURAL PRN
Status: DISCONTINUED | OUTPATIENT
Start: 2023-08-28 | End: 2023-08-28

## 2023-08-28 RX ORDER — HYDRALAZINE HYDROCHLORIDE 20 MG/ML
10 INJECTION INTRAMUSCULAR; INTRAVENOUS
Status: DISCONTINUED | OUTPATIENT
Start: 2023-08-28 | End: 2023-08-29 | Stop reason: HOSPADM

## 2023-08-28 RX ORDER — HEPARIN SODIUM 1000 [USP'U]/ML
INJECTION, SOLUTION INTRAVENOUS; SUBCUTANEOUS PRN
Status: DISCONTINUED | OUTPATIENT
Start: 2023-08-28 | End: 2023-08-28

## 2023-08-28 RX ORDER — SODIUM CHLORIDE, SODIUM LACTATE, POTASSIUM CHLORIDE, CALCIUM CHLORIDE 600; 310; 30; 20 MG/100ML; MG/100ML; MG/100ML; MG/100ML
INJECTION, SOLUTION INTRAVENOUS CONTINUOUS PRN
Status: DISCONTINUED | OUTPATIENT
Start: 2023-08-28 | End: 2023-08-28

## 2023-08-28 RX ORDER — FENTANYL CITRATE 50 UG/ML
25 INJECTION, SOLUTION INTRAMUSCULAR; INTRAVENOUS EVERY 5 MIN PRN
Status: DISCONTINUED | OUTPATIENT
Start: 2023-08-28 | End: 2023-08-28 | Stop reason: HOSPADM

## 2023-08-28 RX ORDER — FINASTERIDE 5 MG/1
5 TABLET, FILM COATED ORAL DAILY
Status: DISCONTINUED | OUTPATIENT
Start: 2023-08-29 | End: 2023-08-29 | Stop reason: HOSPADM

## 2023-08-28 RX ORDER — NITROGLYCERIN 0.4 MG/1
0.4 TABLET SUBLINGUAL EVERY 5 MIN PRN
Status: DISCONTINUED | OUTPATIENT
Start: 2023-08-28 | End: 2023-08-29 | Stop reason: HOSPADM

## 2023-08-28 RX ORDER — ONDANSETRON 2 MG/ML
INJECTION INTRAMUSCULAR; INTRAVENOUS PRN
Status: DISCONTINUED | OUTPATIENT
Start: 2023-08-28 | End: 2023-08-28

## 2023-08-28 RX ORDER — PROTAMINE SULFATE 10 MG/ML
INJECTION, SOLUTION INTRAVENOUS PRN
Status: DISCONTINUED | OUTPATIENT
Start: 2023-08-28 | End: 2023-08-28

## 2023-08-28 RX ORDER — HYDROMORPHONE HYDROCHLORIDE 1 MG/ML
0.2 INJECTION, SOLUTION INTRAMUSCULAR; INTRAVENOUS; SUBCUTANEOUS EVERY 5 MIN PRN
Status: DISCONTINUED | OUTPATIENT
Start: 2023-08-28 | End: 2023-08-28 | Stop reason: HOSPADM

## 2023-08-28 RX ORDER — ASPIRIN 81 MG/1
81 TABLET, CHEWABLE ORAL DAILY
Status: DISCONTINUED | OUTPATIENT
Start: 2023-08-29 | End: 2023-08-29 | Stop reason: HOSPADM

## 2023-08-28 RX ORDER — ONDANSETRON 4 MG/1
4 TABLET, ORALLY DISINTEGRATING ORAL EVERY 30 MIN PRN
Status: DISCONTINUED | OUTPATIENT
Start: 2023-08-28 | End: 2023-08-28 | Stop reason: HOSPADM

## 2023-08-28 RX ORDER — FENTANYL CITRATE 50 UG/ML
INJECTION, SOLUTION INTRAMUSCULAR; INTRAVENOUS PRN
Status: DISCONTINUED | OUTPATIENT
Start: 2023-08-28 | End: 2023-08-28

## 2023-08-28 RX ORDER — CLOPIDOGREL BISULFATE 75 MG/1
75 TABLET ORAL DAILY
Status: DISCONTINUED | OUTPATIENT
Start: 2023-08-29 | End: 2023-08-29

## 2023-08-28 RX ORDER — CLOPIDOGREL BISULFATE 75 MG/1
300 TABLET ORAL ONCE
Status: COMPLETED | OUTPATIENT
Start: 2023-08-28 | End: 2023-08-28

## 2023-08-28 RX ORDER — NALOXONE HYDROCHLORIDE 0.4 MG/ML
0.4 INJECTION, SOLUTION INTRAMUSCULAR; INTRAVENOUS; SUBCUTANEOUS
Status: DISCONTINUED | OUTPATIENT
Start: 2023-08-28 | End: 2023-08-29

## 2023-08-28 RX ORDER — SODIUM CHLORIDE, SODIUM LACTATE, POTASSIUM CHLORIDE, CALCIUM CHLORIDE 600; 310; 30; 20 MG/100ML; MG/100ML; MG/100ML; MG/100ML
INJECTION, SOLUTION INTRAVENOUS CONTINUOUS
Status: DISCONTINUED | OUTPATIENT
Start: 2023-08-28 | End: 2023-08-28 | Stop reason: HOSPADM

## 2023-08-28 RX ORDER — LIDOCAINE 40 MG/G
CREAM TOPICAL
Status: DISCONTINUED | OUTPATIENT
Start: 2023-08-28 | End: 2023-08-28 | Stop reason: HOSPADM

## 2023-08-28 RX ORDER — ASPIRIN 325 MG
325 TABLET ORAL ONCE
Status: COMPLETED | OUTPATIENT
Start: 2023-08-28 | End: 2023-08-28

## 2023-08-28 RX ORDER — HYDROMORPHONE HYDROCHLORIDE 1 MG/ML
0.4 INJECTION, SOLUTION INTRAMUSCULAR; INTRAVENOUS; SUBCUTANEOUS EVERY 5 MIN PRN
Status: DISCONTINUED | OUTPATIENT
Start: 2023-08-28 | End: 2023-08-28 | Stop reason: HOSPADM

## 2023-08-28 RX ORDER — ONDANSETRON 4 MG/1
4 TABLET, ORALLY DISINTEGRATING ORAL EVERY 30 MIN PRN
Status: CANCELLED | OUTPATIENT
Start: 2023-08-28

## 2023-08-28 RX ORDER — ASPIRIN 81 MG/1
81 TABLET ORAL DAILY
Status: DISCONTINUED | OUTPATIENT
Start: 2023-08-29 | End: 2023-08-29

## 2023-08-28 RX ORDER — OXYCODONE HYDROCHLORIDE 5 MG/1
5 TABLET ORAL
Status: CANCELLED | OUTPATIENT
Start: 2023-08-28

## 2023-08-28 RX ORDER — METOPROLOL SUCCINATE 100 MG/1
100 TABLET, EXTENDED RELEASE ORAL DAILY
Status: DISCONTINUED | OUTPATIENT
Start: 2023-08-29 | End: 2023-08-29 | Stop reason: HOSPADM

## 2023-08-28 RX ORDER — PROPOFOL 10 MG/ML
INJECTION, EMULSION INTRAVENOUS PRN
Status: DISCONTINUED | OUTPATIENT
Start: 2023-08-28 | End: 2023-08-28

## 2023-08-28 RX ORDER — LIDOCAINE 40 MG/G
CREAM TOPICAL
Status: DISCONTINUED | OUTPATIENT
Start: 2023-08-28 | End: 2023-08-29 | Stop reason: HOSPADM

## 2023-08-28 RX ORDER — FENTANYL CITRATE 50 UG/ML
50 INJECTION, SOLUTION INTRAMUSCULAR; INTRAVENOUS EVERY 5 MIN PRN
Status: DISCONTINUED | OUTPATIENT
Start: 2023-08-28 | End: 2023-08-28 | Stop reason: HOSPADM

## 2023-08-28 RX ORDER — NALOXONE HYDROCHLORIDE 0.4 MG/ML
0.2 INJECTION, SOLUTION INTRAMUSCULAR; INTRAVENOUS; SUBCUTANEOUS
Status: DISCONTINUED | OUTPATIENT
Start: 2023-08-28 | End: 2023-08-29

## 2023-08-28 RX ORDER — ONDANSETRON 2 MG/ML
4 INJECTION INTRAMUSCULAR; INTRAVENOUS EVERY 30 MIN PRN
Status: CANCELLED | OUTPATIENT
Start: 2023-08-28

## 2023-08-28 RX ORDER — OXYCODONE HYDROCHLORIDE 10 MG/1
10 TABLET ORAL
Status: CANCELLED | OUTPATIENT
Start: 2023-08-28

## 2023-08-28 RX ORDER — CLOPIDOGREL BISULFATE 75 MG/1
75 TABLET ORAL DAILY
Status: DISCONTINUED | OUTPATIENT
Start: 2023-08-29 | End: 2023-08-29 | Stop reason: HOSPADM

## 2023-08-28 RX ORDER — SODIUM CHLORIDE 9 MG/ML
INJECTION, SOLUTION INTRAVENOUS CONTINUOUS
Status: DISCONTINUED | OUTPATIENT
Start: 2023-08-28 | End: 2023-08-28 | Stop reason: HOSPADM

## 2023-08-28 RX ORDER — DEXAMETHASONE SODIUM PHOSPHATE 4 MG/ML
INJECTION, SOLUTION INTRA-ARTICULAR; INTRALESIONAL; INTRAMUSCULAR; INTRAVENOUS; SOFT TISSUE PRN
Status: DISCONTINUED | OUTPATIENT
Start: 2023-08-28 | End: 2023-08-28

## 2023-08-28 RX ORDER — CEFAZOLIN SODIUM/WATER 2 G/20 ML
2 SYRINGE (ML) INTRAVENOUS
Status: COMPLETED | OUTPATIENT
Start: 2023-08-28 | End: 2023-08-28

## 2023-08-28 RX ORDER — ONDANSETRON 2 MG/ML
4 INJECTION INTRAMUSCULAR; INTRAVENOUS EVERY 30 MIN PRN
Status: DISCONTINUED | OUTPATIENT
Start: 2023-08-28 | End: 2023-08-28 | Stop reason: HOSPADM

## 2023-08-28 RX ORDER — KETOROLAC TROMETHAMINE 30 MG/ML
15 INJECTION, SOLUTION INTRAMUSCULAR; INTRAVENOUS ONCE
Status: COMPLETED | OUTPATIENT
Start: 2023-08-28 | End: 2023-08-28

## 2023-08-28 RX ORDER — TAMSULOSIN HYDROCHLORIDE 0.4 MG/1
0.4 CAPSULE ORAL DAILY
Status: DISCONTINUED | OUTPATIENT
Start: 2023-08-29 | End: 2023-08-29 | Stop reason: HOSPADM

## 2023-08-28 RX ADMIN — PROPOFOL 70 MG: 10 INJECTION, EMULSION INTRAVENOUS at 11:26

## 2023-08-28 RX ADMIN — ROCURONIUM BROMIDE 10 MG: 50 INJECTION, SOLUTION INTRAVENOUS at 13:36

## 2023-08-28 RX ADMIN — SODIUM CHLORIDE, POTASSIUM CHLORIDE, SODIUM LACTATE AND CALCIUM CHLORIDE: 600; 310; 30; 20 INJECTION, SOLUTION INTRAVENOUS at 11:32

## 2023-08-28 RX ADMIN — LIDOCAINE HYDROCHLORIDE 100 MG: 20 INJECTION, SOLUTION INFILTRATION; PERINEURAL at 11:26

## 2023-08-28 RX ADMIN — CLOPIDOGREL BISULFATE 300 MG: 75 TABLET ORAL at 18:21

## 2023-08-28 RX ADMIN — ONDANSETRON 4 MG: 2 INJECTION INTRAMUSCULAR; INTRAVENOUS at 13:11

## 2023-08-28 RX ADMIN — NOREPINEPHRINE BITARTRATE 6.4 MCG: 1 INJECTION, SOLUTION, CONCENTRATE INTRAVENOUS at 14:04

## 2023-08-28 RX ADMIN — ASPIRIN 325 MG: 325 TABLET ORAL at 09:20

## 2023-08-28 RX ADMIN — Medication 2000 UNITS: at 14:07

## 2023-08-28 RX ADMIN — NOREPINEPHRINE BITARTRATE 6.4 MCG: 1 INJECTION, SOLUTION, CONCENTRATE INTRAVENOUS at 11:38

## 2023-08-28 RX ADMIN — NOREPINEPHRINE BITARTRATE 6.4 MCG: 1 INJECTION, SOLUTION, CONCENTRATE INTRAVENOUS at 11:45

## 2023-08-28 RX ADMIN — Medication 2000 UNITS: at 13:53

## 2023-08-28 RX ADMIN — Medication 2000 UNITS: at 12:40

## 2023-08-28 RX ADMIN — NOREPINEPHRINE BITARTRATE 6.4 MCG: 1 INJECTION, SOLUTION, CONCENTRATE INTRAVENOUS at 14:11

## 2023-08-28 RX ADMIN — PROTAMINE SULFATE 20 MG: 10 INJECTION, SOLUTION INTRAVENOUS at 14:28

## 2023-08-28 RX ADMIN — FENTANYL CITRATE 25 MCG: 50 INJECTION, SOLUTION INTRAMUSCULAR; INTRAVENOUS at 15:25

## 2023-08-28 RX ADMIN — ROCURONIUM BROMIDE 10 MG: 50 INJECTION, SOLUTION INTRAVENOUS at 13:07

## 2023-08-28 RX ADMIN — FENTANYL CITRATE 25 MCG: 50 INJECTION, SOLUTION INTRAMUSCULAR; INTRAVENOUS at 15:11

## 2023-08-28 RX ADMIN — ROCURONIUM BROMIDE 50 MG: 50 INJECTION, SOLUTION INTRAVENOUS at 11:27

## 2023-08-28 RX ADMIN — DEXAMETHASONE SODIUM PHOSPHATE 4 MG: 4 INJECTION, SOLUTION INTRA-ARTICULAR; INTRALESIONAL; INTRAMUSCULAR; INTRAVENOUS; SOFT TISSUE at 13:11

## 2023-08-28 RX ADMIN — FENTANYL CITRATE 25 MCG: 50 INJECTION, SOLUTION INTRAMUSCULAR; INTRAVENOUS at 15:32

## 2023-08-28 RX ADMIN — PROPOFOL 30 MG: 10 INJECTION, EMULSION INTRAVENOUS at 11:29

## 2023-08-28 RX ADMIN — Medication 2 G: at 11:50

## 2023-08-28 RX ADMIN — SUGAMMADEX 200 MG: 100 INJECTION, SOLUTION INTRAVENOUS at 14:36

## 2023-08-28 RX ADMIN — PROTAMINE SULFATE 50 MG: 10 INJECTION, SOLUTION INTRAVENOUS at 14:32

## 2023-08-28 RX ADMIN — PHENYLEPHRINE HYDROCHLORIDE 0.3 MCG/KG/MIN: 10 INJECTION INTRAVENOUS at 11:40

## 2023-08-28 RX ADMIN — FENTANYL CITRATE 100 MCG: 50 INJECTION, SOLUTION INTRAMUSCULAR; INTRAVENOUS at 11:26

## 2023-08-28 RX ADMIN — KETOROLAC TROMETHAMINE 15 MG: 30 INJECTION INTRAMUSCULAR; INTRAVENOUS at 15:44

## 2023-08-28 RX ADMIN — PROTAMINE SULFATE 50 MG: 10 INJECTION, SOLUTION INTRAVENOUS at 14:30

## 2023-08-28 RX ADMIN — HYDROMORPHONE HYDROCHLORIDE 0.2 MG: 1 INJECTION, SOLUTION INTRAMUSCULAR; INTRAVENOUS; SUBCUTANEOUS at 15:41

## 2023-08-28 RX ADMIN — FENTANYL CITRATE 25 MCG: 50 INJECTION, SOLUTION INTRAMUSCULAR; INTRAVENOUS at 15:20

## 2023-08-28 RX ADMIN — SODIUM CHLORIDE: 9 INJECTION, SOLUTION INTRAVENOUS at 11:20

## 2023-08-28 RX ADMIN — NOREPINEPHRINE BITARTRATE 6.4 MCG: 1 INJECTION, SOLUTION, CONCENTRATE INTRAVENOUS at 14:16

## 2023-08-28 RX ADMIN — ROCURONIUM BROMIDE 20 MG: 50 INJECTION, SOLUTION INTRAVENOUS at 12:50

## 2023-08-28 RX ADMIN — Medication 10000 UNITS: at 12:26

## 2023-08-28 ASSESSMENT — ACTIVITIES OF DAILY LIVING (ADL)
ADLS_ACUITY_SCORE: 18
ADLS_ACUITY_SCORE: 20
ADLS_ACUITY_SCORE: 18
ADLS_ACUITY_SCORE: 18

## 2023-08-28 ASSESSMENT — VISUAL ACUITY: OU: NORMAL ACUITY

## 2023-08-28 NOTE — ANESTHESIA POSTPROCEDURE EVALUATION
SUBJECTIVE:   CC: Jorge Luis Peck is an 51 year old male who presents for preventive health visit.     Healthy Habits:    Do you get at least three servings of calcium containing foods daily (dairy, green leafy vegetables, etc.)? yes    Amount of exercise or daily activities, outside of work: 3 day(s) per week    Problems taking medications regularly No    Medication side effects: No    Have you had an eye exam in the past two years? yes    Do you see a dentist twice per year? yes    Do you have sleep apnea, excessive snoring or daytime drowsiness?no      PROBLEMS TO ADD ON... Sleep, anxiety. Is leaving for [place] next week     Today's PHQ-2 Score:     PHQ-9 SCORE 4/19/2019   PHQ-9 Total Score 4     ELEAZAR-7 SCORE 4/19/2019   Total Score 9       Abuse: Current or Past(Physical, Sexual or Emotional)- NO  Do you feel safe in your environment? YES    Social History     Tobacco Use     Smoking status: Never Smoker     Smokeless tobacco: Current User   Substance Use Topics     Alcohol use: Yes     If you drink alcohol do you typically have >3 drinks per day or >7 drinks per week? No                      Last PSA: No results found for: PSA    Reviewed orders with patient. Reviewed health maintenance and updated orders accordingly - Yes      Reviewed and updated as needed this visit by clinical staff  Tobacco  Allergies  Meds  Problems  Med Hx  Surg Hx  Fam Hx  Soc Hx          Reviewed and updated as needed this visit by Provider  Tobacco  Allergies  Meds  Problems  Med Hx  Surg Hx  Fam Hx  Soc Hx           ROS:  CONSTITUTIONAL: NEGATIVE for fever, chills, change in weight  INTEGUMENTARY/SKIN: NEGATIVE for worrisome rashes, moles or lesions  EYES: NEGATIVE for vision changes or irritation  ENT: NEGATIVE for ear, mouth and throat problems  RESP: NEGATIVE for significant cough or SOB  CV: NEGATIVE for chest pain, palpitations or peripheral edema  GI: NEGATIVE for nausea, abdominal pain, heartburn, or change in  Patient: Jose Carney    Procedure: Procedure(s):  Transcatheter Mitral Valve Repair       Anesthesia Type:  General    Note:  Disposition: Admission   Postop Pain Control: Uneventful            Sign Out: Well controlled pain   PONV: No   Neuro/Psych: Uneventful            Sign Out: Acceptable/Baseline neuro status   Airway/Respiratory: Uneventful            Sign Out: Acceptable/Baseline resp. status   CV/Hemodynamics: Uneventful            Sign Out: Acceptable CV status; No obvious hypovolemia; No obvious fluid overload   Other NRE: NONE   DID A NON-ROUTINE EVENT OCCUR? No    Event details/Postop Comments:  No complications.           Last vitals:  Vitals Value Taken Time   BP 90/55 08/28/23 1604   Temp 36.5  C (97.7  F) 08/28/23 1505   Pulse 71 08/28/23 1611   Resp 11 08/28/23 1611   SpO2 93 % 08/28/23 1611   Vitals shown include unvalidated device data.    Electronically Signed By: Momo Dalal MD  August 28, 2023  4:13 PM   "bowel habits   male: negative for dysuria, hematuria, decreased urinary stream, erectile dysfunction, urethral discharge  MUSCULOSKELETAL: NEGATIVE for significant arthralgias or myalgia  NEURO: NEGATIVE for weakness, dizziness or paresthesias  PSYCHIATRIC: NEGATIVE for changes in mood or affect. POSITIVE for anxiety and insomnia.    OBJECTIVE:   BP (!) 138/94   Pulse 80   Temp 96.4  F (35.8  C) (Tympanic)   Ht 1.702 m (5' 7\")   Wt 89.4 kg (197 lb)   BMI 30.85 kg/m    EXAM:  GENERAL: healthy, alert and no distress  EYES: Eyes grossly normal to inspection, PERRL and conjunctivae and sclerae normal  HENT: ear canals and TM's normal, nose and mouth without ulcers or lesions  NECK: no adenopathy, no asymmetry, masses, or scars and thyroid normal to palpation  RESP: lungs clear to auscultation - no rales, rhonchi or wheezes  CV: regular rate and rhythm, normal S1 S2, no S3 or S4, no murmur, click or rub, no peripheral edema and peripheral pulses strong  ABDOMEN: soft, nontender, no hepatosplenomegaly, no masses and bowel sounds normal  MS: no gross musculoskeletal defects noted, no edema  SKIN: no suspicious lesions or rashes  NEURO: Normal strength and tone, mentation intact and speech normal  PSYCH: mentation appears normal, affect normal/bright    Diagnostic Test Results:  No results found for this or any previous visit (from the past 24 hour(s)).    ASSESSMENT/PLAN:   Jorge Luis was seen today for physical. Feels otherwise-well, with the exception of anxiety and insomnia (which he has suffered from for quite some time). Will screen fasting lipids, fasting BG, and PSA today. Will check HTN labs, as well. Will check TSH given anxiety and insomnia. BP was found to be high today despite 10 mg lisinopril. Will increase this to 20 mg daily and have him follow up with BP check in a couple weeks. He agrees to begin checking his BP outside of clinic, as well. He would like to have his measle IgG titer checked today, as well, " given the recent outbreak. Will give Td and Shingrix immunizations today, as well. Finally, will check testosterone to evaluate his response to his current injected regimen. However, he skipped his Sunday dose, so I expect this to be spuriously low. Will consider this in dosing based on this lab.     Concerning his anxiety, he is willing to try escitalopram for daily symptom control. He has never tried serotonin specific reuptake inhibitor therapy before. Discussed pathophysiology of anxiety in great detail and reasonable expectations of treatment. Given that hydroxyzine did not help him when prescribed last month, I will provide a short Rx of alprazolam to be used prn for acute anxiety. He understands that this is not meant to be an oft-used medication, and he will reach out if he finds that he is using it more than a couple times a week.     Regarding his insomnia, there is no clear underlying organic cause. He has responded well to Ambien, but he does not wish to use this on a long term basis. He understands that we may need to try several options before finding something that works well for him with few side effects. Will start with amitriptyline before bed each night. He agrees to start at 25 mg and gradually titrate up (if needed) to as high as 100 mg. If he experiences no benefit, will try something else (perhaps doxepin). Will be in touch regarding this issue.     Discussed risks, benefits, alternatives, potential side effects, and proper administration of new medication / treatment. Agrees with plan of care. All questions answered.     Diagnoses and all orders for this visit:    Encounter for routine adult health examination without abnormal findings    Hypogonadism male  -     Testosterone, total    Essential hypertension  -     Albumin Random Urine Quantitative with Creat Ratio  -     Comprehensive metabolic panel  -     lisinopril (PRINIVIL/ZESTRIL) 20 MG tablet; Take 1 tablet (20 mg) by mouth  "daily    Anxiety  -     TSH with free T4 reflex  -     escitalopram (LEXAPRO) 10 MG tablet; Take 1 tablet (10 mg) by mouth daily  -     ALPRAZolam (XANAX) 0.5 MG tablet; Take 1 tablet (0.5 mg) by mouth 3 times daily as needed for anxiety    Insomnia, unspecified type  -     amitriptyline (ELAVIL) 25 MG tablet; Take 1-2 tablets (25-50 mg) by mouth At Bedtime    Screening for diabetes mellitus  -     Comprehensive metabolic panel    Screening for lipid disorders  -     Lipid panel reflex to direct LDL Fasting    Screening for prostate cancer  -     Prostate spec antigen screen    Need for prophylactic vaccination with tetanus-diphtheria (Td)  -     TD PRSERV FREE >=7 YRS ADS IM [91043]  -     1st  Administration  [18734]    Immunity status testing  -     Rubeola Antibody IgG    Need for vaccination  -     SHINGRIX [50607]  -     Each additional admin.  (Right click and add QUANTITY)  [75050]      COUNSELING:  Reviewed preventive health counseling, as reflected in patient instructions    BP Readings from Last 1 Encounters:   05/07/19 (!) 138/94     Estimated body mass index is 30.85 kg/m  as calculated from the following:    Height as of this encounter: 1.702 m (5' 7\").    Weight as of this encounter: 89.4 kg (197 lb).      Weight management plan: Discussed healthy diet and exercise guidelines     reports that he has never smoked. He uses smokeless tobacco.      Counseling Resources:  ATP IV Guidelines  Pooled Cohorts Equation Calculator  FRAX Risk Assessment  ICSI Preventive Guidelines  Dietary Guidelines for Americans, 2010  USDA's MyPlate  ASA Prophylaxis  Lung CA Screening    Hu Ochoa NP  [facility]  "

## 2023-08-28 NOTE — LETTER
Transition Communication Hand-off for Care Transitions to Next Level of Care Provider    Name: Jose Carney  : 1960  MRN #: [id number]  Primary Care Provider: KAYLA HUGHES     Primary Clinic: [facility] [address] [address]     Reason for Hospitalization:  Nonrheumatic mitral valve regurgitation [I34.0]  Mitral regurgitation [I34.0]  Admit Date/Time: 2023  7:03 AM  Discharge Date: 2023  Payor Source: Payor: BCBS / Plan: BCBS OUT OF STATE / Product Type: Indemnity /            Reason for Communication Hand-off Referral: Other care transition    Discharge Plan: home with outpatient cardiac rehab       Concern for non-adherence with plan of care:    No  Discharge Needs Assessment:  Needs      Flowsheet Row Most Recent Value   Equipment Currently Used at Home none            Already enrolled in Tele-monitoring program and name of program:    Follow-up specialty is recommended: Yes    Follow-up plan:    Future Appointments   Date Time Provider Department Center   2023  2:45 PM Marito Kang, OT [facility] O   2023  1:00 PM UU OT WAITLIST [facility] O   10/5/2023  8:30 AM UC LAB UCLABR [facility]   10/5/2023  9:00 AM [id number] UCCVCV [facility]   10/5/2023 10:30 AM Sia Westbrook NP [facility]       Any outstanding tests or procedures:        Referrals       Future Labs/Procedures    Cardiac Rehab Referral     Process Instructions:    Advance to Wellness and Exercise for Life (WEL) Program or to another maintenance exercise program upon completion of phase 2 cardiac rehab.    Weight mgt program is only offered at [facility].    *This therapy referral will be filtered to a centralized scheduling office at [facility] and the patient will receive a call to schedule an appointment at a [place] location most convenient for them. *        Comments:    If you have not heard from the scheduling office within 2 business days, please call  [phone] for all locations, with the exception of [place], please call [phone] and [place], please call [phone].    Please be aware that coverage of these services is subject to the terms and limitations of your health insurance plan.  Call member services at your health plan with any benefit or coverage questions.  If you have not heard from the scheduling office within 2 business days, please call [phone] for [facility], [phone] for [place] and [phone] for [place].              Key Recommendations:    Please follow up with patient on out patient cardiac rehab appointment.     Brittney Mars RN    AVS/Discharge Summary is the source of truth; this is a helpful guide for improved communication of patient story

## 2023-08-28 NOTE — ANESTHESIA PROCEDURE NOTES
Perioperative CADEN Procedure Note    Staff -        Performed By: anesthesiologist      CADEN Probe Insertion    Probe Status PRE Insertion: NO obvious damage  Probe type:  Adult 2D  Bite block used:   Oral Airway  Insertion Technique: Jaw Lift  Insertion complications: None obvious  Billing Report:CADEN report by Anesthesiologist (See Separate Report note)  Probe Status POST Removal: NO obvious damage  Comments: The patient has a dilated cardiomyopathy with 3 to 4 plus mitral regurgitation.  No AI and trace TR.  The ejection fraction is 25 percent.    CADEN Report  General Procedure Information  Images for this study have NOT been archived.  (This is not a billable CADEN report.)    Post Intervention Findings  Procedure(s) performed:  Other (see comments). Global function:  Unchanged. Regional wall motion: Unchanged. Surgeon(s) notified of all postintervention findings: No.                 Echocardiogram Comments

## 2023-08-28 NOTE — PLAN OF CARE
Jose Carney is a very pleasant 62 year old male with history cardiac sarcoidosis and severe mitral regurgitation who is admitted after planned MitraClip with Dr. Dawn.   Admission   Diagnosis: Cristin clip   Admitted from: PACU  Via: stretcher   Accompanied by: wife charlette   Belongings: Placed in closet; valuables sent home with family. LL. Gonzalze yellow bag PT bag awaiting from PACU.  Admission paperwork: complete   Teaching: Call don't fall, use of console, meal times, visiting hours, orientation to unit, when to call for the RN (angina/sob/dizzyness, etc.), and the importance of safety.   Access: PIV   Telemetry:Placed on pt   Ht./Wt.: to be completed by following shift.    Two nurse head-to-toe skin assessment performed by RE and SH.  Skin issues noted: R groin site, scab on right hand, dry feet .  See PCS for assessment and treatment of wounds and surgical sites.

## 2023-08-28 NOTE — ANESTHESIA CARE TRANSFER NOTE
Patient: Jose Carney    Procedure: Procedure(s):  Transcatheter Mitral Valve Repair       Diagnosis: valvular disease  Diagnosis Additional Information: No value filed.    Anesthesia Type:   General     Note:    Oropharynx: oropharynx clear of all foreign objects and spontaneously breathing  Level of Consciousness: awake  Oxygen Supplementation: face mask  Level of Supplemental Oxygen (L/min / FiO2): 8  Independent Airway: airway patency satisfactory and stable  Dentition: dentition unchanged  Vital Signs Stable: post-procedure vital signs reviewed and stable  Report to RN Given: handoff report given  Patient transferred to: PACU    Handoff Report: Identifed the Patient, Identified the Reponsible Provider, Reviewed the pertinent medical history, Discussed the surgical course, Reviewed Intra-OP anesthesia mangement and issues during anesthesia, Set expectations for post-procedure period and Allowed opportunity for questions and acknowledgement of understanding      Vitals:  Vitals Value Taken Time   /61 08/28/23 1501   Temp     Pulse 77 08/28/23 1511   Resp 5 08/28/23 1511   SpO2 99 % 08/28/23 1511   Vitals shown include unvalidated device data.    Electronically Signed By: HARSH Negron CRNA  August 28, 2023  3:12 PM

## 2023-08-28 NOTE — ANESTHESIA PROCEDURE NOTES
Airway       Patient location during procedure: OR       Procedure Start/Stop Times: 8/28/2023 11:30 AM  Staff -        CRNA: Dannielle Romano APRN CRNA       Performed By: CRNA  Consent for Airway        Urgency: elective  Indications and Patient Condition       Indications for airway management: maureen-procedural       Induction type:intravenous       Mask difficulty assessment: 2 - vent by mask + OA or adjuvant +/- NMBA    Final Airway Details       Final airway type: endotracheal airway       Successful airway: ETT - single  Endotracheal Airway Details        ETT size (mm): 7.5       Cuffed: yes       Successful intubation technique: direct laryngoscopy       DL Blade Type: Duque 2       Grade View of Cords: 1       Adjucts: stylet       Position: Center       Measured from: gums/teeth       Secured at (cm): 22    Post intubation assessment        Placement verified by: capnometry, equal breath sounds and chest rise        Number of attempts at approach: 1       Secured with: silk tape       Ease of procedure: easy       Dentition: Unchanged    Medication(s) Administered   Medication Administration Time: 8/28/2023 11:30 AM

## 2023-08-29 ENCOUNTER — APPOINTMENT (OUTPATIENT)
Dept: OCCUPATIONAL THERAPY | Facility: CLINIC | Age: 63
End: 2023-08-29
Payer: COMMERCIAL

## 2023-08-29 ENCOUNTER — APPOINTMENT (OUTPATIENT)
Dept: CARDIOLOGY | Facility: CLINIC | Age: 63
End: 2023-08-29
Payer: COMMERCIAL

## 2023-08-29 ENCOUNTER — APPOINTMENT (OUTPATIENT)
Dept: GENERAL RADIOLOGY | Facility: CLINIC | Age: 63
End: 2023-08-29
Attending: INTERNAL MEDICINE
Payer: COMMERCIAL

## 2023-08-29 VITALS
SYSTOLIC BLOOD PRESSURE: 100 MMHG | HEART RATE: 87 BPM | HEIGHT: 73 IN | DIASTOLIC BLOOD PRESSURE: 62 MMHG | RESPIRATION RATE: 14 BRPM | TEMPERATURE: 98 F | BODY MASS INDEX: 25.42 KG/M2 | WEIGHT: 191.8 LBS | OXYGEN SATURATION: 100 %

## 2023-08-29 LAB
ANION GAP SERPL CALCULATED.3IONS-SCNC: 8 MMOL/L (ref 7–15)
ATRIAL RATE - MUSE: 60 BPM
ATRIAL RATE - MUSE: 72 BPM
ATRIAL RATE - MUSE: 76 BPM
BUN SERPL-MCNC: 17 MG/DL (ref 8–23)
CALCIUM SERPL-MCNC: 8.3 MG/DL (ref 8.8–10.2)
CHLORIDE SERPL-SCNC: 109 MMOL/L (ref 98–107)
CREAT SERPL-MCNC: 0.94 MG/DL (ref 0.67–1.17)
DEPRECATED HCO3 PLAS-SCNC: 22 MMOL/L (ref 22–29)
DIASTOLIC BLOOD PRESSURE - MUSE: NORMAL MMHG
ERYTHROCYTE [DISTWIDTH] IN BLOOD BY AUTOMATED COUNT: 14.5 % (ref 10–15)
GFR SERPL CREATININE-BSD FRML MDRD: >90 ML/MIN/1.73M2
GLUCOSE SERPL-MCNC: 109 MG/DL (ref 70–99)
HCT VFR BLD AUTO: 38.4 % (ref 40–53)
HGB BLD-MCNC: 13 G/DL (ref 13.3–17.7)
INTERPRETATION ECG - MUSE: NORMAL
LVEF ECHO: NORMAL
MAGNESIUM SERPL-MCNC: 1.8 MG/DL (ref 1.7–2.3)
MCH RBC QN AUTO: 31.9 PG (ref 26.5–33)
MCHC RBC AUTO-ENTMCNC: 33.9 G/DL (ref 31.5–36.5)
MCV RBC AUTO: 94 FL (ref 78–100)
P AXIS - MUSE: 12 DEGREES
P AXIS - MUSE: 44 DEGREES
P AXIS - MUSE: 50 DEGREES
PHOSPHATE SERPL-MCNC: 3.1 MG/DL (ref 2.5–4.5)
PLATELET # BLD AUTO: 136 10E3/UL (ref 150–450)
POTASSIUM SERPL-SCNC: 4 MMOL/L (ref 3.4–5.3)
PR INTERVAL - MUSE: 258 MS
PR INTERVAL - MUSE: 280 MS
PR INTERVAL - MUSE: 338 MS
QRS DURATION - MUSE: 142 MS
QRS DURATION - MUSE: 146 MS
QRS DURATION - MUSE: 150 MS
QT - MUSE: 424 MS
QT - MUSE: 450 MS
QT - MUSE: 466 MS
QTC - MUSE: 450 MS
QTC - MUSE: 477 MS
QTC - MUSE: 510 MS
R AXIS - MUSE: -15 DEGREES
R AXIS - MUSE: -19 DEGREES
R AXIS - MUSE: -32 DEGREES
RBC # BLD AUTO: 4.08 10E6/UL (ref 4.4–5.9)
SODIUM SERPL-SCNC: 139 MMOL/L (ref 136–145)
SYSTOLIC BLOOD PRESSURE - MUSE: NORMAL MMHG
T AXIS - MUSE: -21 DEGREES
T AXIS - MUSE: -22 DEGREES
T AXIS - MUSE: -6 DEGREES
VENTRICULAR RATE- MUSE: 60 BPM
VENTRICULAR RATE- MUSE: 72 BPM
VENTRICULAR RATE- MUSE: 76 BPM
WBC # BLD AUTO: 11.9 10E3/UL (ref 4–11)

## 2023-08-29 PROCEDURE — 71045 X-RAY EXAM CHEST 1 VIEW: CPT | Mod: 26 | Performed by: RADIOLOGY

## 2023-08-29 PROCEDURE — 97165 OT EVAL LOW COMPLEX 30 MIN: CPT | Mod: GO | Performed by: OCCUPATIONAL THERAPIST

## 2023-08-29 PROCEDURE — 250N000013 HC RX MED GY IP 250 OP 250 PS 637

## 2023-08-29 PROCEDURE — 93306 TTE W/DOPPLER COMPLETE: CPT | Mod: 26 | Performed by: INTERNAL MEDICINE

## 2023-08-29 PROCEDURE — 97530 THERAPEUTIC ACTIVITIES: CPT | Mod: GO | Performed by: OCCUPATIONAL THERAPIST

## 2023-08-29 PROCEDURE — 36415 COLL VENOUS BLD VENIPUNCTURE: CPT

## 2023-08-29 PROCEDURE — G0378 HOSPITAL OBSERVATION PER HR: HCPCS

## 2023-08-29 PROCEDURE — 84100 ASSAY OF PHOSPHORUS: CPT

## 2023-08-29 PROCEDURE — 99239 HOSP IP/OBS DSCHRG MGMT >30: CPT | Mod: 25

## 2023-08-29 PROCEDURE — 71045 X-RAY EXAM CHEST 1 VIEW: CPT

## 2023-08-29 PROCEDURE — 85027 COMPLETE CBC AUTOMATED: CPT

## 2023-08-29 PROCEDURE — 93005 ELECTROCARDIOGRAM TRACING: CPT

## 2023-08-29 PROCEDURE — 83735 ASSAY OF MAGNESIUM: CPT

## 2023-08-29 PROCEDURE — 258N000003 HC RX IP 258 OP 636

## 2023-08-29 PROCEDURE — 80048 BASIC METABOLIC PNL TOTAL CA: CPT

## 2023-08-29 PROCEDURE — 999N000208 ECHOCARDIOGRAM COMPLETE

## 2023-08-29 PROCEDURE — 255N000002 HC RX 255 OP 636: Performed by: STUDENT IN AN ORGANIZED HEALTH CARE EDUCATION/TRAINING PROGRAM

## 2023-08-29 PROCEDURE — 93010 ELECTROCARDIOGRAM REPORT: CPT | Performed by: INTERNAL MEDICINE

## 2023-08-29 RX ORDER — ASPIRIN 81 MG/1
81 TABLET, CHEWABLE ORAL DAILY
Qty: 90 TABLET | Refills: 3 | Status: SHIPPED | OUTPATIENT
Start: 2023-08-30 | End: 2024-03-28

## 2023-08-29 RX ORDER — METOPROLOL SUCCINATE 25 MG/1
75 TABLET, EXTENDED RELEASE ORAL DAILY
Qty: 360 TABLET | Refills: 3 | Status: SHIPPED | OUTPATIENT
Start: 2023-08-29 | End: 2023-11-24

## 2023-08-29 RX ORDER — METOPROLOL SUCCINATE 25 MG/1
75 TABLET, EXTENDED RELEASE ORAL DAILY
Qty: 360 TABLET | Refills: 3 | Status: SHIPPED | OUTPATIENT
Start: 2023-08-29 | End: 2023-08-29

## 2023-08-29 RX ORDER — METOPROLOL SUCCINATE 25 MG/1
100 TABLET, EXTENDED RELEASE ORAL DAILY
Qty: 360 TABLET | Refills: 3 | Status: SHIPPED | OUTPATIENT
Start: 2023-08-29 | End: 2023-08-29

## 2023-08-29 RX ORDER — CLOPIDOGREL BISULFATE 75 MG/1
75 TABLET ORAL DAILY
Qty: 90 TABLET | Refills: 3 | Status: SHIPPED | OUTPATIENT
Start: 2023-08-30 | End: 2024-03-28

## 2023-08-29 RX ADMIN — SODIUM CHLORIDE, POTASSIUM CHLORIDE, SODIUM LACTATE AND CALCIUM CHLORIDE 250 ML: 600; 310; 30; 20 INJECTION, SOLUTION INTRAVENOUS at 08:41

## 2023-08-29 RX ADMIN — HUMAN ALBUMIN MICROSPHERES AND PERFLUTREN 5 ML: 10; .22 INJECTION, SOLUTION INTRAVENOUS at 08:17

## 2023-08-29 RX ADMIN — CLOPIDOGREL BISULFATE 75 MG: 75 TABLET ORAL at 08:30

## 2023-08-29 RX ADMIN — SODIUM CHLORIDE, POTASSIUM CHLORIDE, SODIUM LACTATE AND CALCIUM CHLORIDE 250 ML: 600; 310; 30; 20 INJECTION, SOLUTION INTRAVENOUS at 03:33

## 2023-08-29 RX ADMIN — TAMSULOSIN HYDROCHLORIDE 0.4 MG: 0.4 CAPSULE ORAL at 08:30

## 2023-08-29 RX ADMIN — FINASTERIDE 5 MG: 5 TABLET, FILM COATED ORAL at 08:33

## 2023-08-29 RX ADMIN — ASPIRIN 81 MG CHEWABLE TABLET 81 MG: 81 TABLET CHEWABLE at 08:30

## 2023-08-29 ASSESSMENT — ACTIVITIES OF DAILY LIVING (ADL)
ADLS_ACUITY_SCORE: 18

## 2023-08-29 NOTE — DISCHARGE INSTRUCTIONS
Patient Instructions - Going Home after Mitral Valve Repair with MITRACLIP:    It s normal to feel a little anxious after leaving the hospital and when fewer people are nearby. People do much better when they feel as though they have support- if you live alone we suggest you arrange to have someone stay with you for a day or two to help you recover.    Medications: Take all of your medications as directed in your discharge summary. Do not stop taking these medications without speaking with your cardiologist. If you have any questions about any of your medications, speak to your pharmacist or provider.    Follow up Appointments    You will follow up at 1 week and one month after your procedure in the structural heart clinic. You will receive these appointments at the time of discharge. If you need to reschedule your appointment, please call: [phone]    See your regular cardiologist in 6 months. Make an appointment once you get home. Your regular heart doctor will continue to be your heart specialist.    See your primary care provider in 2 weeks. Make an appointment once you get home.    Site Care: Shower every day- let the water run over your incision or access site, but do not rub the area. No tub baths, pools or hot-tubs for the 1st week you are at home. Do not put ointments, powders, or lotions on your access site and/or incision. It is normal to feel a small lump the size of a marble in the groin access site. That is the closure device used to close the vein. If you are experiencing discomfort, redness or increasing swelling, please call us.    Dental Work: Avoid major dental work for the next 6 months if possible. . You will need antibiotics before dental work or surgery. This would decrease the risk of infection.    Driving: You should not drive for the first 5 days after your procedure. The first time you drive, you must have someone with you. You may ride in a car.    Activity: People recover at  different rates depending on their general health and the type of heart valve procedure. Most people take a few weeks to feel fully recovered. Daily activity and exercise are an important part of your recovery. Do not lift, push or pull anything > 10 lb. for the first 5 days.     For the First 5 days after your procedure Do Not:  Lift, push, or pull more than ten pounds (including pets, laundry, groceries, etc)  Garden, including lawn mowing and raking  Excessively bear down or strain when having a bowel movement  Ride a Bike  For the first 5 days after your procedure Do:  Weigh yourself every day in the morning after using the bathroom. If you notice weight gain of more than 3 pounds in 1 day or more than 5 pounds in 1 week, call the cardiology clinic.  Get up and get dressed every day. Do not stay in bed. Set a daily routine  Walk as much as you can. You may walk up and down stairs. When you are tired, rest.  Cough and deep breathe. Use your incentive spirometer 5-10 times each hour when you are awake.   We strongly recommend you participate in a cardiac rehabilitation program. This type of program will help you learn about your heart health, prevent more heart problems, participate in safe and heart-healthy activities, and learn how to return to your activities of daily living and hobbies.  Let the cardiology clinic know if you need to leave town before your first follow-up visit.    When to call the Cardiology Clinic to speak with a nurse?  Swelling of the legs, ankles, or feet  Increased shortness of breath  Change in the color or temperature of your lower legs and feet  Abdominal pain or unrelieved nausea and or vomiting  Redness and warmth around your access site and/or incision that does not go away   Yellow or green drainage from your access site and/or incision   Weight gain of 3 pounds in one day or 5 pounds in one week   Develop any numbness, tingling, or limited movement of your arms or legs.   Chest  pain that does not go away   New confusion or you cannot think clearly   Fever and chills        CALL THE VALVE CLINIC IF YOU HAVE ANY QUESTIONS OR CONCERNS:    [phone]        - Your blood pressures were on the soft side in the hospital, due to this we have reduced your dose of Metoprolol to 75mg daily.     - I suspect dehydration may be contributing to your low blood pressures. Over the next few days make sure you are staying hydrated. I would like you to check your blood pressures when you wake up in the morning and keep a log of the readings to bring to your follow up appointment.       - If your blood pressure is consistently less than 100/50 then you should hold your Metoprolol.

## 2023-08-29 NOTE — PROGRESS NOTES
Care Management Discharge Note    Discharge Date: 08/29/2023       Discharge Disposition: Outpatient Rehab (PT, OT, SLP, Cardiac or Pulmonary)    Discharge Services: None    Discharge DME: None    Discharge Transportation: car, drives self, family or friend will provide    Private pay costs discussed: Not applicable    Does the patient's insurance plan have a 3 day qualifying hospital stay waiver?  No    PAS Confirmation Code:    Patient/family educated on Medicare website which has current facility and service quality ratings: no    Education Provided on the Discharge Plan: Yes  Persons Notified of Discharge Plans: patient and team   Patient/Family in Agreement with the Plan: yes    Handoff Referral Completed: Yes    Additional Information:       Talked to patient about outpatient cardiac rehab appointment. He said he can nearby by him at [facility]. Offered to help set up the appointment he declined stating  they will do and know what to do and if they have any question they will us.     External referral sent with request to follow up with patient on outpatient cardiac rehab appointment.     Brittney Mars RN, PHN, BSN   Float Nurse Care Coordinator  Covering for [address]  Phone [phone]

## 2023-08-29 NOTE — PLAN OF CARE
Occupational Therapy Discharge Summary    Reason for therapy discharge:    Discharged to home with outpatient therapy.    Progress towards therapy goal(s). See goals on Care Plan in [facility] electronic health record for goal details.  Goals partially met.  Barriers to achieving goals:   discharge from facility.    Therapy recommendation(s):    Continued therapy is recommended.  Rationale/Recommendations:  OP CR to increase functional endurance.

## 2023-08-29 NOTE — PROGRESS NOTES
08/29/23 1019   Appointment Info   Signing Clinician's Name / Credentials (OT) Darion Kang OTR/L   Living Environment   People in Home spouse;child(thuy), adult   Current Living Arrangements house   Transportation Anticipated car, drives self;family or friend will provide   Living Environment Comments 4 NOEMI, all needs met on main floor, walk-in shower   Self-Care   Usual Activity Tolerance good   Current Activity Tolerance moderate   Regular Exercise No   Equipment Currently Used at Home none   Fall history within last six months no   Activity/Exercise/Self-Care Comment Pt was I in all ADL/IADLs prior to admission including driving, cleaning, cooking, and med mgmt. Pt works doing [employer].   General Information   Referring Physician Stephany Perez, APRN CNP   Patient/Family Therapy Goal Statement (OT) Return to PLOF   Additional Occupational Profile Info/Pertinent History of Current Problem 62 year old male with history cardiac sarcoidosis and severe mitral regurgitation who is admitted after planned MitraClip   Existing Precautions/Restrictions   (no straining or lifting over 10# for 1 week)   Cognitive Status Examination   Cognitive Status Comments No concerns   Visual Perception   Visual Acuity Wears reading glasses   Clinical Impression   Criteria for Skilled Therapeutic Interventions Met (OT) Yes, treatment indicated   OT Diagnosis Decreased I in ADLs due to deconditioning   Assessment of Occupational Performance 1-3 Performance Deficits   Identified Performance Deficits functional endurance   Clinical Decision Making Complexity (OT) low complexity   Risk & Benefits of therapy have been explained patient;spouse/significant other   OT Total Evaluation Time   OT Eval, Low Complexity Minutes (10858) 5   OT Goals   Therapy Frequency (OT) 3 times/wk   OT Predicted Duration/Target Date for Goal Attainment 09/11/23   OT Goals Aerobic Activity;OT Goal 1;OT Goal 2   OT: Perform aerobic activity  with stable cardiovascular response 20 minutes;continuous activity   OT: Goal 1 Navigate 4 stairs with mod I and stable CV response   OT: Goal 2 Demonstrate understanding of post-op precautions during 100% of ADLs   Total Session Time   Total Session Time (sum of timed and untimed services) 5

## 2023-08-29 NOTE — DISCHARGE SUMMARY
[address] [address]  p: [phone]    Discharge Summary: Cardiology Service    Jose Carney MRN# [id number]   YOB: 1960 Age: 62 year old       Admission Date: 8/28/2023  Discharge Date: 08/29/23    Discharge Diagnoses:  1. Severe Mitral Regurgitation s/p MitraClip x 2  2. Biopsy Confirmed Cardiac Sarcoidosis  3. AVNRT s/p Ablation & ICD  4. Obstructive Sleep Apnea    Brief HPI:  Jose Carney is a very pleasant 62 year old male with history cardiac sarcoidosis and severe mitral regurgitation who is admitted after planned MitraClip with Dr. Dawn. He underwent successful MitraClip x 2 on 8/28/23. The procedure was uncomplicated. Immediate post-op mean gradient was 4mmHg. He experienced some mild hypotensive after the procedure and remained asymptomatic. He received 250mL bolus of LR x 2 with improvement in pressures. He is discharged home in stable condition with outpatient follow up.     Hospital Course by Diagnosis:  #Severe mitral regurgitation s/p mitraclip x 2.  Post op Mean Gradient 4 mmHg by CADEN.  Sheath site soft without hematoma. No arrhythmias. Neuro's intact  - Lifelong 81 mg ASA   - Plavix 75mg daily x 6 months   - Outpatient cardiac rehab/PT/OT  - Lifelong antibiotic prophylaxis prior to all dental procedures.  - Follow-up in  clinic on 9/5/23     # Biopsy confirmed cardiac sarcoidosis  # AVNRT s/p ablation and ICD  Follows with Dr. Dickey. Diagnosed by transbronchial biopsy 2013. ICD placed after PET scan highly suspicious and patient developing AVRNT. Previous attempts to wean immunosuppression have resulted in recurrent arrhythmias and symptoms/inflammation. Previously on amiodarone and sotalol but titrated off by EP. Now s/p Ablation 3/29/22 with Dr. Will   - Continue Methotrexate 20 mg weekly  - Continue Toprol XL at reduced dose of 75mg daily      # WILLIAM   - Continue CPAP    Pertinent Procedures:  1.  Cristin Clip x 2 on 8/28/23    Consults:  - PT/OT    Medication Changes:  - START Plavix 75mg daily ( x 6 months)  - START Aspirin 81mg daily   - REDUCE Toprol XL to 75mg daily     Discharge medications:   Current Discharge Medication List        START taking these medications    Details   aspirin (ASA) 81 MG chewable tablet Take 1 tablet (81 mg) by mouth daily  Qty: 90 tablet, Refills: 3    Associated Diagnoses: Status post coronary angiogram      clopidogrel (PLAVIX) 75 MG tablet Take 1 tablet (75 mg) by mouth daily  Qty: 90 tablet, Refills: 3    Associated Diagnoses: Status post coronary angiogram           CONTINUE these medications which have CHANGED    Details   metoprolol succinate ER (TOPROL XL) 25 MG 24 hr tablet Take 3 tablets (75 mg) by mouth daily  Qty: 360 tablet, Refills: 3    Associated Diagnoses: Sarcoid, cardiac           CONTINUE these medications which have NOT CHANGED    Details   finasteride (PROSCAR) 5 MG tablet Take 5 mg by mouth daily      folic acid (FOLVITE) 1 MG tablet TAKE FIVE TABLETS (5MG) ONCE WEEKLY WITH YOUR METHOTREXATE  Qty: 60 tablet, Refills: 3    Associated Diagnoses: Sarcoid, cardiac      furosemide (LASIX) 20 MG tablet Take 20 mg by mouth as needed      !! methimazole (TAPAZOLE) 10 MG tablet Take by mouth 2 times daily      !! methimazole (TAPAZOLE) 5 MG tablet TAKE 1-1/2 TABLETS BY MOUTH ONE TIME A DAY.      methotrexate 2.5 MG tablet Take 8 tablets (20 mg) by mouth every 7 days  Qty: 104 tablet, Refills: 3    Associated Diagnoses: Sarcoid, cardiac; Sarcoidosis      !! predniSONE (DELTASONE) 1 MG tablet       sildenafil (REVATIO) 20 MG tablet Take 20-60 mg by mouth as needed      tamsulosin (FLOMAX) 0.4 MG capsule Take 0.4 mg by mouth daily      timolol maleate (TIMOPTIC) 0.5 % ophthalmic solution       prednisoLONE acetate (PRED FORTE) 1 % ophthalmic susp Place 1 drop into both eyes every hour    Associated Diagnoses: Sarcoid, cardiac      !! predniSONE (DELTASONE) 10 MG  tablet        !! - Potential duplicate medications found. Please discuss with provider.          Follow-up:  -  clinic follow up on 9/5/23    Labs or imaging requiring follow-up after discharge:  - CBC, BMP at  follow up     Code status:  - Full     Condition on discharge  Temp:  [97.4  F (36.3  C)-98.8  F (37.1  C)] 97.4  F (36.3  C)  Pulse:  [60-95] 71  Resp:  [3-27] 17  BP: ()/(52-65) 93/56  MAP:  [57 mmHg-133 mmHg] 60 mmHg  Arterial Line BP: ()/() 87/45  SpO2:  [93 %-100 %] 98 %    General: Alert, interactive, NAD  Eyes: sclera anicteric, EOMI  Neck: JVP not elevated, carotid 2+ bilaterally  Cardiovascular: regular rate and rhythm, normal S1 and S2, no murmurs, gallops, or rubs  Resp: clear to auscultation bilaterally, no rales, wheezes, or rhonchi  GI: Soft, nontender, nondistended. +BS.  No HSM or masses, no rebound or guarding.  Extremities: No edema, no cyanosis or clubbing, dorsalis pedis and posterior tibialis pulses 2+ bilaterally. Right groin site soft, flat, some tenderness noted.   Skin: Warm and dry, no jaundice or rash  Neuro: CN 2-12 intact, moves all extremities equally  Psych: Alert & oriented x 3    Imaging with results:  8/29/23 Echocardiogram:  s/p MitraClip x 2 on 8/28/23. Moderate residual mitral insufficiency is  present. The mean gradient across the mitral valve is 8 mmHg.  Left ventricular function is decreased. The ejection fraction is 40-45%  (mildly reduced).  Global right ventricular function is normal.  IVC diameter >2.1 cm collapsing <50% with sniff suggests a high RA pressure  estimated at 15 mmHg or greater.  No pericardial effusion is present.  No significant changes noted from yesterday's post-proecdure images. MV gr is  expectedly higher today.    8/29/23 EKG:      Patient Care Team:  Amira Gentile PA-C as PCP - General (Physician Assistant - Medical)  Helen Gonzalez, RN as Specialty Care Coordinator (Cardiology)  Janice Shafer, RN as Specialty  Care Coordinator (Cardiology)  Lauro Will MD as MD (Clinical Cardiac Electrophysiology)  Mandi Dickey MD as Assigned Heart and Vascular Provider    Time Spent on this Encounter   I, HARSH Zhu CNP, personally saw the patient today and spent greater than 30 minutes discharging this patient.    Patient discussed with staff cardiologist, Dr. Dawn, who agrees with the above documentation and plan. Documentation represents joint decision making.     HARSH Zhu CNP on 8/29/2023 at 10:59 AM  [facility] Cardiology      Physician Attestation   I have reviewed and discussed with the advanced practice provider their history, physical and plan for Jose Carney. I did not participate in a shared visit by interviewing or examining the patient and this should be billed as an advanced practice provider only visit.    Tony Morales MD  Interventional Cardiology

## 2023-08-29 NOTE — PLAN OF CARE
"Goal Outcome Evaluation: Rest and hemodynamic stability  Shift summary: Pt was hypotensive during shift provider was notified. Pt was give 250 ml bolus of LR and nurse encourage fluid intake. Pt has not voided since castro was taken out. Bladder was scanned and content was >100 ml.  Pt reported problems with starting urine stream before surgery.  When asked what he does to aid it at home? Pt stated \"It just goes away after a while\"          Problem: Plan of Care - These are the overarching goals to be used throughout the patient stay.    Goal: Absence of Hospital-Acquired Illness or Injury  Outcome: Progressing  Intervention: Prevent Skin Injury  Recent Flowsheet Documentation  Taken 8/28/2023 1900 by Annia Castellano RN  Body Position: position changed independently  Intervention: Prevent and Manage VTE (Venous Thromboembolism) Risk  Recent Flowsheet Documentation  Taken 8/28/2023 1900 by Annia Castellano RN  VTE Prevention/Management: SCDs (sequential compression devices) on  Goal: Optimal Comfort and Wellbeing  Outcome: Progressing     Problem: Cardiac Catheterization (Diagnostic/Interventional)  Goal: Acceptable Pain Control  Outcome: Progressing     Problem: Cardiac Catheterization (Diagnostic/Interventional)  Goal: Absence of Embolism Signs and Symptoms  Intervention: Prevent or Manage Embolism  Recent Flowsheet Documentation  Taken 8/28/2023 1900 by Annia Castellano RN  VTE Prevention/Management: SCDs (sequential compression devices) on  Goal: Absence of Vascular Access Complication  Intervention: Prevent Access Site Complications  Recent Flowsheet Documentation  Taken 8/28/2023 1900 by Annia Castellano RN  Activity Management: activity adjusted per tolerance  Head of Bed (HOB) Positioning: HOB at 30-45 degrees                    "

## 2023-08-29 NOTE — PROGRESS NOTES
Jose Carney is a very pleasant 62 year old male with history cardiac sarcoidosis and severe mitral regurgitation who is admitted after planned MitraClip with Dr. Dawn.     DISCHARGE                         No discharge date for patient encounter.  ----------------------------------------------------------------------------  Discharged to: Home  Via: Automobile  Accompanied by: Family  Discharge Instructions: diet, activity, medications, follow up appointments, when to call the MD, aftercare instructions, and what to watchout for (i.e. s/s of infection, increasing SOB, palpitations, chest pain,)  Prescriptions: To be filled by  discharge pharmacy     pharmacy per pt's request; medication list reviewed & sent with pt  Follow Up Appointments: arranged; information given  Belongings: All sent with pt  IV: out  Telemetry: off  Pt exhibits understanding of above discharge instructions; all questions answered.    Discharge Paperwork: Signed, copied, and sent home with patient.

## 2023-09-25 DIAGNOSIS — D86.85 SARCOID, CARDIAC: ICD-10-CM

## 2023-09-25 DIAGNOSIS — D86.9 SARCOIDOSIS: ICD-10-CM

## 2023-09-28 NOTE — TELEPHONE ENCOUNTER
methotrexate sodium 2.5 mg tablet       Last Written Prescription Date:  6-16-22  Last Fill Quantity: 104,   # refills: 3  Last Office Visit: 7-6-23 [place]  Future Office visit:  10-5-23 [place]    CBC RESULTS:   Recent Labs   Lab Test 08/29/23  0644   WBC 11.9*   RBC 4.08*   HGB 13.0*   HCT 38.4*   MCV 94   MCH 31.9   MCHC 33.9   RDW 14.5   *       Creatinine   Date Value Ref Range Status   08/29/2023 0.94 0.67 - 1.17 mg/dL Final   09/04/2020 1.19 0.66 - 1.25 mg/dL Final   ]    Liver Function Studies -   Recent Labs   Lab Test 04/13/23  1506   PROTTOTAL 6.9   ALBUMIN 4.3   BILITOTAL 1.3*   ALKPHOS 82   AST 27   ALT 19       Routing refill request to provider for review/approval because:  Med not on cards protocol

## 2023-10-04 NOTE — PROGRESS NOTES
[facility] HEART CARE  CARDIOVASCULAR DIVISION    VALVE CLINIC RETURN VISIT    PRIMARY CARDIOLOGIST: Dr. Dickey       PERTINENT CLINICAL HISTORY:     Jose Carney is a very pleasant 62 year old male who presents for 1 month MitraClip follow-up. 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 Unable to placed additional clips due to iatrogenic mitral stenosis. He experienced mild hypotension post procedure which resolved with 250 NS. He is discharged home on ASA and Plavix.      Has been feeling pretty well. His breathing is maybe slightly better since his mitral valve repair but still gets winded pretty easily. Is not particularly active, he is somewhat limited by his breathing, but overall just a sedentary individual. He wonders if his shortness of breath is due to deconditioning. Has been taking short walks maybe 10-15 minutes a couple times a week. He is able to walk 10-15 minutes on flat surface without shortness of breath. He does notice shortness of breath when walking at an incline or up stairs. No orthopnea, PND, leg swelling, weight gain. He reports lightheadedness with bending over and standing quickly, otherwise no significant lightheadedness with daily activities. No syncope or palpitations. His groin site has healed well. He does notice spontaneous bruising since start Plavix. He is interested in cardiac rehab but is cost prohibitive. He is between jobs and only has major medical insurance. He would be paying out of pocket for rehab.       PAST MEDICAL HISTORY:     Past Medical History:   Diagnosis Date    First degree AV block 03/20/2018    Heart murmur     Pacemaker     Palpitations 03/20/2018    Paroxysmal supraventricular tachycardia (H) 03/20/2018    Sarcoid, cardiac/EF 54% per MRI,[place] of  affected myocardium is 12% of myocardial mass 01/25/2018    Sarcoidosis/ systemic 2013 03/20/2018    Sleep apnea     Thyroid disease         PAST SURGICAL HISTORY:     Past Surgical History:   Procedure Laterality Date    CV CORONARY ANGIOGRAM N/A 6/30/2023    Procedure: Coronary Angiogram;  Surgeon: Tony Morales MD;  Location:  HEART CARDIAC CATH LAB    CV TRANSCATHETER MITRAL VALVE REPAIR N/A 8/28/2023    Procedure: Transcatheter Mitral Valve Repair;  Surgeon: Tnoy Morales MD;  Location:  HEART CARDIAC CATH LAB    EP ABLATION SVT N/A 3/29/2022    Procedure: Ablation Supraventricular Tachycardia;  Surgeon: Lauro Will MD;  Location:  HEART CARDIAC CATH LAB    HERNIA REPAIR, INGUINAL RT/LT Bilateral         CURRENT MEDICATIONS:     Current Outpatient Medications   Medication Sig Dispense Refill    aspirin (ASA) 81 MG chewable tablet Take 1 tablet (81 mg) by mouth daily 90 tablet 3    clopidogrel (PLAVIX) 75 MG tablet Take 1 tablet (75 mg) by mouth daily 90 tablet 3    finasteride (PROSCAR) 5 MG tablet Take 5 mg by mouth daily      folic acid (FOLVITE) 1 MG tablet TAKE FIVE TABLETS (5MG) ONCE WEEKLY WITH YOUR METHOTREXATE 60 tablet 3    furosemide (LASIX) 20 MG tablet Take 20 mg by mouth as needed      methimazole (TAPAZOLE) 10 MG tablet Take by mouth 2 times daily      methimazole (TAPAZOLE) 5 MG tablet TAKE 1-1/2 TABLETS BY MOUTH ONE TIME A DAY.      methotrexate 2.5 MG tablet TAKE 8 TABLETS (20 MG) BY MOUTH EVERY 7 DAYS 104 tablet 3    metoprolol succinate ER (TOPROL XL) 25 MG 24 hr tablet Take 3 tablets (75 mg) by mouth daily 360 tablet 3    prednisoLONE acetate (PRED FORTE) 1 % ophthalmic susp Place 1 drop into both eyes every hour      predniSONE (DELTASONE) 1 MG tablet       predniSONE (DELTASONE) 10 MG tablet  (Patient not taking: Reported on 7/6/2023)      sildenafil (REVATIO) 20 MG tablet Take 20-60 mg by mouth as needed      tamsulosin (FLOMAX) 0.4 MG capsule Take 0.4 mg by mouth  daily      timolol maleate (TIMOPTIC) 0.5 % ophthalmic solution           ALLERGIES:     Allergies   Allergen Reactions    Seasonal Allergies     Terbinafine         FAMILY HISTORY:   No family history on file.     SOCIAL HISTORY:     Social History     Socioeconomic History    Marital status:    Tobacco Use    Smoking status: Never    Smokeless tobacco: Never   Substance and Sexual Activity    Alcohol use: Not Currently    Drug use: No        REVIEW OF SYSTEMS:     Constitutional: No fevers or chills  Skin: No new rash or itching  Eyes: No acute change in vision  Ears/Nose/Throat: No purulent rhinorrhea, new hearing loss, or new vertigo  Respiratory: No cough or hemoptysis  Cardiovascular: See HPI  Gastrointestinal: No change in appetite, vomiting, hematemesis or diarrhea  Genitourinary: No dysuria or hematuria  Musculoskeletal: No new back pain, neck pain or muscle pain  Neurologic: No new headaches, focal weakness or behavior changes  Psychiatric: No hallucinations, excessive alcohol consumption or illegal drug usage  Hematologic/Lymphatic/Immunologic: No bleeding, chills, fever, night sweats or weight loss  Endocrine: No new cold intolerance, heat intolerance, polyphagia, polydipsia or polyuria      PHYSICAL EXAMINATION:     There were no vitals taken for this visit.    GENERAL: No acute distress.  HEENT: EOMI. Sclerae white, not injected. Nares clear. Pharynx without erythema or exudate.   Neck: No adenopathy. No thyromegaly. No jugular venous distension.   Heart: Regular rate and rhythm. No murmur.   Lungs: Clear to auscultation. No ronchi, wheezes, rales.   Abdomen: Soft, nontender, nondistended. Bowel sounds present.  Extremities: No clubbing, cyanosis, or edema.   Neurologic: Alert and oriented to person/place/time, normal speech and affect. No focal deficits.  Skin: No petechiae, purpura or rash.     LABORATORY DATA:     LIPID RESULTS:  No results found for: CHOL, HDL, LDL, TRIG,  CHOLHDLRATIO    LIVER ENZYME RESULTS:  Lab Results   Component Value Date    AST 27 04/13/2023    AST 19 09/04/2020    ALT 19 04/13/2023    ALT 31 09/04/2020       CBC RESULTS:  Lab Results   Component Value Date    WBC 11.9 (H) 08/29/2023    WBC 5.7 09/04/2020    RBC 4.08 (L) 08/29/2023    RBC 4.32 (L) 09/04/2020    HGB 13.0 (L) 08/29/2023    HGB 14.6 09/04/2020    HCT 38.4 (L) 08/29/2023    HCT 42.9 09/04/2020    MCV 94 08/29/2023    MCV 99 09/04/2020    MCH 31.9 08/29/2023    MCH 33.8 (H) 09/04/2020    MCHC 33.9 08/29/2023    MCHC 34.0 09/04/2020    RDW 14.5 08/29/2023    RDW 14.1 09/04/2020     (L) 08/29/2023     09/04/2020       BMP RESULTS:  Lab Results   Component Value Date     08/29/2023     09/04/2020    POTASSIUM 4.0 08/29/2023    POTASSIUM 4.2 06/15/2022    POTASSIUM 3.8 09/04/2020    CHLORIDE 109 (H) 08/29/2023    CHLORIDE 109 01/04/2023    CHLORIDE 109 09/04/2020    CO2 22 08/29/2023    CO2 29 06/15/2022    CO2 28 09/04/2020    ANIONGAP 8 08/29/2023    ANIONGAP 1 (L) 06/15/2022    ANIONGAP 3 09/04/2020     (H) 08/29/2023    GLC 86 08/28/2023    GLC 73 04/13/2023    BUN 17.0 08/29/2023    BUN 19 06/15/2022    BUN 18 09/04/2020    CR 0.94 08/29/2023    CR 1.19 09/04/2020    GFRESTIMATED >90 08/29/2023    GFRESTIMATED 67 09/04/2020    GFRESTBLACK 77 09/04/2020    INGE 8.3 (L) 08/29/2023    INGE 8.5 09/04/2020        A1C RESULTS:  No results found for: A1C    INR RESULTS:  Lab Results   Component Value Date    INR 1.14 08/28/2023          PROCEDURES & FURTHER ASSESSMENTS:     ECG 10/5/23:   NSR with 1st degree AVB and RBBB HR 70s    ECHO 10/5/23:    Interpretation Summary  Biplane LVEF is 55%. Mild left ventricular dilation is present.     Right ventricular function, chamber size, wall motion, and thickness are  normal.     There is a predominantly left-to-right shunt.  s/p MitraClip x 2 on 8/28/23. Moderate to severe eccentric residual mitral  insufficiency is present  (underestimated on several views). The mean gradient  across the mitral valve is 5 mmHg at a HR of 65 BPM.     Mild pulmonary hypertension is present with the right ventricular systolic  pressure is 34mmHg above the right atrial pressure.     Mild to moderate dilatation of the aorta is present.  Sinuses of Valsalva 4.4 cm.     There is likely a moderate to large intraatrial shunt present seen by color  imaging on several images.     No pericardial effusion is present.     Compared to prior imaging on 8/29/2023 the aortic root measurements have  increased, there is now mild dilation of the left ventricle but the left  ventricular ejection fraction has improved. The gradient across the mitral  valve has decreased slightly though the mitral regurgitation persists as does  the intra-atrial shunt.  ______________________________________________________________________________  Left Ventricle  Left ventricular function is normal.The ejection fraction is 55-60%. Biplane  LVEF is 55%. Left ventricular wall thickness is normal. Mild left ventricular  dilation is present. Left ventricular diastolic function is not assessable.     Right Ventricle  Right ventricular function, chamber size, wall motion, and thickness are  normal. A pacemaker lead is noted in the right ventricle.     Atria  Mild right atrial enlargement is present. Severe left atrial enlargement is  present. There is a predominantly left-to-right shunt. There is likely a  moderate to large intraatrial shunt present seen by color imaging on several  images.     Mitral Valve  s/p MitraClip x 2 on 8/28/23. Moderate to severe eccentric residual mitral  insufficiency is present (underestimated on several views). The mean gradient  across the mitral valve is 5 mmHg at a HR of 65 BPM.     Aortic Valve  Aortic valve is normal in structure and function. The aortic valve is  tricuspid. Trace to mild aortic insufficiency is present.     Tricuspid Valve  The tricuspid valve  is normal. Mild tricuspid insufficiency is present. The  right ventricular systolic pressure is 34mmHg above the right atrial pressure.  Mild pulmonary hypertension is present.     Pulmonic Valve  The pulmonic valve is normal.     Vessels  Sinuses of Valsalva 4.4 cm. Mild to moderate dilatation of the aorta is  present. Ascending aorta 3.4 cm.     Pericardium  No pericardial effusion is present.     Compared to Previous Study  Compared to prior imaging on 2023 the aortic root measurements have  increased, there is now mild dilation of the left ventricle but the left  ventricular ejection fraction has improved. The gradient across the mitral  valve has decreased slightly though the mitral regurgitation persists as does  the intra-atrial shunt.  ______________________________________________________________________________  MMode/2D Measurements & Calculations  IVSd: 0.96 cm     LVIDd: 6.1 cm  LVIDs: 4.3 cm  LVPWd: 0.81 cm  FS: 29.9 %  LV mass(C)d: 219.4 grams  LV mass(C)dI: 104.0 grams/m2  Ao root diam: 4.4 cm  LA dimension: 3.9 cm  asc Aorta Diam: 3.4 cm  LA/Ao: 0.88  LVOT diam: 2.4 cm  LVOT area: 4.6 cm2  Ao root diam index Ht(cm/m): 2.4  Ao root diam index BSA (cm/m2): 2.1  Asc Ao diam index BSA (cm/m2): 1.6  Asc Ao diam index Ht(cm/m): 1.9  LA Volume (BP): 111.0 ml     LA Volume Index (BP): 52.6 ml/m2  RV Base: 4.2 cm  RWT: 0.27  TAPSE: 2.1 cm     Doppler Measurements & Calculations  MV E max tiffani: 148.0 cm/sec  MV A max tiffani: 136.4 cm/sec  MV E/A: 1.1  MV max P.2 mmHg  MV mean P.7 mmHg  MV V2 VTI: 51.6 cm  MVA(VTI): 1.3 cm2  MV dec time: 0.37 sec  Ao V2 max: 106.0 cm/sec  Ao max P.5 mmHg  Ao V2 mean: 75.1 cm/sec  Ao mean P.6 mmHg  Ao V2 VTI: 19.6 cm  ALICIA(I,D): 3.4 cm2  ALICIA(V,D): 2.7 cm2  LV V1 max P.6 mmHg  LV V1 max: 62.8 cm/sec  LV V1 VTI: 14.7 cm  SV(LVOT): 67.7 ml  SI(LVOT): 32.1 ml/m2  PA V2 max: 105.2 cm/sec  PA max P.4 mmHg  PA acc time: 0.10 sec  TR max tiffani: 293.2 cm/sec  TR  max P.4 mmHg  AV Bennie Ratio (DI): 0.59     ALICIA Index (cm2/m2): 1.6  E/E' av.2  Lateral E/e': 15.6  Medial E/e': 24.8    TTE 23:  Interpretation Summary  s/p MitraClip x 2 on 23. Moderate residual mitral insufficiency is  present. The mean gradient across the mitral valve is 8 mmHg.  Left ventricular function is decreased. The ejection fraction is 40-45%  (mildly reduced).  Global right ventricular function is normal.  IVC diameter >2.1 cm collapsing <50% with sniff suggests a high RA pressure  estimated at 15 mmHg or greater.  No pericardial effusion is present.  No significant changes noted from yesterday's post-proecdure images. MV gr is  expectedly higher today.  ______________________________________________________________________________  Left Ventricle  Left ventricular function is decreased. The ejection fraction is 40-45%  (mildly reduced). 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     Right Ventricle  Global right ventricular function is normal.     Mitral Valve  Moderate mitral insufficiency is present. The mean gradient across the mitral  valve is 8 mmHg.     Aortic Valve  Trace aortic insufficiency is present.     Tricuspid Valve  The tricuspid valve is normal. The right ventricular systolic pressure is  approximated at 25.3 mmHg plus the right atrial pressure.     Pulmonic Valve  Trace pulmonic insufficiency is present.     Vessels  IVC diameter >2.1 cm collapsing <50% with sniff suggests a high RA pressure  estimated at 15 mmHg or greater.     Pericardium  No pericardial effusion is present.     Compared to Previous Study  No significant changes noted.     ______________________________________________________________________________  Doppler Measurements & Calculations     MV max PG: 15.6 mmHg  MV mean P.7 mmHg  MV V2 VTI: 61.2 cm  TR max bennie: 251.5 cm/sec  TR max P.3 mmHg     CADEN 23:  Preprocedural findings  The left ventricle is  moderately dilated. 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 Visually estimated LV systolic  function is around 40%  Right ventricle is borderline dilated with low normal systolic function.  Severe left atrial enlargement is noted.  There is restriction of the posterior mitral leaflet related to the wall  motion abnormality as described above. This is resulting in a large based jet  of severe posteriorly directed eccentric mitral regurgitation quantified by  both 2D and 3D measures. No mitral stenosis.  No pericardial effusion.     Transesophageal echo guidance was used to obtain transseptal puncture and left  atrial access.     Post procedural findings  No pericardial effusion.  Unchanged left and right ventricular systolic function and size.  Mitral valve regurgitation was quite broad-based. 2 XT W clips were implanted  at the A2 P2 location resulting in good tissue bridge and a tri-orifice mitral  valve. Multiple attempts had to be made to adequately capture the posterior  mitral leaflet. After deployment of the first clip, there was significant 3+  residual mitral regurgitation laterally. As such the second XT W clip had to  be implanted. We attempted to place the clip at 3 different locations and  eventually accepted the location which resulted in the most reduction in  mitral regurgitation.  Unfortunately there is still 2+ residual mitral regurgitation given how broad-  based the jet is. The residual MR is coming predominantly between the 2 clips.  This potentially is amenable to Amplatzer plug occlusion in the future if  required.  Iatrogenic mitral stenosis with postprocedural MS gradient of 6 to 7 mmHg.  Iatrogenic ASD.    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     Severe mitral regurgitation s/p mitraclip x 2 with moderate to severe residual MR:  History of severe mixed mitral regurgitation who underwent successful MitraClip x 2 on 8/28/23 with reduction in MR from severe to moderate. Unable to place additional clips due to elevated mean PG of 7 mmHg across the mitral valve. He was discharged home on ASA and Plavix. Feeling well with maybe slight improvement in dyspnea; however, remains NYHA class II. ECHO today shows moderate to severe residual MR with improved mean PG 5 mmHg. Additional clips and/or vascular plug would likely result in mitral stenosis. Advised patient to increase activity level as able, if he remains symptomatic, would likely need to consider minimally invasive MVR with Dr. Kiran.   - Lifelong 81 mg ASA   - Plavix 75mg daily x 6 months   - Unable to initiate afterload reduction due to soft baseline BP  - Outpatient cardiac rehab/PT/OT  - Lifelong antibiotic prophylaxis prior to all dental procedures.  - Follow-up Dr. Dickey 4 months w/echo prior  - Structural heart clinic 1 year with echo and labs prior      2. Biopsy confirmed pulmonary and cardiac sarcoidosis  3. AVNRT s/p ablation and ICD  Follows with Dr. Dickey. Diagnosed by transbronchial biopsy 2013. ICD placed after PET scan highly suspicious and patient developing AVRNT. Previous attempts to wean immunosuppression have resulted in recurrent arrhythmias and symptoms/inflammation. Previously on amiodarone and sotalol but titrated off by EP. Now s/p Ablation 3/29/22 with Dr. Will.   - Continue Methotrexate 20 mg weekly  - Continue Toprol XL 75mg daily      4. WILLIAM   - Continue CPAP    Sia HARSH Westbrook, CNP  [facility] Cardiology Team

## 2023-10-05 ENCOUNTER — OFFICE VISIT (OUTPATIENT)
Dept: CARDIOLOGY | Facility: CLINIC | Age: 63
End: 2023-10-05
Attending: NURSE PRACTITIONER
Payer: COMMERCIAL

## 2023-10-05 ENCOUNTER — LAB (OUTPATIENT)
Dept: LAB | Facility: CLINIC | Age: 63
End: 2023-10-05
Payer: COMMERCIAL

## 2023-10-05 VITALS
WEIGHT: 191.9 LBS | BODY MASS INDEX: 25.43 KG/M2 | DIASTOLIC BLOOD PRESSURE: 69 MMHG | HEART RATE: 65 BPM | SYSTOLIC BLOOD PRESSURE: 107 MMHG | HEIGHT: 73 IN | OXYGEN SATURATION: 100 %

## 2023-10-05 DIAGNOSIS — Z95.818 STATUS POST IMPLANTATION OF MITRAL VALVE LEAFLET CLIP: Primary | ICD-10-CM

## 2023-10-05 DIAGNOSIS — Z79.631 ENCOUNTER FOR METHOTREXATE MONITORING: ICD-10-CM

## 2023-10-05 DIAGNOSIS — Z51.81 ENCOUNTER FOR METHOTREXATE MONITORING: ICD-10-CM

## 2023-10-05 DIAGNOSIS — I34.0 NONRHEUMATIC MITRAL VALVE REGURGITATION: ICD-10-CM

## 2023-10-05 DIAGNOSIS — Z98.890 STATUS POST IMPLANTATION OF MITRAL VALVE LEAFLET CLIP: Primary | ICD-10-CM

## 2023-10-05 DIAGNOSIS — D86.85 CARDIAC SARCOIDOSIS: ICD-10-CM

## 2023-10-05 LAB
ALBUMIN SERPL BCG-MCNC: 4.3 G/DL (ref 3.5–5.2)
ALP SERPL-CCNC: 82 U/L (ref 40–129)
ALT SERPL W P-5'-P-CCNC: 9 U/L (ref 0–70)
ANION GAP SERPL CALCULATED.3IONS-SCNC: 8 MMOL/L (ref 7–15)
AST SERPL W P-5'-P-CCNC: 26 U/L (ref 0–45)
BI-PLANE LVEF ECHO: NORMAL
BILIRUB DIRECT SERPL-MCNC: 0.26 MG/DL (ref 0–0.3)
BILIRUB SERPL-MCNC: 1 MG/DL
BUN SERPL-MCNC: 18.8 MG/DL (ref 8–23)
CALCIUM SERPL-MCNC: 9.3 MG/DL (ref 8.8–10.2)
CHLORIDE SERPL-SCNC: 107 MMOL/L (ref 98–107)
CREAT SERPL-MCNC: 0.91 MG/DL (ref 0.67–1.17)
DEPRECATED HCO3 PLAS-SCNC: 26 MMOL/L (ref 22–29)
EGFRCR SERPLBLD CKD-EPI 2021: >90 ML/MIN/1.73M2
ERYTHROCYTE [DISTWIDTH] IN BLOOD BY AUTOMATED COUNT: 14 % (ref 10–15)
GLUCOSE SERPL-MCNC: 103 MG/DL (ref 70–99)
HCT VFR BLD AUTO: 40.9 % (ref 40–53)
HGB BLD-MCNC: 14.1 G/DL (ref 13.3–17.7)
LVEF ECHO: NORMAL
MCH RBC QN AUTO: 32.5 PG (ref 26.5–33)
MCHC RBC AUTO-ENTMCNC: 34.5 G/DL (ref 31.5–36.5)
MCV RBC AUTO: 94 FL (ref 78–100)
NT-PROBNP SERPL-MCNC: 508 PG/ML (ref 0–900)
PLATELET # BLD AUTO: 171 10E3/UL (ref 150–450)
POTASSIUM SERPL-SCNC: 4.5 MMOL/L (ref 3.4–5.3)
PROT SERPL-MCNC: 6.5 G/DL (ref 6.4–8.3)
RBC # BLD AUTO: 4.34 10E6/UL (ref 4.4–5.9)
SODIUM SERPL-SCNC: 141 MMOL/L (ref 135–145)
WBC # BLD AUTO: 4.8 10E3/UL (ref 4–11)

## 2023-10-05 PROCEDURE — 93306 TTE W/DOPPLER COMPLETE: CPT | Performed by: INTERNAL MEDICINE

## 2023-10-05 PROCEDURE — 82248 BILIRUBIN DIRECT: CPT | Performed by: PATHOLOGY

## 2023-10-05 PROCEDURE — 80053 COMPREHEN METABOLIC PANEL: CPT | Performed by: PATHOLOGY

## 2023-10-05 PROCEDURE — 36415 COLL VENOUS BLD VENIPUNCTURE: CPT | Performed by: PATHOLOGY

## 2023-10-05 PROCEDURE — 85027 COMPLETE CBC AUTOMATED: CPT | Performed by: PATHOLOGY

## 2023-10-05 PROCEDURE — 83880 ASSAY OF NATRIURETIC PEPTIDE: CPT | Performed by: PATHOLOGY

## 2023-10-05 PROCEDURE — 93010 ELECTROCARDIOGRAM REPORT: CPT | Performed by: INTERNAL MEDICINE

## 2023-10-05 PROCEDURE — 93005 ELECTROCARDIOGRAM TRACING: CPT

## 2023-10-05 PROCEDURE — 99213 OFFICE O/P EST LOW 20 MIN: CPT | Performed by: NURSE PRACTITIONER

## 2023-10-05 PROCEDURE — 99214 OFFICE O/P EST MOD 30 MIN: CPT | Mod: 24 | Performed by: NURSE PRACTITIONER

## 2023-10-05 RX ORDER — AMOXICILLIN 500 MG/1
2000 CAPSULE ORAL
Qty: 4 CAPSULE | Refills: 3 | Status: SHIPPED | OUTPATIENT
Start: 2023-10-05 | End: 2024-07-09

## 2023-10-05 ASSESSMENT — PAIN SCALES - GENERAL: PAINLEVEL: NO PAIN (0)

## 2023-10-05 NOTE — PROGRESS NOTES
Structural Heart Coordinator visit:  Provided additional education regarding TAVR/MitraClip procedure, after being present for discussion with physician. Explained the work-up process and next steps for patient. Patient provided our direct contact number and instructed to call with any questions.           KCCQ Results:   1a. 5  1b. 4  1c. 2  2. 5  3. 3  4. 3  5. 4  6. 3  7. 3  8a. 4  8b. 2  8c. 5    Elva Rangel CMA  Structural Heart   [facility]    Vtama Pregnancy And Lactation Text: It is unknown if this medication can cause problems during pregnancy and breastfeeding.

## 2023-10-05 NOTE — PATIENT INSTRUCTIONS
You were seen today in the Structural Heart Clinic at the [facility].    Cardiology provider you saw during your visit: Sia Wetsbrook NP    Instructions:   Continue aspirin 81 mg daily lifelong.  Continue Plavix 75 mg x 6 months (okay to stop 2/28/24)  Delay any nonurgent dental procedures for the first 6 month post MitraClip.   For all future dental cleanings and procedures you will need to take antibiotics prior - see instructions below.   Increase exercise level to 10 minutes x 5 days, then 15 minutes 5 days a week, 20 min x 5 days a week and so on until you achieve 30 minutes x 5 days a week  Follow-up with your Primary Cardiologist Dr. Dickey in 4-6 months with echo and labs prior   Follow-up in Valve Clinic in 1 year with echo, labs and ECG prior     Prevention of Infective (Bacterial) Endocarditis:  You are at increased risk for developing adverse outcomes from infective endocarditis (IE), also known as bacterial endocarditis (BE) because of the new device in your heart. The guidelines for prevention of IE are to give patients antibiotics prior to any dental procedures that involve manipulation of gingival tissue or the periapical region of teeth, or perforation of the oral mucosa:      It is recommended to take Amoxicillin 2 gm by mouth as a single dose 30 to 60 minutes before procedure.     OR if allergic to Penicillin or Ampicillin:     Cephalexin 2 gm by mouth, or  Clindamycin 600 mg by mouth, or  Azithromycin or Clarithromycin 500 mg PO       Questions and scheduling:   First call: Structural Heart  Mayelin Claire [phone]    General scheduling line: [phone].   First press #1 for the [place] and then press #3 for Medical Questions to reach the Cardiology triage nurse.     On Call Cardiologist for after hours or on weekends: [phone], press option #4 and ask to speak to the on-call Cardiologist.

## 2023-10-05 NOTE — NURSING NOTE
Chief Complaint   Patient presents with    Follow Up     23-75 days s/p Mitraclip       Vitals were taken, medications reconciled and EKG performed.    Meena Schulte, EMT   10:12 AM

## 2023-10-05 NOTE — LETTER
10/5/2023      RE: Jose Carney  [address]  [facility] [address]       Dear Colleague,    Thank you for the opportunity to participate in the care of your patient, Jose Carney, at the [facility] HE[facility]. Please see a copy of my visit note below.        [facility] HEART CARE  CARDIOVASCULAR DIVISION    VALVE CLINIC RETURN VISIT    PRIMARY CARDIOLOGIST: Dr. Dickey       PERTINENT CLINICAL HISTORY:     Jose Carney is a very pleasant 62 year old male who presents for 1 month MitraClip follow-up. 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 Unable to placed additional clips due to iatrogenic mitral stenosis. He experienced mild hypotension post procedure which resolved with 250 NS. He is discharged home on ASA and Plavix.      Has been feeling pretty well. His breathing is maybe slightly better since his mitral valve repair but still gets winded pretty easily. Is not particularly active, he is somewhat limited by his breathing, but overall just a sedentary individual. He wonders if his shortness of breath is due to deconditioning. Has been taking short walks maybe 10-15 minutes a couple times a week. He is able to walk 10-15 minutes on flat surface without shortness of breath. He does notice shortness of breath when walking at an incline or up stairs. No orthopnea, PND, leg swelling, weight gain. He reports lightheadedness with bending over and standing quickly, otherwise no significant lightheadedness with daily activities. No syncope or palpitations. His groin site has healed well. He does notice spontaneous bruising since start Plavix. He is interested in cardiac rehab but is cost prohibitive. He is between jobs and only has  major medical insurance. He would be paying out of pocket for rehab.       PAST MEDICAL HISTORY:     Past Medical History:   Diagnosis Date    First degree AV block 03/20/2018    Heart murmur     Pacemaker     Palpitations 03/20/2018    Paroxysmal supraventricular tachycardia (H) 03/20/2018    Sarcoid, cardiac/EF 54% per MRI,[place] of affected myocardium is 12% of myocardial mass 01/25/2018    Sarcoidosis/ systemic 2013 03/20/2018    Sleep apnea     Thyroid disease         PAST SURGICAL HISTORY:     Past Surgical History:   Procedure Laterality Date    CV CORONARY ANGIOGRAM N/A 6/30/2023    Procedure: Coronary Angiogram;  Surgeon: Tony Morales MD;  Location: [facility] CARDIAC CATH LAB    CV TRANSCATHETER MITRAL VALVE REPAIR N/A 8/28/2023    Procedure: Transcatheter Mitral Valve Repair;  Surgeon: Tony Morales MD;  Location: [facility] CARDIAC CATH LAB    EP ABLATION SVT N/A 3/29/2022    Procedure: Ablation Supraventricular Tachycardia;  Surgeon: Lauro Will MD;  Location: [facility] CARDIAC CATH LAB    HERNIA REPAIR, INGUINAL RT/LT Bilateral         CURRENT MEDICATIONS:     Current Outpatient Medications   Medication Sig Dispense Refill    aspirin (ASA) 81 MG chewable tablet Take 1 tablet (81 mg) by mouth daily 90 tablet 3    clopidogrel (PLAVIX) 75 MG tablet Take 1 tablet (75 mg) by mouth daily 90 tablet 3    finasteride (PROSCAR) 5 MG tablet Take 5 mg by mouth daily      folic acid (FOLVITE) 1 MG tablet TAKE FIVE TABLETS (5MG) ONCE WEEKLY WITH YOUR METHOTREXATE 60 tablet 3    furosemide (LASIX) 20 MG tablet Take 20 mg by mouth as needed      methimazole (TAPAZOLE) 10 MG tablet Take by mouth 2 times daily      methimazole (TAPAZOLE) 5 MG tablet TAKE 1-1/2 TABLETS BY MOUTH ONE TIME A DAY.      methotrexate 2.5 MG tablet TAKE 8 TABLETS (20 MG) BY MOUTH EVERY 7 DAYS 104 tablet 3    metoprolol succinate ER (TOPROL XL) 25 MG 24 hr tablet Take 3 tablets (75 mg) by mouth daily 360 tablet 3    prednisoLONE  acetate (PRED FORTE) 1 % ophthalmic susp Place 1 drop into both eyes every hour      predniSONE (DELTASONE) 1 MG tablet       predniSONE (DELTASONE) 10 MG tablet  (Patient not taking: Reported on 7/6/2023)      sildenafil (REVATIO) 20 MG tablet Take 20-60 mg by mouth as needed      tamsulosin (FLOMAX) 0.4 MG capsule Take 0.4 mg by mouth daily      timolol maleate (TIMOPTIC) 0.5 % ophthalmic solution           ALLERGIES:     Allergies   Allergen Reactions    Seasonal Allergies     Terbinafine         FAMILY HISTORY:   No family history on file.     SOCIAL HISTORY:     Social History     Socioeconomic History    Marital status:    Tobacco Use    Smoking status: Never    Smokeless tobacco: Never   Substance and Sexual Activity    Alcohol use: Not Currently    Drug use: No        REVIEW OF SYSTEMS:     Constitutional: No fevers or chills  Skin: No new rash or itching  Eyes: No acute change in vision  Ears/Nose/Throat: No purulent rhinorrhea, new hearing loss, or new vertigo  Respiratory: No cough or hemoptysis  Cardiovascular: See HPI  Gastrointestinal: No change in appetite, vomiting, hematemesis or diarrhea  Genitourinary: No dysuria or hematuria  Musculoskeletal: No new back pain, neck pain or muscle pain  Neurologic: No new headaches, focal weakness or behavior changes  Psychiatric: No hallucinations, excessive alcohol consumption or illegal drug usage  Hematologic/Lymphatic/Immunologic: No bleeding, chills, fever, night sweats or weight loss  Endocrine: No new cold intolerance, heat intolerance, polyphagia, polydipsia or polyuria      PHYSICAL EXAMINATION:     There were no vitals taken for this visit.    GENERAL: No acute distress.  HEENT: EOMI. Sclerae white, not injected. Nares clear. Pharynx without erythema or exudate.   Neck: No adenopathy. No thyromegaly. No jugular venous distension.   Heart: Regular rate and rhythm. No murmur.   Lungs: Clear to auscultation. No ronchi, wheezes, rales.   Abdomen:  Soft, nontender, nondistended. Bowel sounds present.  Extremities: No clubbing, cyanosis, or edema.   Neurologic: Alert and oriented to person/place/time, normal speech and affect. No focal deficits.  Skin: No petechiae, purpura or rash.     LABORATORY DATA:     LIPID RESULTS:  No results found for: CHOL, HDL, LDL, TRIG, CHOLHDLRATIO    LIVER ENZYME RESULTS:  Lab Results   Component Value Date    AST 27 04/13/2023    AST 19 09/04/2020    ALT 19 04/13/2023    ALT 31 09/04/2020       CBC RESULTS:  Lab Results   Component Value Date    WBC 11.9 (H) 08/29/2023    WBC 5.7 09/04/2020    RBC 4.08 (L) 08/29/2023    RBC 4.32 (L) 09/04/2020    HGB 13.0 (L) 08/29/2023    HGB 14.6 09/04/2020    HCT 38.4 (L) 08/29/2023    HCT 42.9 09/04/2020    MCV 94 08/29/2023    MCV 99 09/04/2020    MCH 31.9 08/29/2023    MCH 33.8 (H) 09/04/2020    MCHC 33.9 08/29/2023    MCHC 34.0 09/04/2020    RDW 14.5 08/29/2023    RDW 14.1 09/04/2020     (L) 08/29/2023     09/04/2020       BMP RESULTS:  Lab Results   Component Value Date     08/29/2023     09/04/2020    POTASSIUM 4.0 08/29/2023    POTASSIUM 4.2 06/15/2022    POTASSIUM 3.8 09/04/2020    CHLORIDE 109 (H) 08/29/2023    CHLORIDE 109 01/04/2023    CHLORIDE 109 09/04/2020    CO2 22 08/29/2023    CO2 29 06/15/2022    CO2 28 09/04/2020    ANIONGAP 8 08/29/2023    ANIONGAP 1 (L) 06/15/2022    ANIONGAP 3 09/04/2020     (H) 08/29/2023    GLC 86 08/28/2023    GLC 73 04/13/2023    BUN 17.0 08/29/2023    BUN 19 06/15/2022    BUN 18 09/04/2020    CR 0.94 08/29/2023    CR 1.19 09/04/2020    GFRESTIMATED >90 08/29/2023    GFRESTIMATED 67 09/04/2020    GFRESTBLACK 77 09/04/2020    INGE 8.3 (L) 08/29/2023    INGE 8.5 09/04/2020        A1C RESULTS:  No results found for: A1C    INR RESULTS:  Lab Results   Component Value Date    INR 1.14 08/28/2023          PROCEDURES & FURTHER ASSESSMENTS:     ECG 10/5/23:   NSR with 1st degree AVB and RBBB HR 70s    ECHO  10/5/23:    Interpretation Summary  Biplane LVEF is 55%. Mild left ventricular dilation is present.     Right ventricular function, chamber size, wall motion, and thickness are  normal.     There is a predominantly left-to-right shunt.  s/p MitraClip x 2 on 8/28/23. Moderate to severe eccentric residual mitral  insufficiency is present (underestimated on several views). The mean gradient  across the mitral valve is 5 mmHg at a HR of 65 BPM.     Mild pulmonary hypertension is present with the right ventricular systolic  pressure is 34mmHg above the right atrial pressure.     Mild to moderate dilatation of the aorta is present.  Sinuses of Valsalva 4.4 cm.     There is likely a moderate to large intraatrial shunt present seen by color  imaging on several images.     No pericardial effusion is present.     Compared to prior imaging on 8/29/2023 the aortic root measurements have  increased, there is now mild dilation of the left ventricle but the left  ventricular ejection fraction has improved. The gradient across the mitral  valve has decreased slightly though the mitral regurgitation persists as does  the intra-atrial shunt.  ______________________________________________________________________________  Left Ventricle  Left ventricular function is normal.The ejection fraction is 55-60%. Biplane  LVEF is 55%. Left ventricular wall thickness is normal. Mild left ventricular  dilation is present. Left ventricular diastolic function is not assessable.     Right Ventricle  Right ventricular function, chamber size, wall motion, and thickness are  normal. A pacemaker lead is noted in the right ventricle.     Atria  Mild right atrial enlargement is present. Severe left atrial enlargement is  present. There is a predominantly left-to-right shunt. There is likely a  moderate to large intraatrial shunt present seen by color imaging on several  images.     Mitral Valve  s/p MitraClip x 2 on 8/28/23. Moderate to severe eccentric  residual mitral  insufficiency is present (underestimated on several views). The mean gradient  across the mitral valve is 5 mmHg at a HR of 65 BPM.     Aortic Valve  Aortic valve is normal in structure and function. The aortic valve is  tricuspid. Trace to mild aortic insufficiency is present.     Tricuspid Valve  The tricuspid valve is normal. Mild tricuspid insufficiency is present. The  right ventricular systolic pressure is 34mmHg above the right atrial pressure.  Mild pulmonary hypertension is present.     Pulmonic Valve  The pulmonic valve is normal.     Vessels  Sinuses of Valsalva 4.4 cm. Mild to moderate dilatation of the aorta is  present. Ascending aorta 3.4 cm.     Pericardium  No pericardial effusion is present.     Compared to Previous Study  Compared to prior imaging on 2023 the aortic root measurements have  increased, there is now mild dilation of the left ventricle but the left  ventricular ejection fraction has improved. The gradient across the mitral  valve has decreased slightly though the mitral regurgitation persists as does  the intra-atrial shunt.  ______________________________________________________________________________  MMode/2D Measurements & Calculations  IVSd: 0.96 cm     LVIDd: 6.1 cm  LVIDs: 4.3 cm  LVPWd: 0.81 cm  FS: 29.9 %  LV mass(C)d: 219.4 grams  LV mass(C)dI: 104.0 grams/m2  Ao root diam: 4.4 cm  LA dimension: 3.9 cm  asc Aorta Diam: 3.4 cm  LA/Ao: 0.88  LVOT diam: 2.4 cm  LVOT area: 4.6 cm2  Ao root diam index Ht(cm/m): 2.4  Ao root diam index BSA (cm/m2): 2.1  Asc Ao diam index BSA (cm/m2): 1.6  Asc Ao diam index Ht(cm/m): 1.9  LA Volume (BP): 111.0 ml     LA Volume Index (BP): 52.6 ml/m2  RV Base: 4.2 cm  RWT: 0.27  TAPSE: 2.1 cm     Doppler Measurements & Calculations  MV E max tiffani: 148.0 cm/sec  MV A max tiffani: 136.4 cm/sec  MV E/A: 1.1  MV max P.2 mmHg  MV mean P.7 mmHg  MV V2 VTI: 51.6 cm  MVA(VTI): 1.3 cm2  MV dec time: 0.37 sec  Ao V2 max: 106.0  cm/sec  Ao max P.5 mmHg  Ao V2 mean: 75.1 cm/sec  Ao mean P.6 mmHg  Ao V2 VTI: 19.6 cm  ALICIA(I,D): 3.4 cm2  ALICIA(V,D): 2.7 cm2  LV V1 max P.6 mmHg  LV V1 max: 62.8 cm/sec  LV V1 VTI: 14.7 cm  SV(LVOT): 67.7 ml  SI(LVOT): 32.1 ml/m2  PA V2 max: 105.2 cm/sec  PA max P.4 mmHg  PA acc time: 0.10 sec  TR max bennie: 293.2 cm/sec  TR max P.4 mmHg  AV Bennie Ratio (DI): 0.59     ALICIA Index (cm2/m2): 1.6  E/E' av.2  Lateral E/e': 15.6  Medial E/e': 24.8    TTE 23:  Interpretation Summary  s/p MitraClip x 2 on 23. Moderate residual mitral insufficiency is  present. The mean gradient across the mitral valve is 8 mmHg.  Left ventricular function is decreased. The ejection fraction is 40-45%  (mildly reduced).  Global right ventricular function is normal.  IVC diameter >2.1 cm collapsing <50% with sniff suggests a high RA pressure  estimated at 15 mmHg or greater.  No pericardial effusion is present.  No significant changes noted from yesterday's post-proecdure images. MV gr is  expectedly higher today.  ______________________________________________________________________________  Left Ventricle  Left ventricular function is decreased. The ejection fraction is 40-45%  (mildly reduced). 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     Right Ventricle  Global right ventricular function is normal.     Mitral Valve  Moderate mitral insufficiency is present. The mean gradient across the mitral  valve is 8 mmHg.     Aortic Valve  Trace aortic insufficiency is present.     Tricuspid Valve  The tricuspid valve is normal. The right ventricular systolic pressure is  approximated at 25.3 mmHg plus the right atrial pressure.     Pulmonic Valve  Trace pulmonic insufficiency is present.     Vessels  IVC diameter >2.1 cm collapsing <50% with sniff suggests a high RA pressure  estimated at 15 mmHg or greater.     Pericardium  No pericardial effusion is present.     Compared to Previous  Study  No significant changes noted.     ______________________________________________________________________________  Doppler Measurements & Calculations     MV max PG: 15.6 mmHg  MV mean P.7 mmHg  MV V2 VTI: 61.2 cm  TR max tiffani: 251.5 cm/sec  TR max P.3 mmHg     CADEN 23:  Preprocedural findings  The left ventricle is moderately dilated. 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 Visually estimated LV systolic  function is around 40%  Right ventricle is borderline dilated with low normal systolic function.  Severe left atrial enlargement is noted.  There is restriction of the posterior mitral leaflet related to the wall  motion abnormality as described above. This is resulting in a large based jet  of severe posteriorly directed eccentric mitral regurgitation quantified by  both 2D and 3D measures. No mitral stenosis.  No pericardial effusion.     Transesophageal echo guidance was used to obtain transseptal puncture and left  atrial access.     Post procedural findings  No pericardial effusion.  Unchanged left and right ventricular systolic function and size.  Mitral valve regurgitation was quite broad-based. 2 XT W clips were implanted  at the A2 P2 location resulting in good tissue bridge and a tri-orifice mitral  valve. Multiple attempts had to be made to adequately capture the posterior  mitral leaflet. After deployment of the first clip, there was significant 3+  residual mitral regurgitation laterally. As such the second XT W clip had to  be implanted. We attempted to place the clip at 3 different locations and  eventually accepted the location which resulted in the most reduction in  mitral regurgitation.  Unfortunately there is still 2+ residual mitral regurgitation given how broad-  based the jet is. The residual MR is coming predominantly between the 2 clips.  This potentially is amenable to Amplatzer plug occlusion in the future  if  required.  Iatrogenic mitral stenosis with postprocedural MS gradient of 6 to 7 mmHg.  Iatrogenic ASD.    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     Severe mitral regurgitation s/p mitraclip x 2 with moderate to severe residual MR:  History of severe mixed mitral regurgitation who underwent successful MitraClip x 2 on 8/28/23 with reduction in MR from severe to moderate. Unable to place additional clips due to elevated mean PG of 7 mmHg across the mitral valve. He was discharged home on ASA and Plavix. Feeling well with maybe slight improvement in dyspnea; however, remains NYHA class II. ECHO today shows moderate to severe residual MR with improved mean PG 5 mmHg. Additional clips and/or vascular plug would likely result in mitral stenosis. Advised patient to increase activity level as able, if he remains symptomatic, would likely need to consider minimally invasive MVR with Dr. Kiran.   - Lifelong 81 mg ASA   - Plavix 75mg daily x 6 months   - Unable to initiate afterload reduction due to soft baseline BP  - Outpatient cardiac rehab/PT/OT  - Lifelong antibiotic prophylaxis prior to all dental procedures.  - Follow-up Dr. Dickey 4 months w/echo prior  - Structural heart clinic 1 year with echo and labs prior      2. Biopsy confirmed pulmonary and cardiac sarcoidosis  3. AVNRT s/p ablation and ICD  Follows with Dr. Dickey. Diagnosed by transbronchial biopsy 2013. ICD placed after PET scan highly suspicious and patient developing AVRNT. Previous attempts to wean immunosuppression have resulted in recurrent arrhythmias and symptoms/inflammation. Previously on amiodarone and sotalol but titrated off by EP. Now s/p Ablation 3/29/22 with Dr. Will.   - Continue  Methotrexate 20 mg weekly  - Continue Toprol XL 75mg daily      4. WILLIAM   - Continue CPAP    HARSH Ngo, CNP  [facility] Cardiology Team     Structural Heart Coordinator visit:  Provided additional education regarding TAVR/MitraClip procedure, after being present for discussion with physician. Explained the work-up process and next steps for patient. Patient provided our direct contact number and instructed to call with any questions.           [facility] Results:   1a. 5  1b. 4  1c. 2  2. 5  3. 3  4. 3  5. 4  6. 3  7. 3  8a. 4  8b. 2  8c. 5    Elva Rangel CMA  Structural Heart   [facility]       Please do not hesitate to contact me if you have any questions/concerns.     Sincerely,     Sia Westbrook, NP

## 2023-10-09 LAB
ATRIAL RATE - MUSE: 70 BPM
DIASTOLIC BLOOD PRESSURE - MUSE: NORMAL MMHG
INTERPRETATION ECG - MUSE: NORMAL
P AXIS - MUSE: 44 DEGREES
PR INTERVAL - MUSE: 242 MS
QRS DURATION - MUSE: 148 MS
QT - MUSE: 458 MS
QTC - MUSE: 494 MS
R AXIS - MUSE: 5 DEGREES
SYSTOLIC BLOOD PRESSURE - MUSE: NORMAL MMHG
T AXIS - MUSE: 13 DEGREES
VENTRICULAR RATE- MUSE: 70 BPM

## 2023-11-14 DIAGNOSIS — D86.85 SARCOID, CARDIAC: ICD-10-CM

## 2023-11-16 ENCOUNTER — TELEPHONE (OUTPATIENT)
Dept: CARDIOLOGY | Facility: CLINIC | Age: 63
End: 2023-11-16
Payer: COMMERCIAL

## 2023-11-17 RX ORDER — METOPROLOL SUCCINATE 25 MG/1
100 TABLET, EXTENDED RELEASE ORAL DAILY
Qty: 360 TABLET | Refills: 3 | OUTPATIENT
Start: 2023-11-17

## 2023-11-17 NOTE — TELEPHONE ENCOUNTER
metoprolol succinate ER (TOPROL XL) 25 MG 24 hr tablet 360 tablet 3 8/29/2023     Should have refills on file  Pharmacy sent message    Jovana Corbin RN

## 2023-11-24 ENCOUNTER — TELEPHONE (OUTPATIENT)
Dept: CARDIOLOGY | Facility: CLINIC | Age: 63
End: 2023-11-24
Payer: COMMERCIAL

## 2023-11-24 DIAGNOSIS — D86.85 SARCOID, CARDIAC: ICD-10-CM

## 2023-11-24 RX ORDER — METOPROLOL SUCCINATE 25 MG/1
75 TABLET, EXTENDED RELEASE ORAL DAILY
Qty: 360 TABLET | Refills: 3 | Status: ON HOLD | OUTPATIENT
Start: 2023-11-24 | End: 2024-07-21

## 2023-11-24 NOTE — TELEPHONE ENCOUNTER
[facility] Call Center    Phone Message    May a detailed message be left on voicemail: yes     Reason for Call: Medication Refill Request    Has the patient contacted the pharmacy for the refill? Yes   Name of medication being requested: metoprolol succinate ER (TOPROL XL) 25 MG 24 hr tablet   Provider who prescribed the medication: fallon per pt   Pharmacy:    [place] [id number]      Date medication is needed: asap    Patient is out of this medication, please advise, thank you.      Action Taken: Other: cardiology     Travel Screening: Not Applicable                                            Thank you!  [facility]

## 2024-02-15 ENCOUNTER — TELEPHONE (OUTPATIENT)
Dept: CARDIOLOGY | Facility: CLINIC | Age: 64
End: 2024-02-15
Payer: COMMERCIAL

## 2024-02-15 NOTE — TELEPHONE ENCOUNTER
Left Voicemail (1st Attempt) and Sent Mychart (1st Attempt) Attempted to call Faby, but call hung up after it was answered for the patient to call back and reschedule the following:    Appointment type: Return EP  Provider: Dr. Will  Return date: 1 year follow-up (12/28/2023)  Specialty phone number: [phone] option 1  Additional appointment(s) needed: device check prior  Additonal Notes: 2/15 LVM and MYC for pt to reschedule Return EP w/ Dr. Will w/ device check prior. MJ

## 2024-02-27 ENCOUNTER — TELEPHONE (OUTPATIENT)
Dept: CARDIOLOGY | Facility: CLINIC | Age: 64
End: 2024-02-27
Payer: COMMERCIAL

## 2024-03-25 DIAGNOSIS — Z79.631 ENCOUNTER FOR METHOTREXATE MONITORING: ICD-10-CM

## 2024-03-25 DIAGNOSIS — Z51.81 ENCOUNTER FOR METHOTREXATE MONITORING: ICD-10-CM

## 2024-03-25 DIAGNOSIS — D86.85 CARDIAC SARCOIDOSIS: Primary | ICD-10-CM

## 2024-03-28 ENCOUNTER — LAB (OUTPATIENT)
Dept: LAB | Facility: CLINIC | Age: 64
End: 2024-03-28
Attending: NURSE PRACTITIONER
Payer: COMMERCIAL

## 2024-03-28 ENCOUNTER — ANCILLARY PROCEDURE (OUTPATIENT)
Dept: CARDIOLOGY | Facility: CLINIC | Age: 64
End: 2024-03-28
Attending: INTERNAL MEDICINE
Payer: COMMERCIAL

## 2024-03-28 ENCOUNTER — ANCILLARY PROCEDURE (OUTPATIENT)
Dept: CARDIOLOGY | Facility: CLINIC | Age: 64
End: 2024-03-28
Attending: NURSE PRACTITIONER
Payer: COMMERCIAL

## 2024-03-28 VITALS
DIASTOLIC BLOOD PRESSURE: 63 MMHG | HEART RATE: 66 BPM | BODY MASS INDEX: 23.56 KG/M2 | SYSTOLIC BLOOD PRESSURE: 100 MMHG | WEIGHT: 178.5 LBS | OXYGEN SATURATION: 100 %

## 2024-03-28 DIAGNOSIS — Z98.890 STATUS POST IMPLANTATION OF MITRAL VALVE LEAFLET CLIP: ICD-10-CM

## 2024-03-28 DIAGNOSIS — Z95.818 STATUS POST IMPLANTATION OF MITRAL VALVE LEAFLET CLIP: ICD-10-CM

## 2024-03-28 DIAGNOSIS — D86.85 CARDIAC SARCOIDOSIS: ICD-10-CM

## 2024-03-28 DIAGNOSIS — I47.10 PAROXYSMAL SUPRAVENTRICULAR TACHYCARDIA (H): ICD-10-CM

## 2024-03-28 DIAGNOSIS — Z51.81 ENCOUNTER FOR METHOTREXATE MONITORING: ICD-10-CM

## 2024-03-28 DIAGNOSIS — Z79.631 ENCOUNTER FOR METHOTREXATE MONITORING: ICD-10-CM

## 2024-03-28 LAB
ALBUMIN SERPL BCG-MCNC: 4.2 G/DL (ref 3.5–5.2)
ALP SERPL-CCNC: 100 U/L (ref 40–150)
ALT SERPL W P-5'-P-CCNC: 7 U/L (ref 0–70)
ANION GAP SERPL CALCULATED.3IONS-SCNC: 9 MMOL/L (ref 7–15)
AST SERPL W P-5'-P-CCNC: 27 U/L (ref 0–45)
BILIRUB SERPL-MCNC: 1.4 MG/DL
BUN SERPL-MCNC: 17.8 MG/DL (ref 8–23)
CALCIUM SERPL-MCNC: 9.2 MG/DL (ref 8.8–10.2)
CHLORIDE SERPL-SCNC: 103 MMOL/L (ref 98–107)
CREAT SERPL-MCNC: 0.93 MG/DL (ref 0.67–1.17)
DEPRECATED HCO3 PLAS-SCNC: 27 MMOL/L (ref 22–29)
EGFRCR SERPLBLD CKD-EPI 2021: >90 ML/MIN/1.73M2
ERYTHROCYTE [DISTWIDTH] IN BLOOD BY AUTOMATED COUNT: 13.8 % (ref 10–15)
GLUCOSE SERPL-MCNC: 92 MG/DL (ref 70–99)
HCT VFR BLD AUTO: 42.3 % (ref 40–53)
HGB BLD-MCNC: 14.3 G/DL (ref 13.3–17.7)
LVEF ECHO: NORMAL
MCH RBC QN AUTO: 31.9 PG (ref 26.5–33)
MCHC RBC AUTO-ENTMCNC: 33.8 G/DL (ref 31.5–36.5)
MCV RBC AUTO: 94 FL (ref 78–100)
MDC_IDC_LEAD_CONNECTION_STATUS: NORMAL
MDC_IDC_LEAD_CONNECTION_STATUS: NORMAL
MDC_IDC_LEAD_IMPLANT_DT: NORMAL
MDC_IDC_LEAD_IMPLANT_DT: NORMAL
MDC_IDC_LEAD_LOCATION: NORMAL
MDC_IDC_LEAD_LOCATION: NORMAL
MDC_IDC_LEAD_LOCATION_DETAIL_1: NORMAL
MDC_IDC_LEAD_LOCATION_DETAIL_1: NORMAL
MDC_IDC_LEAD_MFG: NORMAL
MDC_IDC_LEAD_MFG: NORMAL
MDC_IDC_LEAD_MODEL: NORMAL
MDC_IDC_LEAD_MODEL: NORMAL
MDC_IDC_LEAD_POLARITY_TYPE: NORMAL
MDC_IDC_LEAD_POLARITY_TYPE: NORMAL
MDC_IDC_LEAD_SERIAL: NORMAL
MDC_IDC_LEAD_SERIAL: NORMAL
MDC_IDC_MSMT_BATTERY_DTM: NORMAL
MDC_IDC_MSMT_BATTERY_REMAINING_LONGEVITY: 90 MO
MDC_IDC_MSMT_BATTERY_REMAINING_PERCENTAGE: 84 %
MDC_IDC_MSMT_BATTERY_STATUS: NORMAL
MDC_IDC_MSMT_CAP_CHARGE_DTM: NORMAL
MDC_IDC_MSMT_CAP_CHARGE_TIME: 10.6 S
MDC_IDC_MSMT_CAP_CHARGE_TYPE: NORMAL
MDC_IDC_MSMT_LEADCHNL_RA_IMPEDANCE_VALUE: 823 OHM
MDC_IDC_MSMT_LEADCHNL_RA_PACING_THRESHOLD_AMPLITUDE: 0.7 V
MDC_IDC_MSMT_LEADCHNL_RA_PACING_THRESHOLD_PULSEWIDTH: 0.4 MS
MDC_IDC_MSMT_LEADCHNL_RV_IMPEDANCE_VALUE: 415 OHM
MDC_IDC_MSMT_LEADCHNL_RV_LEAD_CHANNEL_STATUS: NORMAL
MDC_IDC_MSMT_LEADCHNL_RV_PACING_THRESHOLD_AMPLITUDE: 1.4 V
MDC_IDC_MSMT_LEADCHNL_RV_PACING_THRESHOLD_PULSEWIDTH: 0.4 MS
MDC_IDC_PG_IMPLANT_DTM: NORMAL
MDC_IDC_PG_MFG: NORMAL
MDC_IDC_PG_MODEL: NORMAL
MDC_IDC_PG_SERIAL: NORMAL
MDC_IDC_PG_TYPE: NORMAL
MDC_IDC_SESS_CLINIC_NAME: NORMAL
MDC_IDC_SESS_DTM: NORMAL
MDC_IDC_SESS_TYPE: NORMAL
MDC_IDC_SET_BRADY_AT_MODE_SWITCH_MODE: NORMAL
MDC_IDC_SET_BRADY_AT_MODE_SWITCH_RATE: 170 {BEATS}/MIN
MDC_IDC_SET_BRADY_LOWRATE: 60 {BEATS}/MIN
MDC_IDC_SET_BRADY_MAX_SENSOR_RATE: 125 {BEATS}/MIN
MDC_IDC_SET_BRADY_MAX_TRACKING_RATE: 125 {BEATS}/MIN
MDC_IDC_SET_BRADY_MODE: NORMAL
MDC_IDC_SET_BRADY_PAV_DELAY_HIGH: 110 MS
MDC_IDC_SET_BRADY_PAV_DELAY_LOW: 220 MS
MDC_IDC_SET_BRADY_SAV_DELAY_HIGH: 110 MS
MDC_IDC_SET_BRADY_SAV_DELAY_LOW: 220 MS
MDC_IDC_SET_LEADCHNL_RA_PACING_AMPLITUDE: 2.5 V
MDC_IDC_SET_LEADCHNL_RA_PACING_CAPTURE_MODE: NORMAL
MDC_IDC_SET_LEADCHNL_RA_PACING_POLARITY: NORMAL
MDC_IDC_SET_LEADCHNL_RA_PACING_PULSEWIDTH: 0.4 MS
MDC_IDC_SET_LEADCHNL_RA_SENSING_ADAPTATION_MODE: NORMAL
MDC_IDC_SET_LEADCHNL_RA_SENSING_POLARITY: NORMAL
MDC_IDC_SET_LEADCHNL_RA_SENSING_SENSITIVITY: 0.25 MV
MDC_IDC_SET_LEADCHNL_RV_PACING_AMPLITUDE: 3 V
MDC_IDC_SET_LEADCHNL_RV_PACING_CAPTURE_MODE: NORMAL
MDC_IDC_SET_LEADCHNL_RV_PACING_POLARITY: NORMAL
MDC_IDC_SET_LEADCHNL_RV_PACING_PULSEWIDTH: 0.4 MS
MDC_IDC_SET_LEADCHNL_RV_SENSING_ADAPTATION_MODE: NORMAL
MDC_IDC_SET_LEADCHNL_RV_SENSING_POLARITY: NORMAL
MDC_IDC_SET_LEADCHNL_RV_SENSING_SENSITIVITY: 0.6 MV
MDC_IDC_SET_ZONE_DETECTION_INTERVAL: 300 MS
MDC_IDC_SET_ZONE_DETECTION_INTERVAL: 353 MS
MDC_IDC_SET_ZONE_DETECTION_INTERVAL: 462 MS
MDC_IDC_SET_ZONE_STATUS: NORMAL
MDC_IDC_SET_ZONE_TYPE: NORMAL
MDC_IDC_SET_ZONE_VENDOR_TYPE: NORMAL
MDC_IDC_STAT_AT_BURDEN_PERCENT: 1 %
MDC_IDC_STAT_AT_DTM_END: NORMAL
MDC_IDC_STAT_AT_DTM_START: NORMAL
MDC_IDC_STAT_BRADY_DTM_END: NORMAL
MDC_IDC_STAT_BRADY_DTM_START: NORMAL
MDC_IDC_STAT_BRADY_RA_PERCENT_PACED: 43 %
MDC_IDC_STAT_BRADY_RV_PERCENT_PACED: 17 %
MDC_IDC_STAT_EPISODE_RECENT_COUNT: 0
MDC_IDC_STAT_EPISODE_RECENT_COUNT: 2
MDC_IDC_STAT_EPISODE_RECENT_COUNT_DTM_END: NORMAL
MDC_IDC_STAT_EPISODE_RECENT_COUNT_DTM_START: NORMAL
MDC_IDC_STAT_EPISODE_TYPE: NORMAL
MDC_IDC_STAT_EPISODE_VENDOR_TYPE: NORMAL
MDC_IDC_STAT_TACHYTHERAPY_ATP_DELIVERED_RECENT: 0
MDC_IDC_STAT_TACHYTHERAPY_ATP_DELIVERED_TOTAL: 17
MDC_IDC_STAT_TACHYTHERAPY_RECENT_DTM_END: NORMAL
MDC_IDC_STAT_TACHYTHERAPY_RECENT_DTM_START: NORMAL
MDC_IDC_STAT_TACHYTHERAPY_SHOCKS_ABORTED_RECENT: 0
MDC_IDC_STAT_TACHYTHERAPY_SHOCKS_ABORTED_TOTAL: 0
MDC_IDC_STAT_TACHYTHERAPY_SHOCKS_DELIVERED_RECENT: 0
MDC_IDC_STAT_TACHYTHERAPY_SHOCKS_DELIVERED_TOTAL: 0
MDC_IDC_STAT_TACHYTHERAPY_TOTAL_DTM_END: NORMAL
MDC_IDC_STAT_TACHYTHERAPY_TOTAL_DTM_START: NORMAL
NT-PROBNP SERPL-MCNC: 1045 PG/ML (ref 0–900)
PLATELET # BLD AUTO: 208 10E3/UL (ref 150–450)
POTASSIUM SERPL-SCNC: 4.1 MMOL/L (ref 3.4–5.3)
PROT SERPL-MCNC: 6.9 G/DL (ref 6.4–8.3)
RBC # BLD AUTO: 4.48 10E6/UL (ref 4.4–5.9)
SODIUM SERPL-SCNC: 139 MMOL/L (ref 135–145)
TROPONIN T SERPL HS-MCNC: 14 NG/L
WBC # BLD AUTO: 6.8 10E3/UL (ref 4–11)

## 2024-03-28 PROCEDURE — 93306 TTE W/DOPPLER COMPLETE: CPT | Performed by: INTERNAL MEDICINE

## 2024-03-28 PROCEDURE — 99215 OFFICE O/P EST HI 40 MIN: CPT | Mod: 25 | Performed by: INTERNAL MEDICINE

## 2024-03-28 PROCEDURE — 36415 COLL VENOUS BLD VENIPUNCTURE: CPT | Performed by: PATHOLOGY

## 2024-03-28 PROCEDURE — 93283 PRGRMG EVAL IMPLANTABLE DFB: CPT | Performed by: INTERNAL MEDICINE

## 2024-03-28 PROCEDURE — 84484 ASSAY OF TROPONIN QUANT: CPT | Performed by: PATHOLOGY

## 2024-03-28 PROCEDURE — 99213 OFFICE O/P EST LOW 20 MIN: CPT | Performed by: INTERNAL MEDICINE

## 2024-03-28 PROCEDURE — 80053 COMPREHEN METABOLIC PANEL: CPT | Performed by: PATHOLOGY

## 2024-03-28 PROCEDURE — 85027 COMPLETE CBC AUTOMATED: CPT | Performed by: PATHOLOGY

## 2024-03-28 PROCEDURE — 83880 ASSAY OF NATRIURETIC PEPTIDE: CPT | Performed by: PATHOLOGY

## 2024-03-28 RX ORDER — ASPIRIN 81 MG/1
81 TABLET ORAL DAILY
Qty: 90 TABLET | Refills: 3 | Status: SHIPPED | OUTPATIENT
Start: 2024-03-28 | End: 2024-07-01

## 2024-03-28 ASSESSMENT — PAIN SCALES - GENERAL: PAINLEVEL: NO PAIN (0)

## 2024-03-28 NOTE — PROGRESS NOTES
Cardiac sarcoid follow up:    March 28, 2024      I the pleasure of seeing Jose Carney in the [facility] Cardiac Sarcoidosis clinic today.     Complex history is as follows:     History sarcoidosis which  diagnosed by transbronchial biopsy in 2013.  He had been having back pain prior to this and 30 pound weight loss and had substantial mediastinal lymphadenopathy.  He was placed on several months of steroids with significant weight gain and improvement in symptoms as well as back pain.      In late summer and fall 2017 he started to develop palpitations which were found to be supraventricular tachycardia (likely AVNRT as well as some atrial tachycardia)  based on his cardiac monitor. He then had a cardiac MRI which was consistent with myocardial involvement from sarcoidosis (see below).  He was then sent down here for further management and was placed on metoprolol.    Based on high risk features of his cardiac MRI we recommended that he have an ICD placed. I did place him on a burst of prednisone given a highly suspicious PET scan which showed complete resolution of his cardiac sarcoidosis.  At that time I switch him to methotrexate as a staring steroid sparing agent and was titrating this up and tapering down his prednisone when he developed ventricular tachycardia.  This was in January 2019.  The VT was at a rate of 135 and was unable to be paced out.  He did not have syncope with this.  He was loaded with amiodarone, and given failure to gain control of his VT he was given Solu-Medrol and placed on higher dose of prednisone in addition to his methotrexate.     He was then given clinical stability I was eventually able to get him off of amiodarone in 2021 -for control of his sarcoid to get him off of prednisone I increase his methotrexate to 20 mg daily which he remains on today.    Thyroid function has been normal throughout the time that he was on amiodarone.       He was intermittently followed in  EP clinic here for paroxysmal atrial fibrillation.     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 No ATP since the ablation but does have runs of non-sustained SVT on his last device interrogation in 12/2022. Total A. fib burden on his last interrogation less than 1%. Not on anticoagulation since [id number].     12/2022: LVEF 42%, severe MR appears to be from posterior leaflet restriction. Repeat PET showed this was not due to sarcoid recurrence. Then underwent mitral clip 8/29/2023    At that time was on  methotrexate 20 mg weekly 5 mg of prednisone daily he is now just on methotrexate 20 mg weekly with folic acid.     This visit:   He does not notice a big change in symptoms before or after the MitraClip.  Possible he has some improvement in his dyspnea on exertion.   He is walking 10,000 steps a day at his current job.   He denies PND orthopnea lower extremity edema.   No chest heaviness or pressure  No ICD discharges  No palpitations  His biggest complaint is the feeling of general muscle achiness, malaise and joint pains since starting Plavix.     First post clip echocardiogram showed a residual shunt L to R from the procedure, residual MR, and some MS    Note:  he lost a lot of weight with his hyperthyroidism gained it back and is losing weight again        PAST MEDICAL HISTORY:  Past Medical History:   Diagnosis Date    First degree AV block 03/20/2018    Heart murmur     Pacemaker     Palpitations 03/20/2018    Paroxysmal supraventricular tachycardia 03/20/2018    Sarcoid, cardiac/EF 54% per MRI,[place] of affected myocardium is 12% of myocardial mass 01/25/2018    Sarcoidosis/ systemic 2013  03/20/2018    Sleep apnea     Thyroid disease      FAMILY HISTORY:  His family history is notable for several family members with autoimmune disease.  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  He denies any tobacco or alcohol use or recreational drug use.     CURRENT MEDICATIONS:    Current Outpatient Medications   Medication Sig Dispense Refill    amoxicillin (AMOXIL) 500 MG capsule Take 4 capsules (2,000 mg) by mouth once as needed (Take 30-60 minutes prior to dental procedure or cleaning) 4 capsule 3    clopidogrel (PLAVIX) 75 MG tablet Take 1 tablet (75 mg) by mouth daily 90 tablet 3    finasteride (PROSCAR) 5 MG tablet Take 5 mg by mouth daily      folic acid (FOLVITE) 1 MG tablet TAKE FIVE TABLETS (5MG) ONCE WEEKLY WITH YOUR METHOTREXATE 60 tablet 3    methimazole (TAPAZOLE) 5 MG tablet TAKE 1-1/2 TABLETS BY MOUTH ONE TIME A DAY.      methotrexate 2.5 MG tablet TAKE 8 TABLETS (20 MG) BY MOUTH EVERY 7 DAYS 104 tablet 3    metoprolol succinate ER (TOPROL XL) 25 MG 24 hr tablet Take 3 tablets (75 mg) by mouth daily 360 tablet 3    timolol maleate (TIMOPTIC) 0.5 % ophthalmic solution       aspirin (ASA) 81 MG chewable tablet Take 1 tablet (81 mg) by mouth daily (Patient not taking: Reported on 3/28/2024) 90 tablet 3    furosemide (LASIX) 20 MG tablet Take 20 mg by mouth as needed (Patient not taking: Reported on 10/5/2023)      prednisoLONE acetate (PRED FORTE) 1 % ophthalmic susp Place 1 drop into both eyes every hour (Patient not taking: Reported on 10/5/2023)      sildenafil (REVATIO) 20 MG tablet Take 20-60 mg by mouth as needed (Patient not taking: Reported on 10/5/2023)      tamsulosin (FLOMAX) 0.4 MG capsule Take 0.4 mg by mouth daily (Patient not taking: Reported on 3/28/2024)         On methotrexate 20 mg daily    All relevant ROS were reviewed in the HPI.        EXAM:    /63 (BP Location: Right arm, Patient Position: Chair, Cuff Size: Adult Regular)   Pulse 66   Wt 81 kg (178 lb 8 oz)   SpO2 100%   BMI 23.56 kg/m      General: appears comfortable, alert and articulate thin  Some temporal wasting  Orophyarynx: moist mucosa, no lesions, dentition intact  Heart: PMI diffuse,III/IV systolic murmur at the apex,  regular, S1/S2, rub, estimated JVP 14 cm prominent V waves  Lungs: clear, no rales or wheezing  Abdomen: soft, non-tender, bowel sounds present, no hepatosplenomegaly  Extremities: no clubbing, cyanosis or edema    All lab trends,imaging, recent echos personally reviewed with the patient.     Cardiac MRI: 2/2019    2. The RV is normal in cavity size. The global systolic function is normal. The RVEF is 66%.      3. Both atria are normal in size.     4. Due to ICD lead artifact, the tricuspid and pulmonic valves were not well visualized.       The mitral and aortic valves appear normally functioning without significant disease.       5. Late gadolinium enhancement imaging shows epicardial and mid-myocardial enhancement in the  basal-inferoseptal, basal-inferior, and basal-inferolateral segments.      6. There is no pericardial effusion.     7. Unable to assess for intracardiac thrombus.     8. Unable to do T2 mapping and assess for myocardial edema (active inflammation).      CONCLUSIONS:    1. Cardiac sarcoidosis with preserved systolic function; LVEF 55%, RVEF 66%.  2. Sarcoidosis related scarring and severe [id number]-[id number] involving the basal-inferoseptal and  basal-inferior segments. Total scar burden 12%. Unchanged from previous CMR dated 01/19/2018.        Repeat PET scan: 4/13/23     1. No evidence for active cardiac sarcoidosis.  2. Active systemic sarcoidosis  2a. Hypermetabolic lymph nodes within the chest and abdomen.   2b. Scattered pulmonary nodules, some of which are FDG avid, overall  unchanged in size compared to 9/20/2018  PET/CT.      Last echo: 10/23     [place] LVEF is 55%. Mild left ventricular dilation is present.     Right ventricular function, chamber size, wall motion, and thickness are  normal.     There is a predominantly left-to-right shunt.  s/p MitraClip x 2 on 8/28/23. Moderate to severe eccentric residual mitral  insufficiency is present (underestimated on several views). The mean gradient  across the mitral valve is 5 mmHg at a HR of 65 BPM.     Mild pulmonary hypertension is present with the right ventricular systolic  pressure is 34mmHg above the right atrial pressure.     Mild to moderate dilatation of the aorta is present.  Sinuses of Valsalva 4.4 cm.     There is likely a moderate to large intraatrial shunt present seen by color  imaging on several images.     No pericardial effusion is present.     Compared to prior imaging on 8/29/2023 the aortic root measurements have  increased, there is now mild dilation of the left ventricle but the left  ventricular ejection fraction has improved. The gradient across the mitral  valve has decreased slightly though the mitral regurgitation persists as does  the intra-atrial shunt.      Most recent echocardiogram reviewed today in clinic-from 3/28/2024.   Mitral regurgitation is severe  Moderate MS  Moderate to severe TR-this is new  Elevated PA pressures         Latest Reference Range & Units 03/28/24 11:55   Potassium 3.4 - 5.3 mmol/L 4.1   Chloride 98 - 107 mmol/L 103   Carbon Dioxide (CO2) 22 - 29 mmol/L 27   Urea Nitrogen 8.0 - 23.0 mg/dL 17.8   Creatinine 0.67 - 1.17 mg/dL 0.93   GFR Estimate >60 mL/min/1.73m2 >90   Calcium 8.8 - 10.2 mg/dL 9.2   Anion Gap 7 - 15 mmol/L 9   Albumin 3.5 - 5.2 g/dL 4.2   Protein Total 6.4 - 8.3 g/dL 6.9   Alkaline Phosphatase 40 - 150 U/L 100   ALT 0 - 70 U/L 7   AST 0 - 45 U/L 27   Bilirubin Total <=1.2 mg/dL 1.4 (H)   Glucose 70 - 99 mg/dL 92   N-Terminal Pro Bnp 0 - 900 pg/mL 1,045 (H)   Troponin T, High Sensitivity <=22 ng/L 14   (H):  Data is abnormally high     Latest Reference Range & Units 03/28/24 11:55   WBC 4.0 - 11.0 10e3/uL 6.8   Hemoglobin 13.3 - 17.7 g/dL 14.3   Hematocrit 40.0 - 53.0 % 42.3   Platelet Count 150 - 450 10e3/uL 208   RBC Count 4.40 - 5.90 10e6/uL 4.48   MCV 78 - 100 fL 94   MCH 26.5 - 33.0 pg 31.9   MCHC 31.5 - 36.5 g/dL 33.8   RDW 10.0 - 15.0 % 13.8         Device: [employer] D433 RESONATE EL ICD  Normal device function.  Mode: DDDR  bpm  AP: 43%  : 17%  Intrinsic rhythm: SR 63 bpm  Lead Trends Appear Stable  Estimated battery longevity to RRT = 7.5 years.    Atrial Arrhythmia: None since 3/12/24  AF [place]: <1%  Ventricular Arrhythmia: None since 3/12/24  Setting Changes: None  Patient has an appointment to see Dr. Dickey today.   Plan: Device follow-up every 3 months.      Assessment and Plan:   Follow-up cardiac sarcoidosis    history of biopsy-proven cardiac sarcoid from a transbronchial biopsy-who has a cardiac MRI consistent with myocardial involvement from sarcoidosis. His PET scan related inflammation resolved completely with 3-4 months of 40 mg of prednisone.  I did have an ICD placed given he had high risk features on his cardiac MRI for malignant arrhythmias.     With respect to his cardiac sarcoidosis, given history of inflammation by MRI and PET as well as ventricular arrhythmias I have kept him on long-term immunosuppression.  He is presently on methotrexate 20 mg weekly.      His LVEF then declined to 42% on echo from 12/28/22 and his MR has progressed from moderate to severe MR. The mechanism appears to be from a restricted posterior leaflet.  This was not due to active sarcoid given negative PET for evaluation.     He is now s/p MV clip 8/2023 -he has been on Plavix for 6 months with some side effects we are stopping that today and placing him on aspirin 81 mg a day only.   He also now has an intra-atrial shunt.   I am concerned that he has moderate to severe MR still, elevated PA  pressures and now moderate TR.   His BNP is up as well.   I think he will need surgical repair.   I will send this update to the valve team and have him see CT surgery this summer when he comes to see EP.     Intra atrial shunt:   Will monitor woth echo -if he has an open repair this could be closed    Aortic root dilation: will monitor for now, BP controlled        Rhythm:   he has minimal atrial fibrillation burden and his AVNRT is ablated-EP took him off of all antiarrhythmics.  He is off of Xarelto given low stroke risk and less than 1% burden but will need to consider going back on at age 66.   Aspirin 81 mg    SCD prevention: ICD in place       History of a mildly reduced ejection fraction:   His EF is improved status post MitraClip  NYHA class II  Euvolemic on exam (I think his neck veins are TR related EF is now)  We will keep him on folic acid and methotrexate for now.   EF did improve with  sarcoid treatment and with MitraClip therefore I do not have him on GDMT      RTC will be determined after the plan for his valve is determined      CC  Patient Care Team:  Amira Gentile PA-C as PCP - General (Physician Assistant - Medical)  Helen Gonzalez, RN as Specialty Care Coordinator (Cardiology)  Janice Shafer, PANCHITO as Specialty Care Coordinator (Cardiology)  Lauor Will MD as MD (Clinical Cardiac Electrophysiology)  Mandi Dickey MD as Assigned Heart and Vascular Provider  GIULIANO KOHLER [place], [place] Hausch, Raymond C, MD

## 2024-03-28 NOTE — NURSING NOTE
Diet: Patient instructed regarding a heart failure healthy diet, including discussion of reduced fat and 2000 mg daily sodium restriction, daily weights, medication purpose and compliance, fluid restrictions and resources for patient and family to access for assistance with heart failure management.       Labs: Patient was given results of the laboratory testing obtained today and patient was instructed about when to return for the next laboratory testing.     Med Reconcile: Reviewed and verified all current medications with the patient. The updated medication list was printed and given to the patient.     Med changes: stop Plavix, start aspirin 81 mg daily     Return Appointment: Patient given instructions regarding scheduling next clinic visit: Dr Will as scheduled in July, discussion with Dr Wynn about MR and if he needs to be seen by CVTS    Patient stated he understood all health information given and agreed to call with further questions or concerns.     Helen Gonzalez RN

## 2024-03-28 NOTE — PATIENT INSTRUCTIONS
"Cardiology Providers you saw during your visit:  Dr. Dickey    Medication changes:   Stop Plavix   Continue Asprin 81 mg daily     Follow up:   Follow up with Dr Dickey TBD  Follow up with Dr Will in July as scheduled.    Dr Dickey will check in with Dr Carrillo for next steps      Please call if you have :  1. Weight gain of more than 2 pounds in a day or 5 pounds in a week  2. Increased shortness of breath, swelling or bloating  3. Dizziness, lightheadedness   4. Any questions or concerns.       Follow the American Heart Association Diet and Lifestyle recommendations:  Limit saturated fat, trans fat, sodium, red meat, sweets and sugar-sweetened beverages. If you choose to eat red meat, compare labels and select the leanest cuts available.  Aim for at least 150 minutes of moderate physical activity or 75 minutes of vigorous physical activity - or an equal combination of both - each week.      During business hours: [phone], press option # 1 to schedule and leave a message for your care team.        After hours, weekends or holidays: On Call Cardiologist- [phone]   option #4 and ask to speak to the on-call Cardiologist. Inform them you are a CORE/heart failure patient at the [facility].      Heart Failure Support Group     dates:    Monday, , 1-2pm     Monday,  , 1-2pm     Monday,  , 1-2pm     Monday, , 1-2pm     Monday, May 6th, 1-2pm     Monday, , 1-2pm     Monday, , 1-2pm     Monday, , 1-2pm     Monday, , 1-2pm     Monday, , 1-2pm     Monday, , 1-2pm     Monday, , 1-2pm     Helen Gonzalez RN BSN CHFN  Cardiology Care Coordinator - Heart Failure/ [facility]  [facility]   Questions and [phone]   First press #1 for the [place] and \"To send a message to your care team\"    "

## 2024-03-28 NOTE — LETTER
3/28/2024      RE: Jose Carney  [address]  [facility] [address]       Dear Colleague,    Thank you for the opportunity to participate in the care of your patient, Jose Carney, at the [facility] [place] at [facility]. Please see a copy of my visit note below.      Cardiac sarcoid follow up:    March 28, 2024      I the pleasure of seeing Jose Carney in the [facility] Cardiac Sarcoidosis clinic today.     Complex history is as follows:     History sarcoidosis which  diagnosed by transbronchial biopsy in 2013.  He had been having back pain prior to this and 30 pound weight loss and had substantial mediastinal lymphadenopathy.  He was placed on several months of steroids with significant weight gain and improvement in symptoms as well as back pain.      In late summer and fall 2017 he started to develop palpitations which were found to be supraventricular tachycardia (likely AVNRT as well as some atrial tachycardia)  based on his cardiac monitor. He then had a cardiac MRI which was consistent with myocardial involvement from sarcoidosis (see below).  He was then sent down here for further management and was placed on metoprolol.    Based on high risk features of his cardiac MRI we recommended that he have an ICD placed. I did place him on a burst of prednisone given a highly suspicious PET scan which showed complete resolution of his cardiac sarcoidosis.  At that time I switch him to methotrexate as a staring steroid sparing agent and was titrating this up and tapering down his prednisone when he developed ventricular tachycardia.  This was in January 2019.  The VT was at a rate of 135 and was unable to be paced out.  He did not have syncope with this.  He was loaded with amiodarone, and given failure to gain control of his VT he was given Solu-Medrol and placed on higher dose of prednisone in addition to his methotrexate.     He was  then given clinical stability I was eventually able to get him off of amiodarone in 2021 -for control of his sarcoid to get him off of prednisone I increase his methotrexate to 20 mg daily which he remains on today.    Thyroid function has been normal throughout the time that he was on amiodarone.       He was intermittently followed in EP clinic here for paroxysmal atrial fibrillation.     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 No ATP since the ablation but does have runs of non-sustained SVT on his last device interrogation in 12/2022. Total A. fib burden on his last interrogation less than 1%. Not on anticoagulation since [id number]=1.     12/2022: LVEF 42%, severe MR appears to be from posterior leaflet restriction. Repeat PET showed this was not due to sarcoid recurrence. Then underwent mitral clip 8/29/2023    At that time was on  methotrexate 20 mg weekly 5 mg of prednisone daily he is now just on methotrexate 20 mg weekly with folic acid.     This visit:   He does not notice a big change in symptoms before or after the MitraClip.  Possible he has some improvement in his dyspnea on exertion.   He is walking 10,000 steps a day at his current job.   He denies PND orthopnea lower extremity edema.   No chest heaviness or pressure  No ICD discharges  No palpitations  His biggest complaint is the feeling of general muscle achiness, malaise and joint pains since starting Plavix.     First post clip echocardiogram showed a residual shunt L to R from the procedure, residual MR, and some MS    Note:  he lost a lot of weight with his hyperthyroidism gained it back and is losing weight  again        PAST MEDICAL HISTORY:  Past Medical History:   Diagnosis Date    First degree AV block 03/20/2018    Heart murmur     Pacemaker     Palpitations 03/20/2018    Paroxysmal supraventricular tachycardia 03/20/2018    Sarcoid, cardiac/EF 54% per MRI,[place] of affected myocardium is 12% of myocardial mass 01/25/2018    Sarcoidosis/ systemic 2013 03/20/2018    Sleep apnea     Thyroid disease      FAMILY HISTORY:  His family history is notable for several family members with autoimmune disease.  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  He denies any tobacco or alcohol use or recreational drug use.     CURRENT MEDICATIONS:    Current Outpatient Medications   Medication Sig Dispense Refill    amoxicillin (AMOXIL) 500 MG capsule Take 4 capsules (2,000 mg) by mouth once as needed (Take 30-60 minutes prior to dental procedure or cleaning) 4 capsule 3    clopidogrel (PLAVIX) 75 MG tablet Take 1 tablet (75 mg) by mouth daily 90 tablet 3    finasteride (PROSCAR) 5 MG tablet Take 5 mg by mouth daily      folic acid (FOLVITE) 1 MG tablet TAKE FIVE TABLETS (5MG) ONCE WEEKLY WITH YOUR METHOTREXATE 60 tablet 3    methimazole (TAPAZOLE) 5 MG tablet TAKE 1-1/2 TABLETS BY MOUTH ONE TIME A DAY.      methotrexate 2.5 MG tablet TAKE 8 TABLETS (20 MG) BY MOUTH EVERY 7 DAYS 104 tablet 3    metoprolol succinate ER (TOPROL XL) 25 MG 24 hr tablet Take 3 tablets (75 mg) by mouth daily 360 tablet 3    timolol maleate (TIMOPTIC) 0.5 % ophthalmic solution       aspirin (ASA) 81 MG chewable tablet Take 1 tablet (81 mg) by mouth daily (Patient not taking: Reported on 3/28/2024) 90 tablet 3    furosemide (LASIX) 20 MG tablet Take 20 mg by mouth as needed (Patient not taking: Reported on 10/5/2023)      prednisoLONE acetate (PRED FORTE) 1 % ophthalmic susp Place 1 drop into both eyes every hour (Patient not  taking: Reported on 10/5/2023)      sildenafil (REVATIO) 20 MG tablet Take 20-60 mg by mouth as needed (Patient not taking: Reported on 10/5/2023)      tamsulosin (FLOMAX) 0.4 MG capsule Take 0.4 mg by mouth daily (Patient not taking: Reported on 3/28/2024)         On methotrexate 20 mg daily    All relevant ROS were reviewed in the HPI.       EXAM:    /63 (BP Location: Right arm, Patient Position: Chair, Cuff Size: Adult Regular)   Pulse 66   Wt 81 kg (178 lb 8 oz)   SpO2 100%   BMI 23.56 kg/m      General: appears comfortable, alert and articulate thin  Some temporal wasting  Orophyarynx: moist mucosa, no lesions, dentition intact  Heart: PMI diffuse,III/IV systolic murmur at the apex,  regular, S1/S2, rub, estimated JVP 14 cm prominent V waves  Lungs: clear, no rales or wheezing  Abdomen: soft, non-tender, bowel sounds present, no hepatosplenomegaly  Extremities: no clubbing, cyanosis or edema    All lab trends,imaging, recent echos personally reviewed with the patient.     Cardiac MRI: 2/2019    2. The RV is normal in cavity size. The global systolic function is normal. The RVEF is 66%.      3. Both atria are normal in size.     4. Due to ICD lead artifact, the tricuspid and pulmonic valves were not well visualized.       The mitral and aortic valves appear normally functioning without significant disease.       5. Late gadolinium enhancement imaging shows epicardial and mid-myocardial enhancement in the  basal-inferoseptal, basal-inferior, and basal-inferolateral segments.      6. There is no pericardial effusion.     7. Unable to assess for intracardiac thrombus.     8. Unable to do T2 mapping and assess for myocardial edema (active inflammation).      CONCLUSIONS:    1. Cardiac sarcoidosis with preserved systolic function; LVEF 55%, RVEF 66%.  2. Sarcoidosis related scarring and severe [id number] involving the basal-inferoseptal and  basal-inferior segments. Total scar burden 12%.  Unchanged from previous CMR dated 01/19/2018.        Repeat PET scan: 4/13/23     1. No evidence for active cardiac sarcoidosis.  2. Active systemic sarcoidosis  2a. Hypermetabolic lymph nodes within the chest and abdomen.   2b. Scattered pulmonary nodules, some of which are FDG avid, overall  unchanged in size compared to 9/20/2018 PET/CT.      Last echo: 10/23     Biplane LVEF is 55%. Mild left ventricular dilation is present.     Right ventricular function, chamber size, wall motion, and thickness are  normal.     There is a predominantly left-to-right shunt.  s/p MitraClip x 2 on 8/28/23. Moderate to severe eccentric residual mitral  insufficiency is present (underestimated on several views). The mean gradient  across the mitral valve is 5 mmHg at a HR of 65 BPM.     Mild pulmonary hypertension is present with the right ventricular systolic  pressure is 34mmHg above the right atrial pressure.     Mild to moderate dilatation of the aorta is present.  Sinuses of Valsalva 4.4 cm.     There is likely a moderate to large intraatrial shunt present seen by color  imaging on several images.     No pericardial effusion is present.     Compared to prior imaging on 8/29/2023 the aortic root measurements have  increased, there is now mild dilation of the left ventricle but the left  ventricular ejection fraction has improved. The gradient across the mitral  valve has decreased slightly though the mitral regurgitation persists as does  the intra-atrial shunt.      Most recent echocardiogram reviewed today in clinic-from 3/28/2024.   Mitral regurgitation is severe  Moderate MS  Moderate to severe TR-this is new  Elevated PA pressures         Latest Reference Range & Units 03/28/24 11:55   Potassium 3.4 - 5.3 mmol/L 4.1   Chloride 98 - 107 mmol/L 103   Carbon Dioxide (CO2) 22 - 29 mmol/L 27   Urea Nitrogen 8.0 - 23.0 mg/dL 17.8   Creatinine 0.67 - 1.17 mg/dL 0.93   GFR Estimate >60 mL/min/1.73m2 >90   Calcium 8.8 - 10.2 mg/dL  9.2   Anion Gap 7 - 15 mmol/L 9   Albumin 3.5 - 5.2 g/dL 4.2   Protein Total 6.4 - 8.3 g/dL 6.9   Alkaline Phosphatase 40 - 150 U/L 100   ALT 0 - 70 U/L 7   AST 0 - 45 U/L 27   Bilirubin Total <=1.2 mg/dL 1.4 (H)   Glucose 70 - 99 mg/dL 92   N-Terminal Pro Bnp 0 - 900 pg/mL 1,045 (H)   Troponin T, High Sensitivity <=22 ng/L 14   (H): Data is abnormally high     Latest Reference Range & Units 03/28/24 11:55   WBC 4.0 - 11.0 10e3/uL 6.8   Hemoglobin 13.3 - 17.7 g/dL 14.3   Hematocrit 40.0 - 53.0 % 42.3   Platelet Count 150 - 450 10e3/uL 208   RBC Count 4.40 - 5.90 10e6/uL 4.48   MCV 78 - 100 fL 94   MCH 26.5 - 33.0 pg 31.9   MCHC 31.5 - 36.5 g/dL 33.8   RDW 10.0 - 15.0 % 13.8         Device: [employer] M[employer] D433 RESONATE EL ICD  Normal device function.  Mode: DDDR  bpm  AP: 43%  : 17%  Intrinsic rhythm: SR 63 bpm  Lead Trends Appear Stable  Estimated battery longevity to RRT = 7.5 years.    Atrial Arrhythmia: None since 3/12/24  AF Alden: <1%  Ventricular Arrhythmia: None since 3/12/24  Setting Changes: None  Patient has an appointment to see Dr. Dickey today.   Plan: Device follow-up every 3 months.      Assessment and Plan:   Follow-up cardiac sarcoidosis    history of biopsy-proven cardiac sarcoid from a transbronchial biopsy-who has a cardiac MRI consistent with myocardial involvement from sarcoidosis. His PET scan related inflammation resolved completely with 3-4 months of 40 mg of prednisone.  I did have an ICD placed given he had high risk features on his cardiac MRI for malignant arrhythmias.     With respect to his cardiac sarcoidosis, given history of inflammation by MRI and PET as well as ventricular arrhythmias I have kept him on long-term immunosuppression.  He is presently on methotrexate 20 mg weekly.      His LVEF then declined to 42% on echo from 12/28/22 and his MR has progressed from moderate to severe MR. The mechanism appears to be from a restricted posterior leaflet.  This was not  due to active sarcoid given negative PET for evaluation.     He is now s/p MV clip 8/2023 -he has been on Plavix for 6 months with some side effects we are stopping that today and placing him on aspirin 81 mg a day only.   He also now has an intra-atrial shunt.   I am concerned that he has moderate to severe MR still, elevated PA pressures and now moderate TR.   His BNP is up as well.   I think he will need surgical repair.   I will send this update to the valve team and have him see CT surgery this summer when he comes to see EP.     Intra atrial shunt:   Will monitor woth echo -if he has an open repair this could be closed    Aortic root dilation: will monitor for now, BP controlled        Rhythm:   he has minimal atrial fibrillation burden and his AVNRT is ablated-EP took him off of all antiarrhythmics.  He is off of Xarelto given low stroke risk and less than 1% burden but will need to consider going back on at age 66.   Aspirin 81 mg    SCD prevention: ICD in place       History of a mildly reduced ejection fraction:   His EF is improved status post MitraClip  NYHA class II  Euvolemic on exam (I think his neck veins are TR related EF is now)  We will keep him on folic acid and methotrexate for now.   EF did improve with  sarcoid treatment and with MitraClip therefore I do not have him on GDMT      RTC will be determined after the plan for his valve is determined      CC  Patient Care Team:  Amira Gentile PA-C as PCP - General (Physician Assistant - Medical)  Helen Gonzalez, RN as Specialty Care Coordinator (Cardiology)  Janice Shafer, RN as Specialty Care Coordinator (Cardiology)  Lauro Will MD as MD (Clinical Cardiac Electrophysiology)  Mandi Dickey MD as Assigned Heart and Vascular Provider  GIULIANO KOHLER [place], [place] Hausch, Raymond C, MD

## 2024-03-28 NOTE — NURSING NOTE
Chief Complaint   Patient presents with    Follow Up     3/28/24: [facility] for hx cardiac sarcoid, labs, echo and device prior       Vitals were taken and medications reconciled.    Aiden Pal, EMT  12:52 PM

## 2024-04-02 ENCOUNTER — TELEPHONE (OUTPATIENT)
Dept: CARDIOLOGY | Facility: CLINIC | Age: 64
End: 2024-04-02
Payer: COMMERCIAL

## 2024-04-02 NOTE — TELEPHONE ENCOUNTER
Called pt and left VM requesting call back to schedule in clinic with Dr. Kiran. CV RN contact info provided.    ----- Message from Mandi Dickey MD sent at 4/2/2024  4:52 AM CDT -----  Mynor Weber,   See below. This is a complex sarcoid patient how may need mitral surgery (plus intra atrial iatrogenic hole closed) as he still has moderate to severe MR post clip and any further clipping would have led to MS    He is seeing Nicolette in July- could you see at the same time or perhaps a little sooner? I am a little worried about him.     Thanks!   RC        ----- Message -----  From: Sia Westbrook NP  Sent: 4/1/2024  11:10 AM CDT  To: Lauro Will MD; Linda Denny, RN; #    Hi Mandi:    I reviewed most recent TTE with Dr. Morales which shows moderate to severe residual MR. Unfortunately, we are unable to place additional clips and/or vascular plug as this would result in mitral stenosis.     We agree that he should be evaluated by surgery. Perhaps he could see Dr. Kiran in July. Let me know if you need any assistance coordinating the visit. His nurse is Tia Pérez. I will plan to see him around that time as well for his 1 year mitraclip follow-up.     Thanks  Sia       ----- Message -----  From: Cynthia Frederick CMA  Sent: 3/29/2024   1:51 PM CDT  To: Sia Westbrook NP; Linda Denny, RN; #    Aliyah Dick in her insket today :)   Please forward back to her when you have an answer on what you need to schedule.    Thank you!  Erin   ----- Message -----  From: Mandi Dickey MD  Sent: 3/28/2024   6:23 PM CDT  To: Tony Morales MD; Lauro Will MD; #    Hi Tony.     Recall this patient of mine with cardiac sarcoid who developed severe MR with a drop in EF (sarcoid was not active) and your team did 2 clips in August of 2023.     I just saw him today - after the MitraClip he had moderate to severe MR as well is the intra-atrial shunt from the procedure -     While his  ejection fraction has improved some (now measuring 55% from 40 %) - his PA pressures are now elevated,  the MR is  measuring severe, his BNP is elevated, he has lost some weight and now has moderate TR (this is new)    I am wondering if he needs mitral surgery-  Plus perhaps closer of the shunt while they are there.     I also took him off of Plavix as it has been 6 months and he was having some side effects.     He is coming back to see Lauro  in July and I am wondering whether I should have him see you in July +/-  CT surgery then.     Let me know your thoughts, and thank you!       DEBBIE

## 2024-04-05 ENCOUNTER — TELEPHONE (OUTPATIENT)
Dept: CARDIOLOGY | Facility: CLINIC | Age: 64
End: 2024-04-05
Payer: COMMERCIAL

## 2024-04-08 DIAGNOSIS — Z95.818 STATUS POST IMPLANTATION OF MITRAL VALVE LEAFLET CLIP: Primary | ICD-10-CM

## 2024-04-08 DIAGNOSIS — Z98.890 STATUS POST IMPLANTATION OF MITRAL VALVE LEAFLET CLIP: Primary | ICD-10-CM

## 2024-04-11 ENCOUNTER — TELEPHONE (OUTPATIENT)
Dept: CARDIOLOGY | Facility: CLINIC | Age: 64
End: 2024-04-11
Payer: COMMERCIAL

## 2024-04-25 ENCOUNTER — TELEPHONE (OUTPATIENT)
Dept: CARDIOLOGY | Facility: CLINIC | Age: 64
End: 2024-04-25
Payer: COMMERCIAL

## 2024-04-25 NOTE — TELEPHONE ENCOUNTER
Due to 5/2 clinic closure pt was rescheduled to first date they were available to come, 5/30. Per pts wife Faby. Pt and wife agreed to the plan

## 2024-05-14 DIAGNOSIS — D86.85 SARCOID, CARDIAC: ICD-10-CM

## 2024-05-21 ENCOUNTER — MYC REFILL (OUTPATIENT)
Dept: CARDIOLOGY | Facility: CLINIC | Age: 64
End: 2024-05-21
Payer: COMMERCIAL

## 2024-05-21 DIAGNOSIS — D86.85 SARCOID, CARDIAC: ICD-10-CM

## 2024-05-21 RX ORDER — FOLIC ACID 1 MG/1
TABLET ORAL
Qty: 60 TABLET | Refills: 3 | Status: CANCELLED | OUTPATIENT
Start: 2024-05-21

## 2024-05-23 RX ORDER — FOLIC ACID 1 MG/1
TABLET ORAL
Qty: 60 TABLET | Refills: 3 | Status: SHIPPED | OUTPATIENT
Start: 2024-05-23

## 2024-05-23 NOTE — TELEPHONE ENCOUNTER
folic acid (FOLVITE) 1 MG tablet 60 tablet 3 2/6/2023       Last Office Visit: 3/28/24  Future Office visit:   7/12/24    Routing refill request to provider for review/approval because:  Diagnosis does not match medication indication, cannot fill per protocol.     Jovana Corbin RN  [facility] Red Flag Triage/MRT

## 2024-05-30 ENCOUNTER — OFFICE VISIT (OUTPATIENT)
Dept: CARDIOLOGY | Facility: CLINIC | Age: 64
End: 2024-05-30
Attending: SURGERY
Payer: COMMERCIAL

## 2024-05-30 VITALS
OXYGEN SATURATION: 96 % | DIASTOLIC BLOOD PRESSURE: 60 MMHG | HEART RATE: 72 BPM | BODY MASS INDEX: 23.4 KG/M2 | HEIGHT: 73 IN | SYSTOLIC BLOOD PRESSURE: 100 MMHG | WEIGHT: 176.6 LBS

## 2024-05-30 DIAGNOSIS — I34.0 SEVERE MITRAL REGURGITATION: Primary | ICD-10-CM

## 2024-05-30 DIAGNOSIS — I05.1 RHEUMATIC MITRAL REGURGITATION: ICD-10-CM

## 2024-05-30 PROCEDURE — 99214 OFFICE O/P EST MOD 30 MIN: CPT | Performed by: SURGERY

## 2024-05-30 PROCEDURE — 93005 ELECTROCARDIOGRAM TRACING: CPT | Mod: RTG

## 2024-05-30 PROCEDURE — 99205 OFFICE O/P NEW HI 60 MIN: CPT | Performed by: SURGERY

## 2024-05-30 ASSESSMENT — PAIN SCALES - GENERAL: PAINLEVEL: NO PAIN (0)

## 2024-05-30 NOTE — LETTER
Cardiothoracic Surgery          This patient is scheduled to undergo a heart valve repair or replacement.    Please have the dentist sign the attached form and fax or e-mail it to the Cardiothoracic Surgery office prior to their surgery date.    Primary fax number:  [facility] ([facility]) [phone]    E-mail:   [facility] ([facility]): [email or web address]  (Tia)      Please contact Tia Holcomb RN at [phone].      Thank you,  Cardiothoracic Surgery Office                                 PATIENT:   LIVIER JONES    :   1960       DATE OF DENTAL VISIT: __________________                                                    ___________________________   was seen by me today for dental clearance prior to heart valve surgery.     ______  No additional treatment is needed prior to surgery.    ______  Further treatment is needed and will be completed prior to surgery date.    ______  Surgery date may need to be rescheduled due to this condition     _________________________________________________________.    Dr. _______________________    Phone _______________________.      Clinic name ____________________________________________________      Address_______________________________________________________

## 2024-05-30 NOTE — LETTER
5/30/2024      RE: Jose Carney  [address]  [facility] [address]       Dear Colleague,    Thank you for the opportunity to participate in the care of your patient, Jose Carney, at the [facility]. Please see a copy of my visit note below.    Cardiovascular and Thoracic Surgery Consultation    Jose Carney MRN# [id number]   YOB: 1960 Age: 63 year old        Reason for consult: I was asked by Dr. Dickey to evaluate this patient for possible mitral valve replacement.           Assessment and Plan:   Mr. Carney is a very pleasant 64 yo M with a history of systemic sarcoidosis with cardiac involvement who is now in remission with daily methotrexate for quite some time. He had severe MR from posterior leaflet restriction that was treated with MitraClip in August 2023. He unfortunately has severe residual MR with mild/moderate stenosis and now moderate TR as well with evidence of pulmonary hypertension. My recommendation is a minimally invasive mitral valve replacement with a mechanical valve and possible tricuspid valve repair. I would like to get a [facility] protocol CT scan to plan for a right mini-thoracotomy approach.    I have explained to the patient the diagnosis in detail, and the reason for recommending the proposed operation. Obviously my concern is his history of cardiac sarcoidosis, but it appears that his cardiac involvement is in remission, and his LV function is back to normal. I will touch base with Dr. Dickey prior to proceeding with surgery as well. I went over the risks and benefits of the operation, and the possible complications associated with the surgery. These complications include, but are not strictly limited to, infection, bleeding, stroke, postop MI, postop arrhythmias, pulmonary and renal complications. I think he is an overall good surgical candidate, and I quoted an operative  mortality risk of under 5%. He also understands the need for lifelong Coumadin with a mechanical valve.    It was a pleasure to meet Mr. Carney today, and thank you very much for this referral.          Attestation:  Mag Kiran MD           Chief Complaint:   Worsening shortness of breath with exertion.             History of Present Illness:   Mr. Carney is a very pleasant 64 yo male with a history of sarcoidosis back in 2013 with pulmonary involvement. He was found to have cardiac sarcoidosis in 2017 based on cardiac MRI. He was having SVT at that time. An ICD was placed and was also treated with steroids followed by methotrexate. In 2022 he was found to have severe MR from posterior leaflet restriction and underwent a MitraClip procedure in August 2023. He continues to have symptoms of SOB and echocardiogram demonstrating severe residual MR and now moderate TR with elevated PA pressures as well. He also has an iatrogenic interatrial shunt from the MitraClip procedure. He does have a remote history of paroxysmal afib, but a very low burden. The patient was referred to me for a surgical evaluation for possible MVR.              Past Medical History:     Past Medical History:   Diagnosis Date     First degree AV block 03/20/2018     Heart murmur      Pacemaker      Palpitations 03/20/2018     Paroxysmal supraventricular tachycardia (H24) 03/20/2018     Sarcoid, cardiac/EF 54% per MRI,[place] of affected myocardium is 12% of myocardial mass 01/25/2018     Sarcoidosis/ systemic 2013 03/20/2018     Sleep apnea      Thyroid disease              Past Surgical History:     Past Surgical History:   Procedure Laterality Date     CV CORONARY ANGIOGRAM N/A 6/30/2023    Procedure: Coronary Angiogram;  Surgeon: Tony Morales MD;  Location:  HEART CARDIAC CATH LAB     CV TRANSCATHETER MITRAL VALVE REPAIR N/A 8/28/2023    Procedure: Transcatheter Mitral Valve Repair;  Surgeon: Tony Morales MD;  Location:   HEART CARDIAC CATH LAB     EP ABLATION SVT N/A 3/29/2022    Procedure: Ablation Supraventricular Tachycardia;  Surgeon: Lauro Will MD;  Location:  HEART CARDIAC CATH LAB     HERNIA REPAIR, INGUINAL RT/LT Bilateral                Social History:     Social History     Tobacco Use     Smoking status: Never     Smokeless tobacco: Never   Substance Use Topics     Alcohol use: Not Currently             Family History:   No family history on file.          Immunizations:     Immunization History   Administered Date(s) Administered     COVID-19 Vaccine ([employer]) 04/09/2021             Allergies:     Allergies   Allergen Reactions     Seasonal Allergies      Terbinafine              Medications:     Current Outpatient Medications   Medication Sig Dispense Refill     amoxicillin (AMOXIL) 500 MG capsule Take 4 capsules (2,000 mg) by mouth once as needed (Take 30-60 minutes prior to dental procedure or cleaning) 4 capsule 3     aspirin 81 MG EC tablet Take 1 tablet (81 mg) by mouth daily 90 tablet 3     folic acid (FOLVITE) 1 MG tablet TAKE FIVE TABLETS (5MG) ONCE WEEKLY WITH YOUR METHOTREXATE 60 tablet 3     methimazole (TAPAZOLE) 5 MG tablet TAKE 1-1/2 TABLETS BY MOUTH ONE TIME A DAY.       methotrexate 2.5 MG tablet TAKE 8 TABLETS (20 MG) BY MOUTH EVERY 7 DAYS 104 tablet 3     metoprolol succinate ER (TOPROL XL) 25 MG 24 hr tablet Take 3 tablets (75 mg) by mouth daily 360 tablet 3     timolol maleate (TIMOPTIC) 0.5 % ophthalmic solution        finasteride (PROSCAR) 5 MG tablet Take 5 mg by mouth daily (Patient not taking: Reported on 5/30/2024)       furosemide (LASIX) 20 MG tablet Take 20 mg by mouth as needed (Patient not taking: Reported on 10/5/2023)       prednisoLONE acetate (PRED FORTE) 1 % ophthalmic susp Place 1 drop into both eyes every hour (Patient not taking: Reported on 10/5/2023)       sildenafil (REVATIO) 20 MG tablet Take 20-60 mg by mouth as needed (Patient not taking: Reported on 10/5/2023)        tamsulosin (FLOMAX) 0.4 MG capsule Take 0.4 mg by mouth daily (Patient not taking: Reported on 3/28/2024)       No current facility-administered medications for this visit.     Facility-Administered Medications Ordered in Other Visits   Medication Dose Route Frequency Provider Last Rate Last Admin     sodium chloride (PF) 0.9% PF flush 10 mL  10 mL Intravenous Once Juan Gallegos MD         sodium chloride (PF) 0.9% PF flush 10 mL  10 mL Intravenous Once STEPAN Montilla MD         sodium chloride (PF) 0.9% PF flush 10 mL  10 mL Intravenous Once Brian Franklin MD         sodium chloride bacteriostatic 0.9 % flush 10 mL  10 mL Intravenous Once Brian Franklin MD                 Review of Systems:   The 10 point Review of Systems is negative other than noted in the HPI           Physical Examination:   AVSS    General: pleasant, in no acute distress  CV: RRR, normal S1 and S2, grade 3/6 systolic murmur at the apex  Resp: CTAB  Abd: soft, ND  Ext: no edema  Neuro: no focal deficits            Data:     Lab Results   Component Value Date    WBC 6.8 03/28/2024    HGB 14.3 03/28/2024    HCT 42.3 03/28/2024     03/28/2024     03/28/2024    POTASSIUM 4.1 03/28/2024    CHLORIDE 103 03/28/2024    CO2 27 03/28/2024    BUN 17.8 03/28/2024    CR 0.93 03/28/2024    GLC 92 03/28/2024    NTBNP 1,045 (H) 03/28/2024    TROPI <0.015 09/04/2020    AST 27 03/28/2024    ALT 7 03/28/2024    ALKPHOS 100 03/28/2024    BILITOTAL 1.4 (H) 03/28/2024    INR 1.14 08/28/2023        CADEN 8/28/23:  Transesophageal Echocardiogram  Order: [id number]  Status: Edited Result - FINAL       Visible to patient: Yes (not seen)       Next appt: 07/12/2024 at 09:30 AM in Lab (Lab)       Dx: Nonrheumatic mitral valve regurgitation    1 Result Note  Details    Reading Physician Reading Date Result Priority   Allison De La O MD  [phone] 8/29/2023      Narrative &  [facility]  [id number]  [id number]  [id number]  [id number]^MIRTHA^JESSICA     [facility]  Echocardiography Laboratory  [address] [place] [address]     Name: LIVIER JONES  MRN: [id number]  : 1960  Study Date: 2023 10:44 AM  Age: 62 yrs  Gender: Male  Patient Location: [facility]  Reason For Study: Nonrheumatic mitral valve regurgitation  Ordering Physician: JESSICA SHANNON  Referring Physician: Amira Gentile  Performed By: Ruben Ortiz RDCS     BSA: 2.1 m2  Height: 73 in  Weight: 191 lb  HR: 60  BP: 112/64 mmHg  ______________________________________________________________________________  Procedure  Complete CADEN Adult. CADEN Probe #67 was used during the procedure.  ______________________________________________________________________________  Interpretation Summary     This was a intraprocedural transesophageal echocardiogram done for MitraClip  implantation.     Preprocedural findings  The left ventricle is moderately dilated. 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 Visually estimated LV systolic  function is around 40%  Right ventricle is borderline dilated with low normal systolic function.  Severe left atrial enlargement is noted.  There is restriction of the posterior mitral leaflet related to the wall  motion abnormality as described above. This is resulting in a large based jet  of severe posteriorly directed eccentric mitral regurgitation quantified by  both 2D and 3D measures. No mitral stenosis.  No pericardial effusion.     Transesophageal echo guidance was used to obtain transseptal puncture and left  atrial access.     Post procedural findings  No pericardial effusion.  Unchanged left and right ventricular systolic function and size.  Mitral valve regurgitation was quite broad-based. 2 XT W clips were implanted  at the A2 P2 location resulting in good tissue bridge and a tri-orifice mitral  valve. Multiple attempts  had to be made to adequately capture the posterior  mitral leaflet. After deployment of the first clip, there was significant 3+  residual mitral regurgitation laterally. As such the second XT W clip had to  be implanted. We attempted to place the clip at 3 different locations and  eventually accepted the location which resulted in the most reduction in  mitral regurgitation.  Unfortunately there is still 2+ residual mitral regurgitation given how broad-  based the jet is. The residual MR is coming predominantly between the 2 clips.  This potentially is amenable to Amplatzer plug occlusion in the future if  required.  Iatrogenic mitral stenosis with postprocedural MS gradient of 6 to 7 mmHg.  Iatrogenic ASD.  ______________________________________________________________________________  CADEN  The CADEN probe was inserted prior to our arrival by anesthesia. Sedation was  administered and monitored by anesthesia. Sedation, endotracheal intubation,  and mechanical ventilation were initiated prior to the CADEN and were monitored  by anesthesia.     Left Ventricle  The left ventricle is moderately dilated. The visual ejection fraction is 40-  45%. There is severe inferior and inferolateral wall hypokinesis. A basal  inferolateral aneurysm is present.     Right Ventricle  Borderline right ventricular enlargement. The right ventricular systolic  function is normal.     Atria  The left atrium is severely dilated. Right atrial size is normal. No thrombus  is detected in the left atrial appendage.     Mitral Valve  There is severe (4+) mitral regurgitation. The mitral regurgitant jet is  eccentrically directed.     Tricuspid Valve  The tricuspid valve is not well visualized, but is grossly normal.     Aortic Valve  The aortic valve is normal in structure and function. No aortic regurgitation  is present. No hemodynamically significant valvular aortic stenosis.     Pulmonic Valve  The pulmonic valve is not well visualized.      Vessels  The aortic root is normal size.     Pericardial/Pleural  There is no pericardial effusion.  ______________________________________________________________________________  Doppler Measurements & Calculations  MV max P.6 mmHg  MV mean P.0 mmHg  MV V2 VTI: 51.6 cm  MR PISA: 20.4 cm2  MR ERO: 0.92 cm2  MR volume: 175.2 ml     ______________________________________________________________________________  Report approved by: Sergio Florian 2023 11:46 AM          TTE 3/28/24:  Reading Physician Reading Date Result Priority   Pelon Haji MD  [phone]  [phone] 3/28/2024      Narrative & Impression  [id number]  [id number]  [id number]  [id number]^MARCIA^DEIDRA^KAMINI     [facility]  Diagnostic and Treatment-3rd Floor  [address] [place] [id number]     Name: LIVIER JONES  MRN: [id number]  : 1960  Study Date: 2024 10:07 AM  Age: 63 yrs  Gender: Male  Patient Location: [facility]  Reason For Study: Status post implantation of mitral valve leaflet clip,  Status po  Ordering Physician: DEIDRA KENNEDY  Referring Physician: DEIDRA KENNEDY  Performed By: Kimi Johnson     BSA: 2.1 m2  Height: 72 in  Weight: 191 lb  HR: 52  BP: 107/69 mmHg  ______________________________________________________________________________  Procedure  Echocardiogram with two-dimensional, color and spectral Doppler performed.  ______________________________________________________________________________  Interpretation Summary     Moderate left ventricular dilation. Left ventricular function is decreased.  The ejection fraction is 50-55% (borderline).  Global right ventricular function is normal.  s/p MitraClip x 2 on 23. Residual moderate to severe mitral  insufficiency. Eccentric anterior jet noted.  Mild mitral stenosis. Mean gradient 5 mmHg at HR 70.  Moderate tricuspid insufficiency.  Interatrial shunt noted w/ color  doppler.  Estimated pulmonary artery systolic pressure is 46 mmHg.  IVC is normal.     This study was compared with the study from 10/5/23. Abnormal findings are  similar to prior.  ______________________________________________________________________________  Left Ventricle  Moderate left ventricular dilation is present. Eccentric hypertrophy.  Diastolic function not assessed due to significant valvular regurgitation.  Left ventricular function is decreased. The ejection fraction is 50-55%  (borderline). Akinesis of basal inferior and inferolateral segments.     Right Ventricle  Global right ventricular function is normal. Mild right ventricular dilation  is present. A pacemaker lead is noted in the right ventricle.     Atria  Severe biatrial enlargement is present. Interatrial shunt noted w/ color  doppler.     Mitral Valve  Moderate to severe mitral insufficiency is present. Mild mitral stenosis is  present. The mean mitral valve gradient is 4.7 mmHg. The peak mitral valve  gradient is 14.1 mmHg. Cristin-clip in place.     Aortic Valve  The aortic valve is tricuspid. Mild aortic insufficiency is present.     Tricuspid Valve  Moderate tricuspid insufficiency is present. Mild pulmonary hypertension is  present. Estimated pulmonary artery systolic pressure is 43 mmHg plus right  atrial pressure.     Pulmonic Valve  The pulmonic valve is normal.     Vessels  The thoracic aorta is normal. The aorta root is normal. Sinuses of Valsalva  3.5 cm. Ascending aorta 3.5 cm. IVC diameter <2.1 cm collapsing >50% with  sniff suggests a normal RA pressure of 3 mmHg. Dilated coronary sinus.     Pericardium  No pericardial effusion is present.     Compared to Previous Study  This study was compared with the study from 10/5/23 .     Attestation  I have personally viewed the imaging and agree with the interpretation and  report as documented by the fellow, Jose Hassan, and/or edited by  me.  ______________________________________________________________________________  MMode/2D Measurements & Calculations  IVSd: 1.0 cm  LVIDd: 5.9 cm  LVIDs: 4.4 cm  LVPWd: 1.2 cm  FS: 26.2 %  LV mass(C)d: 268.3 grams  LV mass(C)dI: 128.4 grams/m2  Ao root diam: 3.5 cm  asc Aorta Diam: 3.5 cm  LVOT diam: 2.5 cm  LVOT area: 4.8 cm2  Ao root diam index Ht(cm/m): 1.9  Ao root diam index BSA (cm/m2): 1.7  Asc Ao diam index BSA (cm/m2): 1.7  Asc Ao diam index Ht(cm/m): 1.9  RWT: 0.40  TAPSE: 2.6 cm     Doppler Measurements & Calculations  MV E max bennie: 149.0 cm/sec  MV A max bennie: 124.9 cm/sec  MV E/A: 1.2  MV max P.1 mmHg  MV mean P.7 mmHg  MV V2 VTI: 56.3 cm  MVA(VTI): 1.1 cm2  Ao V2 max: 88.6 cm/sec  Ao max PG: 3.1 mmHg  Ao V2 mean: 60.4 cm/sec  Ao mean P.0 mmHg  Ao V2 VTI: 17.5 cm  ALICIA(I,D): 3.7 cm2  ALICIA(V,D): 3.3 cm2  LV V1 max P.5 mmHg  LV V1 max: 61.9 cm/sec  LV V1 VTI: 13.5 cm  SV(LVOT): 64.4 ml  SI(LVOT): 30.8 ml/m2  PA acc time: 0.13 sec  TR max bennie: 329.0 cm/sec  TR max P.3 mmHg  AV [place] Ratio (DI): 0.70  ALICIA Index (cm2/m2): 1.8  E/E' av.7  Lateral E/e': 14.5  Medial E/e': 22.8  RV S Bennie: 14.2 cm/sec     ______________________________________________________________________________  Report approved by: Sergio Dumas 2024 01:53 PM     Coronary Angiogram 23:  Conclusion    Dominant right coronary artery.  Normal coronary angiogram.         Plan      Follow bedrest per protocol    Continued medical management and lifestyle modifications for cardiovascular risk factor optimizations.    Arterial sheath removed from radial artery with TR band placement.    Discharge today per protocol     Recommendations    General Recommendations:  - Recommended follow up with doctor Dr. Dickey.     Coronary Findings    Diagnostic  Dominance: Right  Left Anterior Descending      First Diagonal Branch   The vessel is small.      Second Diagonal Branch   The vessel is large.      Third  Diagonal Branch         Intervention     No interventions have been documented.                      Please do not hesitate to contact me if you have any questions/concerns.     Sincerely,     Mag Kiran MD

## 2024-05-30 NOTE — PATIENT INSTRUCTIONS
You were seen today in the [facility]    Amarilys will call you on Monday to schedule surgery with Dr. Kiran.    Schedule prior to surgery:    Dental appointment/clearance  CT MICS protocol  PAC appointment  EKG  Blood work    Please call PANCHITO Weber surgery coordinator with any questions.  Thank you.    Tia Holcomb RNCC  Cardiothoracic Surgery  Phone [phone]  Fax [phone]

## 2024-05-30 NOTE — NURSING NOTE
Chief Complaint   Patient presents with    New Patient     New CV Sx        Vitals were taken, medications reconciled.    Elias Machado, Facilitator   1:51 PM

## 2024-05-31 NOTE — PROGRESS NOTES
Cardiovascular and Thoracic Surgery Consultation    Jose Carney MRN# [id number]   YOB: 1960 Age: 63 year old        Reason for consult: I was asked by Dr. Dickey to evaluate this patient for possible mitral valve replacement.           Assessment and Plan:   Mr. Carney is a very pleasant 64 yo M with a history of systemic sarcoidosis with cardiac involvement who is now in remission with daily methotrexate for quite some time. He had severe MR from posterior leaflet restriction that was treated with MitraClip in August 2023. He unfortunately has severe residual MR with mild/moderate stenosis and now moderate TR as well with evidence of pulmonary hypertension. My recommendation is a minimally invasive mitral valve replacement with a mechanical valve and possible tricuspid valve repair. I would like to get a [facility] protocol CT scan to plan for a right mini-thoracotomy approach.    I have explained to the patient the diagnosis in detail, and the reason for recommending the proposed operation. Obviously my concern is his history of cardiac sarcoidosis, but it appears that his cardiac involvement is in remission, and his LV function is back to normal. I will touch base with Dr. Dickey prior to proceeding with surgery as well. I went over the risks and benefits of the operation, and the possible complications associated with the surgery. These complications include, but are not strictly limited to, infection, bleeding, stroke, postop MI, postop arrhythmias, pulmonary and renal complications. I think he is an overall good surgical candidate, and I quoted an operative mortality risk of under 5%. He also understands the need for lifelong Coumadin with a mechanical valve.    It was a pleasure to meet Mr. Carney today, and thank you very much for this referral.          Attestation:  Mag Kiran MD           Chief Complaint:   Worsening shortness of breath with exertion.             History of Present  Illness:   Mr. Carney is a very pleasant 64 yo male with a history of sarcoidosis back in 2013 with pulmonary involvement. He was found to have cardiac sarcoidosis in 2017 based on cardiac MRI. He was having SVT at that time. An ICD was placed and was also treated with steroids followed by methotrexate. In 2022 he was found to have severe MR from posterior leaflet restriction and underwent a MitraClip procedure in August 2023. He continues to have symptoms of SOB and echocardiogram demonstrating severe residual MR and now moderate TR with elevated PA pressures as well. He also has an iatrogenic interatrial shunt from the MitraClip procedure. He does have a remote history of paroxysmal afib, but a very low burden. The patient was referred to me for a surgical evaluation for possible MVR.              Past Medical History:     Past Medical History:   Diagnosis Date    First degree AV block 03/20/2018    Heart murmur     Pacemaker     Palpitations 03/20/2018    Paroxysmal supraventricular tachycardia (H24) 03/20/2018    Sarcoid, cardiac/EF 54% per MRI,[place] of affected myocardium is 12% of myocardial mass 01/25/2018    Sarcoidosis/ systemic 2013 03/20/2018    Sleep apnea     Thyroid disease              Past Surgical History:     Past Surgical History:   Procedure Laterality Date    CV CORONARY ANGIOGRAM N/A 6/30/2023    Procedure: Coronary Angiogram;  Surgeon: Tony Morales MD;  Location: [facility] CARDIAC CATH LAB    CV TRANSCATHETER MITRAL VALVE REPAIR N/A 8/28/2023    Procedure: Transcatheter Mitral Valve Repair;  Surgeon: Tony Morales MD;  Location: [facility] CARDIAC CATH LAB    EP ABLATION SVT N/A 3/29/2022    Procedure: Ablation Supraventricular Tachycardia;  Surgeon: Lauro Will MD;  Location: [facility] CARDIAC CATH LAB    HERNIA REPAIR, INGUINAL RT/LT Bilateral                Social History:     Social History     Tobacco Use    Smoking status: Never    Smokeless tobacco: Never   Substance Use  Topics    Alcohol use: Not Currently             Family History:   No family history on file.          Immunizations:     Immunization History   Administered Date(s) Administered    COVID-19 Vaccine ([employer]) 04/09/2021             Allergies:     Allergies   Allergen Reactions    Seasonal Allergies     Terbinafine              Medications:     Current Outpatient Medications   Medication Sig Dispense Refill    amoxicillin (AMOXIL) 500 MG capsule Take 4 capsules (2,000 mg) by mouth once as needed (Take 30-60 minutes prior to dental procedure or cleaning) 4 capsule 3    aspirin 81 MG EC tablet Take 1 tablet (81 mg) by mouth daily 90 tablet 3    folic acid (FOLVITE) 1 MG tablet TAKE FIVE TABLETS (5MG) ONCE WEEKLY WITH YOUR METHOTREXATE 60 tablet 3    methimazole (TAPAZOLE) 5 MG tablet TAKE 1-1/2 TABLETS BY MOUTH ONE TIME A DAY.      methotrexate 2.5 MG tablet TAKE 8 TABLETS (20 MG) BY MOUTH EVERY 7 DAYS 104 tablet 3    metoprolol succinate ER (TOPROL XL) 25 MG 24 hr tablet Take 3 tablets (75 mg) by mouth daily 360 tablet 3    timolol maleate (TIMOPTIC) 0.5 % ophthalmic solution       finasteride (PROSCAR) 5 MG tablet Take 5 mg by mouth daily (Patient not taking: Reported on 5/30/2024)      furosemide (LASIX) 20 MG tablet Take 20 mg by mouth as needed (Patient not taking: Reported on 10/5/2023)      prednisoLONE acetate (PRED FORTE) 1 % ophthalmic susp Place 1 drop into both eyes every hour (Patient not taking: Reported on 10/5/2023)      sildenafil (REVATIO) 20 MG tablet Take 20-60 mg by mouth as needed (Patient not taking: Reported on 10/5/2023)      tamsulosin (FLOMAX) 0.4 MG capsule Take 0.4 mg by mouth daily (Patient not taking: Reported on 3/28/2024)       No current facility-administered medications for this visit.     Facility-Administered Medications Ordered in Other Visits   Medication Dose Route Frequency Provider Last Rate Last Admin    sodium chloride (PF) 0.9% PF flush 10 mL  10 mL Intravenous Once El,  MD Juan        sodium chloride (PF) 0.9% PF flush 10 mL  10 mL Intravenous Once STEPAN Montilla MD        sodium chloride (PF) 0.9% PF flush 10 mL  10 mL Intravenous Once Brian Franklin MD        sodium chloride bacteriostatic 0.9 % flush 10 mL  10 mL Intravenous Once Brian Franklin MD                 Review of Systems:   The 10 point Review of Systems is negative other than noted in the HPI           Physical Examination:   AVSS    General: pleasant, in no acute distress  CV: RRR, normal S1 and S2, grade 3/6 systolic murmur at the apex  Resp: CTAB  Abd: soft, ND  Ext: no edema  Neuro: no focal deficits            Data:     Lab Results   Component Value Date    WBC 6.8 2024    HGB 14.3 2024    HCT 42.3 2024     2024     2024    POTASSIUM 4.1 2024    CHLORIDE 103 2024    CO2 27 2024    BUN 17.8 2024    CR 0.93 2024    GLC 92 2024    NTBNP 1,045 (H) 2024    TROPI <0.015 2020    AST 27 2024    ALT 7 2024    ALKPHOS 100 2024    BILITOTAL 1.4 (H) 2024    INR 1.14 2023        CADEN 23:  Transesophageal Echocardiogram  Order: [id number]  Status: Edited Result - FINAL       Visible to patient: Yes (not seen)       Next appt: 2024 at 09:30 AM in Lab (Lab)       Dx: Nonrheumatic mitral valve regurgitation    1 Result Note  Details    Reading Physician Reading Date Result Priority   Allison De La O MD  [phone] 2023      Narrative & Impression  [id number]  [facility]  [id number]  [id number]^MIRTHA^[facility]  Echocardiography Laboratory  [address] [place] [address]     Name: ROBERT LIVIER NATALIA  MRN: [id number]  : 1960  Study Date: 2023 10:44 AM  Age: 62 yrs  Gender: Male  Patient Location: [facility]  Reason For Study: Nonrheumatic mitral valve regurgitation  Ordering Physician: JESSICA SHANNON  Referring Physician:  Amira Gentile  Performed By: Ruben Ortiz BARRY     BSA: 2.1 m2  Height: 73 in  Weight: 191 lb  HR: 60  BP: 112/64 mmHg  ______________________________________________________________________________  Procedure  Complete CADEN Adult. CADEN Probe #67 was used during the procedure.  ______________________________________________________________________________  Interpretation Summary     This was a intraprocedural transesophageal echocardiogram done for MitraClip  implantation.     Preprocedural findings  The left ventricle is moderately dilated. 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 Visually estimated LV systolic  function is around 40%  Right ventricle is borderline dilated with low normal systolic function.  Severe left atrial enlargement is noted.  There is restriction of the posterior mitral leaflet related to the wall  motion abnormality as described above. This is resulting in a large based jet  of severe posteriorly directed eccentric mitral regurgitation quantified by  both 2D and 3D measures. No mitral stenosis.  No pericardial effusion.     Transesophageal echo guidance was used to obtain transseptal puncture and left  atrial access.     Post procedural findings  No pericardial effusion.  Unchanged left and right ventricular systolic function and size.  Mitral valve regurgitation was quite broad-based. 2 XT W clips were implanted  at the A2 P2 location resulting in good tissue bridge and a tri-orifice mitral  valve. Multiple attempts had to be made to adequately capture the posterior  mitral leaflet. After deployment of the first clip, there was significant 3+  residual mitral regurgitation laterally. As such the second XT W clip had to  be implanted. We attempted to place the clip at 3 different locations and  eventually accepted the location which resulted in the most reduction in  mitral regurgitation.  Unfortunately there is still 2+ residual mitral  regurgitation given how broad-  based the jet is. The residual MR is coming predominantly between the 2 clips.  This potentially is amenable to Amplatzer plug occlusion in the future if  required.  Iatrogenic mitral stenosis with postprocedural MS gradient of 6 to 7 mmHg.  Iatrogenic ASD.  ______________________________________________________________________________  CADEN  The CADEN probe was inserted prior to our arrival by anesthesia. Sedation was  administered and monitored by anesthesia. Sedation, endotracheal intubation,  and mechanical ventilation were initiated prior to the CADEN and were monitored  by anesthesia.     Left Ventricle  The left ventricle is moderately dilated. The visual ejection fraction is 40-  45%. There is severe inferior and inferolateral wall hypokinesis. A basal  inferolateral aneurysm is present.     Right Ventricle  Borderline right ventricular enlargement. The right ventricular systolic  function is normal.     Atria  The left atrium is severely dilated. Right atrial size is normal. No thrombus  is detected in the left atrial appendage.     Mitral Valve  There is severe (4+) mitral regurgitation. The mitral regurgitant jet is  eccentrically directed.     Tricuspid Valve  The tricuspid valve is not well visualized, but is grossly normal.     Aortic Valve  The aortic valve is normal in structure and function. No aortic regurgitation  is present. No hemodynamically significant valvular aortic stenosis.     Pulmonic Valve  The pulmonic valve is not well visualized.     Vessels  The aortic root is normal size.     Pericardial/Pleural  There is no pericardial effusion.  ______________________________________________________________________________  Doppler Measurements & Calculations  MV max P.6 mmHg  MV mean P.0 mmHg  MV V2 VTI: 51.6 cm  MR PISA: 20.4 cm2  MR ERO: 0.92 cm2  MR volume: 175.2 ml     ______________________________________________________________________________  Report  approved by: Sergio Florian 2023 11:46 AM          TTE 3/28/24:  Reading Physician Reading Date Result Priority   Pelon Haji MD  [phone] 3/28/2024      Narrative & Impression  [id number]  [id number]  [id number]  [id number]^MARCIA^DEIDRA^KAMINI     [facility]  Diagnostic and Treatment-3rd Floor  [address] [place] [address]     Name: LIVIER JONES  MRN: [id number]  : 1960  Study Date: 2024 10:07 AM  Age: 63 yrs  Gender: Male  Patient [facility]: [facility]  Reason For Study: Status post implantation of mitral valve leaflet clip,  Status po  Ordering Physician: DEIDRA KENNEDY  Referring Physician: DEIDRA KENNEDY  Performed By: Kimi Johnson     BSA: 2.1 m2  Height: 72 in  Weight: 191 lb  HR: 52  BP: 107/69 mmHg  ______________________________________________________________________________  Procedure  Echocardiogram with two-dimensional, color and spectral Doppler performed.  ______________________________________________________________________________  Interpretation Summary     Moderate left ventricular dilation. Left ventricular function is decreased.  The ejection fraction is 50-55% (borderline).  Global right ventricular function is normal.  s/p MitraClip x 2 on 23. Residual moderate to severe mitral  insufficiency. Eccentric anterior jet noted.  Mild mitral stenosis. Mean gradient 5 mmHg at HR 70.  Moderate tricuspid insufficiency.  Interatrial shunt noted w/ color doppler.  Estimated pulmonary artery systolic pressure is 46 mmHg.  IVC is normal.     This study was compared with the study from 10/5/23. Abnormal findings are  similar to prior.  ______________________________________________________________________________  Left Ventricle  Moderate left ventricular dilation is present. Eccentric hypertrophy.  Diastolic function not assessed due to significant valvular regurgitation.  Left ventricular  function is decreased. The ejection fraction is 50-55%  (borderline). Akinesis of basal inferior and inferolateral segments.     Right Ventricle  Global right ventricular function is normal. Mild right ventricular dilation  is present. A pacemaker lead is noted in the right ventricle.     Atria  Severe biatrial enlargement is present. Interatrial shunt noted w/ color  doppler.     Mitral Valve  Moderate to severe mitral insufficiency is present. Mild mitral stenosis is  present. The mean mitral valve gradient is 4.7 mmHg. The peak mitral valve  gradient is 14.1 mmHg. Cristin-clip in place.     Aortic Valve  The aortic valve is tricuspid. Mild aortic insufficiency is present.     Tricuspid Valve  Moderate tricuspid insufficiency is present. Mild pulmonary hypertension is  present. Estimated pulmonary artery systolic pressure is 43 mmHg plus right  atrial pressure.     Pulmonic Valve  The pulmonic valve is normal.     Vessels  The thoracic aorta is normal. The aorta root is normal. Sinuses of Valsalva  3.5 cm. Ascending aorta 3.5 cm. IVC diameter <2.1 cm collapsing >50% with  sniff suggests a normal RA pressure of 3 mmHg. Dilated coronary sinus.     Pericardium  No pericardial effusion is present.     Compared to Previous Study  This study was compared with the study from 10/5/23 .     Attestation  I have personally viewed the imaging and agree with the interpretation and  report as documented by the fellow, Jose Hassan, and/or edited by me.  ______________________________________________________________________________  MMode/2D Measurements & Calculations  IVSd: 1.0 cm  LVIDd: 5.9 cm  LVIDs: 4.4 cm  LVPWd: 1.2 cm  FS: 26.2 %  LV mass(C)d: 268.3 grams  LV mass(C)dI: 128.4 grams/m2  Ao root diam: 3.5 cm  asc Aorta Diam: 3.5 cm  LVOT diam: 2.5 cm  LVOT area: 4.8 cm2  Ao root diam index Ht(cm/m): 1.9  Ao root diam index BSA (cm/m2): 1.7  Asc Ao diam index BSA (cm/m2): 1.7  Asc Ao diam index Ht(cm/m): 1.9  RWT:  0.40  TAPSE: 2.6 cm     Doppler Measurements & Calculations  MV E max bennie: 149.0 cm/sec  MV A max bennie: 124.9 cm/sec  MV E/A: 1.2  MV max P.1 mmHg  MV mean P.7 mmHg  MV V2 VTI: 56.3 cm  MVA(VTI): 1.1 cm2  Ao V2 max: 88.6 cm/sec  Ao max PG: 3.1 mmHg  Ao V2 mean: 60.4 cm/sec  Ao mean P.0 mmHg  Ao V2 VTI: 17.5 cm  ALICIA(I,D): 3.7 cm2  ALICIA(V,D): 3.3 cm2  LV V1 max P.5 mmHg  LV V1 max: 61.9 cm/sec  LV V1 VTI: 13.5 cm  SV(LVOT): 64.4 ml  SI(LVOT): 30.8 ml/m2  PA acc time: 0.13 sec  TR max bennie: 329.0 cm/sec  TR max P.3 mmHg  AV Bennie Ratio (DI): 0.70  ALICIA Index (cm2/m2): 1.8  E/E' av.7  Lateral E/e': 14.5  Medial E/e': 22.8  RV S Bennie: 14.2 cm/sec     ______________________________________________________________________________  Report approved by: Sergio Dumas 2024 01:53 PM     Coronary Angiogram 23:  Conclusion    Dominant right coronary artery.  Normal coronary angiogram.         Plan     Follow bedrest per protocol   Continued medical management and lifestyle modifications for cardiovascular risk factor optimizations.   Arterial sheath removed from radial artery with TR band placement.   Discharge today per protocol     Recommendations    General Recommendations:  - Recommended follow up with doctor Dr. Dickey.     Coronary Findings    Diagnostic  Dominance: Right  Left Anterior Descending      First Diagonal Branch   The vessel is small.      Second Diagonal Branch   The vessel is large.      Third Diagonal Branch         Intervention     No interventions have been documented.

## 2024-06-03 ENCOUNTER — TELEPHONE (OUTPATIENT)
Dept: CARDIOLOGY | Facility: CLINIC | Age: 64
End: 2024-06-03
Payer: COMMERCIAL

## 2024-06-03 ENCOUNTER — PREP FOR PROCEDURE (OUTPATIENT)
Dept: CARDIOLOGY | Facility: CLINIC | Age: 64
End: 2024-06-03
Payer: COMMERCIAL

## 2024-06-03 DIAGNOSIS — I34.0 SEVERE MITRAL REGURGITATION: Primary | ICD-10-CM

## 2024-06-03 NOTE — TELEPHONE ENCOUNTER
Spoke with ts wife Faby and schedule pre op appts. Went over dates times and locations. Wife Faby agreed to the plan

## 2024-06-03 NOTE — TELEPHONE ENCOUNTER
Per task, pt needs to schedule surgery with Dr. Kiran. LM asking pt to call back. Will try again later

## 2024-06-04 DIAGNOSIS — I34.0 SEVERE MITRAL REGURGITATION: Primary | ICD-10-CM

## 2024-06-04 RX ORDER — CHLORHEXIDINE GLUCONATE ORAL RINSE 1.2 MG/ML
10 SOLUTION DENTAL ONCE
Status: CANCELLED | OUTPATIENT
Start: 2024-06-04 | End: 2024-06-04

## 2024-06-04 RX ORDER — LIDOCAINE 40 MG/G
CREAM TOPICAL
Status: CANCELLED | OUTPATIENT
Start: 2024-06-04

## 2024-06-04 RX ORDER — NOREPINEPHRINE BITARTRATE 0.06 MG/ML
.01-.1 INJECTION, SOLUTION INTRAVENOUS CONTINUOUS
Status: CANCELLED | OUTPATIENT
Start: 2024-07-16

## 2024-06-04 RX ORDER — FAMOTIDINE 20 MG/1
20 TABLET, FILM COATED ORAL
Status: CANCELLED | OUTPATIENT
Start: 2024-06-04

## 2024-06-04 RX ORDER — PHENYLEPHRINE HCL IN 0.9% NACL 50MG/250ML
.1-6 PLASTIC BAG, INJECTION (ML) INTRAVENOUS CONTINUOUS
Status: CANCELLED | OUTPATIENT
Start: 2024-07-16

## 2024-06-04 RX ORDER — GABAPENTIN 300 MG/1
300 CAPSULE ORAL
Status: CANCELLED | OUTPATIENT
Start: 2024-06-04

## 2024-06-04 RX ORDER — CEFAZOLIN SODIUM 2 G/50ML
2 SOLUTION INTRAVENOUS
Status: CANCELLED | OUTPATIENT
Start: 2024-06-04

## 2024-06-04 RX ORDER — CEFAZOLIN SODIUM 2 G/50ML
2 SOLUTION INTRAVENOUS SEE ADMIN INSTRUCTIONS
Status: CANCELLED | OUTPATIENT
Start: 2024-06-04

## 2024-06-04 RX ORDER — DEXMEDETOMIDINE HYDROCHLORIDE 4 UG/ML
.2-1.2 INJECTION, SOLUTION INTRAVENOUS CONTINUOUS
Status: CANCELLED | OUTPATIENT
Start: 2024-07-16

## 2024-06-04 RX ORDER — ACETAMINOPHEN 325 MG/1
975 TABLET ORAL ONCE
Status: CANCELLED | OUTPATIENT
Start: 2024-06-04 | End: 2024-06-04

## 2024-06-04 NOTE — TELEPHONE ENCOUNTER
FUTURE VISIT INFORMATION      SURGERY INFORMATION:  Date: 24  Location: [facility]  Surgeon:  Mag Kiran MD   Anesthesia Type:  general  Procedure: MINIMALLY INVASIVE MITRAL VALVE REPLACEMENT, Transesophageal Echocardiogram (CADEN) MINIMALLY INVASIVE TRICUSPID VALVE REPAIR   Consult: ov 24    RECORDS REQUESTED FROM:       Primary Care Provider: Amira Gentile PA-C - [facility]    Pertinent Medical History: paroxysmal supraventricular tachycardia, palpitations, mitral regurgitation    Most recent EKG+ Tracin24    Most recent ECHO: 24

## 2024-06-05 ENCOUNTER — TELEPHONE (OUTPATIENT)
Dept: CARDIOLOGY | Facility: CLINIC | Age: 64
End: 2024-06-05
Payer: COMMERCIAL

## 2024-06-05 NOTE — TELEPHONE ENCOUNTER
----- Message from Mandi Dickey MD sent at 6/4/2024  3:14 PM CDT -----    Regarding: RE: sarcoid  Hi, we will definitely handle the immuno suppression change now that we know the date.    ----- Message -----  From: Tia Holcomb RN  Sent: 6/4/2024   8:16 AM CDT  To: Mandi Dickey MD  Subject: FW: sarcoid                                      Hi Dr. Dickey,    For this pt Dr. Kiran would like to stop his methotrexate two weeks prior to surgery, and asked I check with you about using cellcept when we hold his methotrexate. Is that something he should do?    If so, we have him scheduled for surgery on July 16, and taking his last dose of methotrexate on 7/1.    Please let me know what I can do to help, thank you.  Tia  CV surgery RN coordinator  ----- Message -----  From: Mag Kiran MD  Sent: 6/4/2024   8:08 AM CDT  To: Tia Holcomb RN  Subject: RE: sarcoid                                      Dr Dickey said using Cellcept while holding Methotrexate so may want to check with her team?    Thx!  ----- Message -----  From: Tia Holcomb RN  Sent: 6/4/2024   8:02 AM CDT  To: Mag Kiran MD  Subject: RE: sarcoid                                      Will do, thanks.    Renea  ----- Message -----  From: Mag Kiran MD  Sent: 6/3/2024   8:39 PM CDT  To: Helen Gonzalez, PANCHITO; Tia Holcomb RN; #  Subject: RE: sarcoid                                      Thanks!  Could we stop his methotrexate 2 weeks prior to surgery and then will resume 2 weeks afterwards?    Mag  ----- Message -----  From: Mandi Dickey MD  Sent: 6/3/2024   3:23 PM CDT  To: Helen Gonzalez, RN; #  Subject: RE: sarcoid                                      Yes I am on board with surgery 100 %   I think he will do well     Sarcoid is under control from the last PET no real reason for me to believe it is active since then     If you want him off of methotrexate I  can change him to cellcept pre op and maybe resume once recovered ?    Thanks for seeing him     RC  ----- Message -----  From: Mag Kiran MD  Sent: 5/31/2024  11:04 AM CDT  To: Mandi Dickey MD  Subject: sarcoid                                          Hi Mandi    I met Mr. Carney yesterday--the gentleman with a history of cardiac sarciodosis now in remission with good LV function and severe MR after MitraClip in August last year. His only option is a surgical MVR. I think he's a great surgical candidate except for his history or sarcoidosis with cardiac involvement. I don't have any experience to be honest with these patients but I feel ok to proceed given that his function is now normal and we think he is in remission? Wanted to get your thoughts--anything else we need to do preop? Can we temporarily stop his methotrexate preop and for how long?    Thanks!    Mag

## 2024-06-06 ENCOUNTER — PATIENT OUTREACH (OUTPATIENT)
Dept: CARDIOLOGY | Facility: CLINIC | Age: 64
End: 2024-06-06
Payer: COMMERCIAL

## 2024-06-06 NOTE — TELEPHONE ENCOUNTER
Called Jose to discuss switching from methotrexate to cellcept while getting ready for, having and recovering from Surgery.      Recommendation will be to have last dose of methotrexate approximately 3 weeks prior to surgery, wait a week and then start Cellcept 1000 mg BID.  He will remain on that until recovered from surgery.      Will follow up with Jose.      Addendum 6/7- called and LVM again

## 2024-06-07 ENCOUNTER — MYC MEDICAL ADVICE (OUTPATIENT)
Dept: CARDIOLOGY | Facility: CLINIC | Age: 64
End: 2024-06-07
Payer: COMMERCIAL

## 2024-06-07 DIAGNOSIS — D86.9 SARCOIDOSIS: Primary | ICD-10-CM

## 2024-06-07 RX ORDER — MYCOPHENOLATE MOFETIL 500 MG/1
1000 TABLET, FILM COATED ORAL 2 TIMES DAILY
Qty: 120 TABLET | Refills: 3 | Status: SHIPPED | OUTPATIENT
Start: 2024-06-30 | End: 2024-10-04

## 2024-06-07 NOTE — TELEPHONE ENCOUNTER
Date: 6/7/2024    Time of Call: 2:56 PM     Diagnosis:  heart failure/Sarcoid      [ VORB ] Ordering provider: Dr Dickey    Order: stop methotrexate on June 23rc.  Wait one week and start CellCept 1000 mg BID, stay on Cellcept until surgical follow up and then will review transition back to methotrexate     Order received by: Helen Gonzalez RN       Follow-up/additional notes: leslyhart sent to Jose

## 2024-06-19 ENCOUNTER — TELEPHONE (OUTPATIENT)
Dept: CARDIOLOGY | Facility: CLINIC | Age: 64
End: 2024-06-19
Payer: COMMERCIAL

## 2024-06-19 ENCOUNTER — MEDICAL CORRESPONDENCE (OUTPATIENT)
Dept: HEALTH INFORMATION MANAGEMENT | Facility: CLINIC | Age: 64
End: 2024-06-19
Payer: COMMERCIAL

## 2024-06-19 DIAGNOSIS — I34.0 SEVERE MITRAL REGURGITATION: Primary | ICD-10-CM

## 2024-06-19 DIAGNOSIS — I48.91 A-FIB (H): ICD-10-CM

## 2024-06-19 NOTE — TELEPHONE ENCOUNTER
Device check on 6/18:  Since 3/28/24 4 AT/AF episodes longest 3 hours 58 minutes for total time 10.7 hours, <1% burden. EGM's support AT/AF with overall rates controlled. 6 NSVT episodes. EGM's support brief NSVT. AFib not listed on problem list and not on anticoagulation, belle send to [facility] for review.     Off of AC since 2022 due to low AF burden.

## 2024-06-19 NOTE — TELEPHONE ENCOUNTER
[facility] Call Center    Phone Message    May a detailed message be left on voicemail: yes     Reason for Call: Other: Leonora wanted to make us aware at the last pacemaker check they had a few longer episodes of afib they were notified of. Please call if any questions otherwise they just wanted to make the team aware.      Action Taken: Other: Cardiology    Travel Screening: Not Applicable     Date of Service:                        Thank you!  [facility]

## 2024-06-24 NOTE — TELEPHONE ENCOUNTER
Date: 6/24/2024    Time of Call: 5:00 PM     Diagnosis:  heart failure/MR/Afib     [ VORB ] Ordering provider: Dr Dickey    Order: Restart Xeralto at 20 mg daily      Order received by: Helen Gonzalez RN       Follow-up/additional notes: Reviewed with Jose over phone, notified surgery team

## 2024-06-25 ENCOUNTER — TELEPHONE (OUTPATIENT)
Dept: CARDIOLOGY | Facility: CLINIC | Age: 64
End: 2024-06-25
Payer: COMMERCIAL

## 2024-06-25 NOTE — TELEPHONE ENCOUNTER
Sent dental clearance to scanning and to [facility]   Topical Anesthesia?: 2% lidocaine jelly Consent: The patient's consent was obtained including but not limited to risks of crusting, scabbing, blistering, scarring, darker or lighter pigmentary change, recurrence, incomplete removal and infection. Price (Use Numbers Only, No Special Characters Or $): 250.00 Detail Level: Detailed Post-Care Instructions: I reviewed with the patient in detail post-care instructions. Patient is to wear sunprotection, and avoid picking at any of the treated lesions. Pt may apply Vaseline to crusted or scabbing areas.

## 2024-06-30 LAB
ABO/RH(D): NORMAL
ANTIBODY SCREEN: NEGATIVE
SPECIMEN EXPIRATION DATE: NORMAL

## 2024-07-01 ENCOUNTER — ANESTHESIA EVENT (OUTPATIENT)
Dept: SURGERY | Facility: CLINIC | Age: 64
DRG: 219 | End: 2024-07-01
Payer: COMMERCIAL

## 2024-07-01 ENCOUNTER — APPOINTMENT (OUTPATIENT)
Dept: LAB | Facility: CLINIC | Age: 64
End: 2024-07-01
Attending: SURGERY
Payer: COMMERCIAL

## 2024-07-01 ENCOUNTER — OFFICE VISIT (OUTPATIENT)
Dept: SURGERY | Facility: CLINIC | Age: 64
End: 2024-07-01
Payer: COMMERCIAL

## 2024-07-01 ENCOUNTER — LAB (OUTPATIENT)
Dept: LAB | Facility: CLINIC | Age: 64
End: 2024-07-01
Payer: COMMERCIAL

## 2024-07-01 ENCOUNTER — HOSPITAL ENCOUNTER (OUTPATIENT)
Dept: CT IMAGING | Facility: CLINIC | Age: 64
Discharge: HOME | End: 2024-07-01
Attending: SURGERY | Admitting: SURGERY
Payer: COMMERCIAL

## 2024-07-01 ENCOUNTER — PRE VISIT (OUTPATIENT)
Dept: SURGERY | Facility: CLINIC | Age: 64
End: 2024-07-01

## 2024-07-01 VITALS
OXYGEN SATURATION: 100 % | DIASTOLIC BLOOD PRESSURE: 62 MMHG | HEIGHT: 73 IN | SYSTOLIC BLOOD PRESSURE: 108 MMHG | RESPIRATION RATE: 16 BRPM | BODY MASS INDEX: 23.34 KG/M2 | HEART RATE: 44 BPM | TEMPERATURE: 97.6 F | WEIGHT: 176.1 LBS

## 2024-07-01 DIAGNOSIS — I34.0 NONRHEUMATIC MITRAL VALVE REGURGITATION: ICD-10-CM

## 2024-07-01 DIAGNOSIS — I34.0 SEVERE MITRAL REGURGITATION: ICD-10-CM

## 2024-07-01 DIAGNOSIS — Z01.818 PREOP EXAMINATION: Primary | ICD-10-CM

## 2024-07-01 LAB
ALBUMIN SERPL BCG-MCNC: 4.2 G/DL (ref 3.5–5.2)
ALBUMIN UR-MCNC: NEGATIVE MG/DL
ALP SERPL-CCNC: 106 U/L (ref 40–150)
ALT SERPL W P-5'-P-CCNC: 34 U/L (ref 0–70)
ANION GAP SERPL CALCULATED.3IONS-SCNC: 9 MMOL/L (ref 7–15)
APPEARANCE UR: CLEAR
APTT PPP: 48 SECONDS (ref 22–38)
AST SERPL W P-5'-P-CCNC: 52 U/L (ref 0–45)
BILIRUB SERPL-MCNC: 0.8 MG/DL
BILIRUB UR QL STRIP: NEGATIVE
BUN SERPL-MCNC: 16.8 MG/DL (ref 8–23)
CALCIUM SERPL-MCNC: 9.2 MG/DL (ref 8.8–10.2)
CHLORIDE SERPL-SCNC: 105 MMOL/L (ref 98–107)
COLOR UR AUTO: ABNORMAL
CREAT SERPL-MCNC: 0.97 MG/DL (ref 0.67–1.17)
DEPRECATED HCO3 PLAS-SCNC: 27 MMOL/L (ref 22–29)
EGFRCR SERPLBLD CKD-EPI 2021: 88 ML/MIN/1.73M2
ERYTHROCYTE [DISTWIDTH] IN BLOOD BY AUTOMATED COUNT: 15.4 % (ref 10–15)
GLUCOSE SERPL-MCNC: 92 MG/DL (ref 70–99)
GLUCOSE UR STRIP-MCNC: NEGATIVE MG/DL
HBA1C MFR BLD: 5.4 %
HCT VFR BLD AUTO: 41.7 % (ref 40–53)
HGB BLD-MCNC: 13.8 G/DL (ref 13.3–17.7)
HGB UR QL STRIP: ABNORMAL
INR PPP: 2.44 (ref 0.85–1.15)
KETONES UR STRIP-MCNC: NEGATIVE MG/DL
LEUKOCYTE ESTERASE UR QL STRIP: NEGATIVE
MAGNESIUM SERPL-MCNC: 2 MG/DL (ref 1.7–2.3)
MCH RBC QN AUTO: 32.1 PG (ref 26.5–33)
MCHC RBC AUTO-ENTMCNC: 33.1 G/DL (ref 31.5–36.5)
MCV RBC AUTO: 97 FL (ref 78–100)
NITRATE UR QL: NEGATIVE
PH UR STRIP: 6.5 [PH] (ref 5–7)
PLATELET # BLD AUTO: 166 10E3/UL (ref 150–450)
POTASSIUM SERPL-SCNC: 4.2 MMOL/L (ref 3.4–5.3)
PREALB SERPL-MCNC: 14 MG/DL (ref 20–40)
PROT SERPL-MCNC: 6.8 G/DL (ref 6.4–8.3)
RBC # BLD AUTO: 4.3 10E6/UL (ref 4.4–5.9)
RBC URINE: 2 /HPF
SODIUM SERPL-SCNC: 141 MMOL/L (ref 135–145)
SP GR UR STRIP: 1.01 (ref 1–1.03)
SQUAMOUS EPITHELIAL: <1 /HPF
UROBILINOGEN UR STRIP-MCNC: 3 MG/DL
WBC # BLD AUTO: 6.2 10E3/UL (ref 4–11)
WBC URINE: 1 /HPF

## 2024-07-01 PROCEDURE — 85610 PROTHROMBIN TIME: CPT | Performed by: PATHOLOGY

## 2024-07-01 PROCEDURE — 80053 COMPREHEN METABOLIC PANEL: CPT | Performed by: PATHOLOGY

## 2024-07-01 PROCEDURE — 71275 CT ANGIOGRAPHY CHEST: CPT | Mod: 26 | Performed by: INTERNAL MEDICINE

## 2024-07-01 PROCEDURE — 74174 CTA ABD&PLVS W/CONTRAST: CPT | Mod: 26 | Performed by: INTERNAL MEDICINE

## 2024-07-01 PROCEDURE — 84134 ASSAY OF PREALBUMIN: CPT | Performed by: SURGERY

## 2024-07-01 PROCEDURE — 99000 SPECIMEN HANDLING OFFICE-LAB: CPT | Performed by: PATHOLOGY

## 2024-07-01 PROCEDURE — 86900 BLOOD TYPING SEROLOGIC ABO: CPT | Performed by: SURGERY

## 2024-07-01 PROCEDURE — 85027 COMPLETE CBC AUTOMATED: CPT | Performed by: PATHOLOGY

## 2024-07-01 PROCEDURE — 83735 ASSAY OF MAGNESIUM: CPT | Performed by: PATHOLOGY

## 2024-07-01 PROCEDURE — 85730 THROMBOPLASTIN TIME PARTIAL: CPT | Performed by: PATHOLOGY

## 2024-07-01 PROCEDURE — 83036 HEMOGLOBIN GLYCOSYLATED A1C: CPT | Performed by: SURGERY

## 2024-07-01 PROCEDURE — 36415 COLL VENOUS BLD VENIPUNCTURE: CPT | Performed by: PATHOLOGY

## 2024-07-01 PROCEDURE — 71275 CT ANGIOGRAPHY CHEST: CPT

## 2024-07-01 PROCEDURE — 99204 OFFICE O/P NEW MOD 45 MIN: CPT | Performed by: PHYSICIAN ASSISTANT

## 2024-07-01 PROCEDURE — 250N000011 HC RX IP 250 OP 636: Performed by: INTERNAL MEDICINE

## 2024-07-01 PROCEDURE — 81001 URINALYSIS AUTO W/SCOPE: CPT | Performed by: PATHOLOGY

## 2024-07-01 RX ORDER — METHOTREXATE 2.5 MG/1
TABLET ORAL
COMMUNITY
Start: 2024-06-07 | End: 2024-10-04

## 2024-07-01 RX ORDER — IOPAMIDOL 755 MG/ML
110 INJECTION, SOLUTION INTRAVASCULAR ONCE
Status: COMPLETED | OUTPATIENT
Start: 2024-07-01 | End: 2024-07-01

## 2024-07-01 RX ADMIN — IOPAMIDOL 110 ML: 755 INJECTION, SOLUTION INTRAVENOUS at 10:44

## 2024-07-01 ASSESSMENT — ENCOUNTER SYMPTOMS
DYSRHYTHMIAS: 1
SEIZURES: 0

## 2024-07-01 ASSESSMENT — LIFESTYLE VARIABLES: TOBACCO_USE: 0

## 2024-07-01 ASSESSMENT — PAIN SCALES - GENERAL: PAINLEVEL: NO PAIN (0)

## 2024-07-01 NOTE — PATIENT INSTRUCTIONS
Preparing for Your Surgery      Name:  Jose Carney   MRN:  [id number]   :  1960   Today's Date:  2024       Arriving for surgery:  Surgery date:  24  Arrival time:  5:30 am  Surgery time: 8:00 am    Please come to:     Please come to:       [facility] [place] [facility]    [address] [place]  [address]     The [facility] [place] Patient/Visitor Ramp is at [address]. Patients and visitors who self-park will receive the reduced hospital parking rate. If the Patient /Visitor Ramp is full, please follow the signs to the [employer] car park located at the main hospital entrance.       parking is available (24 hours/ 7 days a week)      Discounted parking pass options are available for patients and visitors. They can be purchased at the [employer] desk at the main hospital entrance.     -    Stop at the security desk and they will direct surgery patients to the Surgery Check in and [facility]. [phone]        - If you need directions, a wheelchair or an escort please stop at the Information/security desk in the lobby.     What can I eat or drink?  -  You may eat and drink normally up to 8 hours prior to arrival time. (Until 9:30 pm on 7/15/24)  -  You may have clear liquids until 2 hours prior to arrival time. (Until 3:30 am on 24)    Examples of clear liquids:  Water  Clear broth  Juices (apple, white grape, white cranberry  and cider) without pulp  Noncarbonated, powder based beverages  (lemonade and Carter-Aid)  Sodas (Sprite, 7-Up, ginger ale and seltzer)  Coffee or tea (without milk or cream)  Gatorade    -  No Alcohol or cannabis products for at least 24 hours before surgery.     Which medicines can I take?    Hold Aspirin the day of surgery.   Hold Multivitamins for 7 days before surgery.  Hold Supplements for 7 days before surgery.  Hold Ibuprofen (Advil, Motrin) for 7  day(s) before surgery--unless otherwise directed by surgeon.  Hold Naproxen (Aleve) for 7 days before surgery.  Last dose of Rivaroxaban (Xarelto) to be 7/12/24    -  DO NOT take these medications the day of surgery:  Folic acid  Furosemide (Lasix)  Sildenafil (Revatio) should not be taken within 24 hours of surgery    -  PLEASE TAKE these medications per your usual routine:  Cellcept  Methimazole (Tapazole)  Metoprolol  Timolol (Timoptic) eye drops and you may bring this to the hospital    How do I prepare myself?  - Please take 2 showers (one the night prior to surgery and one the morning of surgery) using Scrubcare or Hibiclens soap.    Use this soap only from the neck to your toes. Do not use in genital area.     Leave the soap on your skin for one minute--then rinse thoroughly.      You may use your own shampoo and conditioner. No other hair products.      Sleep in clean sheets and wear clean clothes.  - Please remove all jewelry and body piercings.  - No lotions, deodorants or fragrance.  - No makeup or fingernail polish.   - Bring your ID and insurance card.    -If you use a CPAP machine, please bring the CPAP machine, tubing, and mask to hospital.    -If you have a Deep Brain Stimulator, Spinal Cord Stimulator, or any Neuro Stimulator device---you must bring the remote control to the hospital.      ALL PATIENTS GOING HOME THE SAME DAY OF SURGERY ARE REQUIRED TO HAVE A RESPONSIBLE ADULT TO DRIVE AND BE IN ATTENDANCE WITH THEM FOR 24 HOURS FOLLOWING SURGERY.    Covid testing policy as of 12/06/2022  Your surgeon will notify and schedule you for a COVID test if one is needed before surgery--please direct any questions or COVID symptoms to your surgeon      Questions or Concerns:    - For any questions regarding the day of surgery or your hospital stay, please contact the Pre Admission Nursing Office at [phone].       - If you have health changes between today and your surgery, please call your surgeon.        - For questions after surgery, please call your surgeons office.           Current Visitor Guidelines    You may have 2 visitors in the pre op area.    Visiting hours: 8 a.m. to 8:30 p.m.    Patients confirmed or suspected to have symptoms of COVID 19 or flu:     No visitors allowed for adult patients.   Children (under age 18) can have 1 named visitor.     People who are sick or showing symptoms of COVID 19 or flu:    Are not allowed to visit patients--we can only make exceptions in special situations.       Please follow these guidelines for your visit:          Please maintain social distance          Masking is optional--however at times you may be asked to wear a mask for the safety of yourself and others     Clean your hands with alcohol hand . Do this when you arrive at and leave the building and patient room,    And again after you touch your mask or anything in the room.     Go directly to and from the room you are visiting.     Stay in the patient s room during your visit. Limit going to other places in the hospital as much as possible     Leave bags and jackets at home or in the car.     For everyone s health, please don t come and go during your visit. That includes for smoking   during your visit.

## 2024-07-01 NOTE — H&P
Pre-Operative H & P     CC:  Preoperative exam to assess for increased cardiopulmonary risk while undergoing surgery and anesthesia.    Date of Encounter: 7/1/2024  Primary Care Physician:  Amira Gentile     Reason for visit:   Encounter Diagnoses   Name Primary?    Severe mitral regurgitation Yes    Preop examination        HPI  Jose Carney is a 63 year old male who presents for pre-operative H & P in preparation for  Procedure Information       Case: [id number] Date/Time: 07/16/24 0800    Procedures:       MINIMALLY INVASIVE MITRAL VALVE REPLACEMENT, Transesophageal Echocardiogram (CADEN) (Chest)      MINIMALLY INVASIVE TRICUSPID VALVE REPAIR (Chest)    Anesthesia type: General    Diagnosis: Severe mitral regurgitation [I34.0]    Pre-op diagnosis: Severe mitral regurgitation [I34.0]    Location:  OR [address] [place] [address] OR    Providers: Mag Kiran MD            Patient is being evaluated for comorbid conditions of WILLIAM, hyperthyroidism, chronic immunosuppression.    Mr. Carney has a history of systemic sarcoidosis with cardiac involvement who is now in remission with daily methotrexate for quite some time. He had severe MR from posterior leaflet restriction that was treated with MitraClip in August 2023. He unfortunately has severe residual MR with mild/moderate stenosis and now moderate TR as well with evidence of pulmonary hypertension. He now presents for the above procedure.    He is followed closely by Dr. Dickey in cardiology for a history of biopsy-proven cardiac sarcoid from a transbronchial biopsy-who has a cardiac MRI consistent with myocardial involvement from sarcoidosis. His PET scan related inflammation resolved completely with 3-4 months of 40 mg of prednisone.  He had an ICD placed given he had high risk features on his cardiac MRI for malignant arrhythmias. With respect to his cardiac sarcoidosis, given history of inflammation by MRI and PET as well as ventricular arrhythmias, he is on  long-term immunosuppression with methotrexate.         History is obtained from the patient and chart review. He is accompanied by his wife.    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    ICD (implantable cardioverter-defibrillator) in place     Palpitations 03/20/2018    Paroxysmal atrial fibrillation (H)     Paroxysmal supraventricular tachycardia (H24) 03/20/2018    Sarcoid, cardiac/EF 54% per MRI,[place] of affected myocardium is 12% of myocardial mass 01/25/2018    Sarcoidosis/ systemic 2013 03/20/2018    Severe mitral regurgitation     Sleep apnea     Thyroid disease        Past Surgical History  Past Surgical History:   Procedure Laterality Date    CATARACT EXTRACTION      CV CORONARY ANGIOGRAM N/A 06/30/2023    Procedure: Coronary Angiogram;  Surgeon: Tony Morales MD;  Location: [facility] CARDIAC CATH LAB    CV TRANSCATHETER MITRAL VALVE REPAIR N/A 08/28/2023    Procedure: Transcatheter Mitral Valve Repair;  Surgeon: Tony Morales MD;  Location: [facility] CARDIAC CATH LAB    EP ABLATION SVT N/A 03/29/2022    Procedure: Ablation Supraventricular Tachycardia;  Surgeon: Lauro Will MD;  Location: [facility] CARDIAC CATH LAB    HERNIA REPAIR, INGUINAL RT/LT Bilateral     IMPLANTED DEVICE         Prior to Admission Medications  Current Outpatient Medications   Medication Sig Dispense Refill    CELLCEPT (BRAND) 500 MG tablet Take 2 tablets (1,000 mg) by mouth 2 times daily 120 tablet 3    furosemide (LASIX) 20 MG tablet Take 20 mg by mouth as needed      methimazole (TAPAZOLE) 5 MG tablet Take 5 mg by mouth every evening      metoprolol succinate ER (TOPROL XL) 25 MG 24 hr tablet Take 3 tablets (75 mg) by mouth daily (Patient taking differently: Take 75 mg by mouth daily as needed) 360 tablet 3    rivaroxaban ANTICOAGULANT  (XARELTO) 20 MG TABS tablet Take 1 tablet (20 mg) by mouth daily (with dinner) (Patient taking differently: Take 20 mg by mouth every morning) 30 tablet 1    sildenafil (REVATIO) 20 MG tablet Take 20-60 mg by mouth as needed      timolol maleate (TIMOPTIC) 0.5 % ophthalmic solution Place 1 drop into both eyes at bedtime      amoxicillin (AMOXIL) 500 MG capsule Take 4 capsules (2,000 mg) by mouth once as needed (Take 30-60 minutes prior to dental procedure or cleaning) (Patient not taking: Reported on 7/1/2024) 4 capsule 3    folic acid (FOLVITE) 1 MG tablet TAKE FIVE TABLETS (5MG) ONCE WEEKLY WITH YOUR METHOTREXATE (Patient not taking: Reported on 7/1/2024) 60 tablet 3    methotrexate 2.5 MG tablet TAKE EIGHT TABLETS BY MOUTH EVERY 7 DAYS (Patient not taking: Reported on 7/1/2024)      prednisoLONE acetate (PRED FORTE) 1 % ophthalmic susp Place 1 drop into both eyes every hour (Patient not taking: Reported on 7/1/2024)         Allergies  Allergies   Allergen Reactions    Seasonal Allergies     Terbinafine        Social History  Social History     Socioeconomic History    Marital status:      Spouse name: Not on file    Number of children: Not on file    Years of education: Not on file    Highest education level: Not on file   Occupational History    Not on file   Tobacco Use    Smoking status: Never    Smokeless tobacco: Never   Vaping Use    Vaping status: Not on file   Substance and Sexual Activity    Alcohol use: Not Currently    Drug use: No    Sexual activity: Not on file   Other Topics Concern    Parent/sibling w/ CABG, MI or angioplasty before 65F 55M? Not Asked   Social History Narrative    Not on file     Social Determinants of Health     Financial Resource Strain: Unknown (11/6/2022)    [facility], [facility]    Overall Financial Resource Strain (CARDIA)     Difficulty of Paying Living Expenses: Patient declined    Food Insecurity: Unknown (11/6/2022)    [facility]    Hunger Vital Sign     Worried About Running Out of Food in the Last Year: Patient declined     Ran Out of Food in the Last Year: Patient declined   Transportation Needs: Unknown (11/6/2022)    [facility]    PRAPARE - Transportation     Lack of Transportation (Medical): Patient declined     Lack of Transportation (Non-Medical): Patient declined   Physical Activity: Insufficiently Active (11/26/2021)    [facility]    Exercise Vital Sign     Days of Exercise per Week: 1 day     Minutes of Exercise per Session: 10 min   Stress: Stress Concern Present (11/26/2021)    [facility] from [facility]    [employer] [place] [employer] - Occupational Stress Questionnaire     Feeling of Stress : To some extent   Social Connections: Moderately Integrated (11/26/2021)    [facility] from [facility]    Social Connection and Isolation Panel [NHANES]     Frequency of Communication with Friends and Family: Once a week     Frequency of Social Gatherings with Friends and Family: Once a week     Attends Yazidism Services: More than 4 times per year     Active Member of Clubs or Organizations: Yes     Attends Club or Organization Meetings: Never     Marital Status:    Interpersonal Safety: Not At Risk (6/9/2023)    Received from [facility] IP Custom IPV     Do you feel UNSAFE in any of your personal relationships with your family members or any other acquaintances?: No   Housing  Stability: Not on file       Family History  Family History   Problem Relation Age of Onset    Anesthesia Reaction No family hx of     Deep Vein Thrombosis (DVT) No family hx of        Review of Systems  The complete review of systems is negative other than noted in the HPI or here.     Anesthesia Evaluation   Pt has had prior anesthetic.     No history of anesthetic complications       ROS/MED HX  ENT/Pulmonary:     (+) sleep apnea, uses CPAP,                                   (-) tobacco use, asthma and recent URI   Neurologic:  - neg neurologic ROS  (-) no seizures and no CVA   Cardiovascular: Comment: Systemic sarcoidosis w/ cardiac involvement    (+)  - -   -  - -   Taking blood thinners  Instructions Given to patient: on Xarelto.      NEWELL.        ICD Reason placed:high risk for arryhthmias 2/2 sarcoidosis.  type;[employer]   dysrhythmias (SVT), a-fib,  valvular problems/murmurs type: MR    pulmonary hypertension (46 mmHg), Previous cardiac testing   Echo: Date: 3/28/24 Results:  See H&P    Stress Test:  Date: Results:    ECG Reviewed:  Date: Results:    Cath:  Date: 8/28/23 Results:  1. Successful placement of 2 XT W clips  2. MR reduction from 4+ to 2+.  3. Mitral gradient 6 mmHg.      METS/Exercise Tolerance: 4 - Raking leaves, gardening Comment: Endorses NEWELL, denies CP     Hematologic:  - neg hematologic  ROS  (-) history of blood clots and history of blood transfusion   Musculoskeletal: Comment: Diffuse pain      GI/Hepatic:     (+) GERD (currently not treated; intermittent),                (-) liver disease   Renal/Genitourinary:  - neg Renal ROS  (-) renal disease   Endo:     (+)          thyroid problem (2/2 amiodarone toxicity), hyperthyroidism,        (-) Type II DM   Psychiatric/Substance Use:  - neg psychiatric ROS     Infectious Disease:  - neg infectious disease ROS     Malignancy:  - neg malignancy ROS     Other: Comment: Immunosuppressed w/ methotrexate; last dose 7/1             BP  "108/62 (BP Location: Right arm, Patient Position: Sitting, Cuff Size: Adult Regular)   Pulse (!) 44   Temp 97.6  F (36.4  C) (Oral)   Resp 16   Ht 1.844 m (6' 0.6\")   Wt 79.9 kg (176 lb 1.6 oz)   SpO2 100%   BMI 23.49 kg/m      Physical Exam  Constitutional: Awake, alert, cooperative, no apparent distress, and appears stated age.  Eyes: Pupils equal, round and reactive to light, extra ocular muscles intact, sclera clear, conjunctiva normal.  HENT: Normocephalic, oral pharynx with moist mucus membranes, good dentition. No goiter appreciated. No removable dental hardware.  Respiratory: Clear to auscultation bilaterally, no crackles or wheezing. No SOB when supine.  Cardiovascular: Irregular rate and rhythm, S1, S2, normal S1 and S2, and no 2/6 noted.  Carotids +1, no bruits. No edema. Palpable pulses to radial, DP and PT arteries.   GI: Normal bowel sounds, soft, non-distended, non-tender, no masses palpated.    Lymph/Hematologic: No cervical lymphadenopathy and no supraclavicular lymphadenopathy.  Genitourinary:  deferred  Skin: Warm and dry.  No rashes.   Musculoskeletal: full ROM of neck. There is no redness, warmth, or swelling of the joints. Gross motor strength is normal.    Neurologic: Awake, alert, oriented to name, place and time. Cranial nerves II-XII are grossly intact. Gait is normal. Ambulates from chair to exam table, seats self, lies supine and sits back up w/o assistance.  Neuropsychiatric: Calm, cooperative. Normal affect. Pleasant. Answers questions appropriately, follows commands w/o difficulty.        PRIOR LABS/DIAGNOSTIC STUDIES:    All labs and imaging personally reviewed        Heart cath -  please see in ROS above     Echo result w/o MOPS: Interpretation Summary Moderate left ventricular dilation. Left ventricular function is decreased. The ejection fraction is 50-55% (borderline).Global right ventricular function is normal.s/p MitraClip x 2 on 8/28/23. Residual moderate to severe " mitral insufficiency. Eccentric anterior jet noted. Mild mitral stenosis. Mean gradient 5 mmHg at HR 70. Moderate tricuspid insufficiency.Interatrial shunt noted w/ color doppler. Estimated pulmonary artery systolic pressure is 46 mmHg.IVC is normal. This study was compared with the study from 10/5/23. Abnormal findings are similar to prior.          Latest Ref Rng & Units 9/20/2018     4:23 PM   PFT   FVC L 4.22    FEV1 L 3.48    FVC% % 87    FEV1% % 91          The patient's records and results personally reviewed by this provider.       LAB/DIAGNOSTIC STUDIES TODAY:  CMP, CBC, A1c, prealb, INR, PTT, T&S, UA, magnesium    Assessment    Jose Carney is a 63 year old male seen as a PAC referral for risk assessment and optimization for anesthesia.    Plan/Recommendations  Pt will be optimized for the proposed procedure.  See below for details on the assessment, risk, and preoperative recommendations    NEUROLOGY  - No history of TIA, CVA or seizure    -Post Op delirium risk factors:  No risk identified    ENT  - No current airway concerns.  Will need to be reassessed day of surgery.  Mallampati: III  TM: > 3    CARDIAC  - s/p [employer] ICD implant due to risk of ventricular arrhythmias in setting of cardiac sarcoidosis. Pt denies h/o device delivering a shock.    - Significant NEWELL w/ severe mitral regurg. S/p mitral clipping 8/28/23.    - Echo 3/28/24: EF 50-55%. S/p MitraClip x 2 on 8/28/23. Residual moderate to severe mitral insufficiency. Moderate tricuspid insufficiency. Interatrial shunt noted w/ color doppler. Estimated PA systolic pressure is 46 mmHg.     - A-fib, on Xarelto. Last dose 7/12.   - Hold lasix, continue metoprolol DOS    - METS (Metabolic Equivalents)  Patient performs 4 or more METS exercise without symptoms             Total Score: 0      RCRI-Low risk: Class 2 0.9% complication rate             Total Score: 1    RCRI: High Risk Surgery        PULMONARY  - WILLIAM w/ CPAP     - Denies  "asthma or inhaler use  - Tobacco History    History   Smoking Status    Never   Smokeless Tobacco    Never       GI  - Intermittent GERD, currently not treated  PONV Medium Risk  Total Score: 2           1 AN PONV: Patient is not a current smoker    1 AN PONV: Intended Post Op Opioids          ENDOCRINE    - BMI: Estimated body mass index is 23.49 kg/m  as calculated from the following:    Height as of this encounter: 1.844 m (6' 0.6\").    Weight as of this encounter: 79.9 kg (176 lb 1.6 oz).  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 Continue tapazole.     HEME/ IMMUNE  VTE Low Risk 0.5%             Total Score: 3    VTE: Greater than 59 yrs old    VTE: Male      - on Xarelto, last dose 7/12  - on methotrexate for sarcoidosis, last dose 7/1    MSK  Patient IS Frail             Total Score: 3    Frailty: Weight loss 10 lbs or greater    Frailty: Slower walking speed    Frailty: Decrease in strength      Due to the patient's risk, a referral to Physical Medicine and Rehabilitation has been made.          The patient is aware that the final anesthesia plan will be decided by the assigned anesthesia provider on the date of service.      The patient is optimized for their procedure. AVS with information on surgery time/arrival time, meds and NPO status given by nursing staff. No further diagnostic testing indicated.      On the day of service:     Prep time: 14 minutes  Visit time: 24 minutes  Documentation time: 16 minutes  ------------------------------------------  Total time: 54 minutes      Layne Madera PA-C  Preoperative Assessment Center  [facility]  Clinic and Surgery Center  Phone: [phone]  Fax: [phone]    "

## 2024-07-05 LAB
ATRIAL RATE - MUSE: NORMAL BPM
DIASTOLIC BLOOD PRESSURE - MUSE: NORMAL MMHG
INTERPRETATION ECG - MUSE: NORMAL
P AXIS - MUSE: NORMAL DEGREES
PR INTERVAL - MUSE: NORMAL MS
QRS DURATION - MUSE: 144 MS
QT - MUSE: 434 MS
QTC - MUSE: 528 MS
R AXIS - MUSE: -58 DEGREES
SYSTOLIC BLOOD PRESSURE - MUSE: NORMAL MMHG
T AXIS - MUSE: 29 DEGREES
VENTRICULAR RATE- MUSE: 89 BPM

## 2024-07-14 ENCOUNTER — HEALTH MAINTENANCE LETTER (OUTPATIENT)
Age: 64
End: 2024-07-14

## 2024-07-16 ENCOUNTER — ANCILLARY PROCEDURE (OUTPATIENT)
Dept: CARDIOLOGY | Facility: CLINIC | Age: 64
DRG: 219 | End: 2024-07-16
Attending: SURGERY
Payer: COMMERCIAL

## 2024-07-16 ENCOUNTER — APPOINTMENT (OUTPATIENT)
Dept: GENERAL RADIOLOGY | Facility: CLINIC | Age: 64
DRG: 219 | End: 2024-07-16
Attending: STUDENT IN AN ORGANIZED HEALTH CARE EDUCATION/TRAINING PROGRAM
Payer: COMMERCIAL

## 2024-07-16 ENCOUNTER — ANESTHESIA (OUTPATIENT)
Dept: SURGERY | Facility: CLINIC | Age: 64
DRG: 219 | End: 2024-07-16
Payer: COMMERCIAL

## 2024-07-16 ENCOUNTER — HOSPITAL ENCOUNTER (INPATIENT)
Facility: CLINIC | Age: 64
LOS: 5 days | Discharge: HOME | DRG: 219 | End: 2024-07-21
Attending: SURGERY | Admitting: SURGERY
Payer: COMMERCIAL

## 2024-07-16 DIAGNOSIS — I34.0 SEVERE MITRAL REGURGITATION: ICD-10-CM

## 2024-07-16 LAB
ALBUMIN SERPL BCG-MCNC: 3.8 G/DL (ref 3.5–5.2)
ALLEN'S TEST: ABNORMAL
ALP SERPL-CCNC: 80 U/L (ref 40–150)
ALT SERPL W P-5'-P-CCNC: 15 U/L (ref 0–70)
ANION GAP SERPL CALCULATED.3IONS-SCNC: 14 MMOL/L (ref 7–15)
ANION GAP SERPL CALCULATED.3IONS-SCNC: 15 MMOL/L (ref 7–15)
ANION GAP SERPL CALCULATED.3IONS-SCNC: 16 MMOL/L (ref 7–15)
APTT PPP: 32 SECONDS (ref 22–38)
APTT PPP: 34 SECONDS (ref 22–38)
APTT PPP: 36 SECONDS (ref 22–38)
APTT PPP: 38 SECONDS (ref 22–38)
AST SERPL W P-5'-P-CCNC: 57 U/L (ref 0–45)
ATRIAL RATE - MUSE: 60 BPM
BASE EXCESS BLDA CALC-SCNC: -1.5 MMOL/L (ref -3–3)
BASE EXCESS BLDA CALC-SCNC: -2.3 MMOL/L (ref -3–3)
BASE EXCESS BLDA CALC-SCNC: -3.4 MMOL/L (ref -3–3)
BASE EXCESS BLDA CALC-SCNC: -3.8 MMOL/L (ref -3–3)
BASE EXCESS BLDA CALC-SCNC: -4.2 MMOL/L (ref -3–3)
BASE EXCESS BLDA CALC-SCNC: -4.7 MMOL/L (ref -3–3)
BASE EXCESS BLDA CALC-SCNC: -5.1 MMOL/L (ref -3–3)
BASE EXCESS BLDA CALC-SCNC: -5.1 MMOL/L (ref -3–3)
BASE EXCESS BLDA CALC-SCNC: -5.4 MMOL/L (ref -3–3)
BASE EXCESS BLDA CALC-SCNC: -5.4 MMOL/L (ref -3–3)
BASE EXCESS BLDA CALC-SCNC: -5.7 MMOL/L (ref -3–3)
BASE EXCESS BLDA CALC-SCNC: -5.8 MMOL/L (ref -3–3)
BASE EXCESS BLDA CALC-SCNC: -6.4 MMOL/L (ref -3–3)
BASE EXCESS BLDV CALC-SCNC: -1.3 MMOL/L (ref -3–3)
BASE EXCESS BLDV CALC-SCNC: -2.9 MMOL/L (ref -3–3)
BASE EXCESS BLDV CALC-SCNC: -3.7 MMOL/L (ref -3–3)
BASE EXCESS BLDV CALC-SCNC: -4.5 MMOL/L (ref -3–3)
BASE EXCESS BLDV CALC-SCNC: -5.7 MMOL/L (ref -3–3)
BILIRUB SERPL-MCNC: 1.5 MG/DL
BLD PROD TYP BPU: NORMAL
BLOOD COMPONENT TYPE: NORMAL
BUN SERPL-MCNC: 17.5 MG/DL (ref 8–23)
BUN SERPL-MCNC: 17.5 MG/DL (ref 8–23)
BUN SERPL-MCNC: 18.3 MG/DL (ref 8–23)
CA-I BLD-MCNC: 4.1 MG/DL (ref 4.4–5.2)
CA-I BLD-MCNC: 4.2 MG/DL (ref 4.4–5.2)
CA-I BLD-MCNC: 4.2 MG/DL (ref 4.4–5.2)
CA-I BLD-MCNC: 4.3 MG/DL (ref 4.4–5.2)
CA-I BLD-MCNC: 4.3 MG/DL (ref 4.4–5.2)
CA-I BLD-MCNC: 4.4 MG/DL (ref 4.4–5.2)
CA-I BLD-MCNC: 4.5 MG/DL (ref 4.4–5.2)
CA-I BLD-MCNC: 4.5 MG/DL (ref 4.4–5.2)
CA-I BLD-MCNC: 4.6 MG/DL (ref 4.4–5.2)
CA-I BLD-MCNC: 4.8 MG/DL (ref 4.4–5.2)
CA-I BLD-MCNC: 5.1 MG/DL (ref 4.4–5.2)
CALCIUM SERPL-MCNC: 8.5 MG/DL (ref 8.8–10.4)
CALCIUM SERPL-MCNC: 9.2 MG/DL (ref 8.8–10.4)
CALCIUM SERPL-MCNC: 9.4 MG/DL (ref 8.8–10.4)
CHLORIDE SERPL-SCNC: 105 MMOL/L (ref 98–107)
CHLORIDE SERPL-SCNC: 105 MMOL/L (ref 98–107)
CHLORIDE SERPL-SCNC: 106 MMOL/L (ref 98–107)
CLOT INIT KAOL IND TO POST HEP NEUT TRTO: 1 {RATIO}
CLOT INIT KAOLIN IND BLD US: 138 SEC (ref 113–166)
CLOT INIT KAOLIN IND P HEP NEUT BLD US: 139 SEC (ref 103–153)
CLOT STIFF PLT CONT BLD CALC: 12.7 HPA (ref 11.9–29.8)
CLOT STIFF TF IND P HEP NEUT BLD US: 14.3 HPA (ref 13–33.2)
CLOT STIFF TF IND+IIB-IIIA INH P HEP NEU: 1.6 HPA (ref 1–3.7)
CODING SYSTEM: NORMAL
COHGB MFR BLD: 98 % (ref 96–97)
COHGB MFR BLD: 98.5 % (ref 96–97)
COHGB MFR BLD: 98.7 % (ref 96–97)
CREAT SERPL-MCNC: 0.92 MG/DL (ref 0.67–1.17)
CREAT SERPL-MCNC: 0.98 MG/DL (ref 0.67–1.17)
CREAT SERPL-MCNC: 1 MG/DL (ref 0.67–1.17)
CROSSMATCH: NORMAL
CROSSMATCH: NORMAL
DIASTOLIC BLOOD PRESSURE - MUSE: NORMAL MMHG
EGFRCR SERPLBLD CKD-EPI 2021: 85 ML/MIN/1.73M2
EGFRCR SERPLBLD CKD-EPI 2021: 87 ML/MIN/1.73M2
EGFRCR SERPLBLD CKD-EPI 2021: >90 ML/MIN/1.73M2
ERYTHROCYTE [DISTWIDTH] IN BLOOD BY AUTOMATED COUNT: 14.7 % (ref 10–15)
ERYTHROCYTE [DISTWIDTH] IN BLOOD BY AUTOMATED COUNT: 14.8 % (ref 10–15)
ERYTHROCYTE [DISTWIDTH] IN BLOOD BY AUTOMATED COUNT: 14.9 % (ref 10–15)
FIBRINOGEN PPP-MCNC: 248 MG/DL (ref 170–490)
FIBRINOGEN PPP-MCNC: 283 MG/DL (ref 170–490)
FIBRINOGEN PPP-MCNC: 291 MG/DL (ref 170–490)
GLUCOSE BLD-MCNC: 106 MG/DL (ref 70–99)
GLUCOSE BLD-MCNC: 114 MG/DL (ref 70–99)
GLUCOSE BLD-MCNC: 122 MG/DL (ref 70–99)
GLUCOSE BLD-MCNC: 123 MG/DL (ref 70–99)
GLUCOSE BLD-MCNC: 124 MG/DL (ref 70–99)
GLUCOSE BLD-MCNC: 131 MG/DL (ref 70–99)
GLUCOSE BLD-MCNC: 132 MG/DL (ref 70–99)
GLUCOSE BLD-MCNC: 132 MG/DL (ref 70–99)
GLUCOSE BLD-MCNC: 138 MG/DL (ref 70–99)
GLUCOSE BLD-MCNC: 139 MG/DL (ref 70–99)
GLUCOSE BLD-MCNC: 139 MG/DL (ref 70–99)
GLUCOSE BLD-MCNC: 142 MG/DL (ref 70–99)
GLUCOSE BLDC GLUCOMTR-MCNC: 129 MG/DL (ref 70–99)
GLUCOSE BLDC GLUCOMTR-MCNC: 132 MG/DL (ref 70–99)
GLUCOSE BLDC GLUCOMTR-MCNC: 177 MG/DL (ref 70–99)
GLUCOSE BLDC GLUCOMTR-MCNC: 180 MG/DL (ref 70–99)
GLUCOSE BLDC GLUCOMTR-MCNC: 186 MG/DL (ref 70–99)
GLUCOSE BLDC GLUCOMTR-MCNC: 80 MG/DL (ref 70–99)
GLUCOSE SERPL-MCNC: 138 MG/DL (ref 70–99)
GLUCOSE SERPL-MCNC: 147 MG/DL (ref 70–99)
GLUCOSE SERPL-MCNC: 185 MG/DL (ref 70–99)
HCO3 BLD-SCNC: 20 MMOL/L (ref 21–28)
HCO3 BLD-SCNC: 21 MMOL/L (ref 21–28)
HCO3 BLD-SCNC: 22 MMOL/L (ref 21–28)
HCO3 BLDA-SCNC: 21 MMOL/L (ref 21–28)
HCO3 BLDA-SCNC: 22 MMOL/L (ref 21–28)
HCO3 BLDA-SCNC: 22 MMOL/L (ref 21–28)
HCO3 BLDA-SCNC: 23 MMOL/L (ref 21–28)
HCO3 BLDA-SCNC: 23 MMOL/L (ref 21–28)
HCO3 BLDA-SCNC: 24 MMOL/L (ref 21–28)
HCO3 BLDA-SCNC: 25 MMOL/L (ref 21–28)
HCO3 BLDA-SCNC: 26 MMOL/L (ref 21–28)
HCO3 BLDA-SCNC: 27 MMOL/L (ref 21–28)
HCO3 BLDA-SCNC: 27 MMOL/L (ref 21–28)
HCO3 BLDV-SCNC: 21 MMOL/L (ref 21–28)
HCO3 BLDV-SCNC: 23 MMOL/L (ref 21–28)
HCO3 BLDV-SCNC: 23 MMOL/L (ref 21–28)
HCO3 BLDV-SCNC: 24 MMOL/L (ref 21–28)
HCO3 BLDV-SCNC: 25 MMOL/L (ref 21–28)
HCO3 SERPL-SCNC: 19 MMOL/L (ref 22–29)
HCO3 SERPL-SCNC: 20 MMOL/L (ref 22–29)
HCO3 SERPL-SCNC: 21 MMOL/L (ref 22–29)
HCT VFR BLD AUTO: 33.9 % (ref 40–53)
HCT VFR BLD AUTO: 35.4 % (ref 40–53)
HCT VFR BLD AUTO: 37.4 % (ref 40–53)
HGB BLD-MCNC: 11.3 G/DL (ref 13.3–17.7)
HGB BLD-MCNC: 11.3 G/DL (ref 13.3–17.7)
HGB BLD-MCNC: 11.5 G/DL (ref 13.3–17.7)
HGB BLD-MCNC: 12 G/DL (ref 13.3–17.7)
HGB BLD-MCNC: 12.1 G/DL (ref 13.3–17.7)
HGB BLD-MCNC: 12.1 G/DL (ref 13.3–17.7)
HGB BLD-MCNC: 12.2 G/DL (ref 13.3–17.7)
HGB BLD-MCNC: 12.2 G/DL (ref 13.3–17.7)
HGB BLD-MCNC: 12.3 G/DL (ref 13.3–17.7)
HGB BLD-MCNC: 12.3 G/DL (ref 13.3–17.7)
HGB BLD-MCNC: 12.5 G/DL (ref 13.3–17.7)
HGB BLD-MCNC: 12.8 G/DL (ref 13.3–17.7)
HGB BLD-MCNC: 13 G/DL (ref 13.3–17.7)
HGB BLD-MCNC: 13.4 G/DL (ref 13.3–17.7)
HGB BLD-MCNC: 13.4 G/DL (ref 13.3–17.7)
INR PPP: 1.3 (ref 0.85–1.15)
INR PPP: 1.37 (ref 0.85–1.15)
INR PPP: 1.38 (ref 0.85–1.15)
INR PPP: 1.84 (ref 0.85–1.15)
INTERPRETATION ECG - MUSE: NORMAL
ISSUE DATE AND TIME: NORMAL
LACTATE BLD-SCNC: 0.8 MMOL/L (ref 0.7–2)
LACTATE BLD-SCNC: 1 MMOL/L (ref 0.7–2)
LACTATE BLD-SCNC: 1.1 MMOL/L (ref 0.7–2)
LACTATE BLD-SCNC: 1.6 MMOL/L (ref 0.7–2)
LACTATE BLD-SCNC: 1.7 MMOL/L (ref 0.7–2)
LACTATE BLD-SCNC: 1.7 MMOL/L (ref 0.7–2)
LACTATE BLD-SCNC: 2 MMOL/L (ref 0.7–2)
LACTATE SERPL-SCNC: 1.8 MMOL/L (ref 0.7–2)
LACTATE SERPL-SCNC: 2.7 MMOL/L (ref 0.7–2)
LACTATE SERPL-SCNC: 3.3 MMOL/L (ref 0.7–2)
LACTATE SERPL-SCNC: 3.7 MMOL/L (ref 0.7–2)
LACTATE SERPL-SCNC: 3.7 MMOL/L (ref 0.7–2)
MAGNESIUM SERPL-MCNC: 2.2 MG/DL (ref 1.7–2.3)
MAGNESIUM SERPL-MCNC: 2.6 MG/DL (ref 1.7–2.3)
MAGNESIUM SERPL-MCNC: 2.9 MG/DL (ref 1.7–2.3)
MCH RBC QN AUTO: 32.1 PG (ref 26.5–33)
MCH RBC QN AUTO: 32.3 PG (ref 26.5–33)
MCH RBC QN AUTO: 32.3 PG (ref 26.5–33)
MCHC RBC AUTO-ENTMCNC: 33.3 G/DL (ref 31.5–36.5)
MCHC RBC AUTO-ENTMCNC: 33.4 G/DL (ref 31.5–36.5)
MCHC RBC AUTO-ENTMCNC: 33.9 G/DL (ref 31.5–36.5)
MCV RBC AUTO: 95 FL (ref 78–100)
MCV RBC AUTO: 96 FL (ref 78–100)
MCV RBC AUTO: 97 FL (ref 78–100)
O2/TOTAL GAS SETTING VFR VENT: 100 %
O2/TOTAL GAS SETTING VFR VENT: 30 %
O2/TOTAL GAS SETTING VFR VENT: 40 %
O2/TOTAL GAS SETTING VFR VENT: 75 %
O2/TOTAL GAS SETTING VFR VENT: 75 %
O2/TOTAL GAS SETTING VFR VENT: 80 %
OXYHGB MFR BLDA: 98 % (ref 92–100)
OXYHGB MFR BLDA: 99 % (ref 92–100)
OXYHGB MFR BLDA: 99 % (ref 92–100)
OXYHGB MFR BLDV: 73 % (ref 70–75)
OXYHGB MFR BLDV: 75 % (ref 70–75)
OXYHGB MFR BLDV: 77 % (ref 70–75)
OXYHGB MFR BLDV: 77 % (ref 70–75)
OXYHGB MFR BLDV: 79 % (ref 70–75)
P AXIS - MUSE: 19 DEGREES
PCO2 BLD: 40 MM HG (ref 35–45)
PCO2 BLD: 41 MM HG (ref 35–45)
PCO2 BLD: 42 MM HG (ref 35–45)
PCO2 BLDA: 102 MM HG (ref 35–45)
PCO2 BLDA: 38 MM HG (ref 35–45)
PCO2 BLDA: 41 MM HG (ref 35–45)
PCO2 BLDA: 43 MM HG (ref 35–45)
PCO2 BLDA: 47 MM HG (ref 35–45)
PCO2 BLDA: 48 MM HG (ref 35–45)
PCO2 BLDA: 54 MM HG (ref 35–45)
PCO2 BLDA: 68 MM HG (ref 35–45)
PCO2 BLDA: 74 MM HG (ref 35–45)
PCO2 BLDA: 91 MM HG (ref 35–45)
PCO2 BLDV: 45 MM HG (ref 40–50)
PCO2 BLDV: 46 MM HG (ref 40–50)
PCO2 BLDV: 46 MM HG (ref 40–50)
PCO2 BLDV: 51 MM HG (ref 40–50)
PCO2 BLDV: 51 MM HG (ref 40–50)
PEEP: 5 CM H2O
PH BLD: 7.29 [PH] (ref 7.35–7.45)
PH BLD: 7.32 [PH] (ref 7.35–7.45)
PH BLD: 7.34 [PH] (ref 7.35–7.45)
PH BLDA: 7.04 [PH] (ref 7.35–7.45)
PH BLDA: 7.07 [PH] (ref 7.35–7.45)
PH BLDA: 7.14 [PH] (ref 7.35–7.45)
PH BLDA: 7.19 [PH] (ref 7.35–7.45)
PH BLDA: 7.24 [PH] (ref 7.35–7.45)
PH BLDA: 7.27 [PH] (ref 7.35–7.45)
PH BLDA: 7.31 [PH] (ref 7.35–7.45)
PH BLDA: 7.39 [PH] (ref 7.35–7.45)
PH BLDV: 7.26 [PH] (ref 7.32–7.43)
PH BLDV: 7.27 [PH] (ref 7.32–7.43)
PH BLDV: 7.27 [PH] (ref 7.32–7.43)
PH BLDV: 7.32 [PH] (ref 7.32–7.43)
PH BLDV: 7.34 [PH] (ref 7.32–7.43)
PHOSPHATE SERPL-MCNC: 3.8 MG/DL (ref 2.5–4.5)
PHOSPHATE SERPL-MCNC: 3.8 MG/DL (ref 2.5–4.5)
PHOSPHATE SERPL-MCNC: 4 MG/DL (ref 2.5–4.5)
PLATELET # BLD AUTO: 165 10E3/UL (ref 150–450)
PLATELET # BLD AUTO: 184 10E3/UL (ref 150–450)
PLATELET # BLD AUTO: 194 10E3/UL (ref 150–450)
PLATELET # BLD AUTO: 224 10E3/UL (ref 150–450)
PO2 BLD: 102 MM HG (ref 80–105)
PO2 BLD: 96 MM HG (ref 80–105)
PO2 BLD: 96 MM HG (ref 80–105)
PO2 BLDA: 160 MM HG (ref 80–105)
PO2 BLDA: 236 MM HG (ref 80–105)
PO2 BLDA: 326 MM HG (ref 80–105)
PO2 BLDA: 331 MM HG (ref 80–105)
PO2 BLDA: 367 MM HG (ref 80–105)
PO2 BLDA: 368 MM HG (ref 80–105)
PO2 BLDA: 392 MM HG (ref 80–105)
PO2 BLDA: 513 MM HG (ref 80–105)
PO2 BLDA: 574 MM HG (ref 80–105)
PO2 BLDA: >600 MM HG (ref 80–105)
PO2 BLDV: 41 MM HG (ref 25–47)
PO2 BLDV: 46 MM HG (ref 25–47)
PO2 BLDV: 46 MM HG (ref 25–47)
PO2 BLDV: 47 MM HG (ref 25–47)
PO2 BLDV: 51 MM HG (ref 25–47)
POTASSIUM BLD-SCNC: 3.7 MMOL/L (ref 3.4–5.3)
POTASSIUM BLD-SCNC: 3.7 MMOL/L (ref 3.4–5.3)
POTASSIUM BLD-SCNC: 3.8 MMOL/L (ref 3.4–5.3)
POTASSIUM BLD-SCNC: 4.3 MMOL/L (ref 3.4–5.3)
POTASSIUM BLD-SCNC: 4.6 MMOL/L (ref 3.4–5.3)
POTASSIUM BLD-SCNC: 4.6 MMOL/L (ref 3.4–5.3)
POTASSIUM BLD-SCNC: 4.7 MMOL/L (ref 3.4–5.3)
POTASSIUM BLD-SCNC: 4.7 MMOL/L (ref 3.4–5.3)
POTASSIUM BLD-SCNC: 4.9 MMOL/L (ref 3.4–5.3)
POTASSIUM BLD-SCNC: 5.4 MMOL/L (ref 3.4–5.3)
POTASSIUM SERPL-SCNC: 3.9 MMOL/L (ref 3.4–5.3)
POTASSIUM SERPL-SCNC: 4.2 MMOL/L (ref 3.4–5.3)
POTASSIUM SERPL-SCNC: 4.4 MMOL/L (ref 3.4–5.3)
PR INTERVAL - MUSE: 218 MS
PROT SERPL-MCNC: 5.7 G/DL (ref 6.4–8.3)
QRS DURATION - MUSE: 194 MS
QT - MUSE: 544 MS
QTC - MUSE: 544 MS
R AXIS - MUSE: -68 DEGREES
RBC # BLD AUTO: 3.52 10E6/UL (ref 4.4–5.9)
RBC # BLD AUTO: 3.71 10E6/UL (ref 4.4–5.9)
RBC # BLD AUTO: 3.87 10E6/UL (ref 4.4–5.9)
SAO2 % BLDA: 100 % (ref 96–97)
SAO2 % BLDA: 96 % (ref 92–100)
SAO2 % BLDA: 96 % (ref 92–100)
SAO2 % BLDA: 97 % (ref 92–100)
SAO2 % BLDA: >100 % (ref 96–97)
SAO2 % BLDV: 74.6 % (ref 70–75)
SAO2 % BLDV: 76.9 % (ref 70–75)
SAO2 % BLDV: 79 % (ref 70–75)
SAO2 % BLDV: 81 % (ref 70–75)
SAO2 % BLDV: 81.3 % (ref 70–75)
SODIUM BLD-SCNC: 139 MMOL/L (ref 135–145)
SODIUM BLD-SCNC: 140 MMOL/L (ref 135–145)
SODIUM BLD-SCNC: 141 MMOL/L (ref 135–145)
SODIUM BLD-SCNC: 142 MMOL/L (ref 135–145)
SODIUM BLD-SCNC: 143 MMOL/L (ref 135–145)
SODIUM SERPL-SCNC: 140 MMOL/L (ref 135–145)
SODIUM SERPL-SCNC: 140 MMOL/L (ref 135–145)
SODIUM SERPL-SCNC: 141 MMOL/L (ref 135–145)
SYSTOLIC BLOOD PRESSURE - MUSE: NORMAL MMHG
T AXIS - MUSE: 99 DEGREES
UNIT ABO/RH: NORMAL
UNIT NUMBER: NORMAL
UNIT STATUS: NORMAL
UNIT TYPE ISBT: 6200
VENTRICULAR RATE- MUSE: 60 BPM
WBC # BLD AUTO: 20.7 10E3/UL (ref 4–11)
WBC # BLD AUTO: 20.7 10E3/UL (ref 4–11)
WBC # BLD AUTO: 25.2 10E3/UL (ref 4–11)

## 2024-07-16 PROCEDURE — 86923 COMPATIBILITY TEST ELECTRIC: CPT | Performed by: SURGERY

## 2024-07-16 PROCEDURE — 250N000009 HC RX 250: Performed by: SURGERY

## 2024-07-16 PROCEDURE — 250N000011 HC RX IP 250 OP 636: Performed by: NURSE ANESTHETIST, CERTIFIED REGISTERED

## 2024-07-16 PROCEDURE — 250N000013 HC RX MED GY IP 250 OP 250 PS 637: Performed by: SURGERY

## 2024-07-16 PROCEDURE — 85049 AUTOMATED PLATELET COUNT: CPT | Performed by: SURGERY

## 2024-07-16 PROCEDURE — 999N000141 HC STATISTIC PRE-PROCEDURE NURSING ASSESSMENT: Performed by: SURGERY

## 2024-07-16 PROCEDURE — 250N000011 HC RX IP 250 OP 636: Performed by: ANESTHESIOLOGY

## 2024-07-16 PROCEDURE — 74018 RADEX ABDOMEN 1 VIEW: CPT | Mod: 26 | Performed by: STUDENT IN AN ORGANIZED HEALTH CARE EDUCATION/TRAINING PROGRAM

## 2024-07-16 PROCEDURE — 85384 FIBRINOGEN ACTIVITY: CPT | Performed by: SURGERY

## 2024-07-16 PROCEDURE — 85610 PROTHROMBIN TIME: CPT | Performed by: SURGERY

## 2024-07-16 PROCEDURE — 30283B1 TRANSFUSION OF NONAUTOLOGOUS 4-FACTOR PROTHROMBIN COMPLEX CONCENTRATE INTO VEIN, PERCUTANEOUS APPROACH: ICD-10-PCS | Performed by: SURGERY

## 2024-07-16 PROCEDURE — 4A133B3 MONITORING OF ARTERIAL PRESSURE, PULMONARY, PERCUTANEOUS APPROACH: ICD-10-PCS | Performed by: SURGERY

## 2024-07-16 PROCEDURE — 250N000009 HC RX 250

## 2024-07-16 PROCEDURE — P9045 ALBUMIN (HUMAN), 5%, 250 ML: HCPCS | Performed by: STUDENT IN AN ORGANIZED HEALTH CARE EDUCATION/TRAINING PROGRAM

## 2024-07-16 PROCEDURE — 82810 BLOOD GASES O2 SAT ONLY: CPT

## 2024-07-16 PROCEDURE — 82330 ASSAY OF CALCIUM: CPT

## 2024-07-16 PROCEDURE — 64461 PVB THORACIC SINGLE INJ SITE: CPT | Mod: 59 | Performed by: ANESTHESIOLOGY

## 2024-07-16 PROCEDURE — 258N000003 HC RX IP 258 OP 636: Performed by: STUDENT IN AN ORGANIZED HEALTH CARE EDUCATION/TRAINING PROGRAM

## 2024-07-16 PROCEDURE — 999N000065 XR ABDOMEN PORT 1 VIEW

## 2024-07-16 PROCEDURE — 370N000017 HC ANESTHESIA TECHNICAL FEE, PER MIN: Performed by: SURGERY

## 2024-07-16 PROCEDURE — 200N000002 HC R&B ICU UMMC

## 2024-07-16 PROCEDURE — 02HQ32Z INSERTION OF MONITORING DEVICE INTO RIGHT PULMONARY ARTERY, PERCUTANEOUS APPROACH: ICD-10-PCS | Performed by: SURGERY

## 2024-07-16 PROCEDURE — 250N000012 HC RX MED GY IP 250 OP 636 PS 637

## 2024-07-16 PROCEDURE — 93005 ELECTROCARDIOGRAM TRACING: CPT

## 2024-07-16 PROCEDURE — 250N000009 HC RX 250: Performed by: ANESTHESIOLOGY

## 2024-07-16 PROCEDURE — 02RG0JZ REPLACEMENT OF MITRAL VALVE WITH SYNTHETIC SUBSTITUTE, OPEN APPROACH: ICD-10-PCS | Performed by: SURGERY

## 2024-07-16 PROCEDURE — 93287 PERI-PX DEVICE EVAL & PRGR: CPT

## 2024-07-16 PROCEDURE — 83605 ASSAY OF LACTIC ACID: CPT | Performed by: STUDENT IN AN ORGANIZED HEALTH CARE EDUCATION/TRAINING PROGRAM

## 2024-07-16 PROCEDURE — 84100 ASSAY OF PHOSPHORUS: CPT | Performed by: PHYSICIAN ASSISTANT

## 2024-07-16 PROCEDURE — 83735 ASSAY OF MAGNESIUM: CPT | Performed by: PHYSICIAN ASSISTANT

## 2024-07-16 PROCEDURE — 5A1221Z PERFORMANCE OF CARDIAC OUTPUT, CONTINUOUS: ICD-10-PCS | Performed by: SURGERY

## 2024-07-16 PROCEDURE — C1760 CLOSURE DEV, VASC: HCPCS | Performed by: SURGERY

## 2024-07-16 PROCEDURE — 86923 COMPATIBILITY TEST ELECTRIC: CPT

## 2024-07-16 PROCEDURE — 250N000011 HC RX IP 250 OP 636: Performed by: SURGERY

## 2024-07-16 PROCEDURE — 85610 PROTHROMBIN TIME: CPT | Performed by: PHYSICIAN ASSISTANT

## 2024-07-16 PROCEDURE — 85049 AUTOMATED PLATELET COUNT: CPT | Performed by: STUDENT IN AN ORGANIZED HEALTH CARE EDUCATION/TRAINING PROGRAM

## 2024-07-16 PROCEDURE — 85730 THROMBOPLASTIN TIME PARTIAL: CPT | Performed by: SURGERY

## 2024-07-16 PROCEDURE — 82330 ASSAY OF CALCIUM: CPT | Performed by: PHYSICIAN ASSISTANT

## 2024-07-16 PROCEDURE — 258N000003 HC RX IP 258 OP 636: Performed by: ANESTHESIOLOGY

## 2024-07-16 PROCEDURE — 272N000088 HC PUMP APP ADULT PERFUSION: Performed by: SURGERY

## 2024-07-16 PROCEDURE — 94002 VENT MGMT INPAT INIT DAY: CPT

## 2024-07-16 PROCEDURE — 250N000013 HC RX MED GY IP 250 OP 250 PS 637: Performed by: STUDENT IN AN ORGANIZED HEALTH CARE EDUCATION/TRAINING PROGRAM

## 2024-07-16 PROCEDURE — 258N000003 HC RX IP 258 OP 636: Performed by: SURGERY

## 2024-07-16 PROCEDURE — 250N000011 HC RX IP 250 OP 636: Performed by: STUDENT IN AN ORGANIZED HEALTH CARE EDUCATION/TRAINING PROGRAM

## 2024-07-16 PROCEDURE — 84295 ASSAY OF SERUM SODIUM: CPT | Performed by: STUDENT IN AN ORGANIZED HEALTH CARE EDUCATION/TRAINING PROGRAM

## 2024-07-16 PROCEDURE — C1898 LEAD, PMKR, OTHER THAN TRANS: HCPCS | Performed by: SURGERY

## 2024-07-16 PROCEDURE — 33430 REPLACEMENT OF MITRAL VALVE: CPT | Performed by: ANESTHESIOLOGY

## 2024-07-16 PROCEDURE — 85730 THROMBOPLASTIN TIME PARTIAL: CPT | Performed by: PHYSICIAN ASSISTANT

## 2024-07-16 PROCEDURE — 410N000004: Performed by: SURGERY

## 2024-07-16 PROCEDURE — P9037 PLATE PHERES LEUKOREDU IRRAD: HCPCS | Performed by: STUDENT IN AN ORGANIZED HEALTH CARE EDUCATION/TRAINING PROGRAM

## 2024-07-16 PROCEDURE — 02PA0JZ REMOVAL OF SYNTHETIC SUBSTITUTE FROM HEART, OPEN APPROACH: ICD-10-PCS | Performed by: SURGERY

## 2024-07-16 PROCEDURE — 86900 BLOOD TYPING SEROLOGIC ABO: CPT | Performed by: SURGERY

## 2024-07-16 PROCEDURE — 250N000024 HC ISOFLURANE, PER MIN: Performed by: SURGERY

## 2024-07-16 PROCEDURE — 85396 CLOTTING ASSAY WHOLE BLOOD: CPT

## 2024-07-16 PROCEDURE — 88305 TISSUE EXAM BY PATHOLOGIST: CPT | Mod: 26 | Performed by: PATHOLOGY

## 2024-07-16 PROCEDURE — 93287 PERI-PX DEVICE EVAL & PRGR: CPT | Mod: 26 | Performed by: INTERNAL MEDICINE

## 2024-07-16 PROCEDURE — 271N000002 HC RX 271: Performed by: ANESTHESIOLOGY

## 2024-07-16 PROCEDURE — 85384 FIBRINOGEN ACTIVITY: CPT | Performed by: STUDENT IN AN ORGANIZED HEALTH CARE EDUCATION/TRAINING PROGRAM

## 2024-07-16 PROCEDURE — 360N000078 HC SURGERY LEVEL 5, PER MIN: Performed by: SURGERY

## 2024-07-16 PROCEDURE — 84100 ASSAY OF PHOSPHORUS: CPT | Performed by: STUDENT IN AN ORGANIZED HEALTH CARE EDUCATION/TRAINING PROGRAM

## 2024-07-16 PROCEDURE — 250N000009 HC RX 250: Performed by: PHYSICIAN ASSISTANT

## 2024-07-16 PROCEDURE — 82805 BLOOD GASES W/O2 SATURATION: CPT | Performed by: STUDENT IN AN ORGANIZED HEALTH CARE EDUCATION/TRAINING PROGRAM

## 2024-07-16 PROCEDURE — C1889 IMPLANT/INSERT DEVICE, NOC: HCPCS | Performed by: SURGERY

## 2024-07-16 PROCEDURE — 88305 TISSUE EXAM BY PATHOLOGIST: CPT | Mod: TC | Performed by: SURGERY

## 2024-07-16 PROCEDURE — 85049 AUTOMATED PLATELET COUNT: CPT | Performed by: PHYSICIAN ASSISTANT

## 2024-07-16 PROCEDURE — 71045 X-RAY EXAM CHEST 1 VIEW: CPT | Mod: 26 | Performed by: RADIOLOGY

## 2024-07-16 PROCEDURE — 82374 ASSAY BLOOD CARBON DIOXIDE: CPT | Performed by: STUDENT IN AN ORGANIZED HEALTH CARE EDUCATION/TRAINING PROGRAM

## 2024-07-16 PROCEDURE — 410N000003 HC PER-PERFUSION 1ST 30 MIN: Performed by: SURGERY

## 2024-07-16 PROCEDURE — 250N000009 HC RX 250: Performed by: STUDENT IN AN ORGANIZED HEALTH CARE EDUCATION/TRAINING PROGRAM

## 2024-07-16 PROCEDURE — 85396 CLOTTING ASSAY WHOLE BLOOD: CPT | Performed by: NURSE ANESTHETIST, CERTIFIED REGISTERED

## 2024-07-16 PROCEDURE — 83735 ASSAY OF MAGNESIUM: CPT | Performed by: STUDENT IN AN ORGANIZED HEALTH CARE EDUCATION/TRAINING PROGRAM

## 2024-07-16 PROCEDURE — 82330 ASSAY OF CALCIUM: CPT | Performed by: STUDENT IN AN ORGANIZED HEALTH CARE EDUCATION/TRAINING PROGRAM

## 2024-07-16 PROCEDURE — 250N000011 HC RX IP 250 OP 636

## 2024-07-16 PROCEDURE — 82805 BLOOD GASES W/O2 SATURATION: CPT

## 2024-07-16 PROCEDURE — 85610 PROTHROMBIN TIME: CPT | Performed by: STUDENT IN AN ORGANIZED HEALTH CARE EDUCATION/TRAINING PROGRAM

## 2024-07-16 PROCEDURE — 999N000157 HC STATISTIC RCP TIME EA 10 MIN

## 2024-07-16 PROCEDURE — 4A1239Z MONITORING OF CARDIAC OUTPUT, PERCUTANEOUS APPROACH: ICD-10-PCS | Performed by: SURGERY

## 2024-07-16 PROCEDURE — 33430 REPLACEMENT OF MITRAL VALVE: CPT | Performed by: SURGERY

## 2024-07-16 PROCEDURE — 250N000011 HC RX IP 250 OP 636: Performed by: PHYSICIAN ASSISTANT

## 2024-07-16 PROCEDURE — 83605 ASSAY OF LACTIC ACID: CPT | Performed by: PHYSICIAN ASSISTANT

## 2024-07-16 PROCEDURE — 85730 THROMBOPLASTIN TIME PARTIAL: CPT | Performed by: STUDENT IN AN ORGANIZED HEALTH CARE EDUCATION/TRAINING PROGRAM

## 2024-07-16 PROCEDURE — 84295 ASSAY OF SERUM SODIUM: CPT | Performed by: PHYSICIAN ASSISTANT

## 2024-07-16 PROCEDURE — 272N000085 HC PACK CELL SAVER CSP: Performed by: SURGERY

## 2024-07-16 PROCEDURE — 71045 X-RAY EXAM CHEST 1 VIEW: CPT

## 2024-07-16 PROCEDURE — 250N000013 HC RX MED GY IP 250 OP 250 PS 637: Performed by: PHYSICIAN ASSISTANT

## 2024-07-16 PROCEDURE — 272N000001 HC OR GENERAL SUPPLY STERILE: Performed by: SURGERY

## 2024-07-16 PROCEDURE — 33430 REPLACEMENT OF MITRAL VALVE: CPT | Performed by: NURSE ANESTHETIST, CERTIFIED REGISTERED

## 2024-07-16 DEVICE — VALVE MITRAL ON-X  TOP HAT  25/33MM ONXMC-25/33: Type: IMPLANTABLE DEVICE | Site: CHEST | Status: FUNCTIONAL

## 2024-07-16 RX ORDER — ACETAMINOPHEN 325 MG/1
975 TABLET ORAL ONCE
Status: COMPLETED | OUTPATIENT
Start: 2024-07-16 | End: 2024-07-16

## 2024-07-16 RX ORDER — VANCOMYCIN HYDROCHLORIDE
1500
Status: DISCONTINUED | OUTPATIENT
Start: 2024-07-16 | End: 2024-07-16

## 2024-07-16 RX ORDER — BISACODYL 10 MG
10 SUPPOSITORY, RECTAL RECTAL DAILY PRN
Status: DISCONTINUED | OUTPATIENT
Start: 2024-07-19 | End: 2024-07-21 | Stop reason: HOSPADM

## 2024-07-16 RX ORDER — LIDOCAINE 40 MG/G
CREAM TOPICAL
Status: DISCONTINUED | OUTPATIENT
Start: 2024-07-16 | End: 2024-07-16 | Stop reason: HOSPADM

## 2024-07-16 RX ORDER — CALCIUM CHLORIDE 100 MG/ML
INJECTION INTRAVENOUS; INTRAVENTRICULAR PRN
Status: DISCONTINUED | OUTPATIENT
Start: 2024-07-16 | End: 2024-07-16

## 2024-07-16 RX ORDER — FLUMAZENIL 0.1 MG/ML
0.2 INJECTION, SOLUTION INTRAVENOUS
Status: DISCONTINUED | OUTPATIENT
Start: 2024-07-16 | End: 2024-07-16 | Stop reason: HOSPADM

## 2024-07-16 RX ORDER — HYDRALAZINE HYDROCHLORIDE 20 MG/ML
10 INJECTION INTRAMUSCULAR; INTRAVENOUS EVERY 30 MIN PRN
Status: DISCONTINUED | OUTPATIENT
Start: 2024-07-16 | End: 2024-07-16

## 2024-07-16 RX ORDER — CEFAZOLIN SODIUM 1 G/3ML
1 INJECTION, POWDER, FOR SOLUTION INTRAMUSCULAR; INTRAVENOUS EVERY 8 HOURS
Status: COMPLETED | OUTPATIENT
Start: 2024-07-16 | End: 2024-07-17

## 2024-07-16 RX ORDER — HEPARIN SODIUM 1000 [USP'U]/ML
INJECTION, SOLUTION INTRAVENOUS; SUBCUTANEOUS PRN
Status: DISCONTINUED | OUTPATIENT
Start: 2024-07-16 | End: 2024-07-16

## 2024-07-16 RX ORDER — HYDROMORPHONE HCL IN WATER/PF 6 MG/30 ML
0.4 PATIENT CONTROLLED ANALGESIA SYRINGE INTRAVENOUS
Status: DISCONTINUED | OUTPATIENT
Start: 2024-07-16 | End: 2024-07-19

## 2024-07-16 RX ORDER — FIBRINOGEN (HUMAN) 700-1300MG
1050 KIT INTRAVENOUS ONCE
Status: DISCONTINUED | OUTPATIENT
Start: 2024-07-16 | End: 2024-07-16

## 2024-07-16 RX ORDER — ONDANSETRON 2 MG/ML
INJECTION INTRAMUSCULAR; INTRAVENOUS PRN
Status: DISCONTINUED | OUTPATIENT
Start: 2024-07-16 | End: 2024-07-16

## 2024-07-16 RX ORDER — VANCOMYCIN HYDROCHLORIDE 1 G/200ML
1000 INJECTION, SOLUTION INTRAVENOUS EVERY 12 HOURS
Status: COMPLETED | OUTPATIENT
Start: 2024-07-16 | End: 2024-07-17

## 2024-07-16 RX ORDER — SODIUM CHLORIDE, SODIUM LACTATE, POTASSIUM CHLORIDE, CALCIUM CHLORIDE 600; 310; 30; 20 MG/100ML; MG/100ML; MG/100ML; MG/100ML
INJECTION, SOLUTION INTRAVENOUS CONTINUOUS PRN
Status: DISCONTINUED | OUTPATIENT
Start: 2024-07-16 | End: 2024-07-16

## 2024-07-16 RX ORDER — CHLORHEXIDINE GLUCONATE ORAL RINSE 1.2 MG/ML
15 SOLUTION DENTAL EVERY 12 HOURS
Status: DISCONTINUED | OUTPATIENT
Start: 2024-07-16 | End: 2024-07-17

## 2024-07-16 RX ORDER — HEPARIN SODIUM 5000 [USP'U]/.5ML
5000 INJECTION, SOLUTION INTRAVENOUS; SUBCUTANEOUS EVERY 8 HOURS
Status: DISCONTINUED | OUTPATIENT
Start: 2024-07-17 | End: 2024-07-18

## 2024-07-16 RX ORDER — PANTOPRAZOLE SODIUM 40 MG/1
40 TABLET, DELAYED RELEASE ORAL DAILY
Status: DISCONTINUED | OUTPATIENT
Start: 2024-07-17 | End: 2024-07-21 | Stop reason: HOSPADM

## 2024-07-16 RX ORDER — LIDOCAINE HYDROCHLORIDE 20 MG/ML
INJECTION, SOLUTION INFILTRATION; PERINEURAL PRN
Status: DISCONTINUED | OUTPATIENT
Start: 2024-07-16 | End: 2024-07-16

## 2024-07-16 RX ORDER — ONDANSETRON 2 MG/ML
4 INJECTION INTRAMUSCULAR; INTRAVENOUS EVERY 6 HOURS PRN
Status: DISCONTINUED | OUTPATIENT
Start: 2024-07-16 | End: 2024-07-21 | Stop reason: HOSPADM

## 2024-07-16 RX ORDER — CEFAZOLIN SODIUM/WATER 2 G/20 ML
2 SYRINGE (ML) INTRAVENOUS SEE ADMIN INSTRUCTIONS
Status: DISCONTINUED | OUTPATIENT
Start: 2024-07-16 | End: 2024-07-16 | Stop reason: HOSPADM

## 2024-07-16 RX ORDER — PROTAMINE SULFATE 10 MG/ML
INJECTION, SOLUTION INTRAVENOUS PRN
Status: DISCONTINUED | OUTPATIENT
Start: 2024-07-16 | End: 2024-07-16

## 2024-07-16 RX ORDER — NALOXONE HYDROCHLORIDE 0.4 MG/ML
0.2 INJECTION, SOLUTION INTRAMUSCULAR; INTRAVENOUS; SUBCUTANEOUS
Status: DISCONTINUED | OUTPATIENT
Start: 2024-07-16 | End: 2024-07-21 | Stop reason: HOSPADM

## 2024-07-16 RX ORDER — TIMOLOL MALEATE 5 MG/ML
1 SOLUTION/ DROPS OPHTHALMIC AT BEDTIME
Status: DISCONTINUED | OUTPATIENT
Start: 2024-07-16 | End: 2024-07-21 | Stop reason: HOSPADM

## 2024-07-16 RX ORDER — NALOXONE HYDROCHLORIDE 0.4 MG/ML
0.2 INJECTION, SOLUTION INTRAMUSCULAR; INTRAVENOUS; SUBCUTANEOUS
Status: DISCONTINUED | OUTPATIENT
Start: 2024-07-16 | End: 2024-07-16 | Stop reason: HOSPADM

## 2024-07-16 RX ORDER — NALOXONE HYDROCHLORIDE 0.4 MG/ML
0.4 INJECTION, SOLUTION INTRAMUSCULAR; INTRAVENOUS; SUBCUTANEOUS
Status: DISCONTINUED | OUTPATIENT
Start: 2024-07-16 | End: 2024-07-16 | Stop reason: HOSPADM

## 2024-07-16 RX ORDER — FIBRINOGEN (HUMAN) 700-1300MG
1150 KIT INTRAVENOUS ONCE
Status: COMPLETED | OUTPATIENT
Start: 2024-07-16 | End: 2024-07-16

## 2024-07-16 RX ORDER — VASOPRESSIN IN 0.9 % NACL 2 UNIT/2ML
SYRINGE (ML) INTRAVENOUS PRN
Status: DISCONTINUED | OUTPATIENT
Start: 2024-07-16 | End: 2024-07-16

## 2024-07-16 RX ORDER — VANCOMYCIN HYDROCHLORIDE 1 G/200ML
1000 INJECTION, SOLUTION INTRAVENOUS
Status: COMPLETED | OUTPATIENT
Start: 2024-07-16 | End: 2024-07-16

## 2024-07-16 RX ORDER — NOREPINEPHRINE BITARTRATE 0.06 MG/ML
.01-.1 INJECTION, SOLUTION INTRAVENOUS CONTINUOUS
Status: DISCONTINUED | OUTPATIENT
Start: 2024-07-16 | End: 2024-07-16 | Stop reason: HOSPADM

## 2024-07-16 RX ORDER — OXYCODONE HYDROCHLORIDE 10 MG/1
10 TABLET ORAL EVERY 4 HOURS PRN
Status: DISCONTINUED | OUTPATIENT
Start: 2024-07-16 | End: 2024-07-21

## 2024-07-16 RX ORDER — FENTANYL CITRATE 50 UG/ML
25-50 INJECTION, SOLUTION INTRAMUSCULAR; INTRAVENOUS
Status: DISCONTINUED | OUTPATIENT
Start: 2024-07-16 | End: 2024-07-16 | Stop reason: HOSPADM

## 2024-07-16 RX ORDER — PROPOFOL 10 MG/ML
INJECTION, EMULSION INTRAVENOUS CONTINUOUS PRN
Status: DISCONTINUED | OUTPATIENT
Start: 2024-07-16 | End: 2024-07-16

## 2024-07-16 RX ORDER — PROPOFOL 10 MG/ML
INJECTION, EMULSION INTRAVENOUS PRN
Status: DISCONTINUED | OUTPATIENT
Start: 2024-07-16 | End: 2024-07-16

## 2024-07-16 RX ORDER — OXYCODONE HYDROCHLORIDE 5 MG/1
5 TABLET ORAL EVERY 4 HOURS PRN
Status: DISCONTINUED | OUTPATIENT
Start: 2024-07-16 | End: 2024-07-21 | Stop reason: HOSPADM

## 2024-07-16 RX ORDER — NICOTINE POLACRILEX 4 MG
15-30 LOZENGE BUCCAL
Status: DISCONTINUED | OUTPATIENT
Start: 2024-07-16 | End: 2024-07-17

## 2024-07-16 RX ORDER — PROPOFOL 10 MG/ML
5-75 INJECTION, EMULSION INTRAVENOUS CONTINUOUS
Status: DISCONTINUED | OUTPATIENT
Start: 2024-07-16 | End: 2024-07-17

## 2024-07-16 RX ORDER — ACETAMINOPHEN 325 MG/1
650 TABLET ORAL EVERY 4 HOURS PRN
Status: DISCONTINUED | OUTPATIENT
Start: 2024-07-19 | End: 2024-07-21 | Stop reason: HOSPADM

## 2024-07-16 RX ORDER — ONDANSETRON 4 MG/1
4 TABLET, ORALLY DISINTEGRATING ORAL EVERY 6 HOURS PRN
Status: DISCONTINUED | OUTPATIENT
Start: 2024-07-16 | End: 2024-07-21 | Stop reason: HOSPADM

## 2024-07-16 RX ORDER — METHIMAZOLE 5 MG/1
5 TABLET ORAL DAILY
Status: DISCONTINUED | OUTPATIENT
Start: 2024-07-17 | End: 2024-07-18

## 2024-07-16 RX ORDER — PHENYLEPHRINE HCL IN 0.9% NACL 50MG/250ML
.1-6 PLASTIC BAG, INJECTION (ML) INTRAVENOUS CONTINUOUS
Status: DISCONTINUED | OUTPATIENT
Start: 2024-07-16 | End: 2024-07-16 | Stop reason: HOSPADM

## 2024-07-16 RX ORDER — GABAPENTIN 300 MG/1
300 CAPSULE ORAL
Status: COMPLETED | OUTPATIENT
Start: 2024-07-16 | End: 2024-07-16

## 2024-07-16 RX ORDER — ASPIRIN 81 MG/1
81 TABLET, CHEWABLE ORAL DAILY
Status: DISCONTINUED | OUTPATIENT
Start: 2024-07-17 | End: 2024-07-21 | Stop reason: HOSPADM

## 2024-07-16 RX ORDER — DEXMEDETOMIDINE HYDROCHLORIDE 4 UG/ML
.2-.7 INJECTION, SOLUTION INTRAVENOUS CONTINUOUS
Status: DISCONTINUED | OUTPATIENT
Start: 2024-07-16 | End: 2024-07-17

## 2024-07-16 RX ORDER — MYCOPHENOLATE MOFETIL 200 MG/ML
1000 POWDER, FOR SUSPENSION ORAL 2 TIMES DAILY
Status: DISCONTINUED | OUTPATIENT
Start: 2024-07-16 | End: 2024-07-18

## 2024-07-16 RX ORDER — NALOXONE HYDROCHLORIDE 0.4 MG/ML
0.4 INJECTION, SOLUTION INTRAMUSCULAR; INTRAVENOUS; SUBCUTANEOUS
Status: DISCONTINUED | OUTPATIENT
Start: 2024-07-16 | End: 2024-07-21 | Stop reason: HOSPADM

## 2024-07-16 RX ORDER — SODIUM CHLORIDE 9 MG/ML
INJECTION, SOLUTION INTRAVENOUS CONTINUOUS PRN
Status: DISCONTINUED | OUTPATIENT
Start: 2024-07-16 | End: 2024-07-16

## 2024-07-16 RX ORDER — CHLORHEXIDINE GLUCONATE ORAL RINSE 1.2 MG/ML
10 SOLUTION DENTAL ONCE
Status: COMPLETED | OUTPATIENT
Start: 2024-07-16 | End: 2024-07-16

## 2024-07-16 RX ORDER — LIDOCAINE 40 MG/G
CREAM TOPICAL
Status: DISCONTINUED | OUTPATIENT
Start: 2024-07-16 | End: 2024-07-21 | Stop reason: HOSPADM

## 2024-07-16 RX ORDER — DEXTROSE MONOHYDRATE 25 G/50ML
25-50 INJECTION, SOLUTION INTRAVENOUS
Status: DISCONTINUED | OUTPATIENT
Start: 2024-07-16 | End: 2024-07-17

## 2024-07-16 RX ORDER — FAMOTIDINE 20 MG/1
20 TABLET, FILM COATED ORAL
Status: COMPLETED | OUTPATIENT
Start: 2024-07-16 | End: 2024-07-16

## 2024-07-16 RX ORDER — CALCIUM GLUCONATE 20 MG/ML
1 INJECTION, SOLUTION INTRAVENOUS
Status: DISCONTINUED | OUTPATIENT
Start: 2024-07-16 | End: 2024-07-21 | Stop reason: HOSPADM

## 2024-07-16 RX ORDER — POLYETHYLENE GLYCOL 3350 17 G/17G
17 POWDER, FOR SOLUTION ORAL DAILY
Status: DISCONTINUED | OUTPATIENT
Start: 2024-07-17 | End: 2024-07-19

## 2024-07-16 RX ORDER — DEXMEDETOMIDINE HYDROCHLORIDE 4 UG/ML
.2-1.2 INJECTION, SOLUTION INTRAVENOUS CONTINUOUS
Status: DISCONTINUED | OUTPATIENT
Start: 2024-07-16 | End: 2024-07-16 | Stop reason: HOSPADM

## 2024-07-16 RX ORDER — HYDRALAZINE HYDROCHLORIDE 20 MG/ML
10 INJECTION INTRAMUSCULAR; INTRAVENOUS EVERY 30 MIN PRN
Status: DISCONTINUED | OUTPATIENT
Start: 2024-07-16 | End: 2024-07-17

## 2024-07-16 RX ORDER — CEFAZOLIN SODIUM/WATER 2 G/20 ML
2 SYRINGE (ML) INTRAVENOUS
Status: COMPLETED | OUTPATIENT
Start: 2024-07-16 | End: 2024-07-16

## 2024-07-16 RX ORDER — AMOXICILLIN 250 MG
1 CAPSULE ORAL 2 TIMES DAILY
Status: DISCONTINUED | OUTPATIENT
Start: 2024-07-16 | End: 2024-07-19

## 2024-07-16 RX ORDER — CALCIUM GLUCONATE 20 MG/ML
2 INJECTION, SOLUTION INTRAVENOUS
Status: DISCONTINUED | OUTPATIENT
Start: 2024-07-16 | End: 2024-07-21 | Stop reason: HOSPADM

## 2024-07-16 RX ORDER — PROCHLORPERAZINE MALEATE 5 MG
10 TABLET ORAL EVERY 6 HOURS PRN
Status: DISCONTINUED | OUTPATIENT
Start: 2024-07-16 | End: 2024-07-21 | Stop reason: HOSPADM

## 2024-07-16 RX ORDER — SODIUM CHLORIDE, SODIUM GLUCONATE, SODIUM ACETATE, POTASSIUM CHLORIDE AND MAGNESIUM CHLORIDE 526; 502; 368; 37; 30 MG/100ML; MG/100ML; MG/100ML; MG/100ML; MG/100ML
INJECTION, SOLUTION INTRAVENOUS CONTINUOUS PRN
Status: DISCONTINUED | OUTPATIENT
Start: 2024-07-16 | End: 2024-07-16

## 2024-07-16 RX ORDER — ACETAMINOPHEN 325 MG/1
975 TABLET ORAL EVERY 8 HOURS
Status: COMPLETED | OUTPATIENT
Start: 2024-07-16 | End: 2024-07-19

## 2024-07-16 RX ORDER — FENTANYL CITRATE 50 UG/ML
INJECTION, SOLUTION INTRAMUSCULAR; INTRAVENOUS PRN
Status: DISCONTINUED | OUTPATIENT
Start: 2024-07-16 | End: 2024-07-16

## 2024-07-16 RX ORDER — HYDROMORPHONE HCL IN WATER/PF 6 MG/30 ML
0.2 PATIENT CONTROLLED ANALGESIA SYRINGE INTRAVENOUS
Status: DISCONTINUED | OUTPATIENT
Start: 2024-07-16 | End: 2024-07-19

## 2024-07-16 RX ORDER — NOREPINEPHRINE BITARTRATE 0.06 MG/ML
.01-.6 INJECTION, SOLUTION INTRAVENOUS CONTINUOUS
Status: DISCONTINUED | OUTPATIENT
Start: 2024-07-16 | End: 2024-07-18

## 2024-07-16 RX ADMIN — SODIUM CHLORIDE, POTASSIUM CHLORIDE, SODIUM LACTATE AND CALCIUM CHLORIDE 500 ML: 600; 310; 30; 20 INJECTION, SOLUTION INTRAVENOUS at 16:59

## 2024-07-16 RX ADMIN — HEPARIN SODIUM 9000 UNITS: 1000 INJECTION INTRAVENOUS; SUBCUTANEOUS at 09:51

## 2024-07-16 RX ADMIN — Medication 20 MG: at 09:24

## 2024-07-16 RX ADMIN — DEXMEDETOMIDINE HYDROCHLORIDE 0.3 MCG/KG/HR: 400 INJECTION INTRAVENOUS at 10:12

## 2024-07-16 RX ADMIN — FENTANYL CITRATE 100 MCG: 50 INJECTION INTRAMUSCULAR; INTRAVENOUS at 10:05

## 2024-07-16 RX ADMIN — INSULIN HUMAN 1.5 UNITS/HR: 1 INJECTION, SOLUTION INTRAVENOUS at 23:58

## 2024-07-16 RX ADMIN — SODIUM CHLORIDE 1.25 G/HR: 900 INJECTION, SOLUTION INTRAVENOUS at 09:50

## 2024-07-16 RX ADMIN — FIBRINOGEN (HUMAN) 1150 MG: KIT INTRAVENOUS at 14:54

## 2024-07-16 RX ADMIN — CEFAZOLIN 1 G: 1 INJECTION, POWDER, FOR SOLUTION INTRAMUSCULAR; INTRAVENOUS at 20:23

## 2024-07-16 RX ADMIN — NOREPINEPHRINE BITARTRATE 0.03 MCG/KG/MIN: 0.06 INJECTION, SOLUTION INTRAVENOUS at 08:33

## 2024-07-16 RX ADMIN — HYDROMORPHONE HYDROCHLORIDE 0.5 MG: 1 INJECTION, SOLUTION INTRAMUSCULAR; INTRAVENOUS; SUBCUTANEOUS at 13:17

## 2024-07-16 RX ADMIN — HEPARIN SODIUM 31000 UNITS: 1000 INJECTION INTRAVENOUS; SUBCUTANEOUS at 09:42

## 2024-07-16 RX ADMIN — SODIUM BICARBONATE 50 MEQ: 84 INJECTION, SOLUTION INTRAVENOUS at 18:41

## 2024-07-16 RX ADMIN — EPINEPHRINE 0.03 MCG/KG/MIN: 1 INJECTION INTRAMUSCULAR; INTRAVENOUS; SUBCUTANEOUS at 08:36

## 2024-07-16 RX ADMIN — NOREPINEPHRINE BITARTRATE 6.4 MCG: 1 INJECTION, SOLUTION, CONCENTRATE INTRAVENOUS at 08:26

## 2024-07-16 RX ADMIN — Medication 50 MG: at 08:20

## 2024-07-16 RX ADMIN — FAMOTIDINE 20 MG: 20 TABLET ORAL at 06:42

## 2024-07-16 RX ADMIN — NOREPINEPHRINE BITARTRATE 12.8 MCG: 1 INJECTION, SOLUTION, CONCENTRATE INTRAVENOUS at 12:26

## 2024-07-16 RX ADMIN — GABAPENTIN 300 MG: 300 CAPSULE ORAL at 06:41

## 2024-07-16 RX ADMIN — PROTAMINE SULFATE 220 MG: 10 INJECTION, SOLUTION INTRAVENOUS at 12:41

## 2024-07-16 RX ADMIN — SODIUM CHLORIDE: 0.9 INJECTION, SOLUTION INTRAVENOUS at 08:09

## 2024-07-16 RX ADMIN — NOREPINEPHRINE BITARTRATE 6.4 MCG: 1 INJECTION, SOLUTION, CONCENTRATE INTRAVENOUS at 08:30

## 2024-07-16 RX ADMIN — Medication 2 G: at 13:05

## 2024-07-16 RX ADMIN — Medication 20 MG: at 11:49

## 2024-07-16 RX ADMIN — NOREPINEPHRINE BITARTRATE 6.4 MCG: 1 INJECTION, SOLUTION, CONCENTRATE INTRAVENOUS at 08:21

## 2024-07-16 RX ADMIN — PROPOFOL 100 MCG/KG/MIN: 10 INJECTION, EMULSION INTRAVENOUS at 12:08

## 2024-07-16 RX ADMIN — EPINEPHRINE 0.07 MCG/KG/MIN: 1 INJECTION INTRAMUSCULAR; INTRAVENOUS; SUBCUTANEOUS at 15:21

## 2024-07-16 RX ADMIN — CALCIUM GLUCONATE 1 G: 20 INJECTION, SOLUTION INTRAVENOUS at 21:53

## 2024-07-16 RX ADMIN — CHLORHEXIDINE GLUCONATE 0.12% ORAL RINSE 10 ML: 1.2 LIQUID ORAL at 06:42

## 2024-07-16 RX ADMIN — SODIUM CHLORIDE, SODIUM GLUCONATE, SODIUM ACETATE, POTASSIUM CHLORIDE AND MAGNESIUM CHLORIDE: 526; 502; 368; 37; 30 INJECTION, SOLUTION INTRAVENOUS at 09:00

## 2024-07-16 RX ADMIN — FENTANYL CITRATE 150 MCG: 50 INJECTION INTRAMUSCULAR; INTRAVENOUS at 08:18

## 2024-07-16 RX ADMIN — NOREPINEPHRINE BITARTRATE 0.07 MCG/KG/MIN: 0.06 INJECTION, SOLUTION INTRAVENOUS at 14:55

## 2024-07-16 RX ADMIN — DEXMEDETOMIDINE HYDROCHLORIDE 0.3 MCG/KG/HR: 4 INJECTION, SOLUTION INTRAVENOUS at 15:02

## 2024-07-16 RX ADMIN — VANCOMYCIN HYDROCHLORIDE 1000 MG: 1 INJECTION, SOLUTION INTRAVENOUS at 18:50

## 2024-07-16 RX ADMIN — SODIUM CHLORIDE, POTASSIUM CHLORIDE, SODIUM LACTATE AND CALCIUM CHLORIDE: 600; 310; 30; 20 INJECTION, SOLUTION INTRAVENOUS at 08:15

## 2024-07-16 RX ADMIN — NOREPINEPHRINE BITARTRATE 6.4 MCG: 1 INJECTION, SOLUTION, CONCENTRATE INTRAVENOUS at 12:45

## 2024-07-16 RX ADMIN — Medication 10 ML/HR: at 09:54

## 2024-07-16 RX ADMIN — OXYCODONE HYDROCHLORIDE 10 MG: 10 TABLET ORAL at 16:51

## 2024-07-16 RX ADMIN — SENNOSIDES AND DOCUSATE SODIUM 1 TABLET: 50; 8.6 TABLET ORAL at 20:17

## 2024-07-16 RX ADMIN — Medication 1 UNITS: at 08:39

## 2024-07-16 RX ADMIN — PROPOFOL 30 MG: 10 INJECTION, EMULSION INTRAVENOUS at 12:00

## 2024-07-16 RX ADMIN — ALBUMIN HUMAN 25 G: 0.05 INJECTION, SOLUTION INTRAVENOUS at 18:43

## 2024-07-16 RX ADMIN — CHLORHEXIDINE GLUCONATE 0.12% ORAL RINSE 15 ML: 1.2 LIQUID ORAL at 20:17

## 2024-07-16 RX ADMIN — Medication 150 MG: at 13:23

## 2024-07-16 RX ADMIN — FENTANYL CITRATE 50 MCG: 50 INJECTION, SOLUTION INTRAMUSCULAR; INTRAVENOUS at 06:59

## 2024-07-16 RX ADMIN — Medication 50 MEQ: at 16:56

## 2024-07-16 RX ADMIN — Medication 2 G: at 09:05

## 2024-07-16 RX ADMIN — HYDROMORPHONE HYDROCHLORIDE 0.5 MG: 1 INJECTION, SOLUTION INTRAMUSCULAR; INTRAVENOUS; SUBCUTANEOUS at 12:58

## 2024-07-16 RX ADMIN — NOREPINEPHRINE BITARTRATE 6.4 MCG: 1 INJECTION, SOLUTION, CONCENTRATE INTRAVENOUS at 12:38

## 2024-07-16 RX ADMIN — OXYCODONE HYDROCHLORIDE 10 MG: 10 TABLET ORAL at 20:50

## 2024-07-16 RX ADMIN — FENTANYL CITRATE 100 MCG: 50 INJECTION INTRAMUSCULAR; INTRAVENOUS at 11:53

## 2024-07-16 RX ADMIN — NOREPINEPHRINE BITARTRATE 6.4 MCG: 1 INJECTION, SOLUTION, CONCENTRATE INTRAVENOUS at 08:18

## 2024-07-16 RX ADMIN — MYCOPHENOLATE MOFETIL 1000 MG: 200 POWDER, FOR SUSPENSION ORAL at 20:17

## 2024-07-16 RX ADMIN — FENTANYL CITRATE 50 MCG: 50 INJECTION INTRAMUSCULAR; INTRAVENOUS at 11:52

## 2024-07-16 RX ADMIN — SODIUM BICARBONATE 50 MEQ: 84 INJECTION, SOLUTION INTRAVENOUS at 16:56

## 2024-07-16 RX ADMIN — VANCOMYCIN HYDROCHLORIDE 1000 MG: 1 INJECTION, SOLUTION INTRAVENOUS at 07:13

## 2024-07-16 RX ADMIN — CALCIUM CHLORIDE INJECTION 500 MG: 100 INJECTION, SOLUTION INTRAVENOUS at 13:30

## 2024-07-16 RX ADMIN — PROTHROMBIN, COAGULATION FACTOR VII HUMAN, COAGULATION FACTOR IX HUMAN, COAGULATION FACTOR X HUMAN, PROTEIN C, PROTEIN S HUMAN, AND WATER 562 UNITS: KIT at 13:29

## 2024-07-16 RX ADMIN — NOREPINEPHRINE BITARTRATE 6.4 MCG: 1 INJECTION, SOLUTION, CONCENTRATE INTRAVENOUS at 12:32

## 2024-07-16 RX ADMIN — LIDOCAINE HYDROCHLORIDE 60 MG: 20 INJECTION, SOLUTION INFILTRATION; PERINEURAL at 08:18

## 2024-07-16 RX ADMIN — Medication 30 MG: at 09:34

## 2024-07-16 RX ADMIN — DEXMEDETOMIDINE HYDROCHLORIDE 0.3 MCG/KG/HR: 4 INJECTION, SOLUTION INTRAVENOUS at 18:50

## 2024-07-16 RX ADMIN — MIDAZOLAM 1 MG: 1 INJECTION INTRAMUSCULAR; INTRAVENOUS at 08:04

## 2024-07-16 RX ADMIN — SODIUM CHLORIDE 7.5 G: 900 INJECTION, SOLUTION INTRAVENOUS at 08:53

## 2024-07-16 RX ADMIN — MIDAZOLAM 1 MG: 1 INJECTION INTRAMUSCULAR; INTRAVENOUS at 06:58

## 2024-07-16 RX ADMIN — TIMOLOL MALEATE 1 DROP: 5 SOLUTION/ DROPS OPHTHALMIC at 21:54

## 2024-07-16 RX ADMIN — PROPOFOL 120 MG: 10 INJECTION, EMULSION INTRAVENOUS at 08:18

## 2024-07-16 RX ADMIN — ONDANSETRON 4 MG: 2 INJECTION INTRAMUSCULAR; INTRAVENOUS at 13:24

## 2024-07-16 RX ADMIN — ACETAMINOPHEN 975 MG: 325 TABLET, FILM COATED ORAL at 06:41

## 2024-07-16 RX ADMIN — PROPOFOL 50 MCG/KG/MIN: 10 INJECTION, EMULSION INTRAVENOUS at 15:01

## 2024-07-16 RX ADMIN — FENTANYL CITRATE 100 MCG: 50 INJECTION INTRAMUSCULAR; INTRAVENOUS at 09:32

## 2024-07-16 RX ADMIN — ACETAMINOPHEN 975 MG: 325 TABLET, FILM COATED ORAL at 16:51

## 2024-07-16 RX ADMIN — Medication 1 UNITS: at 08:42

## 2024-07-16 RX ADMIN — Medication 10 MG: at 20:00

## 2024-07-16 ASSESSMENT — ACTIVITIES OF DAILY LIVING (ADL)
ADLS_ACUITY_SCORE: 30
ADLS_ACUITY_SCORE: 19
ADLS_ACUITY_SCORE: 30
ADLS_ACUITY_SCORE: 19
ADLS_ACUITY_SCORE: 30
ADLS_ACUITY_SCORE: 19
ADLS_ACUITY_SCORE: 30
ADLS_ACUITY_SCORE: 30
ADLS_ACUITY_SCORE: 19

## 2024-07-16 NOTE — PROGRESS NOTES
Pt arrived from OR @ ~1400. Sedated on 0.3 dex; propofol off. Following commands, PERRL, FLOWERS. AV paced in 60s. MAP > 65 on norepi, epi, vaso. CMV 30%/450/18/5. CT x2 w/ initial 150ml OP on arrival, slowed to 20-50/hr. 1U plt, fibryga given. Sena w/ adequate OP. Gave 500 LR, 25g albumin, 2 amps bicarb for increasing lactic acid.     Plan: Continue POC  For vital signs and complete assessments, please see documentation flowsheets.

## 2024-07-16 NOTE — H&P
CV ICU H&P    ASSESSMENT: Jose Carney is a 63 year old male with PMH of WILLIAM, SVT s/p ICD, hyperthyroidism, and systemic sarcoidosis with cardiac involvement prompting presentation. Patient is s/p MitraClip in August 2023 d/t severe MR from posterior leaflet restriction who ultimetely continued with moderate residual MR and evidence of new pulmonary HTN. Patient now presents to [facility] for minimally invasive MVR with On-X  mechanical valve by Dr. Kiran on 7/16/24.     CO-MORBIDITIES:   Patient Active Problem List   Diagnosis    Sarcoidosis/ systemic 2013    Paroxysmal supraventricular tachycardia (H24)    Palpitations    First degree AV block    Sarcoid, cardiac/EF 54% per MRI,[place] of affected myocardium is 12% of myocardial mass    Status post coronary angiogram    Mitral regurgitation    Severe mitral regurgitation       Intra-op:   - Arrived @ 1351 on 0.05 norepi, 2 vasopressin; precedex and propofol for sedation   - EBL: 1L // Cell-saver: 220 // Blood products: 0 // Kcentra: 562 // Fibryga: 0   - UOP: 520 // Given 2500 IVF throughout case  - Hemodynamic goals post-op: MAP >65, SBP < 120   - Vent Settings: Tidal Volume 450, PEEP 5, RR 14, FiO2 40   - Access: RIJ CVC, PAC, Left radial arterial line  - Pacer wires in place (V wires backup, not pacing);  also with permanent ICD in place        PLAN:  Neuro/ pain/ sedation:  - Monitor neurological status. Notify the MD for any acute changes in exam.  # Acute postoperative pain  - Scheduled: Tylenol  - PRN: Tylenol, oxycodone, Dilaudid  # Sedation  - Gtt: Propofol, precedex   - Wean for RASS goal 0 to -1    HEENT:  #Sarcoidosis with eye involvement  - PTA timolol maleate drops     Pulmonary care:   # Mechanical ventilation  # WILLIAM, CPAP at home   Resp: 16   Vent Settings on arrival:    - Tidal Volume: 450   - PEEP: 5   - RR: 14   - FiO2: 40  - Goal SpO2 > 92%  - Encourage IS q15-30 minutes when awake after extubation   - CXR on arrival, then daily   - Monitor CT  output   - Wean vent as able; Trend ABG's   - Hold PTA CPAP while intubated, consider resuming on extubation     Cardiovascular:    # Cardiogenic shock  # Sarcoidosis with cardiac involvement, EF 54% per MRI   # Mitral Regurgitation s/p MitraClip (8/2023) s/p MVR with On-X valve (7/16/24)  # SVT s/p ICD placement  # Afib, on Xarelto at home   Pre CPBP: LVEF 45%  Post CPBP: LVEF decreased to 30%; mitral valve with no paravalvular leak; moderate tricuspid regurgitation   - Monitor hemodynamics closely  - Goal MAP >65, SBP <120   - Epi, NE, Vaso gtts for inotropic and pressor support, wean as able  - Hold PTA Xarelto, Metoprolol succinate 75mg daily  - Hold Statin   - Hold BB   - ASA: start tomorrow  - Cap TPW when able post-op     GI care / Nutrition:   # NPO status   # Intermittent GERD, no treatment at home   - NPO, bedside swallow eval once extubated  - PPI  - Bowel regimen: MiraLAX, senna  - OG tube for meds      Renal / Fluids / Electrolytes:   # Volume status   # Electrolyte monitoring  # Lactate trending    BL creat appears to be ~ 0.95 - 1.0   - Strict I/O, daily weights, avoid/limit nephrotoxins  - Replete lytes PRN per protocol  - Trend lactate   - Hold PTA sildenafil     Endocrine:    # Stress hyperglycemia  Preop A1c 5.4   (7/1/24)   - Insulin gtt  - Goal BG <180 for optimal healing  # Hyperthyroidism d/t amiodarone toxicity by history   - Resume PTA methimazole tomorrow 7/17    ID / Antibiotics:  # Stress induced leukocytosis  - To complete perioperative regimen  - Monitor fever curve, WBC, and inflammatory markers as appropriate  # Chronic Immunosuppression given sarcoidosis   - Resume cellcept 500mg BID  - Hold PTA methotrexate and folic acid   - Not given stress dose steroids prior to procedure     Heme:     # Acute blood loss anemia  # Post CPB thrombocytopenia  No s/sx active bleeding  - Trending CBC   - Hgb goal > 7.0    MSK / Skin:  # Sternotomy  # Surgical Incision  - Sternal precautions, postop  incision management protocol  - PT/OT/CR    Prophylaxis:     - Mechanical DVT ppx  - Chemical DVT ppx: start SQH tomorrow  - PPI    Lines / Tubes / Drains:  - ETT  - RIJ CVC, PA catheter  - Left radial arterial Line  - CTs x2 (1 pleural, 1 mediastinal)   - Sena  - Pacer wires     Disposition:  - CVICU      Patient seen, findings and plan discussed with CVICU staff and my attending, Dr. Octavia King MD  General Surgery, PGY-1    ====================================    HPI:   Presents to CVICU intubated and sedated. Patient presented to on account of systemic sarcoidosis with cardiac involvement (mitral regurgitation) prompting mitral valve replacement with On-X mechanical valve. He previously had MitraClip in August 2023 but continued to have severe residual MR with stenosis and moderate TR. As such, presents for procedure today.      PAST MEDICAL HISTORY:   Past Medical History:   Diagnosis Date    First degree AV block 03/20/2018    Heart murmur     Hyperthyroidism     2/2 amiodarone toxicity    ICD (implantable cardioverter-defibrillator) in place     Palpitations 03/20/2018    Paroxysmal atrial fibrillation (H)     Paroxysmal supraventricular tachycardia (H24) 03/20/2018    Sarcoid, cardiac/EF 54% per MRI,[place] of affected myocardium is 12% of myocardial mass 01/25/2018    Sarcoidosis/ systemic 2013 03/20/2018    Severe mitral regurgitation     Sleep apnea     Thyroid disease        PAST SURGICAL HISTORY:   Past Surgical History:   Procedure Laterality Date    CATARACT EXTRACTION      CV CORONARY ANGIOGRAM N/A 06/30/2023    Procedure: Coronary Angiogram;  Surgeon: Tony Morales MD;  Location: [facility] CARDIAC CATH LAB    CV TRANSCATHETER MITRAL VALVE REPAIR N/A 08/28/2023    Procedure: Transcatheter Mitral Valve Repair;  Surgeon: Tony Morales MD;  Location: [facility] CARDIAC CATH LAB    EP ABLATION SVT N/A 03/29/2022    Procedure: Ablation Supraventricular Tachycardia;  Surgeon:  Lauro Will MD;  Location:  HEART CARDIAC CATH LAB    HERNIA REPAIR, INGUINAL RT/LT Bilateral     IMPLANTED DEVICE         FAMILY HISTORY:   Family History   Problem Relation Age of Onset    Anesthesia Reaction No family hx of     Deep Vein Thrombosis (DVT) No family hx of        SOCIAL HISTORY:   Social History     Tobacco Use    Smoking status: Never    Smokeless tobacco: Never   Substance Use Topics    Alcohol use: Not Currently         OBJECTIVE:  1. VITAL SIGNS:   Temp:  [97.5  F (36.4  C)] 97.5  F (36.4  C)  Pulse:  [60-72] 60  Resp:  [12-18] 16  BP: ()/(63-76) 97/63  MAP:  [67 mmHg-74 mmHg] 74 mmHg  Arterial Line BP: (100-112)/(52-57) 112/57  SpO2:  [97 %-100 %] 100 %    Resp: 16        2. INTAKE/ OUTPUT:   In: 2621 mL  Out: 640 mL  UOP: 640 mL  I/O: +1981 mL       3. PHYSICAL EXAMINATION:   General: sedated  Neuro: sedated  Resp: intubated, ventilated  CV: RRR, peripheral pulses 2+   Abdomen: Soft, non-distended, non-tender  Incisions: c/d/I covered with bandage, no strikethrough  Extremities: warm and well perfused  CT: To suction, serosang output, no airleak, crepitus      4. INVESTIGATIONS:   Arterial Blood Gases   Recent Labs   Lab 07/16/24  1327 07/16/24  1252 07/16/24  1223 07/16/24  1212   PH 7.31* 7.31* 7.27* 7.24*   PCO2 41 43 47* 54*   PO2 236* 160* >600* 574*   HCO3 21 22 22 23       Complete Blood Count   Recent Labs   Lab 07/16/24  1401 07/16/24  1327 07/16/24  1254 07/16/24  1252 07/16/24  1250   WBC 25.2*  --   --   --   --    HGB 12.5* 12.3* 12.1* 12.2*  --      --   --   --  165       Basic Metabolic Panel  Recent Labs   Lab 07/16/24  1401 07/16/24  1327 07/16/24  1254 07/16/24  1252 07/16/24  1223   NA  --  140 140 140 140   POTASSIUM  --  4.3 4.6 4.6 5.4*   * 123* 131* 132* 132*     Liver Function Tests  Recent Labs   Lab 07/16/24  1250   INR 1.84*       Coagulation Profile  Recent Labs   Lab 07/16/24  1250   INR 1.84*   PTT 32         5. RADIOLOGY:       Recent  Results (from the past 24 hour(s))   POC US Guidance Needle Placement    Impression    Right paravertebral catheter   CADEN with Report    Narrative    Piyush Kylie     7/16/2024  6:36 AM  Procedures     Cardiac Device Check - Inpatient   Result Value    Date Time Interrogation Session [id number]    Implantable Pulse Generator  [employer]    Implantable Pulse Generator Model D433 RESONATE EL ICD    Implantable Pulse Generator Serial Number [id number]    Type Interrogation Session In Clinic    Clinic Name [facility]    Implantable Pulse Generator Type Defibrillator    Implantable Pulse Generator Implant Date 20181128    Implantable Lead  [employer]    Implantable Lead Model 0292 Endotak Tensed 4-Site SG    Implantable Lead Serial Number [id number]    Implantable Lead Implant Date 20181128    Implantable Lead Polarity Type Bipolar Lead    Implantable Lead Location Detail 1 UNKNOWN    Implantable Lead Location Right Ventricle    Implantable Lead Connection Status Connected    Implantable Lead  [employer]    Implantable Lead Model 7741 Ingevity MRI    Implantable Lead Serial Number [id number]    Implantable Lead Implant Date 20181128    Implantable Lead Polarity Type Bipolar Lead    Implantable Lead Location Detail 1 UNKNOWN    Implantable Lead Location Right Atrium    Implantable Lead Connection Status Connected    [place] Setting Mode (NBG Code) DDD    [place] Setting Lower Rate Limit 60    [place] Setting Maximum Tracking Rate 125    [place] Setting RAJESH Delay Low 220    [place] Setting PAV Delay Low 220    [place] Setting PAV Delay High 110    [place] Setting RAJESH Delay High 110    [place] Setting AT Mode Switch Rate 170    [place] Setting AT Mode Switch Mode VDI    Lead Channel Setting Sensing Polarity Bipolar    Lead Channel Setting Sensing Sensitivity 0.25    Lead Channel Setting Sensing Adaptation Mode Adaptive    Lead Channel Setting Sensing Polarity  Bipolar    Lead Channel Setting Sensing Sensitivity 0.6    Lead Channel Setting Sensing Adaptation Mode Adaptive    Lead Channel Setting Pacing Polarity Bipolar    Lead Channel Setting Pacing Pulse Width 0.4    Lead Channel Setting Pacing Amplitude 2.5    Lead Channel Setting Pacing Capture Mode Fixed Pacing    Lead Channel Setting Pacing Polarity Bipolar    Lead Channel Setting Pacing Pulse Width 0.4    Lead Channel Setting Pacing Amplitude 3.0    Lead Channel Setting Pacing Capture Mode Fixed Pacing    Zone Setting Type Category VF    Zone Setting Vendor Type Category VF    Zone Setting Status Inactive    Zone Setting Type Category VT    Zone Setting Vendor Type Category VT    Zone Setting Status Inactive    Zone Setting Type Category VT    Zone Setting Vendor Type Category VT-1    Zone Setting Status Inactive    Lead Channel Impedance Value 768    Lead Channel Pacing Threshold Amplitude 0.8    Lead Channel Pacing Threshold Pulse Width 0.4    Lead Channel Status Check Lead    Lead Channel Impedance Value 389    Lead Channel Pacing Threshold Amplitude 1.4    Lead Channel Pacing Threshold Pulse Width 0.4    Battery Date Time of Measurements [id number]    Battery Status Middle of Service    Battery Remaining Longevity 90    Battery Remaining Percentage 81    Capacitor Charge Type Reformation    Capacitor Last Charge Date Time [id number]    Capacitor Charge Time 10.7    [place] Statistic Date Time Start 20240328000000    [place] Statistic Date Time End 20240716000000    [place] Statistic RA Percent Paced 21    [place] Statistic RV Percent Paced 12    Atrial Tachy Statistic Date Time Start [id number]    Atrial Tachy Statistic Date Time End 20240716000000    Atrial Tachy Statistic AT/AF [place] Percent 1    Therapy Statistic Recent Shocks Delivered 0    Therapy Statistic Recent Shocks Aborted 0    Therapy Statistic Recent ATP Delivered 0    Therapy Statistic Recent Date Time Start 20240328000000    Therapy Statistic  Recent Date Time End 20240716000000    Therapy Statistic Total Shocks Delivered 0    Therapy Statistic Total Shocks Aborted 0    Therapy Statistic Total ATP Delivered 17    Therapy Statistic Total  Date Time Start 20181128000000    Therapy Statistic Total  Date Time End 20240716000000    Episode Statistic Recent Count 0    Episode Statistic Type Category Other    Episode Statistic Recent Count 8    Episode Statistic Type Category VT    Episode Statistic Vendor Type Category NSVT    Episode Statistic Recent Count 0    Episode Statistic Type Category VT    Episode Statistic Vendor Type Category VT    Episode Statistic Recent Count 0    Episode Statistic Type Category VT    Episode Statistic Vendor Type Category VT-1    Episode Statistic Recent Date Time Start 20240328000000    Episode Statistic Recent Date Time End 20240716000000    Episode Statistic Recent Date Time Start 20240328000000    Episode Statistic Recent Date Time End 20240716000000    Episode Statistic Recent Date Time Start 20240328000000    Episode Statistic Recent Date Time End 20240716000000    Episode Statistic Recent Date Time Start 20240328000000    Episode Statistic Recent Date Time End 20240716000000    Narrative    Patient seen in  for evaluation and iterative programming of their ICD per MD orders.     Device: [employer] D433 RESONATE EL ICD  Normal device function.  Mode: DDD  bpm  AP: 21%  : 12%  Intrinsic rhythm: SR 63 bpm  Lead Trends Appear Stable.  Estimated battery longevity to RRT = 7 years.  Atrial Arrhythmia: 9 ATR episodes lasting 2 sec - 17 hrs 41 sec.  AF [place]: 1%  Anticoagulant: Xarelto  Ventricular Arrhythmia: 8 NSVT episodes lasting 2-4 sec @ 116-238 bpm.  Setting Changes: ICD Therapies turned OFF, rate response turned OFF.  Plan: Page Device RN to turn ICD Therapies back ON.    ERIC Trejo RN    Dual lead ICD    I have reviewed and interpreted the device interrogation, settings, programming and nurse's  summary. The device is functioning within normal device parameters. I agree with the current findings, assessment and plan.       =========================================

## 2024-07-16 NOTE — ANESTHESIA PROCEDURE NOTES
Airway       Patient location during procedure: OR       Procedure Start/Stop Times: 7/16/2024 8:23 AM  Staff -        Performed By: CRNA, SRNA, anesthesiologist and fellow  Consent for Airway        Urgency: elective  Indications and Patient Condition       Indications for airway management: maureen-procedural       Induction type:intravenous       Mask difficulty assessment: 1 - vent by mask    Final Airway Details       Final airway type: endotracheal airway       Successful airway: ETT - single  Endotracheal Airway Details        ETT size (mm): 8.0       Cuffed: yes       Successful intubation technique: direct laryngoscopy       DL Blade Type: Duque 2       Grade View of Cords: 1       Adjucts: stylet       Position: Right       Measured from: gums/teeth       Secured at (cm): 26       Bite Block used: CADEN block.    Post intubation assessment        Placement verified by: capnometry, equal breath sounds and chest rise        Number of attempts at approach: 1       Secured with: tape       Ease of procedure: easy       Dentition: Intact and Unchanged    Medication(s) Administered   Medication Administration Time: 7/16/2024 8:23 AM

## 2024-07-16 NOTE — BRIEF OP NOTE
[facility]    Brief Operative Note    Pre-operative diagnosis: Severe mitral regurgitation [I34.0]  Post-operative diagnosis Same as pre-operative diagnosis    Procedure: Right Sided Thoracotomy On Cardiopulmonary Bypass with a MINIMALLY INVASIVE MITRAL VALVE REPLACEMENT Using On-X Prosthetic Heart Valve with Conform-X Sewing Ring Size 25/33mm, Transesophageal Echocardiogram (CADEN) by Anesthesia, Right - Chest    Surgeon: Surgeons and Role:     * Mag Kiran MD - Primary     * Aneta Keys PA-C - Assisting  Anesthesia: General with Block   Estimated Blood Loss: 1L    Drains: R pleural and 1 joseph drain, ventricular epicardial wires  Specimens:   ID Type Source Tests Collected by Time Destination   1 : Mitral valve with leaflets Tissue Heart Valve, Mitral (Bicuspid) SURGICAL PATHOLOGY EXAM Mag Kiran MD 7/16/2024 10:53 AM      Findings:   Friable tissue; mitraclip x2 explanted with native mitral valve. Mechanical On-x valve implanted .  Complications: None.  Implants:   Implant Name Type Inv. Item Serial No.  Lot No. LRB No. Used Action   VALVE MITRAL ON-X  TOP HAT  25/33MM [facility]-25/33 - [id number] Valve VALVE MITRAL ON-X  TOP HAT  25/33MM [facility]-25/33 [id number] [employer]  N/A 1 Implanted

## 2024-07-16 NOTE — OR NURSING
Temp:  [97.5  F (36.4  C)] 97.5  F (36.4  C)  Pulse:  [66-72] 67  Resp:  [12-18] 15  BP: ()/(66-76) 96/68  SpO2:  [97 %-100 %] 97 %    Right paraverterbral block performed without complications, 1 mg versed, 50 mcg fentanyl given.  NSR, VSS, RA.  Pt tolerated well.  Will continue to monitor.

## 2024-07-16 NOTE — ANESTHESIA PROCEDURE NOTES
Arterial Line Procedure Note    Pre-Procedure   Staff -        Anesthesiologist:  Alo Yanez MD       Resident/Fellow: Ronny Taveras MD       Performed By: fellow       Location: OR       Pre-Anesthestic Checklist: patient identified, IV checked, risks and benefits discussed, informed consent, monitors and equipment checked, pre-op evaluation and at physician/surgeon's request  Timeout:       Correct Patient: Yes        Correct Procedure: Yes        Correct Site: Yes        Correct Position: Yes   Line Placement:   This line was placed Pre Induction starting at 7/16/2024 8:05 AM and ending at 7/16/2024 8:10 AM  Procedure   Procedure: arterial line       Laterality: left       Insertion Site: radial.  Sterile Prep        Standard elements of sterile barrier followed       Skin prep: Chloraprep  Insertion/Injection        Technique: ultrasound guided and Seldinger Technique        1. Ultrasound was used to evaluate the access site.       2. Artery evaluated via ultrasound for patency/adequacy.       3. Using real-time ultrasound the needle/catheter was observed entering the artery/vein.       5. The visualized structures were anatomically normal.       6. There were no apparent abnormal pathologic findings.       Catheter Type/Size: 20 G, 12 cm  Narrative         Secured by: suture       Tegaderm dressing used.       Complications: None apparent,        Arterial waveform: Yes        IBP within 10% of NIBP: Yes

## 2024-07-16 NOTE — ANESTHESIA POSTPROCEDURE EVALUATION
Patient: Jose Carney    Procedure: Procedure(s):  Right Sided Thoracotomy On Cardiopulmonary Bypass with a MINIMALLY INVASIVE MITRAL VALVE REPLACEMENT Using On-X Prosthetic Heart Valve with Conform-X Sewing Ring Size 25/33mm, Transesophageal Echocardiogram (CADEN) by Anesthesia       Anesthesia Type:  No value filed.    Note:  Disposition: ICU            ICU Sign Out: Anesthesiologist/ICU physician sign out WAS performed   Postop Pain Control: Uneventful            Sign Out: Well controlled pain   PONV: No   Neuro/Psych: Uneventful            Sign Out: Acceptable/Baseline neuro status   Airway/Respiratory: Uneventful            Sign Out: AIRWAY IN SITU/Resp. Support               Airway in situ/Resp. Support: ETT   CV/Hemodynamics: Uneventful            Sign Out: Acceptable CV status   Other NRE: NONE   DID A NON-ROUTINE EVENT OCCUR? No           Last vitals:  Vitals:    07/16/24 1500 07/16/24 1515 07/16/24 1530   BP:      Pulse: 60 60 60   Resp: 18 18 18   Temp: 36.5  C (97.7  F)     SpO2: 100% 100% 100%       Electronically Signed By: Silvio Lagunas MD  July 16, 2024  3:45 PM

## 2024-07-16 NOTE — PHARMACY-ADMISSION MEDICATION HISTORY
Admission medication history completed 7/9/2024 by pharmacy intern via pre-operative phone call. Please see Pharmacy - Admission Medication History note from that date for more information.    Notable comment from medication history: Patient has been holding folic acid and methotrexate since 6/23/24 after talking to his doctor     Stephany Hastings, PharmD, [facility]    Prior to Admission medications    Medication Sig Last Dose Taking? Auth Provider Long Term End Date   CELLCEPT (BRAND) 500 MG tablet Take 2 tablets (1,000 mg) by mouth 2 times daily 7/16/2024 at 0530 Yes Mandi Dickey MD Yes    folic acid (FOLVITE) 1 MG tablet TAKE FIVE TABLETS (5MG) ONCE WEEKLY WITH YOUR METHOTREXATE 6/23/2024 Yes Mandi Dickey MD     methimazole (TAPAZOLE) 5 MG tablet Take 5 mg by mouth every evening 7/16/2024 at 0530 Yes Reported, Patient Yes    methotrexate 2.5 MG tablet  6/23/2024 Yes Reported, Patient No    metoprolol succinate ER (TOPROL XL) 25 MG 24 hr tablet Take 3 tablets (75 mg) by mouth daily  Patient taking differently: Take 75 mg by mouth daily as needed 7/10/2024 Yes Mandi Dickey MD Yes    rivaroxaban ANTICOAGULANT (XARELTO) 20 MG TABS tablet Take 1 tablet (20 mg) by mouth daily (with dinner)  Patient taking differently: Take 20 mg by mouth every morning 7/9/2024 Yes Mandi Dickey MD Yes    sildenafil (REVATIO) 20 MG tablet Take 20-60 mg by mouth as needed Unknown Yes Reported, Patient Yes    timolol maleate (TIMOPTIC) 0.5 % ophthalmic solution Place 1 drop into both eyes at bedtime 7/15/2024 Yes Reported, Patient

## 2024-07-16 NOTE — ANESTHESIA PROCEDURE NOTES
Perioperative CADEN Procedure Note    Staff -        Anesthesiologist:  Alo Yanze MD       Resident/Fellow: Ronny Taveras MD       Performed By: fellow  Preanesthesia Checklist:  Patient identified, IV assessed, risks and benefits discussed, monitors and equipment assessed, procedure being performed at surgeon's request and anesthesia consent obtained.    CADEN Probe Insertion    Probe Status PRE Insertion: NO obvious damage  Probe type:  Adult 3D  Bite block used:   Soft  Insertion Technique: Easy, no oropharyngeal manipulation  Insertion complications: None obvious  Billing Report:CADEN report by Anesthesiologist (See Separate Report note)  Probe Status POST Removal: NO obvious damage    CADEN Report  General Procedure Information  Images for this study have been archived.  Modalities: 2D, 3D, CW Doppler, PW Doppler and Color flow mapping  Echocardiographic and Doppler Measurements  Right Ventricle:  Cavity size dilated.    Hypertrophy not present.   Thrombus not present.    Global function normal.     Left Ventricle:  Cavity size dilated.    Hypertrophy not present.   Thrombus not present.   Global Function mildly impaired.   Ejection Fraction 45%.      Ventricular Regional Function:  1- Basal Anteroseptal:  normal  2- Basal Anterior:  normal  3- Basal Anterolateral:  normal  4- Basal Inferolateral:  normal  5- Basal Inferior:  normal  6- Basal Inferoseptal:  normal  7- Mid Anteroseptal:  normal  8- Mid Anterior:  normal  9- Mid Anterolateral:  normal  10- Mid Inferolateral:  normal  11- Mid Inferior:  normal  12- Mid Inferoseptal:  normal  13- Apical Anterior:  normal  14- Apical Lateral:  normal  15- Apical Inferior:  normal  16- Apical Septal:  normal  17- [place]:  normal    Valves  Aortic Valve: Annulus normal.  Stenosis not present.  Regurgitation +1.  Leaflets normal.  Leaflet motions normal.    Mitral Valve: Annulus normal.  Stenosis mild.  Regurgitation +4.  Leaflet motions restricted.    Tricuspid Valve:  Annulus dilated.  Stenosis not present.  Regurgitation +3.  Leaflets normal.  Leaflet motions normal.    Pulmonic Valve: Annulus normal.  Stenosis not present.  Regurgitation absent.    Other Valve Findings:  Tricuspid valve: Moderate regurgitant jet most prominent at the posterior and septal commissure is present. V wire of AICD seen traversing at this commissure. VC is moderate. There is no hepatic vein reversal seen, but S wave blunting is present.    Mitral Valve: There is a mitraclip present that is well seated. There is severe mitral regurgitation seen on both sides of the mitraclip with a prominent jet at the A1/P1 leaflets. There is mild stenosis with mean PG 4 mmHg. Blunted pulmonary S wave. Regurgitant jet seen to be directed towards JONELLE.  Aorta: Ascending Aorta: Size normal.  Dissection not present.  Plaque thickness less than 3 mm.  Mobile plaque not present.    Aortic Arch: Size normal.    Dissection not present.   Plaque thickness less than 3 mm.   Mobile plaque not present.    Descending Aorta: Size normal.    Dissection not present.   Plaque thickness greater than 3 mm.   Mobile plaque not present.      Right Atrium:   Spontaneous echo contrast not present.   Thrombus not present.   Tumor not present.   Device present.   Left Atrium: Size dilated.  Spontaneous echo contrast not present.  Thrombus not present.  Tumor not present.  Device not present.    Left atrial appendage normal.     Atrial Septum:    Other atrial septal defect findings:  Iatrogenic septal commuinication s/p mitraclip. Diameter 0.89cm and 3D ERO estimate of 0.6cm^2  Ventricular Septum: Intra-ventricular septum morphology normal.      Diastolic Function Measurements:  Diastolic Dysfunction Grade= indeterminate.  E=  ms.  A=  ms.  E/A Ratio= .  DT=  ms.  S/D= .  IVRT= ms.  Other Findings:   Pericardium:  normal. Pleural Effusion:  none. Pulmonary Arteries:  normal. Pulmonary Venous Flow:  blunted (decreased) systolic flow.  Coronary  sinus size (mm):  12.   Post Intervention Findings  Procedure(s) performed:  Mitral Valve Repair/Replace. Global function:  Worsened (See comments). Regional wall motion: Unchanged. Surgeon(s) notified of all postintervention findings: Yes (Notified in real time). Mitral Valve: Valve replaced with mechanical valve. No EVELIN present. No paravalvular leak.            Post Intervention comments: Post bypass: RV function is mildly reduced. LV function is moderately reduced, estimated 30%. No new RWMA. Intrinsic pacer DDD pacing at 60 bpm. There is a new mechanical valve in the mitral position that is well seated, functioning, no paravalvular leak and a mean PG 1-3 mmHg. Tricuspid regurgitation is unchanged and moderate. There is a residual atrial septal defect present. The aortic and pulmonic valves are unchanged. There is no echo evidence of aortic dissection..    Echocardiogram Comments

## 2024-07-16 NOTE — ANESTHESIA CARE TRANSFER NOTE
Patient: Jose Carney    Procedure: Procedure(s):  Right Sided Thoracotomy On Cardiopulmonary Bypass with a MINIMALLY INVASIVE MITRAL VALVE REPLACEMENT Using On-X Prosthetic Heart Valve with Conform-X Sewing Ring Size 25/33mm, Transesophageal Echocardiogram (CADEN) by Anesthesia       Diagnosis: Severe mitral regurgitation [I34.0]  Diagnosis Additional Information: No value filed.    Anesthesia Type:   No value filed.     Note:    Oropharynx: oral airway in place and ventilatory support  Level of Consciousness: iatrogenic sedation        Dentition: dentition unchanged  Vital Signs Stable: post-procedure vital signs reviewed and stable  Report to RN Given: handoff report given  Patient transferred to: ICU  Comments: Transported on monitor to ICU with CV fellow, CV MDA fellow, CRNA and HAI.  VSS.  Vent settings and sedation per ICU team.   ICU Handoff: Call for PAUSE to initiate/utilize ICU HANDOFF, Identified Patient, Identified Responsible Provider, Reviewed the Pertinent Medical History, Discussed Surgical Course, Reviewed Intra-OP Anesthesia Management and Issues during Anesthesia, Set Expectations for Post Procedure Period and Allowed Opportunity for Questions and Acknowledgement of Understanding  Vitals:  Vitals Value Taken Time   BP     Temp     Pulse 60 07/16/24 1407   Resp 16 07/16/24 1406   SpO2 100 % 07/16/24 1407   Vitals shown include unfiled device data.    Electronically Signed By: HARSH Rashid CRNA  July 16, 2024  2:08 PM   Statement Selected

## 2024-07-16 NOTE — ANESTHESIA PROCEDURE NOTES
Central Line/PA Catheter Placement    Pre-Procedure   Staff -        Anesthesiologist:  Alo Yanez MD       Resident/Fellow: Ronny Taveras MD       Other Anesthesia Staff: Kylie Pickett       Performed By: fellow       Location: OR       Pre-Anesthestic Checklist: patient identified, IV checked, site marked, risks and benefits discussed, informed consent, monitors and equipment checked, pre-op evaluation and at physician/surgeon's request  Timeout:       Correct Patient: Yes        Correct Procedure: Yes        Correct Site: Yes        Correct Position: Yes        Correct Laterality: Yes   Line Placement:   This line was placed Post Induction    Procedure   Procedure: central line       Laterality: right       Insertion Site: internal jugular.       Patient Position: Trendelenburg and supine  Sterile Prep        All elements of maximal sterile barrier technique followed       Patient Prep/Sterile Barriers: draped, hand hygiene, gloves , hat , mask , draped, gown, sterile gel and probe cover       Skin prep: Chloraprep  Insertion/Injection        Technique: ultrasound guided        1. Ultrasound was used to evaluate the access site.       2. Vein evaluated via ultrasound for patency/adequacy.       3. Using real-time ultrasound the needle/catheter was observed entering the artery/vein.       4. Permanent image was captured and entered into the patient's record.       5. The visualized structures were anatomically normal.       6. There were no apparent abnormal pathologic findings.       Introducer Type: 9 Fr, 2-lumen MAC        Type: PA/CVC with Introducer       Catheter Size: 7.5 Fr       Catheter Length: 8       Number of Lumens: double lumen       PA Catheter Type: CCO         Appropriate RV, RA and PA waveforms noted:  Yes            Balloon down at end of the procedure:   Narrative         Secured by: suture       Biopatch and Tegaderm dressing used.       Complications: None apparent,        blood  aspirated from all lumens,        All lumens flushed: Yes       Verification method: CADEN

## 2024-07-16 NOTE — OP NOTE
July 16th, 2024    Referring Cardiologist: Mandi Dickye MD    Preoperative Diagnosis: severe mitral valve regurgitation s/p MitraClip placement    Postoperative Diagnosis: severe mitral valve regurgitation s/p MitraClip placement    Surgeon: Mag Kiran MD    Assistant: DIANNE López    Please note that a PA assistance was required do to lack of having a qualified resident or fellow to assist in the operation.    Name of Operation: Right mini-thoracotomy, mitral valve replacement with a 25/33 mm On-X mechanical valve, right common femoral arterial and venous cannulation for cardiopulmonary bypass, intraoperative CADEN.    Anesthesia: general orotracheal    Indications for Procedure: Mr. Carney is a very pleasant 63-year-old gentleman with a history of systemic sarcoidosis with cardiac involvement in the past, now in remission.  He had severe mitral regurgitation from posterior leaflet restriction and was treated with a MitraClip procedure in August 2023.  2 clips were deployed, but unfortunately he developed severe recurrent mitral regurgitation with mild to moderate stenosis as well.  He was recently referred to me for a surgical evaluation for minimally invasive mitral valve surgery.  He was taken to the operative today for a right minithoracotomy mitral valve replacement with a On-X mechanical valve.  He understood the need for lifelong anticoagulation with Coumadin.    Operative Findings: The patient had a mildly diminished LV systolic function.  He had severe MR.  He had mild to moderate TR.  There was a small iatrogenic PFO from the transseptal access from the previous the MitraClip procedure.    Description of the Procedure: After informed consent was obtained, the patient was brought down to the operating room and was placed on the OR table in the supine position.  Intravenous and intra-arterial lines were begun.  While monitoring his blood pressure and EKG tracing, he was anesthetized and intubated  using a single-lumen endotracheal tube.  A [place]-Juli catheter was also placed.  His entire chest, abdomen, both groins and legs were prepped down to the toes using multiple layers of DuraPrep.  He was draped in a sterile field.  A right minithoracotomy incision was made, and the fourth intercostal space was entered.  An Oumar soft tissue retractor and the [employer] minithoracotomy rib retractor were used for exposure.  Carbon dioxide was flooded into the chest to prevent air embolism.  A 10 mm 30 degree endoscope was then placed via the third intercostal space using a trocar to aid in the visualization.  Next, a small right groin incision was made and the right common femoral artery and vein were dissected out.  The patient was fully heparinized.  5-0 Prolene pursestring sutures were used to cannulate the right common femoral artery and vein using a 17 Wolof femoral arterial cannula and a 27 Wolof multistage venous cannula, respectively.  Seldinger technique was used with CADEN guidance.  After appropriate ACT level was achieved, cardiopulmonary bypass was established and the patient was kept normothermic during the entire operation.    The right phrenic nerve was identified and protected.  The pericardium was opened 2 cm anteriorly in a parallel fashion to the phrenic nerve.  The venous drainage of the heart was excellent.  The interatrial groove was dissected out and the oblique sinus was entered as well.  An antegrade needle/aortic root vent was placed in the ascending aorta.  The aorta was then crossclamped and 1200 cc of [place] antegrade cardioplegia was given to fully arrest the heart.  The patient went into good diastolic arrest without any LV distention.  A standard left atriotomy was then made, and the mitral valve was exposed.  Our exposure was good.  Findings were noted above.  Both leaflets were excised as well as the 2 MitraClips that were embedded into the leaflet tissue.  The annulus was  then sized to a 25/33 mm On-X mechanical valve.  Annular sutures were placed using horizontal mattress sutures of 2-0 Ethibond with supra-annular pledgets.  The valve was brought to the field and all sutures were brought through the sewing ring and the valve was seated down without difficulty.  All sutures were tied on securely using the Cor-Knot device.  We made sure that the leaflets were positioned in the anti-anatomic position and the leaflets opened and closed freely.  The left atriotomy was then closed in 2 layers of running 4-0 Prolene.  Antegrade hotshot was given and the aortic cross-clamp was removed.      The patient was placed in Trendelenburg position.  The ascending aorta was continuously vented to de-air the heart.  The patient was eventually weaned off cardiopulmonary bypass with low-dose inotropic and vasopressor support.  LV function was back to its baseline.  The heart was adequately de-aired.  The mechanical mitral valve was seated down well with no paravalvular leak with a mean gradient of 3 mmHg.  Once the patient remained stable off bypass, the femoral venous cannula was removed and protamine was given.  The femoral arterial cannula was subsequently removed as well.  The groin incision was irrigated out and was closed in layers of running Vicryl suture.  Skin was closed using 3-0 Vicryl and was sealed using Dermabond.    The right lung reinflated nicely.  Temporary ventricular pacing wire placed in the RV muscle.  24 [place] Brian drain was placed in the right pleural space and a 28 [place] straight chest tube in the mediastinum.  These were all brought out percutaneously below the minithoracotomy incision and secured to the skin using 2-0 Ethibond.  The rib space was then reapproximated using 2 interrupted #5 Ethibond sutures.  The minithoracotomy incision was finally closed in layers of running Vicryl suture.  The skin was closed using 3-0 Vicryl and was sealed using Dermabond.    There were  no intraoperative complications and patient tolerated the operation well.  No blood products were given intraoperatively.  All sponge counts, needle counts, and instrument counts were correct x 2 at the end of the operation.  EBL: 300 cc.  Specimen removed: mitral valve leaflets along with the MitraClips.  The patient was brought to the ICU and hemodynamically stable condition and remained intubated.      Mag Kiran MD

## 2024-07-16 NOTE — OR NURSING
Device RN paged.    Fabiola device RN at bedside, accessing ICD. Shocking mechanism if turned off.

## 2024-07-16 NOTE — ANESTHESIA PROCEDURE NOTES
Paravertebral Procedure Note    Pre-Procedure   Staff -        Anesthesiologist:  Micah Beckman MD       Performed By: anesthesiologist       Location: pre-op       Pre-Anesthestic Checklist: patient identified, IV checked, site marked, risks and benefits discussed, informed consent, monitors and equipment checked, pre-op evaluation, at physician/surgeon's request and post-op pain management  Timeout:       Correct Patient: Yes        Correct Procedure: Yes        Correct Site: Yes        Correct Position: Yes        Correct Laterality: Yes        Site Marked: Yes  Procedure Documentation  Procedure: Paravertebral       Diagnosis: POST OPERATIVE PAIN       Laterality: right       Patient Position: prone       Patient Prep/Sterile Barriers: sterile gloves, mask, patient draped       Skin prep: Chloraprep       Local skin infiltrated with 2 mL of 2% lidocaine.        Insertion Site: T5-6.       Needle Type: ToSocialMeterTVy needle       Needle Gauge: 17.        Needle Length (Inches): 3.13        Catheter: 19 G.          Catheter threaded easily.             Ultrasound guided       1. Ultrasound was used to identify targeted nerve, plexus, vascular marker, or fascial plane and place a needle adjacent to it in real-time.       2. Ultrasound was used to visualize the spread of anesthetic in close proximity to the above referenced structure.       3. A permanent image is entered into the patient's record.    Assessment/Narrative         The placement was negative for: blood aspirated, painful injection and site bleeding       Paresthesias: No.       Bolus given via catheter. no blood aspirated via catheter.        Secured via Tegaderm and Dermabond.        Complications: none       Injection made incrementally with aspirations every 5 mL.    Medication(s) Administered   20 mL BUPivacaine HCl (PF) 0.25 %  20 mL BUPivacaine liposome 1.3 %    FOR [facility] ([facility]) ONLY:   Pain Team Contact information: please page the  "Pain Team [facility]. Search \"Pain\". During daytime hours, please page the attending first. At night please page the resident first.      "

## 2024-07-16 NOTE — LETTER
Transition Communication Hand-off for Care Transitions to Next Level of Care Provider    Name: Jose Carney  : 1960  MRN #: [id number]  Primary Care Provider: KAYLA HUGHES     Primary Clinic: [address] [facility] [address]  [facility] [address]     Reason for Hospitalization:  Severe mitral regurgitation [I34.0]  Admit Date/Time: 2024  5:41 AM  Discharge Date: 2024    Payor Source: Payor: [facility] / Plan: [facility] CHOICE / Product Type: Indemnity /       Reason for Communication Hand-off Referral: Other continuity of care    NEW INR PATIENT: GOAL 2.5-3.5, needs lab appointment on 24    Discharge Plan:  Discharge Plan:      Flowsheet Row Most Recent Value   Disposition Comments wife will provide ride home             Concern for non-adherence with plan of care:   Y/N no  Discharge Needs Assessment:  Needs      Flowsheet Row Most Recent Value   Equipment Currently Used at Home none   # of Referrals Placed by CM External Care Coordination            Follow-up plan:  No future appointments.    Any outstanding tests or procedures:        Referrals       Future Labs/Procedures    CARDIAC REHAB REFERRAL     Process Instructions:    Advance to Wellness and Exercise for Life (WEL) Program or to another maintenance exercise program upon completion of phase 2 cardiac rehab.        Comments:    Please be aware that coverage of these services is subject to the terms and limitations of your health insurance plan.  Call member services at your health plan with any benefit or coverage questions.     Order is sent electronically to central rehab scheduling. Call [phone] if you haven't been contacted regarding these appointments within 2 business days of discharge.  If you have not heard from the scheduling office within 2 business days, please call [phone] for [employer], [phone] for [employer] and [phone] for [employer].              Key Recommendations:  per attached  Sharad Gaxiola, RN    AVS/Discharge Summary is the source of truth; this is a helpful guide for improved communication of patient story

## 2024-07-17 ENCOUNTER — APPOINTMENT (OUTPATIENT)
Dept: GENERAL RADIOLOGY | Facility: CLINIC | Age: 64
DRG: 219 | End: 2024-07-17
Attending: PHYSICIAN ASSISTANT
Payer: COMMERCIAL

## 2024-07-17 ENCOUNTER — APPOINTMENT (OUTPATIENT)
Dept: OCCUPATIONAL THERAPY | Facility: CLINIC | Age: 64
DRG: 219 | End: 2024-07-17
Attending: PHYSICIAN ASSISTANT
Payer: COMMERCIAL

## 2024-07-17 LAB
ABO/RH(D): NORMAL
ALBUMIN SERPL BCG-MCNC: 3.8 G/DL (ref 3.5–5.2)
ALLEN'S TEST: ABNORMAL
ALP SERPL-CCNC: 64 U/L (ref 40–150)
ALT SERPL W P-5'-P-CCNC: 12 U/L (ref 0–70)
ANION GAP SERPL CALCULATED.3IONS-SCNC: 10 MMOL/L (ref 7–15)
ANION GAP SERPL CALCULATED.3IONS-SCNC: 8 MMOL/L (ref 7–15)
ANTIBODY SCREEN: NEGATIVE
APTT PPP: 34 SECONDS (ref 22–38)
APTT PPP: 36 SECONDS (ref 22–38)
APTT PPP: 39 SECONDS (ref 22–38)
AST SERPL W P-5'-P-CCNC: 53 U/L (ref 0–45)
ATRIAL RATE - MUSE: 60 BPM
BASE EXCESS BLDA CALC-SCNC: -0.2 MMOL/L (ref -3–3)
BASE EXCESS BLDA CALC-SCNC: 0.8 MMOL/L (ref -3–3)
BASE EXCESS BLDA CALC-SCNC: 1.1 MMOL/L (ref -3–3)
BASE EXCESS BLDV CALC-SCNC: 1.1 MMOL/L (ref -3–3)
BASE EXCESS BLDV CALC-SCNC: 1.7 MMOL/L (ref -3–3)
BASE EXCESS BLDV CALC-SCNC: 2.2 MMOL/L (ref -3–3)
BASE EXCESS BLDV CALC-SCNC: 2.8 MMOL/L (ref -3–3)
BASE EXCESS BLDV CALC-SCNC: 4 MMOL/L (ref -3–3)
BILIRUB SERPL-MCNC: 1.3 MG/DL
BLD PROD TYP BPU: NORMAL
BLD PROD TYP BPU: NORMAL
BLOOD COMPONENT TYPE: NORMAL
BLOOD COMPONENT TYPE: NORMAL
BUN SERPL-MCNC: 17.9 MG/DL (ref 8–23)
BUN SERPL-MCNC: 17.9 MG/DL (ref 8–23)
BUN SERPL-MCNC: 18 MG/DL (ref 8–23)
BUN SERPL-MCNC: 18.1 MG/DL (ref 8–23)
CA-I BLD-MCNC: 4.5 MG/DL (ref 4.4–5.2)
CA-I BLD-MCNC: 4.6 MG/DL (ref 4.4–5.2)
CA-I BLD-MCNC: 4.8 MG/DL (ref 4.4–5.2)
CALCIUM SERPL-MCNC: 8.2 MG/DL (ref 8.8–10.4)
CALCIUM SERPL-MCNC: 8.5 MG/DL (ref 8.8–10.4)
CALCIUM SERPL-MCNC: 8.7 MG/DL (ref 8.8–10.4)
CALCIUM SERPL-MCNC: 8.7 MG/DL (ref 8.8–10.4)
CHLORIDE SERPL-SCNC: 106 MMOL/L (ref 98–107)
CHLORIDE SERPL-SCNC: 107 MMOL/L (ref 98–107)
CODING SYSTEM: NORMAL
CODING SYSTEM: NORMAL
COHGB MFR BLD: 99.4 % (ref 96–97)
COHGB MFR BLD: 99.4 % (ref 96–97)
COHGB MFR BLD: 99.9 % (ref 96–97)
CREAT SERPL-MCNC: 0.87 MG/DL (ref 0.67–1.17)
CREAT SERPL-MCNC: 0.91 MG/DL (ref 0.67–1.17)
CREAT SERPL-MCNC: 0.91 MG/DL (ref 0.67–1.17)
CREAT SERPL-MCNC: 0.93 MG/DL (ref 0.67–1.17)
CROSSMATCH: NORMAL
CROSSMATCH: NORMAL
DIASTOLIC BLOOD PRESSURE - MUSE: NORMAL MMHG
EGFRCR SERPLBLD CKD-EPI 2021: >90 ML/MIN/1.73M2
ERYTHROCYTE [DISTWIDTH] IN BLOOD BY AUTOMATED COUNT: 14.9 % (ref 10–15)
ERYTHROCYTE [DISTWIDTH] IN BLOOD BY AUTOMATED COUNT: 15.1 % (ref 10–15)
ERYTHROCYTE [DISTWIDTH] IN BLOOD BY AUTOMATED COUNT: 15.2 % (ref 10–15)
FIBRINOGEN PPP-MCNC: 287 MG/DL (ref 170–490)
FIBRINOGEN PPP-MCNC: 308 MG/DL (ref 170–490)
FIBRINOGEN PPP-MCNC: 322 MG/DL (ref 170–490)
GLUCOSE BLDC GLUCOMTR-MCNC: 115 MG/DL (ref 70–99)
GLUCOSE BLDC GLUCOMTR-MCNC: 117 MG/DL (ref 70–99)
GLUCOSE BLDC GLUCOMTR-MCNC: 125 MG/DL (ref 70–99)
GLUCOSE BLDC GLUCOMTR-MCNC: 133 MG/DL (ref 70–99)
GLUCOSE BLDC GLUCOMTR-MCNC: 134 MG/DL (ref 70–99)
GLUCOSE BLDC GLUCOMTR-MCNC: 141 MG/DL (ref 70–99)
GLUCOSE BLDC GLUCOMTR-MCNC: 160 MG/DL (ref 70–99)
GLUCOSE BLDC GLUCOMTR-MCNC: 203 MG/DL (ref 70–99)
GLUCOSE SERPL-MCNC: 146 MG/DL (ref 70–99)
GLUCOSE SERPL-MCNC: 146 MG/DL (ref 70–99)
GLUCOSE SERPL-MCNC: 151 MG/DL (ref 70–99)
GLUCOSE SERPL-MCNC: 167 MG/DL (ref 70–99)
HCO3 BLD-SCNC: 24 MMOL/L (ref 21–28)
HCO3 BLD-SCNC: 25 MMOL/L (ref 21–28)
HCO3 BLD-SCNC: 26 MMOL/L (ref 21–28)
HCO3 BLDV-SCNC: 26 MMOL/L (ref 21–28)
HCO3 BLDV-SCNC: 27 MMOL/L (ref 21–28)
HCO3 BLDV-SCNC: 28 MMOL/L (ref 21–28)
HCO3 BLDV-SCNC: 28 MMOL/L (ref 21–28)
HCO3 BLDV-SCNC: 29 MMOL/L (ref 21–28)
HCO3 SERPL-SCNC: 23 MMOL/L (ref 22–29)
HCO3 SERPL-SCNC: 23 MMOL/L (ref 22–29)
HCO3 SERPL-SCNC: 24 MMOL/L (ref 22–29)
HCO3 SERPL-SCNC: 25 MMOL/L (ref 22–29)
HCT VFR BLD AUTO: 31.6 % (ref 40–53)
HCT VFR BLD AUTO: 33.4 % (ref 40–53)
HCT VFR BLD AUTO: 34.3 % (ref 40–53)
HGB BLD-MCNC: 11.1 G/DL (ref 13.3–17.7)
HGB BLD-MCNC: 11.5 G/DL (ref 13.3–17.7)
HGB BLD-MCNC: 11.8 G/DL (ref 13.3–17.7)
INR PPP: 1.45 (ref 0.85–1.15)
INR PPP: 1.46 (ref 0.85–1.15)
INR PPP: 1.56 (ref 0.85–1.15)
INR PPP: 1.62 (ref 0.85–1.15)
INTERPRETATION ECG - MUSE: NORMAL
LACTATE SERPL-SCNC: 1.7 MMOL/L (ref 0.7–2)
LACTATE SERPL-SCNC: 2.2 MMOL/L (ref 0.7–2)
LACTATE SERPL-SCNC: 2.3 MMOL/L (ref 0.7–2)
LACTATE SERPL-SCNC: 2.3 MMOL/L (ref 0.7–2)
LACTATE SERPL-SCNC: 2.5 MMOL/L (ref 0.7–2)
MAGNESIUM SERPL-MCNC: 1.9 MG/DL (ref 1.7–2.3)
MAGNESIUM SERPL-MCNC: 2.2 MG/DL (ref 1.7–2.3)
MAGNESIUM SERPL-MCNC: 2.3 MG/DL (ref 1.7–2.3)
MCH RBC QN AUTO: 32.6 PG (ref 26.5–33)
MCH RBC QN AUTO: 32.6 PG (ref 26.5–33)
MCH RBC QN AUTO: 32.7 PG (ref 26.5–33)
MCHC RBC AUTO-ENTMCNC: 34.4 G/DL (ref 31.5–36.5)
MCHC RBC AUTO-ENTMCNC: 34.4 G/DL (ref 31.5–36.5)
MCHC RBC AUTO-ENTMCNC: 35.1 G/DL (ref 31.5–36.5)
MCV RBC AUTO: 93 FL (ref 78–100)
MCV RBC AUTO: 95 FL (ref 78–100)
MCV RBC AUTO: 95 FL (ref 78–100)
O2/TOTAL GAS SETTING VFR VENT: 2 %
O2/TOTAL GAS SETTING VFR VENT: 21 %
O2/TOTAL GAS SETTING VFR VENT: 30 %
OXYHGB MFR BLDV: 60 % (ref 70–75)
OXYHGB MFR BLDV: 64 % (ref 70–75)
OXYHGB MFR BLDV: 66 % (ref 70–75)
OXYHGB MFR BLDV: 69 % (ref 70–75)
OXYHGB MFR BLDV: 69 % (ref 70–75)
P AXIS - MUSE: 58 DEGREES
PCO2 BLD: 38 MM HG (ref 35–45)
PCO2 BLD: 38 MM HG (ref 35–45)
PCO2 BLD: 45 MM HG (ref 35–45)
PCO2 BLDV: 44 MM HG (ref 40–50)
PCO2 BLDV: 44 MM HG (ref 40–50)
PCO2 BLDV: 46 MM HG (ref 40–50)
PCO2 BLDV: 46 MM HG (ref 40–50)
PCO2 BLDV: 51 MM HG (ref 40–50)
PEEP: 5 CM H2O
PH BLD: 7.38 [PH] (ref 7.35–7.45)
PH BLD: 7.41 [PH] (ref 7.35–7.45)
PH BLD: 7.43 [PH] (ref 7.35–7.45)
PH BLDV: 7.35 [PH] (ref 7.32–7.43)
PH BLDV: 7.39 [PH] (ref 7.32–7.43)
PH BLDV: 7.4 [PH] (ref 7.32–7.43)
PH BLDV: 7.41 [PH] (ref 7.32–7.43)
PH BLDV: 7.41 [PH] (ref 7.32–7.43)
PHOSPHATE SERPL-MCNC: 2.6 MG/DL (ref 2.5–4.5)
PHOSPHATE SERPL-MCNC: 3.2 MG/DL (ref 2.5–4.5)
PHOSPHATE SERPL-MCNC: 3.6 MG/DL (ref 2.5–4.5)
PLATELET # BLD AUTO: 183 10E3/UL (ref 150–450)
PLATELET # BLD AUTO: 194 10E3/UL (ref 150–450)
PLATELET # BLD AUTO: 209 10E3/UL (ref 150–450)
PO2 BLD: 101 MM HG (ref 80–105)
PO2 BLD: 105 MM HG (ref 80–105)
PO2 BLD: 112 MM HG (ref 80–105)
PO2 BLDV: 32 MM HG (ref 25–47)
PO2 BLDV: 33 MM HG (ref 25–47)
PO2 BLDV: 34 MM HG (ref 25–47)
PO2 BLDV: 36 MM HG (ref 25–47)
PO2 BLDV: 38 MM HG (ref 25–47)
POTASSIUM SERPL-SCNC: 3.9 MMOL/L (ref 3.4–5.3)
POTASSIUM SERPL-SCNC: 4.3 MMOL/L (ref 3.4–5.3)
POTASSIUM SERPL-SCNC: 4.3 MMOL/L (ref 3.4–5.3)
POTASSIUM SERPL-SCNC: 4.4 MMOL/L (ref 3.4–5.3)
PR INTERVAL - MUSE: 216 MS
PROT SERPL-MCNC: 5.7 G/DL (ref 6.4–8.3)
QRS DURATION - MUSE: 194 MS
QT - MUSE: 574 MS
QTC - MUSE: 574 MS
R AXIS - MUSE: -72 DEGREES
RBC # BLD AUTO: 3.41 10E6/UL (ref 4.4–5.9)
RBC # BLD AUTO: 3.53 10E6/UL (ref 4.4–5.9)
RBC # BLD AUTO: 3.61 10E6/UL (ref 4.4–5.9)
SAO2 % BLDA: 97 % (ref 92–100)
SAO2 % BLDA: 97 % (ref 92–100)
SAO2 % BLDA: 98 % (ref 92–100)
SAO2 % BLDV: 61.8 % (ref 70–75)
SAO2 % BLDV: 65.8 % (ref 70–75)
SAO2 % BLDV: 67.7 % (ref 70–75)
SAO2 % BLDV: 70.8 % (ref 70–75)
SAO2 % BLDV: 71 % (ref 70–75)
SODIUM SERPL-SCNC: 140 MMOL/L (ref 135–145)
SPECIMEN EXPIRATION DATE: NORMAL
SYSTOLIC BLOOD PRESSURE - MUSE: NORMAL MMHG
T AXIS - MUSE: 107 DEGREES
UNIT ABO/RH: NORMAL
UNIT ABO/RH: NORMAL
UNIT NUMBER: NORMAL
UNIT NUMBER: NORMAL
UNIT STATUS: NORMAL
UNIT STATUS: NORMAL
UNIT TYPE ISBT: 6200
UNIT TYPE ISBT: 6200
VENTRICULAR RATE- MUSE: 60 BPM
WBC # BLD AUTO: 15.4 10E3/UL (ref 4–11)
WBC # BLD AUTO: 16.3 10E3/UL (ref 4–11)
WBC # BLD AUTO: 16.4 10E3/UL (ref 4–11)

## 2024-07-17 PROCEDURE — 85730 THROMBOPLASTIN TIME PARTIAL: CPT | Performed by: STUDENT IN AN ORGANIZED HEALTH CARE EDUCATION/TRAINING PROGRAM

## 2024-07-17 PROCEDURE — 99291 CRITICAL CARE FIRST HOUR: CPT | Mod: 24 | Performed by: ANESTHESIOLOGY

## 2024-07-17 PROCEDURE — 250N000012 HC RX MED GY IP 250 OP 636 PS 637

## 2024-07-17 PROCEDURE — 250N000009 HC RX 250: Performed by: PHYSICIAN ASSISTANT

## 2024-07-17 PROCEDURE — 250N000013 HC RX MED GY IP 250 OP 250 PS 637

## 2024-07-17 PROCEDURE — 97165 OT EVAL LOW COMPLEX 30 MIN: CPT | Mod: GO | Performed by: OCCUPATIONAL THERAPIST

## 2024-07-17 PROCEDURE — 71045 X-RAY EXAM CHEST 1 VIEW: CPT

## 2024-07-17 PROCEDURE — 97530 THERAPEUTIC ACTIVITIES: CPT | Mod: GO | Performed by: OCCUPATIONAL THERAPIST

## 2024-07-17 PROCEDURE — 250N000011 HC RX IP 250 OP 636: Performed by: SURGERY

## 2024-07-17 PROCEDURE — 82330 ASSAY OF CALCIUM: CPT | Performed by: STUDENT IN AN ORGANIZED HEALTH CARE EDUCATION/TRAINING PROGRAM

## 2024-07-17 PROCEDURE — 83605 ASSAY OF LACTIC ACID: CPT | Performed by: STUDENT IN AN ORGANIZED HEALTH CARE EDUCATION/TRAINING PROGRAM

## 2024-07-17 PROCEDURE — 83735 ASSAY OF MAGNESIUM: CPT

## 2024-07-17 PROCEDURE — 258N000003 HC RX IP 258 OP 636: Performed by: STUDENT IN AN ORGANIZED HEALTH CARE EDUCATION/TRAINING PROGRAM

## 2024-07-17 PROCEDURE — 250N000013 HC RX MED GY IP 250 OP 250 PS 637: Performed by: PHYSICIAN ASSISTANT

## 2024-07-17 PROCEDURE — 83735 ASSAY OF MAGNESIUM: CPT | Performed by: STUDENT IN AN ORGANIZED HEALTH CARE EDUCATION/TRAINING PROGRAM

## 2024-07-17 PROCEDURE — 80048 BASIC METABOLIC PNL TOTAL CA: CPT

## 2024-07-17 PROCEDURE — 84100 ASSAY OF PHOSPHORUS: CPT

## 2024-07-17 PROCEDURE — 250N000011 HC RX IP 250 OP 636: Performed by: ANESTHESIOLOGY

## 2024-07-17 PROCEDURE — 85384 FIBRINOGEN ACTIVITY: CPT | Performed by: STUDENT IN AN ORGANIZED HEALTH CARE EDUCATION/TRAINING PROGRAM

## 2024-07-17 PROCEDURE — 82330 ASSAY OF CALCIUM: CPT

## 2024-07-17 PROCEDURE — 85027 COMPLETE CBC AUTOMATED: CPT | Performed by: STUDENT IN AN ORGANIZED HEALTH CARE EDUCATION/TRAINING PROGRAM

## 2024-07-17 PROCEDURE — 71045 X-RAY EXAM CHEST 1 VIEW: CPT | Mod: 26 | Performed by: RADIOLOGY

## 2024-07-17 PROCEDURE — 82805 BLOOD GASES W/O2 SATURATION: CPT | Performed by: STUDENT IN AN ORGANIZED HEALTH CARE EDUCATION/TRAINING PROGRAM

## 2024-07-17 PROCEDURE — 94003 VENT MGMT INPAT SUBQ DAY: CPT

## 2024-07-17 PROCEDURE — 84100 ASSAY OF PHOSPHORUS: CPT | Performed by: STUDENT IN AN ORGANIZED HEALTH CARE EDUCATION/TRAINING PROGRAM

## 2024-07-17 PROCEDURE — 271N000002 HC RX 271: Performed by: ANESTHESIOLOGY

## 2024-07-17 PROCEDURE — 80048 BASIC METABOLIC PNL TOTAL CA: CPT | Performed by: STUDENT IN AN ORGANIZED HEALTH CARE EDUCATION/TRAINING PROGRAM

## 2024-07-17 PROCEDURE — 200N000002 HC R&B ICU UMMC

## 2024-07-17 PROCEDURE — 85610 PROTHROMBIN TIME: CPT | Performed by: STUDENT IN AN ORGANIZED HEALTH CARE EDUCATION/TRAINING PROGRAM

## 2024-07-17 PROCEDURE — 999N000157 HC STATISTIC RCP TIME EA 10 MIN

## 2024-07-17 PROCEDURE — 5A09357 ASSISTANCE WITH RESPIRATORY VENTILATION, LESS THAN 24 CONSECUTIVE HOURS, CONTINUOUS POSITIVE AIRWAY PRESSURE: ICD-10-PCS | Performed by: ANESTHESIOLOGY

## 2024-07-17 PROCEDURE — 250N000013 HC RX MED GY IP 250 OP 250 PS 637: Performed by: STUDENT IN AN ORGANIZED HEALTH CARE EDUCATION/TRAINING PROGRAM

## 2024-07-17 PROCEDURE — 82040 ASSAY OF SERUM ALBUMIN: CPT | Performed by: PHYSICIAN ASSISTANT

## 2024-07-17 PROCEDURE — 97535 SELF CARE MNGMENT TRAINING: CPT | Mod: GO | Performed by: OCCUPATIONAL THERAPIST

## 2024-07-17 PROCEDURE — 258N000003 HC RX IP 258 OP 636

## 2024-07-17 PROCEDURE — 250N000011 HC RX IP 250 OP 636: Performed by: PHYSICIAN ASSISTANT

## 2024-07-17 PROCEDURE — 01996 DLY HOSP MGMT EDRL RX ADMIN: CPT | Performed by: PHYSICIAN ASSISTANT

## 2024-07-17 PROCEDURE — 258N000003 HC RX IP 258 OP 636: Performed by: SURGERY

## 2024-07-17 PROCEDURE — 250N000009 HC RX 250: Performed by: SURGERY

## 2024-07-17 PROCEDURE — 250N000011 HC RX IP 250 OP 636: Performed by: STUDENT IN AN ORGANIZED HEALTH CARE EDUCATION/TRAINING PROGRAM

## 2024-07-17 RX ORDER — WARFARIN SODIUM 4 MG/1
4 TABLET ORAL
Status: COMPLETED | OUTPATIENT
Start: 2024-07-17 | End: 2024-07-17

## 2024-07-17 RX ORDER — MAGNESIUM OXIDE 400 MG/1
400 TABLET ORAL EVERY 4 HOURS
Status: DISCONTINUED | OUTPATIENT
Start: 2024-07-17 | End: 2024-07-17 | Stop reason: ALTCHOICE

## 2024-07-17 RX ORDER — DEXTROSE MONOHYDRATE 25 G/50ML
25-50 INJECTION, SOLUTION INTRAVENOUS
Status: DISCONTINUED | OUTPATIENT
Start: 2024-07-17 | End: 2024-07-21 | Stop reason: HOSPADM

## 2024-07-17 RX ORDER — MAGNESIUM SULFATE HEPTAHYDRATE 40 MG/ML
2 INJECTION, SOLUTION INTRAVENOUS ONCE
Status: COMPLETED | OUTPATIENT
Start: 2024-07-17 | End: 2024-07-17

## 2024-07-17 RX ORDER — NICOTINE POLACRILEX 4 MG
15-30 LOZENGE BUCCAL
Status: DISCONTINUED | OUTPATIENT
Start: 2024-07-17 | End: 2024-07-21 | Stop reason: HOSPADM

## 2024-07-17 RX ORDER — FUROSEMIDE 10 MG/ML
40 INJECTION INTRAMUSCULAR; INTRAVENOUS ONCE
Status: COMPLETED | OUTPATIENT
Start: 2024-07-17 | End: 2024-07-17

## 2024-07-17 RX ADMIN — OXYCODONE HYDROCHLORIDE 10 MG: 10 TABLET ORAL at 00:32

## 2024-07-17 RX ADMIN — MYCOPHENOLATE MOFETIL 1000 MG: 200 POWDER, FOR SUSPENSION ORAL at 20:12

## 2024-07-17 RX ADMIN — OXYCODONE HYDROCHLORIDE 5 MG: 5 TABLET ORAL at 22:31

## 2024-07-17 RX ADMIN — SENNOSIDES AND DOCUSATE SODIUM 1 TABLET: 50; 8.6 TABLET ORAL at 20:13

## 2024-07-17 RX ADMIN — FUROSEMIDE 40 MG: 10 INJECTION, SOLUTION INTRAVENOUS at 08:55

## 2024-07-17 RX ADMIN — POLYETHYLENE GLYCOL 3350 17 G: 17 POWDER, FOR SOLUTION ORAL at 08:26

## 2024-07-17 RX ADMIN — MYCOPHENOLATE MOFETIL 1000 MG: 200 POWDER, FOR SUSPENSION ORAL at 08:26

## 2024-07-17 RX ADMIN — DEXMEDETOMIDINE HYDROCHLORIDE 0.4 MCG/KG/HR: 4 INJECTION, SOLUTION INTRAVENOUS at 06:18

## 2024-07-17 RX ADMIN — SENNOSIDES AND DOCUSATE SODIUM 1 TABLET: 50; 8.6 TABLET ORAL at 08:26

## 2024-07-17 RX ADMIN — VANCOMYCIN HYDROCHLORIDE 1000 MG: 1 INJECTION, SOLUTION INTRAVENOUS at 06:18

## 2024-07-17 RX ADMIN — ASPIRIN 81 MG CHEWABLE TABLET 81 MG: 81 TABLET CHEWABLE at 08:26

## 2024-07-17 RX ADMIN — ACETAMINOPHEN 975 MG: 325 TABLET, FILM COATED ORAL at 16:59

## 2024-07-17 RX ADMIN — CEFAZOLIN 1 G: 1 INJECTION, POWDER, FOR SOLUTION INTRAMUSCULAR; INTRAVENOUS at 04:26

## 2024-07-17 RX ADMIN — CHLORHEXIDINE GLUCONATE 0.12% ORAL RINSE 15 ML: 1.2 LIQUID ORAL at 08:27

## 2024-07-17 RX ADMIN — CEFAZOLIN 1 G: 1 INJECTION, POWDER, FOR SOLUTION INTRAMUSCULAR; INTRAVENOUS at 12:14

## 2024-07-17 RX ADMIN — SODIUM CHLORIDE, POTASSIUM CHLORIDE, SODIUM LACTATE AND CALCIUM CHLORIDE 250 ML: 600; 310; 30; 20 INJECTION, SOLUTION INTRAVENOUS at 05:35

## 2024-07-17 RX ADMIN — MAGNESIUM SULFATE HEPTAHYDRATE 2 G: 2 INJECTION, SOLUTION INTRAVENOUS at 20:10

## 2024-07-17 RX ADMIN — INSULIN ASPART 2 UNITS: 100 INJECTION, SOLUTION INTRAVENOUS; SUBCUTANEOUS at 20:13

## 2024-07-17 RX ADMIN — HEPARIN SODIUM 5000 UNITS: 5000 INJECTION, SOLUTION INTRAVENOUS; SUBCUTANEOUS at 20:14

## 2024-07-17 RX ADMIN — EPINEPHRINE 0.04 MCG/KG/MIN: 1 INJECTION INTRAMUSCULAR; INTRAVENOUS; SUBCUTANEOUS at 04:28

## 2024-07-17 RX ADMIN — HEPARIN SODIUM 5000 UNITS: 5000 INJECTION, SOLUTION INTRAVENOUS; SUBCUTANEOUS at 12:14

## 2024-07-17 RX ADMIN — METHIMAZOLE 5 MG: 5 TABLET ORAL at 08:26

## 2024-07-17 RX ADMIN — Medication 40 MG: at 08:26

## 2024-07-17 RX ADMIN — Medication: at 10:51

## 2024-07-17 RX ADMIN — ACETAMINOPHEN 975 MG: 325 TABLET, FILM COATED ORAL at 23:37

## 2024-07-17 RX ADMIN — POTASSIUM PHOSPHATE, MONOBASIC POTASSIUM PHOSPHATE, DIBASIC 9 MMOL: 224; 236 INJECTION, SOLUTION, CONCENTRATE INTRAVENOUS at 21:44

## 2024-07-17 RX ADMIN — TIMOLOL MALEATE 1 DROP: 5 SOLUTION/ DROPS OPHTHALMIC at 22:31

## 2024-07-17 RX ADMIN — WARFARIN SODIUM 4 MG: 4 TABLET ORAL at 17:00

## 2024-07-17 RX ADMIN — ACETAMINOPHEN 975 MG: 325 TABLET, FILM COATED ORAL at 00:16

## 2024-07-17 RX ADMIN — ACETAMINOPHEN 975 MG: 325 TABLET, FILM COATED ORAL at 08:26

## 2024-07-17 RX ADMIN — OXYCODONE HYDROCHLORIDE 5 MG: 5 TABLET ORAL at 14:31

## 2024-07-17 ASSESSMENT — ACTIVITIES OF DAILY LIVING (ADL)
ADLS_ACUITY_SCORE: 26
ADLS_ACUITY_SCORE: 30
ADLS_ACUITY_SCORE: 26
ADLS_ACUITY_SCORE: 30
ADLS_ACUITY_SCORE: 26
ADLS_ACUITY_SCORE: 30
ADLS_ACUITY_SCORE: 30
PREVIOUS_RESPONSIBILITIES: MEAL PREP;HOUSEKEEPING;LAUNDRY;SHOPPING;YARDWORK;MEDICATION MANAGEMENT;FINANCES;DRIVING;WORK
ADLS_ACUITY_SCORE: 30
ADLS_ACUITY_SCORE: 26
ADLS_ACUITY_SCORE: 30
ADLS_ACUITY_SCORE: 26
ADLS_ACUITY_SCORE: 30
ADLS_ACUITY_SCORE: 26
ADLS_ACUITY_SCORE: 30
ADLS_ACUITY_SCORE: 26
ADLS_ACUITY_SCORE: 30

## 2024-07-17 ASSESSMENT — ENCOUNTER SYMPTOMS
SEIZURES: 0
DYSRHYTHMIAS: 1

## 2024-07-17 ASSESSMENT — LIFESTYLE VARIABLES: TOBACCO_USE: 0

## 2024-07-17 NOTE — ANESTHESIA PREPROCEDURE EVALUATION
Pre-Operative H & P     CC:  Preoperative exam to assess for increased cardiopulmonary risk while undergoing surgery and anesthesia.    Date of Encounter: 7/1/2024  Primary Care Physician:  Amira Gentile     Reason for visit:   Encounter Diagnosis   Name Primary?    Severe mitral regurgitation        HPI  Jose Carney is a 63 year old male who presents for pre-operative H & P in preparation for  Procedure Information       Case: [id number] Anesthesia Start Date/Time: 07/16/24 0759    Procedure: Right Sided Thoracotomy On Cardiopulmonary Bypass with a MINIMALLY INVASIVE MITRAL VALVE REPLACEMENT Using On-X Prosthetic Heart Valve with Conform-X Sewing Ring Size 25/33mm, Transesophageal Echocardiogram (CADEN) by Anesthesia (Right: Chest)    Anesthesia type: General with Block    Diagnosis: Severe mitral regurgitation [I34.0]    Pre-op diagnosis: Severe mitral regurgitation [I34.0]    Location:  OR [address] [place] [address] OR    Providers: Mag Kiran MD            Patient is being evaluated for comorbid conditions of WILLIAM, hyperthyroidism, chronic immunosuppression.    Mr. Carney has a history of systemic sarcoidosis with cardiac involvement who is now in remission with daily methotrexate for quite some time. He had severe MR from posterior leaflet restriction that was treated with MitraClip in August 2023. He unfortunately has severe residual MR with mild/moderate stenosis and now moderate TR as well with evidence of pulmonary hypertension. He now presents for the above procedure.    He is followed closely by Dr. Dickey in cardiology for a history of biopsy-proven cardiac sarcoid from a transbronchial biopsy-who has a cardiac MRI consistent with myocardial involvement from sarcoidosis. His PET scan related inflammation resolved completely with 3-4 months of 40 mg of prednisone.  He had an ICD placed given he had high risk features on his cardiac MRI for malignant arrhythmias. With respect to his cardiac sarcoidosis,  given history of inflammation by MRI and PET as well as ventricular arrhythmias, he is on long-term immunosuppression with methotrexate.         History is obtained from the patient and chart review. He is accompanied by his wife.    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    ICD (implantable cardioverter-defibrillator) in place     Palpitations 03/20/2018    Paroxysmal atrial fibrillation (H)     Paroxysmal supraventricular tachycardia (H24) 03/20/2018    Sarcoid, cardiac/EF 54% per MRI,[place] of affected myocardium is 12% of myocardial mass 01/25/2018    Sarcoidosis/ systemic 2013 03/20/2018    Severe mitral regurgitation     Sleep apnea     Thyroid disease        Past Surgical History  Past Surgical History:   Procedure Laterality Date    CATARACT EXTRACTION      CV CORONARY ANGIOGRAM N/A 06/30/2023    Procedure: Coronary Angiogram;  Surgeon: Tony Morales MD;  Location: [facility] CARDIAC CATH LAB    CV TRANSCATHETER MITRAL VALVE REPAIR N/A 08/28/2023    Procedure: Transcatheter Mitral Valve Repair;  Surgeon: Tony Morales MD;  Location: [facility] CARDIAC CATH LAB    EP ABLATION SVT N/A 03/29/2022    Procedure: Ablation Supraventricular Tachycardia;  Surgeon: Lauro Will MD;  Location: [facility] CARDIAC CATH LAB    HERNIA REPAIR, INGUINAL RT/LT Bilateral     IMPLANTED DEVICE         Prior to Admission Medications  No current outpatient medications on file.       Allergies  Allergies   Allergen Reactions    Seasonal Allergies     Terbinafine        Social History  Social History     Socioeconomic History    Marital status:      Spouse name: Not on file    Number of children: Not on file    Years of education: Not on file    Highest education level: Not on file   Occupational History    Not on file   Tobacco Use     Smoking status: Never    Smokeless tobacco: Never   Vaping Use    Vaping status: Not on file   Substance and Sexual Activity    Alcohol use: Not Currently    Drug use: No    Sexual activity: Not on file   Other Topics Concern    Parent/sibling w/ CABG, MI or angioplasty before 65F 55M? Not Asked   Social History Narrative    Not on file     Social Determinants of Health     Financial Resource Strain: Unknown (11/6/2022)    Received from [facility], [facility]    Overall Financial Resource Strain (CARDIA)     Difficulty of Paying Living Expenses: Patient declined   Food Insecurity: Unknown (11/6/2022)    Received from [facility], [facility]    Hunger Vital Sign     Worried About Running Out of Food in the Last Year: Patient declined     Ran Out of Food in the Last Year: Patient declined   Transportation Needs: Unknown (11/6/2022)    Received from [facility], [facility]    PRAPARE - Transportation     Lack of Transportation (Medical): Patient declined     Lack of Transportation (Non-Medical): Patient declined   Physical Activity: Insufficiently Active (11/26/2021)    Received from [facility], [facility]    Exercise Vital Sign     Days of Exercise per Week: 1 day     Minutes of Exercise per Session: 10 min   Stress: Stress Concern Present (11/26/2021)    Received from [facility], [facility]    [employer] [place] [employer] - Occupational Stress Questionnaire     Feeling of Stress : To some extent   Social Connections: Moderately Integrated (11/26/2021)    Received from [facility], [facility]    Social Connection and Isolation Panel [NHANES]     Frequency of Communication with Friends and Family: Once a week     Frequency of Social Gatherings with Friends and Family: Once a week     Attends Buddhism Services: More than 4 times per year     Active Member of Clubs or Organizations: Yes     Attends Club or Organization Meetings: Never     Marital Status:    Interpersonal Safety: Not At Risk (6/9/2023)    [facility]     IP Custom IPV     Do you feel UNSAFE in any of your personal relationships with your family members or any other acquaintances?: No   Housing Stability: Not on file       Family History  Family History   Problem Relation Age of Onset    Anesthesia Reaction No family hx of     Deep Vein Thrombosis (DVT) No family hx of        Review of Systems  The complete review of systems is negative other than noted in the HPI or here.     Anesthesia Evaluation   Pt has had prior anesthetic.     No history of anesthetic complications       ROS/MED HX  ENT/Pulmonary:     (+) sleep apnea, uses CPAP,                                   (-) tobacco use, asthma and recent URI   Neurologic:  - neg neurologic ROS  (-) no seizures and no CVA   Cardiovascular: Comment: Systemic sarcoidosis w/ cardiac involvement    (+)  - -   -  - -   Taking blood thinners  Instructions Given to patient: on Xarelto.      NEWELL.        ICD Reason placed:high risk for arryhthmias 2/2 sarcoidosis.  type;A [employer]   dysrhythmias (SVT), a-fib,  valvular problems/murmurs type: MR    pulmonary hypertension (46 mmHg), Previous cardiac testing   Echo: Date: 3/28/24 Results:  See H&P    Stress Test:  Date: Results:    ECG Reviewed:  Date: Results:    Cath:  Date: 8/28/23 Results:  1. Successful placement of 2 XT W clips  2. MR reduction from 4+ to 2+.  3. Mitral gradient 6 mmHg.      METS/Exercise Tolerance: 4 - Raking leaves, gardening Comment: Endorses NEWELL, denies  "CP     Hematologic:  - neg hematologic  ROS  (-) history of blood clots and history of blood transfusion   Musculoskeletal: Comment: Diffuse pain      GI/Hepatic:     (+) GERD (currently not treated; intermittent),                (-) liver disease   Renal/Genitourinary:  - neg Renal ROS  (-) renal disease   Endo:     (+)          thyroid problem (2/2 amiodarone toxicity), hyperthyroidism,        (-) Type II DM   Psychiatric/Substance Use:  - neg psychiatric ROS     Infectious Disease:  - neg infectious disease ROS     Malignancy:  - neg malignancy ROS     Other: Comment: Immunosuppressed w/ methotrexate; last dose 7/1             BP 97/63   Pulse 60   Temp 37  C (98.6  F)   Resp 12   Ht 1.854 m (6' 1\")   Wt 78.7 kg (173 lb 8 oz)   SpO2 97%   BMI 22.89 kg/m      Physical Exam  Constitutional: Awake, alert, cooperative, no apparent distress, and appears stated age.  Eyes: Pupils equal, round and reactive to light, extra ocular muscles intact, sclera clear, conjunctiva normal.  HENT: Normocephalic, oral pharynx with moist mucus membranes, good dentition. No goiter appreciated. No removable dental hardware.  Respiratory: Clear to auscultation bilaterally, no crackles or wheezing. No SOB when supine.  Cardiovascular: Irregular rate and rhythm, S1, S2, normal S1 and S2, and no 2/6 noted.  Carotids +1, no bruits. No edema. Palpable pulses to radial, DP and PT arteries.   GI: Normal bowel sounds, soft, non-distended, non-tender, no masses palpated.    Lymph/Hematologic: No cervical lymphadenopathy and no supraclavicular lymphadenopathy.  Genitourinary:  deferred  Skin: Warm and dry.  No rashes.   Musculoskeletal: full ROM of neck. There is no redness, warmth, or swelling of the joints. Gross motor strength is normal.    Neurologic: Awake, alert, oriented to name, place and time. Cranial nerves II-XII are grossly intact. Gait is normal. Ambulates from chair to exam table, seats self, lies supine and sits back up w/o " assistance.  Neuropsychiatric: Calm, cooperative. Normal affect. Pleasant. Answers questions appropriately, follows commands w/o difficulty.        PRIOR LABS/DIAGNOSTIC STUDIES:    All labs and imaging personally reviewed        Heart cath -  please see in ROS above     Echo result w/o MOPS: Interpretation Summary Moderate left ventricular dilation. Left ventricular function is decreased. The ejection fraction is 50-55% (borderline).Global right ventricular function is normal.s/p MitraClip x 2 on 8/28/23. Residual moderate to severe mitral insufficiency. Eccentric anterior jet noted. Mild mitral stenosis. Mean gradient 5 mmHg at HR 70. Moderate tricuspid insufficiency.Interatrial shunt noted w/ color doppler. Estimated pulmonary artery systolic pressure is 46 mmHg.IVC is normal. This study was compared with the study from 10/5/23. Abnormal findings are similar to prior.          Latest Ref Rng & Units 9/20/2018     4:23 PM   PFT   FVC L 4.22    FEV1 L 3.48    FVC% % 87    FEV1% % 91          The patient's records and results personally reviewed by this provider.       LAB/DIAGNOSTIC STUDIES TODAY:  CMP, CBC, A1c, prealb, INR, PTT, T&S, UA, magnesium    Assessment    Jose Carney is a 63 year old male seen as a PAC referral for risk assessment and optimization for anesthesia.    Plan/Recommendations  Pt will be optimized for the proposed procedure.  See below for details on the assessment, risk, and preoperative recommendations    NEUROLOGY  - No history of TIA, CVA or seizure    -Post Op delirium risk factors:  No risk identified    ENT  - No current airway concerns.  Will need to be reassessed day of surgery.  Mallampati: III  TM: > 3    CARDIAC  - s/p [employer] ICD implant due to risk of ventricular arrhythmias in setting of cardiac sarcoidosis. Pt denies h/o device delivering a shock.    - Significant NEWELL w/ severe mitral regurg. S/p mitral clipping 8/28/23.    - Echo 3/28/24: EF 50-55%. S/p MitraClip  "x 2 on 8/28/23. Residual moderate to severe mitral insufficiency. Moderate tricuspid insufficiency. Interatrial shunt noted w/ color doppler. Estimated PA systolic pressure is 46 mmHg.     - A-fib, on Xarelto. Last dose 7/12.   - Hold lasix, continue metoprolol DOS    - METS (Metabolic Equivalents)  Patient performs 4 or more METS exercise without symptoms             Total Score: 0      RCRI-Low risk: Class 2 0.9% complication rate             Total Score: 1    RCRI: High Risk Surgery        PULMONARY  - WILLIAM w/ CPAP     - Denies asthma or inhaler use  - Tobacco History    History   Smoking Status    Never   Smokeless Tobacco    Never       GI  - Intermittent GERD, currently not treated  PONV Medium Risk  Total Score: 2           1 AN PONV: Patient is not a current smoker    1 AN PONV: Intended Post Op Opioids          ENDOCRINE    - BMI: Estimated body mass index is 22.89 kg/m  as calculated from the following:    Height as of this encounter: 1.854 m (6' 1\").    Weight as of this encounter: 78.7 kg (173 lb 8 oz).  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 Continue tapazole.     HEME/ IMMUNE  VTE Low Risk 0.5%             Total Score: 3    VTE: Greater than 59 yrs old    VTE: Male      - on Xarelto, last dose 7/12  - on methotrexate for sarcoidosis, last dose 7/1    MSK  Patient IS Frail             Total Score: 3    Frailty: Weight loss 10 lbs or greater    Frailty: Slower walking speed    Frailty: Decrease in strength      Due to the patient's risk, a referral to Physical Medicine and Rehabilitation has been made.          The patient is aware that the final anesthesia plan will be decided by the assigned anesthesia provider on the date of service.      The patient is optimized for their procedure. AVS with information on surgery time/arrival time, meds and NPO status given by nursing staff. No further diagnostic testing indicated.      On the " day of service:     Prep time: 14 minutes  Visit time: 24 minutes  Documentation time: 16 minutes  ------------------------------------------  Total time: 54 minutes      Layne Madera PA-C  Preoperative Assessment Center  [facility]  Phone: [phone]  Fax: [phone]    Physical Exam    Airway        Mallampati: II   TM distance: > 3 FB   Neck ROM: limited   Mouth opening: > 3 cm    Respiratory Devices and Support         Dental           Cardiovascular           (+) murmur       Pulmonary                   Anesthesia Plan    ASA Status:  3    NPO Status:  NPO Appropriate    Anesthesia Type: General.     - Airway: ETT   Induction: Intravenous.   Maintenance: Balanced.   Techniques and Equipment:     - Airway: Video-Laryngoscope     - Lines/Monitors: 2nd IV, CADEN, Central Line, Arterial Line, PAC, CVP, BIS, NIRS            CADEN Absolute Contra-indication: NONE            CADEN Relative Contra-indication: NONE     - Blood: Blood in Room     - Drips/Meds: Vasopressin, Epinephrine, Dexmed. infusion     Consents    Anesthesia Plan(s) and associated risks, benefits, and realistic alternatives discussed. Questions answered and patient/representative(s) expressed understanding.     - Discussed:     - Discussed with:  Patient      - Extended Intubation/Ventilatory Support Discussed: Yes.      - Patient is DNR/DNI Status: No     Use of blood products discussed: Yes.     - Discussed with: Patient.     - Consented: consented to blood products     Postoperative Care    Pain management: Multi-modal analgesia.   PONV prophylaxis: Ondansetron (or other 5HT-3), Dexamethasone or Solumedrol     Comments:

## 2024-07-17 NOTE — PROGRESS NOTES
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     Procedure: minimally invasive MVR with On-X mechanical valve on 7/16/24 by Dr. Kiran     Date of Surgery: 7/16/24    Date of Catheter Placement: 7/16/24    Plan/Recommendations:  1. Regional Anesthesia/Analgesia  -Continuous Catheter Type/Site: right paravertebral (PV) T5-6  Infusate: ropivacaine 0.2%    Programmed Intermittent Bolus (PIB) at 10 mL Q60 min via right catheter, total infusion rate of 10 mL/hr    Plan to maintain catheter approximately of 7 days    2. Anticoagulation  -Please contact Inpatient Pain Service before ordering or making any anticoagulation changes  -will start coumadin on 7/17/24 evening.  -plan on removing catheter on 7/18/24 morning. Primary service aware the need to hold subcutaneous Heparin 4 hours prior to catheter removal.     3. Multimodal Analgesia  - per primary service    Pain Service will continue to follow.    Discussed with attending anesthesiologist    Sabi Dickson PA-C  07/17/2024     Overnight Events: no major acute event    Tubes/Drains: Yes  Chest tubes    Subjective: pain is manageable    Symptoms of LAST: No    Pain Location:  chest    Pain Intensity:    Pain at Rest: 2/10     Satisfied with your level of pain control: Yes    Diet: Advance Diet as Tolerated: Clear Liquid Diet    Relevant Labs:  Recent Labs   Lab Test 07/17/24  0947 07/17/24  0809   INR 1.56* 1.62*   PLT  --  183   PTT  --  39*   BUN  --  18.0       Physical Exam:  Vitals: BP 97/63   Pulse 60   Temp 98.6  F (37  C)   Resp 10   Ht 1.854 m (6' 1\")   Wt 78.7 kg (173 lb 8 oz)   SpO2 96%   BMI 22.89 kg/m      Physical Exam: " "  Orientation:  Alert, oriented, and in no acute distress: Yes  Sedation: No    Motor Examination:  5/5 Strength in lower extremities: Yes        Catheter Site:   Catheter entry site is clean/dry/intact: Yes    Tender: No      Relevant Medications:  Current Pain Medications:  Medications related to Pain Management (From now, onward)      Start     Dose/Rate Route Frequency Ordered Stop    07/19/24 0000  acetaminophen (TYLENOL) tablet 650 mg         650 mg Oral or Feeding Tube EVERY 4 HOURS PRN 07/16/24 1351      07/19/24 0000  bisacodyl (DULCOLAX) suppository 10 mg         10 mg Rectal DAILY PRN 07/16/24 1351      07/18/24 0000  magnesium hydroxide (MILK OF MAGNESIA) suspension 30 mL         30 mL Oral DAILY PRN 07/16/24 1351      07/17/24 0800  polyethylene glycol (MIRALAX) Packet 17 g         17 g Oral or Feeding Tube DAILY 07/16/24 1351      07/17/24 0800  aspirin (ASA) chewable tablet 81 mg         81 mg Oral or NG Tube DAILY 07/16/24 1351      07/16/24 2000  senna-docusate (SENOKOT-S/PERICOLACE) 8.6-50 MG per tablet 1 tablet         1 tablet Oral or Feeding Tube 2 TIMES DAILY 07/16/24 1351      07/16/24 1500  acetaminophen (TYLENOL) tablet 975 mg         975 mg Oral or Feeding Tube EVERY 8 HOURS 07/16/24 1351 07/19/24 1559    07/16/24 1351  HYDROmorphone (DILAUDID) injection 0.2 mg        Placed in \"Or\" Linked Group    0.2 mg Intravenous EVERY 2 HOURS PRN 07/16/24 1351      07/16/24 1351  HYDROmorphone (DILAUDID) injection 0.4 mg        Placed in \"Or\" Linked Group    0.4 mg Intravenous EVERY 2 HOURS PRN 07/16/24 1351      07/16/24 1351  oxyCODONE (ROXICODONE) tablet 5 mg        Placed in \"Or\" Linked Group    5 mg Oral EVERY 4 HOURS PRN 07/16/24 1351      07/16/24 1351  oxyCODONE IR (ROXICODONE) tablet 10 mg        Placed in \"Or\" Linked Group    10 mg Oral EVERY 4 HOURS PRN 07/16/24 1351      07/16/24 1351  lidocaine 1 % 0.1-1 mL         0.1-1 mL Other EVERY 1 HOUR PRN 07/16/24 1351      07/16/24 1351  lidocaine " "(LMX4) cream          Topical EVERY 1 HOUR PRN 07/16/24 1351      07/16/24 0800  ROPivacaine 0.2% in sodium chloride 0.9% PERINEURAL infusion          Perineural Continuous Nerve Block 07/16/24 0735              Primary Service Contacted with Recommendations? Yes            Acute Inpatient Pain Service [employer]  Hours of pain coverage 24/7   Page via Amcom- Please Page the Pain Team Via Amcom: \"PAIN MANAGEMENT ACUTE INPATIENT/ [facility]\"            "

## 2024-07-17 NOTE — PROGRESS NOTES
07/17/24 1400   Appointment Info   Signing Clinician's Name / Credentials (OT) alessandra thomas ot/l   Living Environment   People in Home significant other   Current Living Arrangements house   Home Accessibility stairs to enter home;stairs within home   Number of Stairs, Main Entrance 4   Stair Railings, Main Entrance railings on both sides of stairs   Number of Stairs, Within Home, Primary greater than 10 stairs   Stair Railings, Within Home, Primary railings on both sides of stairs   Transportation Anticipated car, drives self   Self-Care   Usual Activity Tolerance good   Current Activity Tolerance fair   Equipment Currently Used at Home none   Fall history within last six months no   Activity/Exercise/Self-Care Comment pt with active job in a seed store   Instrumental Activities of Daily Living (IADL)   Previous Responsibilities meal prep;housekeeping;laundry;shopping;yardwork;medication management;finances;driving;work   IADL Comments SO can assist as needed, SO is an RN   General Information   Referring Physician Aneta Keys   Patient/Family Therapy Goal Statement (OT) return to PLOF   Additional Occupational Profile Info/Pertinent History of Current Problem Jose Carney is a 63 year old male s/p minimally invasive MVR with On-X mechanical valve on 7/16/24 by Dr. Kiran. 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 Patient following commands with appropriate ABG trends, will work towards extubation today with PST. Given mechanical valve, will start warfarin today with plan to bridge with heparin tomorrow. Will have paravertebral catheter removed tomorrow, in anticipation of removal, hold SQH tonight at midnight.   Existing Precautions/Restrictions thoracotomy   General Observations and Info pt motivated with supportive SO at bedside.   Cognitive Status Examination   Orientation Status orientation to person, place and time    Cognitive Status Comments no significant concerns. pt displays linear logic and following all commands, intermittently confused with pivot transfer, will continue to monitor.   Visual Perception   Visual Impairment/Limitations WFL   Sensory   Sensory Quick Adds sensation intact   Range of Motion Comprehensive   General Range of Motion no range of motion deficits identified   Strength Comprehensive (MMT)   General Manual Muscle Testing (MMT) Assessment other (see comments)   Comment, General Manual Muscle Testing (MMT) Assessment general deconidtioning at risk of further decompensation.   Coordination   Upper Extremity Coordination No deficits were identified   Bed Mobility   Bed Mobility other (see comments)   Comment (Bed Mobility) education required.  (mod assist today.)   Transfers   Transfers sit-stand transfer;bed-chair transfer   Transfer Skill: Bed to Chair/Chair to Bed   Bed-Chair Henry (Transfers) minimum assist (75% patient effort)   Sit-Stand Transfer   Sit-Stand Henry (Transfers) minimum assist (75% patient effort)   Balance   Balance Assessment standing dynamic balance   Balance Comments no concerns.   Activities of Daily Living   BADL Assessment/Intervention lower body dressing   Lower Body Dressing Assessment/Training   Henry Level (Lower Body Dressing) maximum assist (25% patient effort)   Clinical Impression   Criteria for Skilled Therapeutic Interventions Met (OT) Yes, treatment indicated   OT Diagnosis decreased ADL I   Assessment of Occupational Performance 5 or more Performance Deficits   Identified Performance Deficits dressing, bathing, toileting, G/H, home making, leisure, bed mobility   Clinical Decision Making Complexity (OT) problem focused assessment/low complexity   Risk & Benefits of therapy have been explained evaluation/treatment results reviewed;care plan/treatment goals reviewed;risks/benefits reviewed;current/potential barriers reviewed;participants voiced  agreement with care plan;participants included;patient;spouse/significant other   Clinical Impression Comments Pt presents to OT with post surgicla precautions and general deconditioning   OT Total Evaluation Time   OT Eval Low Complexity Minutes (15131) 5   OT Goals   Therapy Frequency (OT) 6 times/week   OT Predicted Duration/Target Date for Goal Attainment 07/30/24   OT Goals Upper Body Dressing;Lower Body Dressing;Toilet Transfer/Toileting;Cognition;Cardiac Phase 1   OT: Upper Body Dressing Modified independent;within precautions   OT: Lower Body Dressing Modified independent;within precautions   OT: Toilet Transfer/Toileting Modified independent;toilet transfer;cleaning and garment management;within precautions   OT: Cognitive Patient/caregiver will verbalize understanding of cognitive assessment results/recommendations as needed for safe discharge planning   OT: Understanding of cardiac education to maximize quality of life, condition management, and health outcomes Patient   OT: Perform aerobic activity with stable cardiovascular response 20 minutes;NuStep;ambulation   OT: Functional/aerobic ambulation tolerance with stable cardiovascular response in order to return to home and community environment Greater than 300 feet;Independent   OT: Navigation of stairs simulating home set up with stable cardiovascular response in order to return to home and community environment Greater than 10 stairs;Modified independent   OT Discharge Planning   OT Plan ambulation, thoracotomy stretch, dressing, bed mobility   OT Discharge Recommendation (DC Rec) home with outpatient cardiac rehab   OT Rationale for DC Rec pt progressing well, will need CR outpaitent to maximize functional endurance and cardiac function   OT Brief overview of current status mod assist bed mobiltiy, max assist dressing, min assist to chair.   Total Session Time   Total Session Time (sum of timed and untimed services) 5

## 2024-07-17 NOTE — PROGRESS NOTES
CV ICU PROGRESS NOTE    Jose Carney  [id number]  Admitted: 7/16/2024  5:41 AM      ASSESSMENT: Jose Carney is a 63 year old male s/p minimally invasive MVR with On-X mechanical valve on 7/16/24 by Dr. Kiran. 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 Patient following commands with appropriate ABG trends, will work towards extubation today with PST. Given mechanical valve, will start warfarin today with plan to bridge with heparin tomorrow. Will have paravertebral catheter removed tomorrow, in anticipation of removal, hold SQH tonight at midnight.       Today's Changes/Plan:   - PST to work towards extubation  - Wean pressors for CI >2  - Start Warfarin tonight with pharmacy to dose; mechanical valve prophylaxis  - RAPS to remove paravertebral catheter tomorrow morning, will start heparin bridge at least 1 hour after removal   - Start SQH today, hold at midnight 7/18 in anticipation of catheter removal; resume after catheter removal    - Diuresis: Lasix 40mg x1 with goal net negative 1L  - Recheck BMP and lytes at 1400  - Discontinue insulin drip, transition to medium sliding scale   - Start ASA    CO-MORBIDITIES:   Patient Active Problem List   Diagnosis    Sarcoidosis/ systemic 2013    Paroxysmal supraventricular tachycardia (H24)    Palpitations    First degree AV block    Sarcoid, cardiac/EF 54% per MRI,[place] of affected myocardium is 12% of myocardial mass    Status post coronary angiogram    Mitral regurgitation    Severe mitral regurgitation       PLAN:   Neuro/ pain/ sedation:  - Monitor neurological status. Notify the MD for any acute changes in exam.  # Acute postoperative pain  - Scheduled: Tylenol  - PRN: Tylenol, oxycodone, Dilaudid  # Sedation  - Gtt: precedex; off propofol - discontinue once extubated   - Wean for RASS goal 0 to -1     HEENT:  #Sarcoidosis with eye involvement  - PTA timolol maleate drops      Pulmonary care:   #Mechanical ventilation  Vent Mode: CMV/AC  (Continuous Mandatory Ventilation/ Assist Control)  FiO2 (%): 30 %  Resp Rate (Set): 18 breaths/min  Tidal Volume (Set, mL): 450 mL  PEEP (cm H2O): 5 cmH2O  Resp: 18    - Pressure Support Trial to work towards extubation    - Goal SpO2 > 92%  - Encourage IS q15-30 minutes when awake.  - Daily CXR  - Monitor CT output  - Hold PTA CPAP while intubated, consider resuming on extubation     Cardiovascular:    # Cardiogenic shock  # Sarcoidosis with cardiac involvement, EF 54% per MRI   # Mitral Regurgitation s/p MitraClip (8/2023) s/p MVR with On-X valve (7/16/24)  # SVT s/p ICD placement  # Afib, on Xarelto at home   - Monitor hemodynamics closely  - Goal MAP >65, SBP <120   - Epi, NE, Vaso gtts for inotropic and pressor support, wean as able  - Hold PTA Xarelto, Metoprolol succinate 75mg daily  - Hold Statin   - Hold BB   - ASA: start today     GI care / Nutrition:   # NPO status   # Intermittent GERD, no treatment at home   - NPO, bedside swallow eval once extubated  - PPI  - Bowel regimen: MiraLAX, senna  - OG tube for meds       Renal / Fluids / Electrolytes:   # Volume status   # Electrolyte monitoring  # Lactate trending    BL creat appears to be ~ 0.95 - 1.0   - Strict I/O, daily weights, avoid/limit nephrotoxins  - Replete lytes PRN per protocol  - Hold PTA sildenafil      Endocrine:    # Stress hyperglycemia  Preop A1c 5.4   (7/1/24)   - Discontinue insulin gtt; start medium sliding scale insulin  - Goal BG <180 for optimal healing  # Hyperthyroidism d/t amiodarone toxicity by history   - Resume PTA methimazole today 7/17      ID / Antibiotics:  # Stress induced leukocytosis  - To complete perioperative regimen: Ancef and Vanco   - Monitor fever curve, WBC, and inflammatory markers as appropriate  # Chronic Immunosuppression given sarcoidosis   - Cellcept 500mg BID  - Hold PTA methotrexate and folic acid   - Not given stress dose steroids prior  to procedure      Heme:     # Acute blood loss anemia  # Post CPB thrombocytopenia  No s/sx active bleeding  - Daily CBC   - Hgb goal > 7.0  - Hgb 11.8 today, with no concern for bleeding      MSK / Skin:  # Sternotomy  # Surgical Incision  - Sternal precautions, postop incision management protocol  - PT/OT/CR     Prophylaxis:     - Mechanical DVT ppx  - Chemical DVT ppx: Warfarin, SQH. Hold SQH at midnight in anticipation of removing pain catheter tomorrow. Will start heparin tomorrow after catheter removed  - PPI     Lines / Tubes / Drains:  - ETT  - RIJ CVC, PA catheter  - Left radial arterial Line  - CTs x2 (1 pleural, 1 mediastinal)   - Castro  - OG  - Pacer wires   - Paravertebral catheter - remove tomorrow     Patient seen, findings and plan discussed with CVICU staff and my attending, Dr. Octavia King MD  General Surgery, PGY-1    ====================================    TODAY'S PROGRESS  INTERVAL EVENTS  Overnight, per nursing, patient able to follow commands and move extremities on nursing neuro exam. Patient had 20-beat run of non-sustained VT, which was reviewed with cardiology. Checked electrolytes, and weaned epi per CVTS given concern of contribution to ectopy. Lactate continues to downtrend from 3.3 to 2.2 this morning. Patient received 250 LR overnight.     On morning exam, patient moving extremities spontaneously and able to follow commands. Off of norepi and on epi at 0.05. Off propofol, on precedex. Patient remains intubated. CT, castro, and temp pacer wires remain in place.     OBJECTIVE  1. VITAL SIGNS  Temp:  [97.7  F (36.5  C)-100.6  F (38.1  C)] 98.1  F (36.7  C)  Pulse:  [60-72] 60  Resp:  [10-34] 18  BP: ()/(63-72) 97/63  MAP:  [64 mmHg-78 mmHg] 66 mmHg  Arterial Line BP: ()/(47-64) 94/56  FiO2 (%):  [30 %] 30 %  SpO2:  [97 %-100 %] 100 %  Vent Mode: CMV/AC  (Continuous Mandatory Ventilation/ Assist Control)  FiO2 (%): 30 %  Resp Rate (Set): 18 breaths/min  Tidal  Volume (Set, mL): 450 mL  PEEP (cm H2O): 5 cmH2O  Resp: 18      2. INTAKE/ OUTPUT  I/O last 3 completed shifts:  In: 4969.73 [I.V.:3134.73; Other:220; NG/GT:120; IV Piggyback:500]  Out: 1660 [Urine:1110; Chest Tube:550]    3. PHYSICAL EXAMINATION  GEN:  no acute distress, intubated, sedated.   NEURO:  no focal deficits, moving upper and lower extremities spontaneously. Follows command to open eyes at bedside   CV:  RRR, no lower leg edema. Peripheral pulses 2+   PULM:  mechanically ventilated  MSK:  extremities warm and well perfused  SKIN:  no rash, incisional site c/d/I with no strikethrough   CT:  to suction, sanguineous output, no airleak or crepitus    4. INVESTIGATIONS  Arterial Blood Gases   Recent Labs   Lab 07/17/24  0350 07/16/24  2345 07/16/24 1958 07/16/24  1807   PH 7.43 7.41 7.34* 7.32*   PCO2 38 38 40 41   PO2 112* 101 96 102   HCO3 25 24 22 21     Complete Blood Count   Recent Labs   Lab 07/17/24  0350 07/16/24  2108 07/16/24  1620 07/16/24  1401   WBC 16.4* 20.7* 20.7* 25.2*   HGB 11.8* 11.3* 12.0* 12.5*    194 224 184     Basic Metabolic Panel  Recent Labs   Lab 07/17/24  0557 07/17/24  0356 07/17/24  0350 07/17/24  0144 07/16/24  2144 07/16/24 2108 07/16/24 2016 07/16/24  1620 07/16/24  1616 07/16/24  1401   NA  --   --  140  140  --   --  141  --  140  --  140   POTASSIUM  --   --  4.3  4.3  --   --  3.9  --  4.2  --  4.4   CHLORIDE  --   --  107  107  --   --  105  --  105  --  106   CO2  --   --  23  23  --   --  21*  --  19*  --  20*   BUN  --   --  17.9  17.9  --   --  17.5  --  18.3  --  17.5   CR  --   --  0.91  0.91  --   --  0.92  --  0.98  --  1.00   * 133* 146*  146* 160*   < > 185*   < > 147*   < > 132*  138*    < > = values in this interval not displayed.     Liver Function Tests  Recent Labs   Lab 07/17/24  0350 07/16/24  2345 07/16/24  1958 07/16/24  1620 07/16/24  1401   AST 53*  --   --   --  57*   ALT 12  --   --   --  15   ALKPHOS 64  --   --   --  80    BILITOTAL 1.3*  --   --   --  1.5*   ALBUMIN 3.8  --   --   --  3.8   INR 1.46* 1.45* 1.38* 1.37* 1.30*     Coagulation Profile  Recent Labs   Lab 07/17/24  0350 07/16/24  2345 07/16/24  1958 07/16/24  1620   INR 1.46* 1.45* 1.38* 1.37*   PTT 36 34 34 38     Lactate  7/16:  3.3  7/17:  2.2     5. RADIOLOGY  Recent Results (from the past 24 hour(s))   CADEN with Report    Narrative    Ronny Taveras MD     7/16/2024  2:25 PM  Perioperative CADEN Procedure Note    Staff -        Anesthesiologist:  Alo Yanez MD       Resident/Fellow: Ronny Taveras MD       Performed By: fellow  Preanesthesia Checklist:  Patient identified, IV assessed, risks and   benefits discussed, monitors and equipment assessed, procedure being   performed at surgeon's request and anesthesia consent obtained.    CADEN Probe Insertion    Probe Status PRE Insertion: NO obvious damage  Probe type:  Adult 3D  Bite block used:   Soft  Insertion Technique: Easy, no oropharyngeal manipulation  Insertion complications: None obvious  Billing Report:CADEN report by Anesthesiologist (See Separate Report note)  Probe Status POST Removal: NO obvious damage    CADEN Report  General Procedure Information  Images for this study have been archived.  Modalities: 2D, 3D, CW Doppler, PW Doppler and Color flow mapping  Echocardiographic and Doppler Measurements  Right Ventricle:  Cavity size dilated.    Hypertrophy not present.     Thrombus not present.    Global function normal.     Left Ventricle:  Cavity size dilated.    Hypertrophy not present.     Thrombus not present.   Global Function mildly impaired.   Ejection   Fraction 45%.      Ventricular Regional Function:  1- Basal Anteroseptal:  normal  2- Basal Anterior:  normal  3- Basal Anterolateral:  normal  4- Basal Inferolateral:  normal  5- Basal Inferior:  normal  6- Basal Inferoseptal:  normal  7- Mid Anteroseptal:  normal  8- Mid Anterior:  normal  9- Mid Anterolateral:  normal  10- Mid Inferolateral:   normal  11- Mid Inferior:  normal  12- Mid Inferoseptal:  normal  13- Apical Anterior:  normal  14- Apical Lateral:  normal  15- Apical Inferior:  normal  16- Apical Septal:  normal  17- [place]:  normal    Valves  Aortic Valve: Annulus normal.  Stenosis not present.  Regurgitation +1.    Leaflets normal.  Leaflet motions normal.    Mitral Valve: Annulus normal.  Stenosis mild.  Regurgitation +4.  Leaflet   motions restricted.    Tricuspid Valve: Annulus dilated.  Stenosis not present.  Regurgitation   +3.  Leaflets normal.  Leaflet motions normal.    Pulmonic Valve: Annulus normal.  Stenosis not present.  Regurgitation   absent.    Other Valve Findings:  Tricuspid valve: Moderate regurgitant jet most   prominent at the posterior and septal commissure is present. V wire of   AICD seen traversing at this commissure. VC is moderate. There is no   hepatic vein reversal seen, but S wave blunting is present.    Mitral Valve: There is a mitraclip present that is well seated. There is   severe mitral regurgitation seen on both sides of the mitraclip with a   prominent jet at the A1/P1 leaflets. There is mild stenosis with mean PG 4   mmHg. Blunted pulmonary S wave. Regurgitant jet seen to be directed   towards JONELLE.  Aorta: Ascending Aorta: Size normal.  Dissection not present.  Plaque   thickness less than 3 mm.  Mobile plaque not present.    Aortic Arch: Size normal.    Dissection not present.   Plaque thickness   less than 3 mm.   Mobile plaque not present.    Descending Aorta: Size normal.    Dissection not present.   Plaque   thickness greater than 3 mm.   Mobile plaque not present.      Right Atrium:   Spontaneous echo contrast not present.   Thrombus not   present.   Tumor not present.   Device present.   Left Atrium: Size dilated.  Spontaneous echo contrast not present.    Thrombus not present.  Tumor not present.  Device not present.    Left atrial appendage normal.     Atrial Septum:    Other atrial septal defect  findings:  Iatrogenic septal   commuinication s/p mitraclip. Diameter 0.89cm and 3D ERO estimate of   0.6cm^2  Ventricular Septum: Intra-ventricular septum morphology normal.      Diastolic Function Measurements:  Diastolic Dysfunction Grade= indeterminate.  E=  ms.  A=  ms.  E/A Ratio=   .  DT=  ms.  S/D= .  IVRT= ms.  Other Findings:   Pericardium:  normal. Pleural Effusion:  none. Pulmonary Arteries:    normal. Pulmonary Venous Flow:  blunted (decreased) systolic flow.    Coronary sinus size (mm):  12.   Post Intervention Findings  Procedure(s) performed:  Mitral Valve Repair/Replace. Global function:    Worsened (See comments). Regional wall motion: Unchanged. Surgeon(s)   notified of all postintervention findings: Yes (Notified in real time).   Mitral Valve: Valve replaced with mechanical valve. No EVELIN present. No   paravalvular leak.            Post Intervention comments: Post bypass: RV function is mildly reduced. LV   function is moderately reduced, estimated 30%. No new RWMA. Intrinsic   pacer DDD pacing at 60 bpm. There is a new mechanical valve in the mitral   position that is well seated, functioning, no paravalvular leak and a mean   PG 1-3 mmHg. Tricuspid regurgitation is unchanged and moderate. There is a   residual atrial septal defect present. The aortic and pulmonic valves are   unchanged. There is no echo evidence of aortic dissection..    Echocardiogram Comments   Cardiac Device Check - Inpatient   Result Value    Date Time Interrogation Session [id number]    Implantable Pulse Generator  Inherited [employer]    Implantable Pulse Generator Model D433 RESONATE  ICD    Implantable Pulse Generator Serial Number [id number]    Type Interrogation Session In Clinic    Clinic Name [facility]    Implantable Pulse Generator Type Defibrillator    Implantable Pulse Generator Implant Date 20181128    Implantable Lead  [employer]    Implantable Lead Model  0292 [employer] [place] 4-Site SG    Implantable Lead Serial Number [id number]    Implantable Lead Implant Date 20181128    Implantable Lead Polarity Type Bipolar Lead    Implantable Lead Location Detail 1 UNKNOWN    Implantable Lead Location Right Ventricle    Implantable Lead Connection Status Connected    Implantable Lead  [employer]    Implantable Lead Model 7741 Ingevity MRI    Implantable Lead Serial Number [id number]    Implantable Lead Implant Date 20181128    Implantable Lead Polarity Type Bipolar Lead    Implantable Lead Location Detail 1 UNKNOWN    Implantable Lead Location Right Atrium    Implantable Lead Connection Status Connected    [place] Setting Mode (NBG Code) DDD    [place] Setting Lower Rate Limit 60    [place] Setting Maximum Tracking Rate 125    [place] Setting RAJESH Delay Low 220    [place] Setting PAV Delay Low 220    [place] Setting PAV Delay High 110    [place] Setting RAJESH Delay High 110    [place] Setting AT Mode Switch Rate 170    [place] Setting AT Mode Switch Mode VDI    Lead Channel Setting Sensing Polarity Bipolar    Lead Channel Setting Sensing Sensitivity 0.25    Lead Channel Setting Sensing Adaptation Mode Adaptive    Lead Channel Setting Sensing Polarity Bipolar    Lead Channel Setting Sensing Sensitivity 0.6    Lead Channel Setting Sensing Adaptation Mode Adaptive    Lead Channel Setting Pacing Polarity Bipolar    Lead Channel Setting Pacing Pulse Width 0.4    Lead Channel Setting Pacing Amplitude 2.5    Lead Channel Setting Pacing Capture Mode Fixed Pacing    Lead Channel Setting Pacing Polarity Bipolar    Lead Channel Setting Pacing Pulse Width 0.4    Lead Channel Setting Pacing Amplitude 3.0    Lead Channel Setting Pacing Capture Mode Fixed Pacing    Zone Setting Type Category VF    Zone Setting Vendor Type Category VF    Zone Setting Status Inactive    Zone Setting Type Category VT    Zone Setting Vendor Type Category VT    Zone Setting Status Inactive    Zone Setting Type Category VT     Zone Setting Vendor Type Category VT-1    Zone Setting Status Inactive    Lead Channel Impedance Value 768    Lead Channel Pacing Threshold Amplitude 0.8    Lead Channel Pacing Threshold Pulse Width 0.4    Lead Channel Status Check Lead    Lead Channel Impedance Value 389    Lead Channel Pacing Threshold Amplitude 1.4    Lead Channel Pacing Threshold Pulse Width 0.4    Battery Date Time of Measurements 41203811621267    Battery Status Middle of Service    Battery Remaining Longevity 90    Battery Remaining Percentage 81    Capacitor Charge Type Reformation    Capacitor Last Charge Date Time [id number]    Capacitor Charge Time 10.7    [place] Statistic Date Time Start 20240328000000    [place] Statistic Date Time End 20240716000000    [place] Statistic RA Percent Paced 21    [place] Statistic RV Percent Paced 12    Atrial Tachy Statistic Date Time Start 22388878773792    Atrial Tachy Statistic Date Time End 20240716000000    Atrial Tachy Statistic AT/AF [place] Percent 1    Therapy Statistic Recent Shocks Delivered 0    Therapy Statistic Recent Shocks Aborted 0    Therapy Statistic Recent ATP Delivered 0    Therapy Statistic Recent Date Time Start 20240328000000    Therapy Statistic Recent Date Time End 45441790466748    Therapy Statistic Total Shocks Delivered 0    Therapy Statistic Total Shocks Aborted 0    Therapy Statistic Total ATP Delivered 17    Therapy Statistic Total  Date Time Start 84457455233323    Therapy Statistic Total  Date Time End 92672823732746    Episode Statistic Recent Count 0    Episode Statistic Type Category Other    Episode Statistic Recent Count 8    Episode Statistic Type Category VT    Episode Statistic Vendor Type Category NSVT    Episode Statistic Recent Count 0    Episode Statistic Type Category VT    Episode Statistic Vendor Type Category VT    Episode Statistic Recent Count 0    Episode Statistic Type Category VT    Episode Statistic Vendor Type Category VT-1    Episode Statistic  Recent Date Time Start 20240328000000    Episode Statistic Recent Date Time End 20240716000000    Episode Statistic Recent Date Time Start 20240328000000    Episode Statistic Recent Date Time End 20240716000000    Episode Statistic Recent Date Time Start 20240328000000    Episode Statistic Recent Date Time End 20240716000000    Episode Statistic Recent Date Time Start 20240328000000    Episode Statistic Recent Date Time End 20240716000000    Narrative    Patient seen in 3C for evaluation and iterative programming of their ICD per MD orders.     Device: [employer] D433 RESONATE EL ICD  Normal device function.  Mode: DDD  bpm  AP: 21%  : 12%  Intrinsic rhythm: SR 63 bpm  Lead Trends Appear Stable.  Estimated battery longevity to RRT = 7 years.  Atrial Arrhythmia: 9 ATR episodes lasting 2 sec - 17 hrs 41 sec.  AF [place]: 1%  Anticoagulant: Xarelto  Ventricular Arrhythmia: 8 NSVT episodes lasting 2-4 sec @ 116-238 bpm.  Setting Changes: ICD Therapies turned OFF, rate response turned OFF.  Plan: Page Device RN to turn ICD Therapies back ON.    ERIC Trejo RN    Dual lead ICD    I have reviewed and interpreted the device interrogation, settings, programming and nurse's summary. The device is functioning within normal device parameters. I agree with the current findings, assessment and plan.   XR Chest Port 1 View    Narrative    EXAM: XR CHEST PORT 1 VIEW  7/16/2024 3:23 PM      HISTORY: Endotracheal tube positioning    COMPARISON: Chest radiograph from 8/29/2023    FINDINGS: Single view of the chest. There is an endotracheal tube with  tip 4 cm from the kamille. Redemonstration of implantable cardiac  defibrillator is unchanged positioning of leads. The right IJ  [place]-Juli catheter with catheter tip in the right main pulmonary  artery curled on itself with tip in the very proximal right main  branch pulmonary artery. There is a right chest tube projects over the  right midlung and new mediastinal drain.  Interval placement of mitral  valve prosthesis. Patchy right perihilar and basilar opacities.  Left-sided basilar hazy opacities with silhouetting of the left  hemidiaphragm and blunting of the left costophrenic angle. No  appreciable pneumothorax.      Impression    IMPRESSION:   1. Endotracheal tube projects 4 cm from the kamille. Interval placement  of multiple additional support devices including [place]-Juli catheter,  chest tube, mediastinal drain.   2. Interval placement of a mitral valve prosthesis.  3. Perihilar and bibasilar opacities most consistent with subsegmental  atelectasis versus edema.   4. Reeder-Juli catheter curled upon itself in the right main branch  pulmonary with tip directed leftward and presumably in the very  proximal right main branch pulmonary artery.    I have personally reviewed the examination and initial interpretation  and I agree with the findings.    ISABELLA RIOS MD         SYSTEM ID:  [id number]   Cardiac Device Check - Inpatient   Result Value    Date Time Interrogation Session [id number]    Implantable Pulse Generator  [employer]    Implantable Pulse Generator Model D433 RESONATE EL ICD    Implantable Pulse Generator Serial Number [id number]    Type Interrogation Session In Clinic    Clinic Name [facility]    Implantable Pulse Generator Type Defibrillator    Implantable Pulse Generator Implant Date 20181128    Implantable Lead  [employer]    Implantable Lead Model 0292 Endotak [place] 4-Site SG    Implantable Lead Serial Number [id number]    Implantable Lead Implant Date 20181128    Implantable Lead Polarity Type Bipolar Lead    Implantable Lead Location Detail 1 UNKNOWN    Implantable Lead Location Right Ventricle    Implantable Lead Connection Status Connected    Implantable Lead  [employer]    Implantable Lead Model 7741 Ingevity MRI    Implantable Lead Serial Number [id number]    Implantable Lead  Implant Date 20181128    Implantable Lead Polarity Type Bipolar Lead    Implantable Lead Location Detail 1 UNKNOWN    Implantable Lead Location Right Atrium    Implantable Lead Connection Status Connected    [place] Setting Mode (NBG Code) DDDR    [place] Setting Lower Rate Limit 60    [place] Setting Maximum Tracking Rate 125    [place] Setting Maximum Sensor Rate 125    [place] Setting RAJESH Delay Low 220    [place] Setting PAV Delay Low 220    [place] Setting PAV Delay High 110    [place] Setting RAJESH Delay High 110    [place] Setting AT Mode Switch Rate 170    [place] Setting AT Mode Switch Mode VDI    Lead Channel Setting Sensing Polarity Bipolar    Lead Channel Setting Sensing Sensitivity 0.25    Lead Channel Setting Sensing Adaptation Mode Adaptive    Lead Channel Setting Sensing Polarity Bipolar    Lead Channel Setting Sensing Sensitivity 0.6    Lead Channel Setting Sensing Adaptation Mode Adaptive    Lead Channel Setting Pacing Polarity Bipolar    Lead Channel Setting Pacing Pulse Width 0.4    Lead Channel Setting Pacing Amplitude 2.5    Lead Channel Setting Pacing Capture Mode Fixed Pacing    Lead Channel Setting Pacing Polarity Bipolar    Lead Channel Setting Pacing Pulse Width 0.4    Lead Channel Setting Pacing Amplitude 3.0    Lead Channel Setting Pacing Capture Mode Fixed Pacing    Zone Setting Type Category VF    Zone Setting Vendor Type Category VF    Zone Setting Status Active    Zone Setting Detection Interval 300    Zone Setting Type Category VT    Zone Setting Vendor Type Category VT    Zone Setting Status Active    Zone Setting Detection Interval 353    Zone Setting Type Category VT    Zone Setting Vendor Type Category VT-1    Zone Setting Status Active    Zone Setting Detection Interval 462    Lead Channel Impedance Value 615    Lead Channel Pacing Threshold Amplitude 0.6    Lead Channel Pacing Threshold Pulse Width 0.4    Lead Channel Status Check Lead    Lead Channel Impedance Value 396    Lead Channel Pacing  Threshold Amplitude 1.3    Lead Channel Pacing Threshold Pulse Width 0.4    Battery Date Time of Measurements [id number]    Battery Status Beginning of Service    Battery Remaining Longevity 84    Battery Remaining Percentage 80    Capacitor Charge Type Reformation    Capacitor Last Charge Date Time 20240506031600    Capacitor Charge Time 10.7    [place] Statistic Date Time Start 20240328000000    [place] Statistic Date Time End 20240716000000    [place] Statistic RA Percent Paced 21    [place] Statistic RV Percent Paced 12    Atrial Tachy Statistic Date Time Start [id number]    Atrial Tachy Statistic Date Time End [id number]    Atrial Tachy Statistic AT/AF [place] Percent 1    Therapy Statistic Recent Shocks Delivered 0    Therapy Statistic Recent Shocks Aborted 0    Therapy Statistic Recent ATP Delivered 0    Therapy Statistic Recent Date Time Start [id number]    Therapy Statistic Recent Date Time End [id number]    Therapy Statistic Total Shocks Delivered 0    Therapy Statistic Total Shocks Aborted 0    Therapy Statistic Total ATP Delivered 17    Therapy Statistic Total  Date Time Start [id number]    Therapy Statistic Total  Date Time End [id number]    Episode Statistic Recent Count 0    Episode Statistic Type Category Other    Episode Statistic Recent Count 8    Episode Statistic Type Category VT    Episode Statistic Vendor Type Category NSVT    Episode Statistic Recent Count 0    Episode Statistic Type Category VT    Episode Statistic Vendor Type Category VT    Episode Statistic Recent Count 0    Episode Statistic Type Category VT    Episode Statistic Vendor Type Category VT-1    Episode Statistic Recent Date Time Start [id number]    Episode Statistic Recent Date Time End [id number]    Episode Statistic Recent Date Time Start 20240328000000    Episode Statistic Recent Date Time End [id number]    Episode Statistic Recent Date Time Start [id number]    Episode Statistic Recent  Date Time End [id number]    Episode Statistic Recent Date Time Start [id number]    Episode Statistic Recent Date Time End [id number]    Narrative    Patient seen in [address] for evaluation and iterative programming of their ICD per MD orders.     Device: [employer] D433 RESONATE EL ICD  Normal device function.  Mode: DDDR  bpm  AP: 21%  : 12%  Intrinsic rhythm: CHB; AS/VS 31 bpm  Lead Trends Appear Stable.  Estimated battery longevity to RRT = 7 years.  Atrial Arrhythmia: None.  Anticoagulant: Xarelto  Ventricular Arrhythmia: None.  Setting Changes: ICD Therapies turned ON.    ERIC Trejo RN    Dual lead ICD    I have reviewed and interpreted the device interrogation, settings, programming and nurse's summary. The device is functioning within normal device parameters. I agree with the current findings, assessment and plan.   XR Abdomen Port 1 View    Narrative    XR ABDOMEN PORT 1 VIEW 7/16/2024 4:25 PM    HISTORY: Check NG/OG tube placement    COMPARISON: None.    FINDINGS:   Frontal view of the abdomen. Gastric tube tip projects over the  stomach. Nonobstructive bowel gas pattern. Moderate colonic stool  burden. Spine degenerative changes. Small left pleural effusion. No  pneumatosis.      Impression    IMPRESSION:   Gastric tube projects over the stomach.    I have personally reviewed the examination and initial interpretation  and I agree with the findings.    COCO DALY DO         SYSTEM ID:  [id number]

## 2024-07-17 NOTE — DISCHARGE INSTRUCTIONS
AFTER YOU GO HOME FROM YOUR HEART SURGERY  (S/p minimally invasive MVR (On-X mechanical mitral valve) by Dr. Kiran 7/16/24)    You had a mini-thoracotomy, avoid lifting anything greater than ten pounds for 2 weeks after surgery and then less than 20 pounds for an additional 2 weeks.   Do not reach backwards or use arms to push out of chair.   Do not let people pull on your arms to assist with standing.   Avoid twisting or reaching too far across your body.    Avoid strenuous activities such as bowling, vacuuming, raking, shoveling, golf or tennis for 12 weeks after your surgery.   It is okay to resume sex if you feel comfortable in doing so. You may have to try different positions with your partner.    Splint your chest incision by hugging a pillow or bringing your arms across your chest when coughing or sneezing.     No driving for 2 weeks after surgery or while on pain medication.    Shower or wash your incisions daily with soap and water (or as instructed), pat dry.   Keep wound clean and dry, showers are okay after discharge, but don't let spray hit directly on incision.   No baths or swimming for 1 month.   Cover chest tube sites with dry gauze until they stop draining, then leave open to air. It is not abnormal for chest tube sites to drain yellowish/clear fluid for up to 2-3 weeks after surgery.   Watch for signs of infection: increased redness, tenderness, warmth or any drainage that appears infected (pus like) or is persistent.  Also a temperature > 100.5 F or chills. Call your surgeon or primary care provider's office immediately.   Remove any skin glue left on incisions after 10-14 days. This will not affect your incision and can speed up healing.    Exercise is very important in your recovery. Please follow the guidelines set up for you in your cardiac rehab classes at the hospital. If outpatient cardiac rehab was ordered for you, we highly recommend you participate. If you have problems arranging  "your cardiac rehab, please call [phone] for all locations, with the exception of [place], please call [phone] and [place], please call [phone].    Avoid sitting for prolonged periods of time, try to walk every hour during the day.    Check your weight when you get home from the hospital and continue to check it daily through your recovery for at least a month. If you notice a weight gain of 2-3 pounds in a week, notify your primary care physician, cardiologist or surgeon.    Take your blood pressure daily and keep a log. If the top number is consistently <90 or >140 please call our office so we may make medication adjustments.    Bowel activity may be slow after surgery. If necessary, you may take an over the counter laxative such as Milk of Magnesia or Miralax. You may have stool softeners prescribed (docusate sodium, Senokot). We recommend using stool softeners while using narcotics for pain (oxycodone/percocet, hydrocodone/vicodin, hydromorphone/dilaudid).      Wean OFF of narcotics (oxycodone, dilaudid, hydrocodone) as soon as possible. You should continue taking acetaminophen as long as you have any surgical pain as the first choice for pain control and add narcotics as necessary for pain to be tolerable.      DENTAL VISITS AFTER SURGERY  We do not recommend having any dental work done for 6 months and you will need to take an antibiotic prior to dental visits from now on.  Please notify your dentist before any procedure for the proper treatment needed. The antibiotic is taken by mouth one hour prior to visit. This includes routine cleanings.  You can sometimes hear a mechanical valve \"clicking,\" this is normal and not a sign of something wrong.    DO NOT SMOKE.  IF YOU NEED HELP QUITTING, PLEASE TALK WITH YOUR CARDIOLOGIST OR PRIMARY DOCTOR.    You are on a blood thinner, follow the instructions you were given in the hospital and DO NOT SKIP this medication. Try and take it the same time " everyday. Your primary care physician or coumadin clinic will manage the dosing. INR goal is 2.5-3.5 for the first 3 months, then 2-3 thereafter.    **TAKE 4 MG WARFARIN (2 TABLETS OF 2 MG) ON SUNDAY AND MONDAY EVENING, THEN INR SHOULD BE CHECKED TUESDAY AND THEY WILL GIVE FURTHER INSTRUCTIONS.**      Continue taking Cellcept. Do not take methotrexate and folic acid for 2 more weeks, may resume on 8/11. Please call Dr. Dickey for confirmation of these instructions.    REGARDING PRESCRIPTION REFILLS.  If you need a refill on your pain medication contact us to discuss your pain and a possible one time refill.   All other medications will be adjusted, discontinued and re-filled by your primary care physician and/or your cardiologist as they were prior to your surgery. We have given you enough for one to three month with possibly one refill.    POST-OPERATIVE CLINIC VISITS  You should see your primary care provider in 1-2 weeks after discharge.   It is important to see your cardiologist about 4-6 weeks after discharge.  Call Dr. Dickey's office.  If you do not hear from a  in 7 days, please call [phone] (choose option 1) and request to be seen with a general cardiologist or someone that you have seen in the past.   If there is a need to return to see CT Surgery please call our  at [phone].    SURGICAL QUESTIONS  Please call Rene Maurice, Funmi Ruiz, Mandy Pérez or Tia Holcomb with surgical recovery and medication questions, their phone numbers are listed below.  They will assist you with your needs and contact other surgery care team members as indicated.    On weekends or after hours, please call [phone] and ask the  to page the Cardiothoracic Surgery fellow on call.      Thank you,    Your Cardiothoracic Surgery Team   Rene Maurice RN Care Coordinator - [phone]  Funmi Ruiz RN Care Coordinator - [phone]   Tia Holcomb, RN Care Coordinator - [phone]    Mandy Pérez, RN Care Coordinator - [phone]

## 2024-07-17 NOTE — PLAN OF CARE
ICU End of Shift Summary 1900 - 0700  Labs drawn per orders and reviewed with results. Provider informed of all critical labs and patient conditions as indicated throughout shift. See flowsheets for vital signs and detailed assessment.      Shift Events:   Patient sedated on precedex/propofol gtt most of shift, propofol weaned off by AM. RASS -2 to -3 throughout shift, follows commands and moves all extremities, pupils round and briskly reactive. Nods appropriately to questions. PRN oxy and scheduled tylenol utilized for post-surgical pain. Tmax 38.1 - provider made aware, covers reduced and fan utilized for cooling measures. Patient with permanent pacemaker - DDDR , AV placed most of shift. Had a x1 episode of VT 20beats, provider notified. MAP goal maintained with Levo and Epi gtt. KYMBERLY performed q4. 4am CVP 10, PA 30s/10s, SvO2 69, CI 2.5, . Lactic peaked at 3.7 and is downward trending without any significant intervention, new lactic 2.2, given 250cc of LR for CVP and Lactic. Remains intubated overnight, vent settings unchanged, trended ABGs. Suctioned x1 for thick white sputum in small amount with RT. No BM this shift, bowel sounds hypoactive. Insulin gtt started for BS >150's. Sena with decreasing output throughout shift, mostly 30-50cc/hr. Ca replaced x1. Chest tube output consistent, ~10-50cc/hr, bright red drainage. Hgb stable at 11.8.     Plan:   PST/Extubate     Goal Outcome Evaluation:      Plan of Care Reviewed With: patient    Overall Patient Progress: improvingOverall Patient Progress: improving    Outcome Evaluation: Chest tube output monitor, no significant increase in output. Hgb stable. Lactic downward trending. Vaso weaned off, Epi and Levo for MAP goals.

## 2024-07-17 NOTE — PHARMACY-ANTICOAGULATION SERVICE
Clinical Pharmacy - Warfarin Dosing Consult     Pharmacy has been consulted to manage this patient s warfarin therapy.  Indication: Mechanical Mitral Valve Replacement (OnX Mitral)  Therapy Goal: INR 2.5-3.5  Provider/Team: [facility]  OP Anticoag Clinic: N/A  Warfarin Prior to Admission: No  Warfarin PTA Regimen: N/A  Significant drug interactions: Aspirin (increased risk of bleeding), methotrexate (may increase effect of warfarin, has been off since end of June), methimazole (may decrease effect of warfarin)  Recent documented change in oral intake/nutrition: Yes (NPO since 7/16 (post-op))    INR   Date Value Ref Range Status   07/17/2024 1.56 (H) 0.85 - 1.15 Final   07/17/2024 1.62 (H) 0.85 - 1.15 Final       Recommend warfarin 4 mg today.  Pharmacy will monitor Jose NATALIA Sheareraristeo daily and order warfarin doses to achieve specified goal.      Please contact pharmacy as soon as possible if the warfarin needs to be held for a procedure or if the warfarin goals change.

## 2024-07-17 NOTE — PROGRESS NOTES
Critical Care Attending Progress Note  I, Leda Dudley MD, saw this patient with the resident and agree with the resident/fellow's findings and plan of care as documented in the note. Please see separate resident note from today for further documentation.     I personally reviewed vital signs, medications, labs and imaging.     Assessment: Mr. Carney is a 63 year old gentleman with a medical history of WILLIAM, SVT s/p ICD, hyperthyroidism, systemic sarcoidosis with cardiac involvement c/b HFrEF (LVEF 45%) who is admitted to the ICU 7/16 s/p minimally invasive mechanical mitral valve replacement. Today, Mr. Carney presents critically ill with cardiogenic shock, respiratory insufficiency.     Active problems and current treatments include:    Cardiogenic shock-Continue epinephrine to goal CI>2, continue norepinephrine and vasopressin to goal MAP>65, wean as tolerated.  Respiratory insufficiency-Continue supplemental oxygen, titrate to SpO2>92%, wean as tolerated. Fast track to extubation if appropriate.  Acute post-operative pain-Continue multimodal analgesia. Appreciate regional anesthesia pain service input and management of regional anesthesia catheter.  S/p mechanical mitral valve replacement-Initiate warfarin when appropriate per surgical team.  ID-Continue maureen-operative prophylaxis.  Nutrition-Advance as tolerated to regular diet if able to extubate.  Prophylaxis-PPI, holding SQH POD0     I personally managed the ventilator, hemodynamics, sedation, analgesia, metabolic abnormalities and nutritional status.      I agree with the resident assessment and plan. I spent 34 minutes of critical care time exclusive of procedures evaluating and managing this patient, discussing with the consultants, and updating the patient and family.    Leda Dudley M.D.

## 2024-07-17 NOTE — PROGRESS NOTES
"CLINICAL NUTRITION SERVICES - BRIEF NOTE    Received provider consult for nutrition education with comments post op cardiovascular surgery (automatic consult on post-op order set). S/p mitral valve replacement on 7/17. Nutrition education not indicated.    RD will follow per LOS protocol or if re-consulted.     Leyla Ortiz, MS, RD, LD  Available on [facility] - \"4A Clinical Dietitian\"  Weekend/Holiday RD - \"Weekend Clinical Dietitian\"  "

## 2024-07-17 NOTE — PROGRESS NOTES
Critical Care Attending Progress Note  I, Leda Dudley MD, saw this patient with the resident and agree with the resident/fellow's findings and plan of care as documented in the note. Please see separate resident note from today for further documentation.     I personally reviewed vital signs, medications, labs and imaging.     Assessment: Mr. Carney is a 63 year old gentleman with a medical history of WILLIAM, SVT s/p ICD, hyperthyroidism, systemic sarcoidosis with cardiac involvement c/b HFrEF (LVEF 45%) who is admitted to the ICU 7/16 s/p minimally invasive mechanical mitral valve replacement. Today, Mr. Carney presents critically ill with cardiogenic shock, respiratory insufficiency.     Active problems and current treatments include:     Cardiogenic shock-Continue epinephrine to goal CI>2, wean as tolerated this morning. 20 beat run of VT likely due to side effect of epinephrine, patient will likely require inotrope support in the setting of mitral valve replacement and expected reduction in ejection fraction, continue to monitor electrolytes closely.  Respiratory insufficiency-Continue supplemental oxygen, titrate to SpO2>92%, wean as tolerated. Extubate this morning. Diuresis to optimize respiratory function.  Acute post-operative pain-Continue multimodal analgesia. Appreciate regional anesthesia pain service input and management of regional anesthesia catheter.  S/p mechanical mitral valve replacement-Initiate warfarin today, plan for heparin infusion bridge initiation tomorrow.  ID-No acute infectious concerns.  Nutrition-Advance as tolerated to regular diet when extubated.  Prophylaxis-PPI, SQH     I personally managed the ventilator, hemodynamics, sedation, analgesia, metabolic abnormalities and nutritional status.       I agree with the resident assessment and plan. I spent 34 minutes of critical care time exclusive of procedures evaluating and managing this patient, discussing with the consultants, and  updating the patient and family.     Leda Dudley M.D.     Rifampin Pregnancy And Lactation Text: This medication is Pregnancy Category C and it isn't know if it is safe during pregnancy. It is also excreted in breast milk and should not be used if you are breast feeding.

## 2024-07-17 NOTE — PLAN OF CARE
ICU End of Shift Summary. See flowsheets for vital signs and detailed assessment.    Changes this shift: Patient extubated this morning; tolerated well; presently maintaining sats on 2L NC. Bedside swallow performed by RN; no issues; diet now advanced per order; tolerating PO intake well with good appetite. Presently maintaining MAP's>65 on norepinephrine @ 0.03, epinephrine titrated to 0.03 as well. KYMBERLY's stable. Patient remains A&Ox4.    Plan: continue to monitor patient status; notify provider of changes.

## 2024-07-18 ENCOUNTER — APPOINTMENT (OUTPATIENT)
Dept: GENERAL RADIOLOGY | Facility: CLINIC | Age: 64
DRG: 219 | End: 2024-07-18
Payer: COMMERCIAL

## 2024-07-18 ENCOUNTER — APPOINTMENT (OUTPATIENT)
Dept: OCCUPATIONAL THERAPY | Facility: CLINIC | Age: 64
DRG: 219 | End: 2024-07-18
Attending: SURGERY
Payer: COMMERCIAL

## 2024-07-18 LAB
ANION GAP SERPL CALCULATED.3IONS-SCNC: 6 MMOL/L (ref 7–15)
ANION GAP SERPL CALCULATED.3IONS-SCNC: 7 MMOL/L (ref 7–15)
BUN SERPL-MCNC: 18.2 MG/DL (ref 8–23)
BUN SERPL-MCNC: 18.4 MG/DL (ref 8–23)
CA-I BLD-MCNC: 4.4 MG/DL (ref 4.4–5.2)
CA-I BLD-MCNC: 4.7 MG/DL (ref 4.4–5.2)
CALCIUM SERPL-MCNC: 8 MG/DL (ref 8.8–10.4)
CALCIUM SERPL-MCNC: 8.5 MG/DL (ref 8.8–10.4)
CHLORIDE SERPL-SCNC: 105 MMOL/L (ref 98–107)
CHLORIDE SERPL-SCNC: 105 MMOL/L (ref 98–107)
CREAT SERPL-MCNC: 0.81 MG/DL (ref 0.67–1.17)
CREAT SERPL-MCNC: 0.84 MG/DL (ref 0.67–1.17)
EGFRCR SERPLBLD CKD-EPI 2021: >90 ML/MIN/1.73M2
EGFRCR SERPLBLD CKD-EPI 2021: >90 ML/MIN/1.73M2
ERYTHROCYTE [DISTWIDTH] IN BLOOD BY AUTOMATED COUNT: 15 % (ref 10–15)
ERYTHROCYTE [DISTWIDTH] IN BLOOD BY AUTOMATED COUNT: 15 % (ref 10–15)
GLUCOSE BLDC GLUCOMTR-MCNC: 107 MG/DL (ref 70–99)
GLUCOSE BLDC GLUCOMTR-MCNC: 127 MG/DL (ref 70–99)
GLUCOSE BLDC GLUCOMTR-MCNC: 131 MG/DL (ref 70–99)
GLUCOSE BLDC GLUCOMTR-MCNC: 147 MG/DL (ref 70–99)
GLUCOSE BLDC GLUCOMTR-MCNC: 96 MG/DL (ref 70–99)
GLUCOSE SERPL-MCNC: 125 MG/DL (ref 70–99)
GLUCOSE SERPL-MCNC: 146 MG/DL (ref 70–99)
HCO3 SERPL-SCNC: 27 MMOL/L (ref 22–29)
HCO3 SERPL-SCNC: 27 MMOL/L (ref 22–29)
HCT VFR BLD AUTO: 31.1 % (ref 40–53)
HCT VFR BLD AUTO: 31.3 % (ref 40–53)
HGB BLD-MCNC: 10.4 G/DL (ref 13.3–17.7)
HGB BLD-MCNC: 10.4 G/DL (ref 13.3–17.7)
INR PPP: 2.26 (ref 0.85–1.15)
MAGNESIUM SERPL-MCNC: 2.2 MG/DL (ref 1.7–2.3)
MAGNESIUM SERPL-MCNC: 2.3 MG/DL (ref 1.7–2.3)
MCH RBC QN AUTO: 32 PG (ref 26.5–33)
MCH RBC QN AUTO: 32.4 PG (ref 26.5–33)
MCHC RBC AUTO-ENTMCNC: 33.2 G/DL (ref 31.5–36.5)
MCHC RBC AUTO-ENTMCNC: 33.4 G/DL (ref 31.5–36.5)
MCV RBC AUTO: 96 FL (ref 78–100)
MCV RBC AUTO: 98 FL (ref 78–100)
MDC_IDC_LEAD_CONNECTION_STATUS: NORMAL
MDC_IDC_LEAD_IMPLANT_DT: NORMAL
MDC_IDC_LEAD_LOCATION: NORMAL
MDC_IDC_LEAD_LOCATION_DETAIL_1: NORMAL
MDC_IDC_LEAD_MFG: NORMAL
MDC_IDC_LEAD_MODEL: NORMAL
MDC_IDC_LEAD_POLARITY_TYPE: NORMAL
MDC_IDC_LEAD_SERIAL: NORMAL
MDC_IDC_MSMT_BATTERY_DTM: NORMAL
MDC_IDC_MSMT_BATTERY_DTM: NORMAL
MDC_IDC_MSMT_BATTERY_REMAINING_LONGEVITY: 84 MO
MDC_IDC_MSMT_BATTERY_REMAINING_LONGEVITY: 90 MO
MDC_IDC_MSMT_BATTERY_REMAINING_PERCENTAGE: 80 %
MDC_IDC_MSMT_BATTERY_REMAINING_PERCENTAGE: 81 %
MDC_IDC_MSMT_BATTERY_STATUS: NORMAL
MDC_IDC_MSMT_BATTERY_STATUS: NORMAL
MDC_IDC_MSMT_CAP_CHARGE_DTM: NORMAL
MDC_IDC_MSMT_CAP_CHARGE_DTM: NORMAL
MDC_IDC_MSMT_CAP_CHARGE_TIME: 10.7 S
MDC_IDC_MSMT_CAP_CHARGE_TIME: 10.7 S
MDC_IDC_MSMT_CAP_CHARGE_TYPE: NORMAL
MDC_IDC_MSMT_CAP_CHARGE_TYPE: NORMAL
MDC_IDC_MSMT_LEADCHNL_RA_IMPEDANCE_VALUE: 615 OHM
MDC_IDC_MSMT_LEADCHNL_RA_IMPEDANCE_VALUE: 768 OHM
MDC_IDC_MSMT_LEADCHNL_RA_PACING_THRESHOLD_AMPLITUDE: 0.6 V
MDC_IDC_MSMT_LEADCHNL_RA_PACING_THRESHOLD_AMPLITUDE: 0.8 V
MDC_IDC_MSMT_LEADCHNL_RA_PACING_THRESHOLD_PULSEWIDTH: 0.4 MS
MDC_IDC_MSMT_LEADCHNL_RA_PACING_THRESHOLD_PULSEWIDTH: 0.4 MS
MDC_IDC_MSMT_LEADCHNL_RV_IMPEDANCE_VALUE: 389 OHM
MDC_IDC_MSMT_LEADCHNL_RV_IMPEDANCE_VALUE: 396 OHM
MDC_IDC_MSMT_LEADCHNL_RV_LEAD_CHANNEL_STATUS: NORMAL
MDC_IDC_MSMT_LEADCHNL_RV_LEAD_CHANNEL_STATUS: NORMAL
MDC_IDC_MSMT_LEADCHNL_RV_PACING_THRESHOLD_AMPLITUDE: 1.3 V
MDC_IDC_MSMT_LEADCHNL_RV_PACING_THRESHOLD_AMPLITUDE: 1.4 V
MDC_IDC_MSMT_LEADCHNL_RV_PACING_THRESHOLD_PULSEWIDTH: 0.4 MS
MDC_IDC_MSMT_LEADCHNL_RV_PACING_THRESHOLD_PULSEWIDTH: 0.4 MS
MDC_IDC_PG_IMPLANT_DTM: NORMAL
MDC_IDC_PG_IMPLANT_DTM: NORMAL
MDC_IDC_PG_MFG: NORMAL
MDC_IDC_PG_MFG: NORMAL
MDC_IDC_PG_MODEL: NORMAL
MDC_IDC_PG_MODEL: NORMAL
MDC_IDC_PG_SERIAL: NORMAL
MDC_IDC_PG_SERIAL: NORMAL
MDC_IDC_PG_TYPE: NORMAL
MDC_IDC_PG_TYPE: NORMAL
MDC_IDC_SESS_CLINIC_NAME: NORMAL
MDC_IDC_SESS_CLINIC_NAME: NORMAL
MDC_IDC_SESS_DTM: NORMAL
MDC_IDC_SESS_DTM: NORMAL
MDC_IDC_SESS_TYPE: NORMAL
MDC_IDC_SESS_TYPE: NORMAL
MDC_IDC_SET_BRADY_AT_MODE_SWITCH_MODE: NORMAL
MDC_IDC_SET_BRADY_AT_MODE_SWITCH_MODE: NORMAL
MDC_IDC_SET_BRADY_AT_MODE_SWITCH_RATE: 170 {BEATS}/MIN
MDC_IDC_SET_BRADY_AT_MODE_SWITCH_RATE: 170 {BEATS}/MIN
MDC_IDC_SET_BRADY_LOWRATE: 60 {BEATS}/MIN
MDC_IDC_SET_BRADY_LOWRATE: 60 {BEATS}/MIN
MDC_IDC_SET_BRADY_MAX_SENSOR_RATE: 125 {BEATS}/MIN
MDC_IDC_SET_BRADY_MAX_TRACKING_RATE: 125 {BEATS}/MIN
MDC_IDC_SET_BRADY_MAX_TRACKING_RATE: 125 {BEATS}/MIN
MDC_IDC_SET_BRADY_MODE: NORMAL
MDC_IDC_SET_BRADY_MODE: NORMAL
MDC_IDC_SET_BRADY_PAV_DELAY_HIGH: 110 MS
MDC_IDC_SET_BRADY_PAV_DELAY_HIGH: 110 MS
MDC_IDC_SET_BRADY_PAV_DELAY_LOW: 220 MS
MDC_IDC_SET_BRADY_PAV_DELAY_LOW: 220 MS
MDC_IDC_SET_BRADY_SAV_DELAY_HIGH: 110 MS
MDC_IDC_SET_BRADY_SAV_DELAY_HIGH: 110 MS
MDC_IDC_SET_BRADY_SAV_DELAY_LOW: 220 MS
MDC_IDC_SET_BRADY_SAV_DELAY_LOW: 220 MS
MDC_IDC_SET_LEADCHNL_RA_PACING_AMPLITUDE: 2.5 V
MDC_IDC_SET_LEADCHNL_RA_PACING_AMPLITUDE: 2.5 V
MDC_IDC_SET_LEADCHNL_RA_PACING_CAPTURE_MODE: NORMAL
MDC_IDC_SET_LEADCHNL_RA_PACING_CAPTURE_MODE: NORMAL
MDC_IDC_SET_LEADCHNL_RA_PACING_POLARITY: NORMAL
MDC_IDC_SET_LEADCHNL_RA_PACING_POLARITY: NORMAL
MDC_IDC_SET_LEADCHNL_RA_PACING_PULSEWIDTH: 0.4 MS
MDC_IDC_SET_LEADCHNL_RA_PACING_PULSEWIDTH: 0.4 MS
MDC_IDC_SET_LEADCHNL_RA_SENSING_ADAPTATION_MODE: NORMAL
MDC_IDC_SET_LEADCHNL_RA_SENSING_ADAPTATION_MODE: NORMAL
MDC_IDC_SET_LEADCHNL_RA_SENSING_POLARITY: NORMAL
MDC_IDC_SET_LEADCHNL_RA_SENSING_POLARITY: NORMAL
MDC_IDC_SET_LEADCHNL_RA_SENSING_SENSITIVITY: 0.25 MV
MDC_IDC_SET_LEADCHNL_RA_SENSING_SENSITIVITY: 0.25 MV
MDC_IDC_SET_LEADCHNL_RV_PACING_AMPLITUDE: 3 V
MDC_IDC_SET_LEADCHNL_RV_PACING_AMPLITUDE: 3 V
MDC_IDC_SET_LEADCHNL_RV_PACING_CAPTURE_MODE: NORMAL
MDC_IDC_SET_LEADCHNL_RV_PACING_CAPTURE_MODE: NORMAL
MDC_IDC_SET_LEADCHNL_RV_PACING_POLARITY: NORMAL
MDC_IDC_SET_LEADCHNL_RV_PACING_POLARITY: NORMAL
MDC_IDC_SET_LEADCHNL_RV_PACING_PULSEWIDTH: 0.4 MS
MDC_IDC_SET_LEADCHNL_RV_PACING_PULSEWIDTH: 0.4 MS
MDC_IDC_SET_LEADCHNL_RV_SENSING_ADAPTATION_MODE: NORMAL
MDC_IDC_SET_LEADCHNL_RV_SENSING_ADAPTATION_MODE: NORMAL
MDC_IDC_SET_LEADCHNL_RV_SENSING_POLARITY: NORMAL
MDC_IDC_SET_LEADCHNL_RV_SENSING_POLARITY: NORMAL
MDC_IDC_SET_LEADCHNL_RV_SENSING_SENSITIVITY: 0.6 MV
MDC_IDC_SET_LEADCHNL_RV_SENSING_SENSITIVITY: 0.6 MV
MDC_IDC_SET_ZONE_DETECTION_INTERVAL: 300 MS
MDC_IDC_SET_ZONE_DETECTION_INTERVAL: 353 MS
MDC_IDC_SET_ZONE_DETECTION_INTERVAL: 462 MS
MDC_IDC_SET_ZONE_STATUS: NORMAL
MDC_IDC_SET_ZONE_TYPE: NORMAL
MDC_IDC_SET_ZONE_VENDOR_TYPE: NORMAL
MDC_IDC_STAT_AT_BURDEN_PERCENT: 1 %
MDC_IDC_STAT_AT_BURDEN_PERCENT: 1 %
MDC_IDC_STAT_AT_DTM_END: NORMAL
MDC_IDC_STAT_AT_DTM_END: NORMAL
MDC_IDC_STAT_AT_DTM_START: NORMAL
MDC_IDC_STAT_AT_DTM_START: NORMAL
MDC_IDC_STAT_BRADY_DTM_END: NORMAL
MDC_IDC_STAT_BRADY_DTM_END: NORMAL
MDC_IDC_STAT_BRADY_DTM_START: NORMAL
MDC_IDC_STAT_BRADY_DTM_START: NORMAL
MDC_IDC_STAT_BRADY_RA_PERCENT_PACED: 21 %
MDC_IDC_STAT_BRADY_RA_PERCENT_PACED: 21 %
MDC_IDC_STAT_BRADY_RV_PERCENT_PACED: 12 %
MDC_IDC_STAT_BRADY_RV_PERCENT_PACED: 12 %
MDC_IDC_STAT_EPISODE_RECENT_COUNT: 0
MDC_IDC_STAT_EPISODE_RECENT_COUNT: 8
MDC_IDC_STAT_EPISODE_RECENT_COUNT: 8
MDC_IDC_STAT_EPISODE_RECENT_COUNT_DTM_END: NORMAL
MDC_IDC_STAT_EPISODE_RECENT_COUNT_DTM_START: NORMAL
MDC_IDC_STAT_EPISODE_TYPE: NORMAL
MDC_IDC_STAT_EPISODE_VENDOR_TYPE: NORMAL
MDC_IDC_STAT_TACHYTHERAPY_ATP_DELIVERED_RECENT: 0
MDC_IDC_STAT_TACHYTHERAPY_ATP_DELIVERED_RECENT: 0
MDC_IDC_STAT_TACHYTHERAPY_ATP_DELIVERED_TOTAL: 17
MDC_IDC_STAT_TACHYTHERAPY_ATP_DELIVERED_TOTAL: 17
MDC_IDC_STAT_TACHYTHERAPY_RECENT_DTM_END: NORMAL
MDC_IDC_STAT_TACHYTHERAPY_RECENT_DTM_END: NORMAL
MDC_IDC_STAT_TACHYTHERAPY_RECENT_DTM_START: NORMAL
MDC_IDC_STAT_TACHYTHERAPY_RECENT_DTM_START: NORMAL
MDC_IDC_STAT_TACHYTHERAPY_SHOCKS_ABORTED_RECENT: 0
MDC_IDC_STAT_TACHYTHERAPY_SHOCKS_ABORTED_RECENT: 0
MDC_IDC_STAT_TACHYTHERAPY_SHOCKS_ABORTED_TOTAL: 0
MDC_IDC_STAT_TACHYTHERAPY_SHOCKS_ABORTED_TOTAL: 0
MDC_IDC_STAT_TACHYTHERAPY_SHOCKS_DELIVERED_RECENT: 0
MDC_IDC_STAT_TACHYTHERAPY_SHOCKS_DELIVERED_RECENT: 0
MDC_IDC_STAT_TACHYTHERAPY_SHOCKS_DELIVERED_TOTAL: 0
MDC_IDC_STAT_TACHYTHERAPY_SHOCKS_DELIVERED_TOTAL: 0
MDC_IDC_STAT_TACHYTHERAPY_TOTAL_DTM_END: NORMAL
MDC_IDC_STAT_TACHYTHERAPY_TOTAL_DTM_END: NORMAL
MDC_IDC_STAT_TACHYTHERAPY_TOTAL_DTM_START: NORMAL
MDC_IDC_STAT_TACHYTHERAPY_TOTAL_DTM_START: NORMAL
PHOSPHATE SERPL-MCNC: 1.9 MG/DL (ref 2.5–4.5)
PHOSPHATE SERPL-MCNC: 2.1 MG/DL (ref 2.5–4.5)
PLATELET # BLD AUTO: 122 10E3/UL (ref 150–450)
PLATELET # BLD AUTO: 155 10E3/UL (ref 150–450)
POTASSIUM SERPL-SCNC: 4.1 MMOL/L (ref 3.4–5.3)
POTASSIUM SERPL-SCNC: 4.3 MMOL/L (ref 3.4–5.3)
RBC # BLD AUTO: 3.21 10E6/UL (ref 4.4–5.9)
RBC # BLD AUTO: 3.25 10E6/UL (ref 4.4–5.9)
SODIUM SERPL-SCNC: 138 MMOL/L (ref 135–145)
SODIUM SERPL-SCNC: 139 MMOL/L (ref 135–145)
WBC # BLD AUTO: 11.5 10E3/UL (ref 4–11)
WBC # BLD AUTO: 16.2 10E3/UL (ref 4–11)

## 2024-07-18 PROCEDURE — 85014 HEMATOCRIT: CPT | Performed by: STUDENT IN AN ORGANIZED HEALTH CARE EDUCATION/TRAINING PROGRAM

## 2024-07-18 PROCEDURE — 85049 AUTOMATED PLATELET COUNT: CPT | Performed by: STUDENT IN AN ORGANIZED HEALTH CARE EDUCATION/TRAINING PROGRAM

## 2024-07-18 PROCEDURE — 250N000011 HC RX IP 250 OP 636: Performed by: PHYSICIAN ASSISTANT

## 2024-07-18 PROCEDURE — 250N000013 HC RX MED GY IP 250 OP 250 PS 637

## 2024-07-18 PROCEDURE — 250N000012 HC RX MED GY IP 250 OP 636 PS 637

## 2024-07-18 PROCEDURE — 82330 ASSAY OF CALCIUM: CPT

## 2024-07-18 PROCEDURE — 85610 PROTHROMBIN TIME: CPT | Performed by: STUDENT IN AN ORGANIZED HEALTH CARE EDUCATION/TRAINING PROGRAM

## 2024-07-18 PROCEDURE — 97110 THERAPEUTIC EXERCISES: CPT | Mod: GO | Performed by: OCCUPATIONAL THERAPIST

## 2024-07-18 PROCEDURE — 83735 ASSAY OF MAGNESIUM: CPT

## 2024-07-18 PROCEDURE — 84100 ASSAY OF PHOSPHORUS: CPT

## 2024-07-18 PROCEDURE — 120N000002 HC R&B MED SURG/OB UMMC

## 2024-07-18 PROCEDURE — 99231 SBSQ HOSP IP/OBS SF/LOW 25: CPT | Mod: 24 | Performed by: ANESTHESIOLOGY

## 2024-07-18 PROCEDURE — 250N000012 HC RX MED GY IP 250 OP 636 PS 637: Performed by: SURGERY

## 2024-07-18 PROCEDURE — 250N000013 HC RX MED GY IP 250 OP 250 PS 637: Performed by: SURGERY

## 2024-07-18 PROCEDURE — 97535 SELF CARE MNGMENT TRAINING: CPT | Mod: GO | Performed by: OCCUPATIONAL THERAPIST

## 2024-07-18 PROCEDURE — 250N000009 HC RX 250: Performed by: SURGERY

## 2024-07-18 PROCEDURE — 80048 BASIC METABOLIC PNL TOTAL CA: CPT

## 2024-07-18 PROCEDURE — 258N000003 HC RX IP 258 OP 636: Performed by: STUDENT IN AN ORGANIZED HEALTH CARE EDUCATION/TRAINING PROGRAM

## 2024-07-18 PROCEDURE — 250N000013 HC RX MED GY IP 250 OP 250 PS 637: Performed by: PHYSICIAN ASSISTANT

## 2024-07-18 PROCEDURE — 71045 X-RAY EXAM CHEST 1 VIEW: CPT | Mod: 26 | Performed by: STUDENT IN AN ORGANIZED HEALTH CARE EDUCATION/TRAINING PROGRAM

## 2024-07-18 PROCEDURE — 258N000003 HC RX IP 258 OP 636: Performed by: SURGERY

## 2024-07-18 PROCEDURE — 71045 X-RAY EXAM CHEST 1 VIEW: CPT

## 2024-07-18 PROCEDURE — 250N000011 HC RX IP 250 OP 636: Performed by: STUDENT IN AN ORGANIZED HEALTH CARE EDUCATION/TRAINING PROGRAM

## 2024-07-18 RX ORDER — WARFARIN SODIUM 2 MG/1
2 TABLET ORAL
Status: COMPLETED | OUTPATIENT
Start: 2024-07-18 | End: 2024-07-18

## 2024-07-18 RX ORDER — METHIMAZOLE 5 MG/1
5 TABLET ORAL DAILY
Status: DISCONTINUED | OUTPATIENT
Start: 2024-07-19 | End: 2024-07-21 | Stop reason: HOSPADM

## 2024-07-18 RX ORDER — MYCOPHENOLATE MOFETIL 500 MG/1
1000 TABLET ORAL 2 TIMES DAILY
Status: DISCONTINUED | OUTPATIENT
Start: 2024-07-18 | End: 2024-07-21 | Stop reason: HOSPADM

## 2024-07-18 RX ADMIN — ACETAMINOPHEN 975 MG: 325 TABLET, FILM COATED ORAL at 15:32

## 2024-07-18 RX ADMIN — ACETAMINOPHEN 975 MG: 325 TABLET, FILM COATED ORAL at 23:07

## 2024-07-18 RX ADMIN — PANTOPRAZOLE SODIUM 40 MG: 40 TABLET, DELAYED RELEASE ORAL at 08:09

## 2024-07-18 RX ADMIN — Medication 12.5 MG: at 11:19

## 2024-07-18 RX ADMIN — SENNOSIDES AND DOCUSATE SODIUM 1 TABLET: 50; 8.6 TABLET ORAL at 20:24

## 2024-07-18 RX ADMIN — CALCIUM GLUCONATE 1 G: 20 INJECTION, SOLUTION INTRAVENOUS at 09:29

## 2024-07-18 RX ADMIN — MYCOPHENOLATE MOFETIL 1000 MG: 500 TABLET, FILM COATED ORAL at 20:24

## 2024-07-18 RX ADMIN — EPINEPHRINE 0.04 MCG/KG/MIN: 1 INJECTION INTRAMUSCULAR; INTRAVENOUS; SUBCUTANEOUS at 04:23

## 2024-07-18 RX ADMIN — MYCOPHENOLATE MOFETIL 1000 MG: 200 POWDER, FOR SUSPENSION ORAL at 08:09

## 2024-07-18 RX ADMIN — WARFARIN SODIUM 2 MG: 2 TABLET ORAL at 17:19

## 2024-07-18 RX ADMIN — OXYCODONE HYDROCHLORIDE 5 MG: 5 TABLET ORAL at 23:07

## 2024-07-18 RX ADMIN — POTASSIUM & SODIUM PHOSPHATES POWDER PACK 280-160-250 MG 2 PACKET: 280-160-250 PACK at 15:32

## 2024-07-18 RX ADMIN — TIMOLOL MALEATE 1 DROP: 5 SOLUTION/ DROPS OPHTHALMIC at 21:44

## 2024-07-18 RX ADMIN — ASPIRIN 81 MG CHEWABLE TABLET 81 MG: 81 TABLET CHEWABLE at 08:09

## 2024-07-18 RX ADMIN — ACETAMINOPHEN 975 MG: 325 TABLET, FILM COATED ORAL at 08:09

## 2024-07-18 RX ADMIN — POTASSIUM PHOSPHATE, MONOBASIC POTASSIUM PHOSPHATE, DIBASIC 9 MMOL: 224; 236 INJECTION, SOLUTION, CONCENTRATE INTRAVENOUS at 06:02

## 2024-07-18 RX ADMIN — Medication 12.5 MG: at 20:24

## 2024-07-18 RX ADMIN — METHIMAZOLE 5 MG: 5 TABLET ORAL at 08:09

## 2024-07-18 RX ADMIN — POTASSIUM & SODIUM PHOSPHATES POWDER PACK 280-160-250 MG 2 PACKET: 280-160-250 PACK at 21:31

## 2024-07-18 RX ADMIN — POTASSIUM & SODIUM PHOSPHATES POWDER PACK 280-160-250 MG 2 PACKET: 280-160-250 PACK at 17:19

## 2024-07-18 RX ADMIN — OXYCODONE HYDROCHLORIDE 5 MG: 5 TABLET ORAL at 06:02

## 2024-07-18 RX ADMIN — OXYCODONE HYDROCHLORIDE 5 MG: 5 TABLET ORAL at 10:11

## 2024-07-18 ASSESSMENT — ACTIVITIES OF DAILY LIVING (ADL)
ADLS_ACUITY_SCORE: 26
ADLS_ACUITY_SCORE: 27
ADLS_ACUITY_SCORE: 26
ADLS_ACUITY_SCORE: 27
ADLS_ACUITY_SCORE: 26
ADLS_ACUITY_SCORE: 27
ADLS_ACUITY_SCORE: 26
ADLS_ACUITY_SCORE: 26

## 2024-07-18 NOTE — PLAN OF CARE
See flowsheets for vital signs, hemodynamics, and detailed assessment.    Major Shift Updates/Changes: AO*4. Reports incisional and lower back pain, oxy 5mg given with relief. ES catheters removed @ 0830. Remains paced and room air. Passing gas,no BM. Chest tube drainage now serosanguinous. Sena pulled @ 1400. Bladder scanned volume @ 1830, 151cc, per protocol rescan in 2 hours.     Up to chair for most of day. Calcium and phos replaced. Artline, swan, and internal jugular mac replaced. Wife and son visited at bedside.     Jamila Wei RN  Shift cared for: 0700 - 1930

## 2024-07-18 NOTE — PROGRESS NOTES
Critical Care Attending Progress Note  I, Leda Dudley MD, saw this patient with the resident and agree with the resident/fellow's findings and plan of care as documented in the note. Please see separate resident note from today for further documentation.     I personally reviewed vital signs, medications, labs and imaging.     Assessment: Mr. Carney is a 63 year old gentleman with a medical history of WILLIAM, SVT s/p ICD, hyperthyroidism, systemic sarcoidosis with cardiac involvement c/b HFrEF (LVEF 45%) who is admitted to the ICU 7/16 s/p minimally invasive mechanical mitral valve replacement. Today, Mr. Carney is progressing as expected with acute post-operative pain, respiratory insufficiency.     Active problems and current treatments include:     Cardiogenic shock-Resolved.  Respiratory insufficiency-Continue supplemental oxygen, titrate to SpO2>92%, wean as tolerated.   Acute post-operative pain-Continue multimodal analgesia. Appreciate regional anesthesia pain service input and management of regional anesthesia catheter.  S/p mechanical mitral valve replacement-Continue warfarin, do not initiate heparin infusion given nearly therapeutic INR this morning.  ID-No acute infectious concerns.  Nutrition-Advance as tolerated to regular diet   Prophylaxis-PPI, warfarin as above     I agree with the resident assessment and plan. I spent 25 minutes exclusive of procedures evaluating and managing this patient, discussing with the consultants, and updating the patient and family.     Leda Dudley M.D.

## 2024-07-18 NOTE — PROGRESS NOTES
CV ICU PROGRESS NOTE    Jose Carney  [id number]  Admitted: 7/16/2024  5:41 AM      ASSESSMENT: Jose Carney is a 63 year old male s/p minimally invasive MVR with On-X mechanical valve on 7/16/24 by Dr. Kiran. 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 Extubated 7/17. On warfarin given mechanical valve, no heparin bridge indicated given morning INR 2.26. Given pressors weaned off this morning, will continue to monitor and may consider de-lining later today or tomorrow. Patient recovering appropriately.       Today's Changes/Plan:   - Wean pressors for CI >2; off pressors as of 0600   - May consider de-lining if patient does not require pressors; will reassess this afternoon   - RAPS removed paravertebral catheter this morning  - Given INR 2.26, no need for heparin bridge. Continue warfarin   - Will not resume SQH for DVT prophylaxis given warfarin and INR 2.26 today   - No diuresis today  - Remove castro  - Start metoprolol 12.5mg BID   - Home CPAP ordered         CO-MORBIDITIES:   Patient Active Problem List   Diagnosis    Sarcoidosis/ systemic 2013    Paroxysmal supraventricular tachycardia (H24)    Palpitations    First degree AV block    Sarcoid, cardiac/EF 54% per MRI,[place] of affected myocardium is 12% of myocardial mass    Status post coronary angiogram    Mitral regurgitation    Severe mitral regurgitation       PLAN:   Neuro/ pain/ sedation:  - Monitor neurological status. Notify the MD for any acute changes in exam.  # Acute postoperative pain  - Scheduled: Tylenol  - PRN: Tylenol, oxycodone, Dilaudid     HEENT:  #Sarcoidosis with eye involvement  - PTA timolol maleate drops     Pulmonary care:   - Goal SpO2 > 92%  - Encourage IS q15-30 minutes when awake.  - Daily CXR  - Monitor CT output  - PTA CPAP ordered for bedtime      Cardiovascular:    # Cardiogenic shock  # Sarcoidosis with cardiac involvement, EF 54% per  MRI   # Mitral Regurgitation s/p MitraClip (8/2023) s/p MVR with On-X valve (7/16/24)  # SVT s/p ICD placement  # Afib, on Xarelto at home   - Monitor hemodynamics closely  - Goal MAP >65, SBP <120   - Epi, NE, Vaso gtts for inotropic and pressor support, wean as able for CI > 2; off this morning at 0600  - Hold PTA Xarelto  - Hold Statin   - Start Metoprolol 12.5mg BID    - ASA 81mg      GI care / Nutrition:   # Intermittent GERD, no treatment at home   - Passed swallow eval; advance diet as tolerated   - PPI  - Bowel regimen: MiraLAX, senna     Renal / Fluids / Electrolytes:   # Volume status   # Electrolyte monitoring  # Lactate trending    BL creat appears to be ~ 0.95 - 1.0   - Strict I/O, daily weights, avoid/limit nephrotoxins  - Replete lytes PRN per protocol  - Hold PTA sildenafil   - No diuresis today      Endocrine:    # Stress hyperglycemia  Preop A1c 5.4   (7/1/24)   - Medium sliding scale insuline   - Goal BG <180 for optimal healing  # Hyperthyroidism d/t amiodarone toxicity by history   - PTA methimazole      ID / Antibiotics:  # Stress induced leukocytosis  - Completed perioperative regimen: Ancef and Vanco   - Monitor fever curve, WBC, and inflammatory markers as appropriate  # Chronic Immunosuppression given sarcoidosis   - Cellcept 500mg BID  - Hold PTA methotrexate and folic acid   - Not given stress dose steroids prior to procedure      Heme:     # Acute blood loss anemia  # Post CPB thrombocytopenia  No s/sx active bleeding  - Daily CBC   - Hgb goal > 7.0  - Hgb 10.4 today, with no concern for bleeding      MSK / Skin:  # Sternotomy  # Surgical Incision  - Sternal precautions, postop incision management protocol  - PT/OT/CR     Prophylaxis:     - Mechanical DVT ppx  - Chemical DVT ppx: Warfarin, no heparin bridge   - PPI     Lines / Tubes / Drains:  - RIJ CVC, PA catheter - may remove this afternoon, will reassess   - Left radial arterial Line  - CTs x2 (1 pleural, 1 mediastinal)   - Sena -  remove today   - Pacer wires     Patient seen, findings and plan discussed with CVICU staff and my attending, Dr. Octavia King MD  General Surgery, PGY-1    ====================================    TODAY'S PROGRESS  INTERVAL EVENTS  No acute events reported overnight. Overnight, patient on norepi at 0.03 and epi at 0.04 for CI > 2, off at 0600. Yesterday, given Lasix 40mg x1 with goal net negative 1L, made 2.4L of urine yesterday. On interview, patient sitting upright in chair, conversational. Patient reports pain well controlled on current regimen. Scheduled Tylenol and took oxycodone 5mg x2 ON. Tolerating diet, no episodes of emesis. Passing gas, no BM yet. Started warfarin overnight, INR this morning 2.26. Held SQH given RAPS to remove pain catheter this morning. Patient denies any issues at present.     OBJECTIVE  1. VITAL SIGNS  Temp:  [98.1  F (36.7  C)-101.3  F (38.5  C)] 99.9  F (37.7  C)  Pulse:  [59-70] 70  Resp:  [10-28] 17  BP: (85-92)/(52-57) 92/57  MAP:  [53 mmHg-282 mmHg] 113 mmHg  Arterial Line BP: ()/() 148/99  FiO2 (%):  [30 %] 30 %  SpO2:  [92 %-100 %] 97 %  Vent Mode: (S) PS  (Pressure Support)  FiO2 (%): 30 %  Resp Rate (Set): 18 breaths/min  Tidal Volume (Set, mL): 450 mL  PEEP (cm H2O): 5 cmH2O  Pressure Support (cm H2O): 5 cmH2O  Resp: 17      2. INTAKE/ OUTPUT  I/O last 3 completed shifts:  In: 2647.36 [P.O.:840; I.V.:1292.36; NG/GT:265; IV Piggyback:250]  Out: 2970 [Urine:2430; Chest Tube:540]    3. PHYSICAL EXAMINATION  GEN:  no acute distress, sitting upright in chair, conversational.   NEURO:  A&O x3, no focal deficits, moving upper and lower extremities spontaneously.   CV:  RRR, no lower leg edema. Peripheral pulses 2+   PULM:  lungs CTAB, non-labored breathing on RA  MSK:  extremities warm and well perfused  SKIN:  no rash, incisional site c/d/I with no strikethrough   CT:  to suction, serosanguineous output, no airleak or crepitus    4.  INVESTIGATIONS  Arterial Blood Gases   Recent Labs   Lab 07/17/24  0809 07/17/24 0350 07/16/24 2345 07/16/24 1958   PH 7.38 7.43 7.41 7.34*   PCO2 45 38 38 40   PO2 105 112* 101 96   HCO3 26 25 24 22     Complete Blood Count   Recent Labs   Lab 07/18/24 0422 07/17/24  1438 07/17/24  0809 07/17/24  0350   WBC 16.2* 16.3* 15.4* 16.4*   HGB 10.4* 11.1* 11.5* 11.8*    194 183 209     Basic Metabolic Panel  Recent Labs   Lab 07/18/24 0424 07/18/24  0422 07/17/24 2339 07/17/24 2012 07/17/24  1540 07/17/24  1438 07/17/24  0859 07/17/24  0809 07/17/24  0356 07/17/24  0350   NA  --  139  --   --   --  140  --  140  --  140  140   POTASSIUM  --  4.1  --   --   --  3.9  --  4.4  --  4.3  4.3   CHLORIDE  --  105  --   --   --  106  --  107  --  107  107   CO2  --  27  --   --   --  24  --  25  --  23  23   BUN  --  18.2  --   --   --  18.1  --  18.0  --  17.9  17.9   CR  --  0.81  --   --   --  0.93  --  0.87  --  0.91  0.91   * 125* 115* 203*   < > 151*   < > 167*   < > 146*  146*    < > = values in this interval not displayed.     Liver Function Tests  Recent Labs   Lab 07/18/24 0422 07/17/24  0947 07/17/24  0809 07/17/24  0350 07/16/24  1620 07/16/24  1401   AST  --   --   --  53*  --  57*   ALT  --   --   --  12  --  15   ALKPHOS  --   --   --  64  --  80   BILITOTAL  --   --   --  1.3*  --  1.5*   ALBUMIN  --   --   --  3.8  --  3.8   INR 2.26* 1.56* 1.62* 1.46*   < > 1.30*    < > = values in this interval not displayed.     Coagulation Profile  Recent Labs   Lab 07/18/24  0422 07/17/24  0947 07/17/24  0809 07/17/24  0350 07/16/24  2345 07/16/24 1958   INR 2.26* 1.56* 1.62* 1.46* 1.45* 1.38*   PTT  --   --  39* 36 34 34     Lactate  7/16:  3.3  7/17:  2.2 -> 1.7    5. RADIOLOGY  Recent Results (from the past 24 hour(s))   CADEN with Report    Narrative    Ronny Taveras MD     7/16/2024  2:25 PM  Perioperative CADEN Procedure Note    Staff -        Anesthesiologist:  Alo Yanez MD        Resident/Fellow: Ronny Taveras MD       Performed By: fellow  Preanesthesia Checklist:  Patient identified, IV assessed, risks and   benefits discussed, monitors and equipment assessed, procedure being   performed at surgeon's request and anesthesia consent obtained.    CADEN Probe Insertion    Probe Status PRE Insertion: NO obvious damage  Probe type:  Adult 3D  Bite block used:   Soft  Insertion Technique: Easy, no oropharyngeal manipulation  Insertion complications: None obvious  Billing Report:CADEN report by Anesthesiologist (See Separate Report note)  Probe Status POST Removal: NO obvious damage    CADEN Report  General Procedure Information  Images for this study have been archived.  Modalities: 2D, 3D, CW Doppler, PW Doppler and Color flow mapping  Echocardiographic and Doppler Measurements  Right Ventricle:  Cavity size dilated.    Hypertrophy not present.     Thrombus not present.    Global function normal.     Left Ventricle:  Cavity size dilated.    Hypertrophy not present.     Thrombus not present.   Global Function mildly impaired.   Ejection   Fraction 45%.      Ventricular Regional Function:  1- Basal Anteroseptal:  normal  2- Basal Anterior:  normal  3- Basal Anterolateral:  normal  4- Basal Inferolateral:  normal  5- Basal Inferior:  normal  6- Basal Inferoseptal:  normal  7- Mid Anteroseptal:  normal  8- Mid Anterior:  normal  9- Mid Anterolateral:  normal  10- Mid Inferolateral:  normal  11- Mid Inferior:  normal  12- Mid Inferoseptal:  normal  13- Apical Anterior:  normal  14- Apical Lateral:  normal  15- Apical Inferior:  normal  16- Apical Septal:  normal  17- [place]:  normal    Valves  Aortic Valve: Annulus normal.  Stenosis not present.  Regurgitation +1.    Leaflets normal.  Leaflet motions normal.    Mitral Valve: Annulus normal.  Stenosis mild.  Regurgitation +4.  Leaflet   motions restricted.    Tricuspid Valve: Annulus dilated.  Stenosis not present.  Regurgitation   +3.  Leaflets normal.   Leaflet motions normal.    Pulmonic Valve: Annulus normal.  Stenosis not present.  Regurgitation   absent.    Other Valve Findings:  Tricuspid valve: Moderate regurgitant jet most   prominent at the posterior and septal commissure is present. V wire of   AICD seen traversing at this commissure. VC is moderate. There is no   hepatic vein reversal seen, but S wave blunting is present.    Mitral Valve: There is a mitraclip present that is well seated. There is   severe mitral regurgitation seen on both sides of the mitraclip with a   prominent jet at the A1/P1 leaflets. There is mild stenosis with mean PG 4   mmHg. Blunted pulmonary S wave. Regurgitant jet seen to be directed   towards JONELLE.  Aorta: Ascending Aorta: Size normal.  Dissection not present.  Plaque   thickness less than 3 mm.  Mobile plaque not present.    Aortic Arch: Size normal.    Dissection not present.   Plaque thickness   less than 3 mm.   Mobile plaque not present.    Descending Aorta: Size normal.    Dissection not present.   Plaque   thickness greater than 3 mm.   Mobile plaque not present.      Right Atrium:   Spontaneous echo contrast not present.   Thrombus not   present.   Tumor not present.   Device present.   Left Atrium: Size dilated.  Spontaneous echo contrast not present.    Thrombus not present.  Tumor not present.  Device not present.    Left atrial appendage normal.     Atrial Septum:    Other atrial septal defect findings:  Iatrogenic septal   commuinication s/p mitraclip. Diameter 0.89cm and 3D ERO estimate of   0.6cm^2  Ventricular Septum: Intra-ventricular septum morphology normal.      Diastolic Function Measurements:  Diastolic Dysfunction Grade= indeterminate.  E=  ms.  A=  ms.  E/A Ratio=   .  DT=  ms.  S/D= .  IVRT= ms.  Other Findings:   Pericardium:  normal. Pleural Effusion:  none. Pulmonary Arteries:    normal. Pulmonary Venous Flow:  blunted (decreased) systolic flow.    Coronary sinus size (mm):  12.   Post Intervention  Findings  Procedure(s) performed:  Mitral Valve Repair/Replace. Global function:    Worsened (See comments). Regional wall motion: Unchanged. Surgeon(s)   notified of all postintervention findings: Yes (Notified in real time).   Mitral Valve: Valve replaced with mechanical valve. No EVELIN present. No   paravalvular leak.            Post Intervention comments: Post bypass: RV function is mildly reduced. LV   function is moderately reduced, estimated 30%. No new RWMA. Intrinsic   pacer DDD pacing at 60 bpm. There is a new mechanical valve in the mitral   position that is well seated, functioning, no paravalvular leak and a mean   PG 1-3 mmHg. Tricuspid regurgitation is unchanged and moderate. There is a   residual atrial septal defect present. The aortic and pulmonic valves are   unchanged. There is no echo evidence of aortic dissection..    Echocardiogram Comments   Cardiac Device Check - Inpatient   Result Value    Date Time Interrogation Session [id number]    Implantable Pulse Generator  [employer]    Implantable Pulse Generator Model D433 RESONATE EL ICD    Implantable Pulse Generator Serial Number [id number]    Type Interrogation Session In Clinic    Clinic Name [facility]    Implantable Pulse Generator Type Defibrillator    Implantable Pulse Generator Implant Date 20181128    Implantable Lead  [employer]    Implantable Lead Model 0292 Endotak [place] 4-Site SG    Implantable Lead Serial Number [id number]    Implantable Lead Implant Date 20181128    Implantable Lead Polarity Type Bipolar Lead    Implantable Lead Location Detail 1 UNKNOWN    Implantable Lead Location Right Ventricle    Implantable Lead Connection Status Connected    Implantable Lead  [employer]    Implantable Lead Model 7741 Ingevity MRI    Implantable Lead Serial Number [id number]    Implantable Lead Implant Date 20181128    Implantable Lead Polarity Type Bipolar Lead     Implantable Lead Location Detail 1 UNKNOWN    Implantable Lead Location Right Atrium    Implantable Lead Connection Status Connected    [place] Setting Mode (NBG Code) DDD    [place] Setting Lower Rate Limit 60    [place] Setting Maximum Tracking Rate 125    [place] Setting RAJESH Delay Low 220    [place] Setting PAV Delay Low 220    [place] Setting PAV Delay High 110    [place] Setting RAJESH Delay High 110    [place] Setting AT Mode Switch Rate 170    [place] Setting AT Mode Switch Mode VDI    Lead Channel Setting Sensing Polarity Bipolar    Lead Channel Setting Sensing Sensitivity 0.25    Lead Channel Setting Sensing Adaptation Mode Adaptive    Lead Channel Setting Sensing Polarity Bipolar    Lead Channel Setting Sensing Sensitivity 0.6    Lead Channel Setting Sensing Adaptation Mode Adaptive    Lead Channel Setting Pacing Polarity Bipolar    Lead Channel Setting Pacing Pulse Width 0.4    Lead Channel Setting Pacing Amplitude 2.5    Lead Channel Setting Pacing Capture Mode Fixed Pacing    Lead Channel Setting Pacing Polarity Bipolar    Lead Channel Setting Pacing Pulse Width 0.4    Lead Channel Setting Pacing Amplitude 3.0    Lead Channel Setting Pacing Capture Mode Fixed Pacing    Zone Setting Type Category VF    Zone Setting Vendor Type Category VF    Zone Setting Status Inactive    Zone Setting Type Category VT    Zone Setting Vendor Type Category VT    Zone Setting Status Inactive    Zone Setting Type Category VT    Zone Setting Vendor Type Category VT-1    Zone Setting Status Inactive    Lead Channel Impedance Value 768    Lead Channel Pacing Threshold Amplitude 0.8    Lead Channel Pacing Threshold Pulse Width 0.4    Lead Channel Status Check Lead    Lead Channel Impedance Value 389    Lead Channel Pacing Threshold Amplitude 1.4    Lead Channel Pacing Threshold Pulse Width 0.4    Battery Date Time of Measurements 48473722982759    Battery Status Middle of Service    Battery Remaining Longevity 90    Battery Remaining Percentage 81     Capacitor Charge Type Reformation    Capacitor Last Charge Date Time 06488134882089    Capacitor Charge Time 10.7    [place] Statistic Date Time Start 14032884179241    [place] Statistic Date Time End 85387564081234    [place] Statistic RA Percent Paced 21    [place] Statistic RV Percent Paced 12    Atrial Tachy Statistic Date Time Start 99240357634539    Atrial Tachy Statistic Date Time End 39094837857445    Atrial Tachy Statistic AT/AF [place] Percent 1    Therapy Statistic Recent Shocks Delivered 0    Therapy Statistic Recent Shocks Aborted 0    Therapy Statistic Recent ATP Delivered 0    Therapy Statistic Recent Date Time Start 90423636433659    Therapy Statistic Recent Date Time End 09326543576311    Therapy Statistic Total Shocks Delivered 0    Therapy Statistic Total Shocks Aborted 0    Therapy Statistic Total ATP Delivered 17    Therapy Statistic Total  Date Time Start 10102104509314    Therapy Statistic Total  Date Time End 27477394147437    Episode Statistic Recent Count 0    Episode Statistic Type Category Other    Episode Statistic Recent Count 8    Episode Statistic Type Category VT    Episode Statistic Vendor Type Category NSVT    Episode Statistic Recent Count 0    Episode Statistic Type Category VT    Episode Statistic Vendor Type Category VT    Episode Statistic Recent Count 0    Episode Statistic Type Category VT    Episode Statistic Vendor Type Category VT-1    Episode Statistic Recent Date Time Start 70667854298383    Episode Statistic Recent Date Time End 61564261633843    Episode Statistic Recent Date Time Start 00234466968705    Episode Statistic Recent Date Time End 36337090128019    Episode Statistic Recent Date Time Start 82281144852588    Episode Statistic Recent Date Time End 85367200182351    Episode Statistic Recent Date Time Start 74941288185479    Episode Statistic Recent Date Time End 58780480224293    Narrative    Patient seen in 3C for evaluation and iterative programming of their ICD  per MD orders.     Device: [employer] D433 RESONATE EL ICD  Normal device function.  Mode: DDD  bpm  AP: 21%  : 12%  Intrinsic rhythm: SR 63 bpm  Lead Trends Appear Stable.  Estimated battery longevity to RRT = 7 years.  Atrial Arrhythmia: 9 ATR episodes lasting 2 sec - 17 hrs 41 sec.  AF [place]: 1%  Anticoagulant: Xarelto  Ventricular Arrhythmia: 8 NSVT episodes lasting 2-4 sec @ 116-238 bpm.  Setting Changes: ICD Therapies turned OFF, rate response turned OFF.  Plan: Page Device RN to turn ICD Therapies back ON.    ERIC Trejo RN    Dual lead ICD    I have reviewed and interpreted the device interrogation, settings, programming and nurse's summary. The device is functioning within normal device parameters. I agree with the current findings, assessment and plan.   XR Chest Port 1 View    Narrative    EXAM: XR CHEST PORT 1 VIEW  7/16/2024 3:23 PM      HISTORY: Endotracheal tube positioning    COMPARISON: Chest radiograph from 8/29/2023    FINDINGS: Single view of the chest. There is an endotracheal tube with  tip 4 cm from the kamille. Redemonstration of implantable cardiac  defibrillator is unchanged positioning of leads. The right IJ  [place]-Juli catheter with catheter tip in the right main pulmonary  artery curled on itself with tip in the very proximal right main  branch pulmonary artery. There is a right chest tube projects over the  right midlung and new mediastinal drain. Interval placement of mitral  valve prosthesis. Patchy right perihilar and basilar opacities.  Left-sided basilar hazy opacities with silhouetting of the left  hemidiaphragm and blunting of the left costophrenic angle. No  appreciable pneumothorax.      Impression    IMPRESSION:   1. Endotracheal tube projects 4 cm from the kamille. Interval placement  of multiple additional support devices including [place]-Juli catheter,  chest tube, mediastinal drain.   2. Interval placement of a mitral valve prosthesis.  3. Perihilar and  bibasilar opacities most consistent with subsegmental  atelectasis versus edema.   4. [place]-Juli catheter curled upon itself in the right main branch  pulmonary with tip directed leftward and presumably in the very  proximal right main branch pulmonary artery.    I have personally reviewed the examination and initial interpretation  and I agree with the findings.    ISABELLA RIOS MD         SYSTEM ID:  [id number]   Cardiac Device Check - Inpatient   Result Value    Date Time Interrogation Session [id number]    Implantable Pulse Generator  [employer]    Implantable Pulse Generator Model D433 RESONATE EL ICD    Implantable Pulse Generator Serial Number [id number]    Type Interrogation Session In Clinic    Clinic Name [facility]    Implantable Pulse Generator Type Defibrillator    Implantable Pulse Generator Implant Date 20181128    Implantable Lead  [employer]    Implantable Lead Model 0292 Endotak [place] 4-Site SG    Implantable Lead Serial Number [id number]    Implantable Lead Implant Date 20181128    Implantable Lead Polarity Type Bipolar Lead    Implantable Lead Location Detail 1 UNKNOWN    Implantable Lead Location Right Ventricle    Implantable Lead Connection Status Connected    Implantable Lead  [employer]    Implantable Lead Model 7741 Ingevity MRI    Implantable Lead Serial Number [id number]    Implantable Lead Implant Date 20181128    Implantable Lead Polarity Type Bipolar Lead    Implantable Lead Location Detail 1 UNKNOWN    Implantable Lead Location Right Atrium    Implantable Lead Connection Status Connected    [place] Setting Mode (NBG Code) DDDR    [place] Setting Lower Rate Limit 60    [place] Setting Maximum Tracking Rate 125    [place] Setting Maximum Sensor Rate 125    [place] Setting RAJESH Delay Low 220    [place] Setting PAV Delay Low 220    [place] Setting PAV Delay High 110    [place] Setting RAJESH Delay High 110    [place] Setting AT Mode  Switch Rate 170    [place] Setting AT Mode Switch Mode VDI    Lead Channel Setting Sensing Polarity Bipolar    Lead Channel Setting Sensing Sensitivity 0.25    Lead Channel Setting Sensing Adaptation Mode Adaptive    Lead Channel Setting Sensing Polarity Bipolar    Lead Channel Setting Sensing Sensitivity 0.6    Lead Channel Setting Sensing Adaptation Mode Adaptive    Lead Channel Setting Pacing Polarity Bipolar    Lead Channel Setting Pacing Pulse Width 0.4    Lead Channel Setting Pacing Amplitude 2.5    Lead Channel Setting Pacing Capture Mode Fixed Pacing    Lead Channel Setting Pacing Polarity Bipolar    Lead Channel Setting Pacing Pulse Width 0.4    Lead Channel Setting Pacing Amplitude 3.0    Lead Channel Setting Pacing Capture Mode Fixed Pacing    Zone Setting Type Category VF    Zone Setting Vendor Type Category VF    Zone Setting Status Active    Zone Setting Detection Interval 300    Zone Setting Type Category VT    Zone Setting Vendor Type Category VT    Zone Setting Status Active    Zone Setting Detection Interval 353    Zone Setting Type Category VT    Zone Setting Vendor Type Category VT-1    Zone Setting Status Active    Zone Setting Detection Interval 462    Lead Channel Impedance Value 615    Lead Channel Pacing Threshold Amplitude 0.6    Lead Channel Pacing Threshold Pulse Width 0.4    Lead Channel Status Check Lead    Lead Channel Impedance Value 396    Lead Channel Pacing Threshold Amplitude 1.3    Lead Channel Pacing Threshold Pulse Width 0.4    Battery Date Time of Measurements 20240716150000    Battery Status Beginning of Service    Battery Remaining Longevity 84    Battery Remaining Percentage 80    Capacitor Charge Type Reformation    Capacitor Last Charge Date Time [id number]    Capacitor Charge Time 10.7    [place] Statistic Date Time Start 88578467657695    [place] Statistic Date Time End 76229722841808    [place] Statistic RA Percent Paced 21    [place] Statistic RV Percent Paced 12    Atrial  Tachy Statistic Date Time Start 53662384577855    Atrial Tachy Statistic Date Time End 17643559213838    Atrial Tachy Statistic AT/AF [place] Percent 1    Therapy Statistic Recent Shocks Delivered 0    Therapy Statistic Recent Shocks Aborted 0    Therapy Statistic Recent ATP Delivered 0    Therapy Statistic Recent Date Time Start 56302078601673    Therapy Statistic Recent Date Time End 20675624850352    Therapy Statistic Total Shocks Delivered 0    Therapy Statistic Total Shocks Aborted 0    Therapy Statistic Total ATP Delivered 17    Therapy Statistic Total  Date Time Start [id number]    Therapy Statistic Total  Date Time End [id number]    Episode Statistic Recent Count 0    Episode Statistic Type Category Other    Episode Statistic Recent Count 8    Episode Statistic Type Category VT    Episode Statistic Vendor Type Category NSVT    Episode Statistic Recent Count 0    Episode Statistic Type Category VT    Episode Statistic Vendor Type Category VT    Episode Statistic Recent Count 0    Episode Statistic Type Category VT    Episode Statistic Vendor Type Category VT-1    Episode Statistic Recent Date Time Start [id number]    Episode Statistic Recent Date Time End [id number]    Episode Statistic Recent Date Time Start [id number]    Episode Statistic Recent Date Time End [id number]    Episode Statistic Recent Date Time Start [id number]    Episode Statistic Recent Date Time End [id number]    Episode Statistic Recent Date Time Start [id number]    Episode Statistic Recent Date Time End [id number]    Narrative    Patient seen in [address] for evaluation and iterative programming of their ICD per MD orders.     Device: [employer] D433 RESONATE EL ICD  Normal device function.  Mode: DDDR  bpm  AP: 21%  : 12%  Intrinsic rhythm: CHB; AS/VS 31 bpm  Lead Trends Appear Stable.  Estimated battery longevity to RRT = 7 years.  Atrial Arrhythmia: None.  Anticoagulant:  Xarelto  Ventricular Arrhythmia: None.  Setting Changes: ICD Therapies turned ON.    ERIC Trejo RN    Dual lead ICD    I have reviewed and interpreted the device interrogation, settings, programming and nurse's summary. The device is functioning within normal device parameters. I agree with the current findings, assessment and plan.   XR Abdomen Port 1 View    Narrative    XR ABDOMEN PORT 1 VIEW 7/16/2024 4:25 PM    HISTORY: Check NG/OG tube placement    COMPARISON: None.    FINDINGS:   Frontal view of the abdomen. Gastric tube tip projects over the  stomach. Nonobstructive bowel gas pattern. Moderate colonic stool  burden. Spine degenerative changes. Small left pleural effusion. No  pneumatosis.      Impression    IMPRESSION:   Gastric tube projects over the stomach.    I have personally reviewed the examination and initial interpretation  and I agree with the findings.    COCO DALY DO         SYSTEM ID:  [id number]

## 2024-07-18 NOTE — PLAN OF CARE
ICU End of Shift Summary 1900 - 0700  Labs drawn per orders and reviewed with results. MD informed of all critical labs and patient conditions as indicated throughout shift. Call light in reach. See flowsheets for vital signs and detailed assessment.      Shift Events:   Patient alert and orientated throughout shift, able to make needs known, call light appropriate. Pain managed with ES caths and PRN Oxy, pain managed per patient report. Remains on RA without any desaturations. Has productive cough of thick sputum, able to perform pulmonary toiletting by self. Continues to be Paced rhythm, rates in the 70s. Epi and Levo weaned off in the AM. KYMBERLY stable SvO2 64, CI 2.7, , CVP 10, PA 42/20. KYMBERLY obtained prior to turning off pressors. Tmax 38.1 C - receiving scheduled Tylenol. Appetite good, tolerating food and fluids. Passing gas, no BM this shift. Swallowing intact without s/sx of aspiration. Sena with adequate output throughout shift. Mag replaced x1, Phos replaced x2. No new skin issues noted. Patient assisted up to chair with SBA of 1 to manage cords, did so without any symptoms.     Plan:   Deline  Floor?     Goal Outcome Evaluation:      Plan of Care Reviewed With: patient    Overall Patient Progress: improvingOverall Patient Progress: improving    Outcome Evaluation: Epi and Levo remain unchanged, when awake could tolerate off/low dose, when asleep MAP would drop to low 60's. Pain managed well with ES catheter and PRN oxy.  Able to stand at bedside with SBA. Proactive with cares.

## 2024-07-18 NOTE — PROGRESS NOTES
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      Procedure: minimally invasive MVR with On-X mechanical valve on 7/16/24 by Dr. Kiran      Date of Surgery: 7/16/24     Date of Catheter Placement: 7/16/24     Plan/Recommendations:  1. Regional Anesthesia/Analgesia  -Continuous Catheter Type/Site: right paravertebral (PV) T5-6  Infusate: ropivacaine 0.2%     Programmed Intermittent Bolus (PIB) at 10 mL Q60 min via right catheter, total infusion rate of 10 mL/hr     -at 0835, the nerve block catheter was removed without complications, dark tip intact, small bandage applied. Site c,d,I.     2. Anticoagulation  -Please contact Inpatient Pain Service before ordering or making any anticoagulation changes  -started coumadin on 7/17/24 at  1700.     -May resume heparin subcutaneous or start heparin gtt 1 hour (at 0935)  after nerve block catheter removal .  Primary service and RN made aware.         3. Multimodal Analgesia  - per primary service   -has baseline back pain from \"disc issues\"-may use lidocaine patches and or menthol patches to area.    Pain Service will sign off.     Discussed with attending anesthesiologist     Sabi Dickson PA-C  07/18/2024     Overnight Events: no major acute event    Tubes/Drains: Yes  Chest tubes    Subjective: pain is managed.    Symptoms of LAST: No    Pain Location:  chest    Pain Intensity:    Pain at Rest: 1/10     Satisfied with your level of pain control: Yes    Diet: Advance Diet as Tolerated: Fully Advanced to diet(s) per Provider order; Moderate Consistent Carb (60 g CHO per Meal) Diet; 2 gm NA Diet    Relevant Labs:  Recent Labs   Lab Test 07/18/24  0422 07/17/24  0947 " "07/17/24  0809   INR 2.26*   < > 1.62*      < > 183   PTT  --   --  39*   BUN 18.2   < > 18.0    < > = values in this interval not displayed.       Physical Exam:  Vitals: BP 92/57 (BP Location: Right arm, Cuff Size: Adult Regular)   Pulse 70   Temp 100  F (37.8  C)   Resp 18   Ht 1.854 m (6' 1\")   Wt 78.7 kg (173 lb 8 oz)   SpO2 97%   BMI 22.89 kg/m      Physical Exam:   Orientation:  Alert, oriented, and in no acute distress: Yes  Sedation: No    Motor Examination:  5/5 Strength in lower extremities: Yes        Catheter Site:   Catheter entry site is clean/dry/intact: Yes    Tender: No      Relevant Medications:  Current Pain Medications:  Medications related to Pain Management (From now, onward)      Start     Dose/Rate Route Frequency Ordered Stop    07/19/24 0000  acetaminophen (TYLENOL) tablet 650 mg         650 mg Oral or Feeding Tube EVERY 4 HOURS PRN 07/16/24 1351      07/19/24 0000  bisacodyl (DULCOLAX) suppository 10 mg         10 mg Rectal DAILY PRN 07/16/24 1351      07/18/24 0000  magnesium hydroxide (MILK OF MAGNESIA) suspension 30 mL         30 mL Oral DAILY PRN 07/16/24 1351      07/17/24 0800  polyethylene glycol (MIRALAX) Packet 17 g         17 g Oral or Feeding Tube DAILY 07/16/24 1351      07/17/24 0800  aspirin (ASA) chewable tablet 81 mg         81 mg Oral or NG Tube DAILY 07/16/24 1351      07/16/24 2000  senna-docusate (SENOKOT-S/PERICOLACE) 8.6-50 MG per tablet 1 tablet         1 tablet Oral or Feeding Tube 2 TIMES DAILY 07/16/24 1351      07/16/24 1500  acetaminophen (TYLENOL) tablet 975 mg         975 mg Oral or Feeding Tube EVERY 8 HOURS 07/16/24 1351 07/19/24 1559    07/16/24 1351  HYDROmorphone (DILAUDID) injection 0.2 mg        Placed in \"Or\" Linked Group    0.2 mg Intravenous EVERY 2 HOURS PRN 07/16/24 1351      07/16/24 1351  HYDROmorphone (DILAUDID) injection 0.4 mg        Placed in \"Or\" Linked Group    0.4 mg Intravenous EVERY 2 HOURS PRN 07/16/24 1351      07/16/24 " "1351  oxyCODONE (ROXICODONE) tablet 5 mg        Placed in \"Or\" Linked Group    5 mg Oral EVERY 4 HOURS PRN 07/16/24 1351      07/16/24 1351  oxyCODONE IR (ROXICODONE) tablet 10 mg        Placed in \"Or\" Linked Group    10 mg Oral EVERY 4 HOURS PRN 07/16/24 1351      07/16/24 1351  lidocaine 1 % 0.1-1 mL         0.1-1 mL Other EVERY 1 HOUR PRN 07/16/24 1351      07/16/24 1351  lidocaine (LMX4) cream          Topical EVERY 1 HOUR PRN 07/16/24 1351      07/16/24 0800  ROPivacaine 0.2% in sodium chloride 0.9% PERINEURAL infusion          Perineural Continuous Nerve Block 07/16/24 0735              Primary Service Contacted with Recommendations? Yes            Acute Inpatient Pain Service [facility]  Hours of pain coverage 24/7   Page via Amcom- Please Page the Pain Team Via Amcom: \"PAIN MANAGEMENT ACUTE INPATIENT/ [facility]\"            "

## 2024-07-19 ENCOUNTER — APPOINTMENT (OUTPATIENT)
Dept: GENERAL RADIOLOGY | Facility: CLINIC | Age: 64
DRG: 219 | End: 2024-07-19
Payer: COMMERCIAL

## 2024-07-19 LAB
ANION GAP SERPL CALCULATED.3IONS-SCNC: 4 MMOL/L (ref 7–15)
BUN SERPL-MCNC: 14.8 MG/DL (ref 8–23)
CA-I BLD-MCNC: 4.5 MG/DL (ref 4.4–5.2)
CALCIUM SERPL-MCNC: 8.4 MG/DL (ref 8.8–10.4)
CHLORIDE SERPL-SCNC: 105 MMOL/L (ref 98–107)
CLOT INIT KAOL IND TO POST HEP NEUT TRTO: 1 {RATIO}
CLOT INIT KAOLIN IND BLD US: 81 SEC (ref 113–166)
CLOT INIT KAOLIN IND P HEP NEUT BLD US: 82 SEC (ref 103–153)
CLOT STIFF PLT CONT BLD CALC: 18.9 HPA (ref 11.9–29.8)
CLOT STIFF TF IND P HEP NEUT BLD US: 21.2 HPA (ref 13–33.2)
CLOT STIFF TF IND+IIB-IIIA INH P HEP NEU: 2.3 HPA (ref 1–3.7)
CREAT SERPL-MCNC: 0.88 MG/DL (ref 0.67–1.17)
EGFRCR SERPLBLD CKD-EPI 2021: >90 ML/MIN/1.73M2
ERYTHROCYTE [DISTWIDTH] IN BLOOD BY AUTOMATED COUNT: 14.9 % (ref 10–15)
GLUCOSE BLDC GLUCOMTR-MCNC: 105 MG/DL (ref 70–99)
GLUCOSE BLDC GLUCOMTR-MCNC: 96 MG/DL (ref 70–99)
GLUCOSE SERPL-MCNC: 100 MG/DL (ref 70–99)
HCO3 SERPL-SCNC: 29 MMOL/L (ref 22–29)
HCT VFR BLD AUTO: 33.1 % (ref 40–53)
HGB BLD-MCNC: 10.8 G/DL (ref 13.3–17.7)
INR PPP: 2.56 (ref 0.85–1.15)
MAGNESIUM SERPL-MCNC: 2 MG/DL (ref 1.7–2.3)
MCH RBC QN AUTO: 32.2 PG (ref 26.5–33)
MCHC RBC AUTO-ENTMCNC: 32.6 G/DL (ref 31.5–36.5)
MCV RBC AUTO: 99 FL (ref 78–100)
PHOSPHATE SERPL-MCNC: 1.9 MG/DL (ref 2.5–4.5)
PHOSPHATE SERPL-MCNC: 2 MG/DL (ref 2.5–4.5)
PLATELET # BLD AUTO: 123 10E3/UL (ref 150–450)
POTASSIUM SERPL-SCNC: 4.4 MMOL/L (ref 3.4–5.3)
RBC # BLD AUTO: 3.35 10E6/UL (ref 4.4–5.9)
SODIUM SERPL-SCNC: 138 MMOL/L (ref 135–145)
WBC # BLD AUTO: 8.9 10E3/UL (ref 4–11)

## 2024-07-19 PROCEDURE — 99231 SBSQ HOSP IP/OBS SF/LOW 25: CPT | Mod: 24 | Performed by: ANESTHESIOLOGY

## 2024-07-19 PROCEDURE — 84100 ASSAY OF PHOSPHORUS: CPT

## 2024-07-19 PROCEDURE — 85014 HEMATOCRIT: CPT

## 2024-07-19 PROCEDURE — 250N000013 HC RX MED GY IP 250 OP 250 PS 637: Performed by: SURGERY

## 2024-07-19 PROCEDURE — 71045 X-RAY EXAM CHEST 1 VIEW: CPT | Mod: 26 | Performed by: RADIOLOGY

## 2024-07-19 PROCEDURE — 36415 COLL VENOUS BLD VENIPUNCTURE: CPT | Performed by: SURGERY

## 2024-07-19 PROCEDURE — 80048 BASIC METABOLIC PNL TOTAL CA: CPT

## 2024-07-19 PROCEDURE — 250N000013 HC RX MED GY IP 250 OP 250 PS 637

## 2024-07-19 PROCEDURE — 120N000005 HC R&B MS OVERFLOW UMMC

## 2024-07-19 PROCEDURE — 85610 PROTHROMBIN TIME: CPT | Performed by: STUDENT IN AN ORGANIZED HEALTH CARE EDUCATION/TRAINING PROGRAM

## 2024-07-19 PROCEDURE — 250N000012 HC RX MED GY IP 250 OP 636 PS 637: Performed by: SURGERY

## 2024-07-19 PROCEDURE — 82330 ASSAY OF CALCIUM: CPT

## 2024-07-19 PROCEDURE — 250N000013 HC RX MED GY IP 250 OP 250 PS 637: Performed by: PHYSICIAN ASSISTANT

## 2024-07-19 PROCEDURE — 71045 X-RAY EXAM CHEST 1 VIEW: CPT

## 2024-07-19 PROCEDURE — 250N000009 HC RX 250

## 2024-07-19 PROCEDURE — 36415 COLL VENOUS BLD VENIPUNCTURE: CPT

## 2024-07-19 PROCEDURE — 84100 ASSAY OF PHOSPHORUS: CPT | Performed by: SURGERY

## 2024-07-19 PROCEDURE — 258N000003 HC RX IP 258 OP 636

## 2024-07-19 PROCEDURE — 83735 ASSAY OF MAGNESIUM: CPT

## 2024-07-19 PROCEDURE — 250N000011 HC RX IP 250 OP 636

## 2024-07-19 RX ORDER — FUROSEMIDE 10 MG/ML
20 INJECTION INTRAMUSCULAR; INTRAVENOUS ONCE
Status: COMPLETED | OUTPATIENT
Start: 2024-07-19 | End: 2024-07-19

## 2024-07-19 RX ORDER — AMOXICILLIN 250 MG
2 CAPSULE ORAL 2 TIMES DAILY
Status: DISCONTINUED | OUTPATIENT
Start: 2024-07-19 | End: 2024-07-21 | Stop reason: HOSPADM

## 2024-07-19 RX ORDER — WARFARIN SODIUM 1 MG/1
2 TABLET ORAL
Status: COMPLETED | OUTPATIENT
Start: 2024-07-19 | End: 2024-07-19

## 2024-07-19 RX ORDER — POLYETHYLENE GLYCOL 3350 17 G/17G
17 POWDER, FOR SOLUTION ORAL 2 TIMES DAILY
Status: DISCONTINUED | OUTPATIENT
Start: 2024-07-19 | End: 2024-07-21 | Stop reason: HOSPADM

## 2024-07-19 RX ADMIN — Medication 12.5 MG: at 20:00

## 2024-07-19 RX ADMIN — TIMOLOL MALEATE 1 DROP: 5 SOLUTION/ DROPS OPHTHALMIC at 20:10

## 2024-07-19 RX ADMIN — ACETAMINOPHEN 975 MG: 325 TABLET, FILM COATED ORAL at 08:25

## 2024-07-19 RX ADMIN — MYCOPHENOLATE MOFETIL 1000 MG: 500 TABLET, FILM COATED ORAL at 20:00

## 2024-07-19 RX ADMIN — SENNOSIDES AND DOCUSATE SODIUM 1 TABLET: 50; 8.6 TABLET ORAL at 08:25

## 2024-07-19 RX ADMIN — ASPIRIN 81 MG CHEWABLE TABLET 81 MG: 81 TABLET CHEWABLE at 08:25

## 2024-07-19 RX ADMIN — POLYETHYLENE GLYCOL 3350 17 G: 17 POWDER, FOR SOLUTION ORAL at 20:00

## 2024-07-19 RX ADMIN — Medication 12.5 MG: at 08:25

## 2024-07-19 RX ADMIN — PANTOPRAZOLE SODIUM 40 MG: 40 TABLET, DELAYED RELEASE ORAL at 08:25

## 2024-07-19 RX ADMIN — OXYCODONE HYDROCHLORIDE 5 MG: 5 TABLET ORAL at 08:24

## 2024-07-19 RX ADMIN — POLYETHYLENE GLYCOL 3350 17 G: 17 POWDER, FOR SOLUTION ORAL at 08:25

## 2024-07-19 RX ADMIN — FUROSEMIDE 20 MG: 10 INJECTION, SOLUTION INTRAMUSCULAR; INTRAVENOUS at 09:09

## 2024-07-19 RX ADMIN — SODIUM PHOSPHATE, MONOBASIC, MONOHYDRATE AND SODIUM PHOSPHATE, DIBASIC, ANHYDROUS 9 MMOL: 142; 276 INJECTION, SOLUTION INTRAVENOUS at 08:25

## 2024-07-19 RX ADMIN — WARFARIN SODIUM 2 MG: 1 TABLET ORAL at 17:20

## 2024-07-19 RX ADMIN — SENNOSIDES AND DOCUSATE SODIUM 2 TABLET: 50; 8.6 TABLET ORAL at 20:00

## 2024-07-19 RX ADMIN — MYCOPHENOLATE MOFETIL 1000 MG: 500 TABLET, FILM COATED ORAL at 08:32

## 2024-07-19 RX ADMIN — METHIMAZOLE 5 MG: 5 TABLET ORAL at 08:32

## 2024-07-19 ASSESSMENT — ACTIVITIES OF DAILY LIVING (ADL)
ADLS_ACUITY_SCORE: 27
ADLS_ACUITY_SCORE: 27
ADLS_ACUITY_SCORE: 29
ADLS_ACUITY_SCORE: 27
ADLS_ACUITY_SCORE: 27
ADLS_ACUITY_SCORE: 29
ADLS_ACUITY_SCORE: 27
ADLS_ACUITY_SCORE: 27
ADLS_ACUITY_SCORE: 29
ADLS_ACUITY_SCORE: 27
ADLS_ACUITY_SCORE: 29
ADLS_ACUITY_SCORE: 27
ADLS_ACUITY_SCORE: 29
ADLS_ACUITY_SCORE: 27
ADLS_ACUITY_SCORE: 29
ADLS_ACUITY_SCORE: 27
ADLS_ACUITY_SCORE: 29
ADLS_ACUITY_SCORE: 27

## 2024-07-19 NOTE — PROGRESS NOTES
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 Patient is now s/p minimally invasive MVR (On-X mechanical valve) via right mini thoracotomy on 7/16/2024 with Dr. Kiran. ICU course overall unremarkable and patient progressed as expected.    Cardiovascular:   Hx of mitral regurgitation, mitral stenosis after MitraClip 08/2023 - now s/p mini MVR (On-X)  Moderate tricuspid regurgitation  Evidence of pulmonary hypertension  Hx SVT s/p ICD placement  Systemic sarcoidosis with cardiac involvement  Hx afib/aflutter on PTA Xarelto  No arrhythmias overnight, V paced, HD stable. MAPS 78-82  Most recent preop echo showed LVEF 50-55%  Intra-op CADEN post bypass with EF 30%, small residual PFO, mitral valve well-seated with no paravalvular leak and MG <3 mmHg  - ASA 81 mg daily  - Statin not indicated  - Metoprolol XL 25 mg daily (PTA dose 75)  - IV filters for small residual PFO  - Will order routine baseline postop echo to be completed today    Chest tubes: 210 ml out of chest tubes in 24 hrs. Suitable to remove, occlusive dressing placed, follow up CXR ordered and will follow.  TPW: cut due to elevated INR, foreign body retained, noted in chart history    Pulmonary:  WILLIAM on home CPAP  Small bilateral pleural effusions  Extubated POD 1. Now saturating well on RA  - Supplemental O2 PRN to keep sats > 92%.  - Pulm hygiene, IS, activity and deep breathing  - Home CPAP settings qHS    Neurology / Psych:  Acute post-operative pain, improved  Pain well controlled with current regimen:  - Acetaminophen PRN, oxycodone PRN  - Right paravertebral catheter removed 7/19     / Renal / Fluid / Electrolytes:  BL creat ~1.0, most recent creatinine 0.78;  adequate UOP.   FLUID STATUS: Pre-op weight 171 lbs, weight today 171 lbs; I/O past 24 hours: -1.2 L  - Diuresis: IV lasix 40 mg x1  - Replete lytes per protocol  - Strict I/O, daily weights  - Avoid/limit nephrotoxins as able    GI / Nutrition:   - Tolerating regular diet  - Advance bowel regimen, no BM since surgery    Endocrine:  Stress induced hyperglycemia   Hyperthyroidism 2/2 amiodarone toxicity  Hgb A1c 5.4%  - Initially managed on insulin drip postop, transitioned to sliding scale now discontinued due to no needs; goal BG <180 for optimal healing  - PTA methimazole    Infectious Disease:  Stress induced leukocytosis, resolved  Chronic immunosuppression for sarcoidosis  WBC 8.7, remains afebrile, no signs or symptoms of infection  - Completed perioperative antibiotics  - Continue to monitor fever curve, CBC  - Cellcept 500 mg BID  - Hold PTA methotrexate and folic acid for healing    Hematology:   Acute blood loss anemia, stable  Acute blood loss/post CPB thrombocytopenia, resolved  Hgb 11.2; Plt 155, no signs or symptoms of active bleeding  - CBC daily    Anticoagulation:   - ASA 81  - Warfarin for On-X mechanical mitral valve with a goal INR of 2.5-3.5. Last INR subtherapeutic  - No heparin bridge per surgeon    MSK / Skin:  Mini thoracotomy  Surgical incision  - Sternal precautions  - Incisional cares per protocol    HEENT:  Sarcoidosis with eye involvement  - PTA timolol maleate drops    Prophylaxis:   - Stress ulcer prophylaxis: Pantoprazole 40 mg daily for 30 days  - DVT prophylaxis: warfarin, SCD    Disposition:   - Transferred to  on 7/19  - Therapies recommending discharge to home with outpatient cardiac rehab. May be set up for home INR monitoring.    Medically Ready for Discharge: Anticipated Tomorrow      Clinically Significant Risk Factors                # Thrombocytopenia: Lowest platelets = 122 in last 2 days, will monitor for bleeding             #Precipitous drop in Hgb/Hct: Lowest Hgb  "this hospitalization: 10.4 g/dL. Will continue to monitor and treat/transfuse as appropriate.          # ICD device present         Discussed with Dr. Story through verbal communication.    Myra Hernandez PA-C  Cardiothoracic Surgery  Pager [phone]  7:38 AM on 7/20/2024        Interval History:     No overnight events. Up from ICU yesterday afternoon.  States pain is well managed on current regimen. Pain most prominent in ribs when coughing. Slept well overnight.  Tolerating diet although low appetite. No BM since surgery.  Breathing well without complaints. Pulls 1000 on IS, encouraged increased use.   Working with therapies and ambulating in halls without assistance.   Denies chest pain, palpitations, dizziness, syncopal symptoms, fevers, chills, myalgias.         Physical Exam:     /72 (BP Location: Left arm, Cuff Size: Adult Regular)   Pulse 70   Temp 98.4  F (36.9  C) (Oral)   Resp 16   Ht 1.854 m (6' 1\")   Wt 77.8 kg (171 lb 9.6 oz)   SpO2 98%   BMI 22.64 kg/m      I/O last 3 completed shifts:  In: 1090 [P.O.:840; I.V.:250]  Out: 2515 [Urine:2365; Chest Tube:150]    Gen: A&Ox4, NAD  Neuro: no focal deficits   CV: v paced, normal S1 S2, no murmurs, rubs or gallops. No JVD  Pulm: CTA, no wheezing or rhonchi, unlabored work of breathing on RA  Abd: nondistended, normal BS, soft, nontender  Ext: no peripheral edema  Incision: right mini thoracotomy site clean, dry, intact, no erythema, small amount of surrounding evolving ecchymosis, appropriately tender. Right groin incision c/d/i, no erythema.  Tubes/drain sites: dressing clean and dry, serosanguinous output, no air leak. 24 hr output 210 mL.          Data:    Imaging:  reviewed recent imaging, no acute concerns    No results found for this or any previous visit (from the past 24 hour(s)).       Labs:  Recent Labs   Lab 07/20/24  0623 07/19/24  0850 07/19/24  0544 07/18/24  1845 07/18/24  1250 07/18/24  0424 07/18/24  0422 07/17/24  0356 " 07/17/24  0350 07/16/24  1616 07/16/24  1401   WBC 8.7  --  8.9  --  11.5*  --  16.2*   < > 16.4*   < > 25.2*   HGB 11.2*  --  10.8*  --  10.4*  --  10.4*   < > 11.8*   < > 12.5*   MCV 99  --  99  --  98  --  96   < > 95   < > 97     --  123*  --  122*  --  155   < > 209   < > 184   INR 2.12*  --  2.56*  --   --   --  2.26*   < > 1.46*   < > 1.30*     --  138  --  138  --  139   < > 140  140   < > 140   POTASSIUM 3.9  --  4.4  --  4.3  --  4.1   < > 4.3  4.3   < > 4.4   CHLORIDE 105  --  105  --  105  --  105   < > 107  107   < > 106   CO2 27  --  29  --  27  --  27   < > 23  23   < > 20*   BUN 13.0  --  14.8  --  18.4  --  18.2   < > 17.9  17.9   < > 17.5   CR 0.78  --  0.88  --  0.84  --  0.81   < > 0.91  0.91   < > 1.00   ANIONGAP 7  --  4*  --  6*  --  7   < > 10  10   < > 14   INGE 8.5*  --  8.4*  --  8.5*  --  8.0*   < > 8.7*  8.7*   < > 9.4   * 96 100*   < > 146*   < > 125*   < > 146*  146*   < > 132*  138*   ALBUMIN  --   --   --   --   --   --   --   --  3.8  --  3.8   PROTTOTAL  --   --   --   --   --   --   --   --  5.7*  --  5.7*   BILITOTAL  --   --   --   --   --   --   --   --  1.3*  --  1.5*   ALKPHOS  --   --   --   --   --   --   --   --  64  --  80   ALT  --   --   --   --   --   --   --   --  12  --  15   AST  --   --   --   --   --   --   --   --  53*  --  57*    < > = values in this interval not displayed.      GLUCOSE:   Recent Labs   Lab 07/20/24  0623 07/19/24  0850 07/19/24  0544 07/19/24  0525 07/18/24  2316 07/18/24  2026   * 96 100* 105* 107* 131*

## 2024-07-19 NOTE — PROGRESS NOTES
Critical Care Attending Progress Note  I, Leda Dudley MD, saw this patient with the resident and agree with the resident/fellow's findings and plan of care as documented in the note. Please see separate resident note from today for further documentation.     I personally reviewed vital signs, medications, labs and imaging.     Assessment: Mr. Carney is a 63 year old gentleman with a medical history of WILLIAM, SVT s/p ICD, hyperthyroidism, systemic sarcoidosis with cardiac involvement c/b HFrEF (LVEF 45%) who is admitted to the ICU 7/16 s/p minimally invasive mechanical mitral valve replacement. Today, Mr. Carney is progressing as expected with acute post-operative pain, respiratory insufficiency.     Active problems and current treatments include:     Cardiogenic shock-Resolved.  Respiratory insufficiency-Continue supplemental oxygen, titrate to SpO2>92%, wean as tolerated. Diuresis today to optimize respiratory function.  Acute post-operative pain-Continue multimodal analgesia.   S/p mechanical mitral valve replacement-Continue warfarin, do not initiate heparin infusion given therapeutic INR. Continue ASA.  ID-No acute infectious concerns.  Nutrition-Advance as tolerated to regular diet   Prophylaxis-PPI, warfarin as above     I agree with the resident assessment and plan. I spent 25 minutes exclusive of procedures evaluating and managing this patient, discussing with the consultants, and updating the patient and family.     Leda Dudley M.D.

## 2024-07-19 NOTE — PROGRESS NOTES
CV ICU PROGRESS NOTE    Jose Carney  [id number]  Admitted: 7/16/2024  5:41 AM      ASSESSMENT: Jose Carney is a 63 year old male s/p minimally invasive MVR with On-X mechanical valve on 7/16/24 by Dr. Kiran. 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 Extubated 7/17. On warfarin given mechanical valve, no heparin bridge indicated given morning INR 2.26 on 7/18. Off of pressors, de-lined 7/18. Transfer to floor as able.       Today's Changes/Plan:   - Lasix 20mg x1  - Goal net negative 1-1.5L  - Discontinue medium sliding scale insulin   - Cut pacer wires  - Keep CT in today  - If no BM by this PM, increase bowel regimen   - Transfer to floor when able       CO-MORBIDITIES:   Patient Active Problem List   Diagnosis    Sarcoidosis/ systemic 2013    Paroxysmal supraventricular tachycardia (H24)    Palpitations    First degree AV block    Sarcoid, cardiac/EF 54% per MRI,[place] of affected myocardium is 12% of myocardial mass    Status post coronary angiogram    Mitral regurgitation    Severe mitral regurgitation       PLAN:   Neuro/ pain/ sedation:  - Monitor neurological status. Notify the MD for any acute changes in exam.  # Acute postoperative pain  - Scheduled: Tylenol  - PRN: Tylenol, oxycodone, Dilaudid      HEENT:  #Sarcoidosis with eye involvement  - PTA timolol maleate drops     Pulmonary care:   - Goal SpO2 > 92%  - Encourage IS q15-30 minutes when awake.  - Daily CXR  - Monitor CT output  - PTA CPAP ordered for bedtime      Cardiovascular:    # Cardiogenic shock  # Sarcoidosis with cardiac involvement, EF 54% per MRI   # Mitral Regurgitation s/p MitraClip (8/2023) s/p MVR with On-X valve (7/16/24)  # SVT s/p ICD placement  # Afib, on Xarelto at home   - Goal MAP >65, SBP <120   - Hold PTA Xarelto  - Metoprolol 12.5mg BID    - ASA 81mg   - Statin not indicated; no statin      GI care / Nutrition:   # Intermittent GERD,  no treatment at home   - Regular Diet   - PPI  - Bowel regimen: MiraLAX, senna- consider increasing if no BM this PM     Renal / Fluids / Electrolytes:   # Volume status   # Electrolyte monitoring  # Lactate trending    BL creat appears to be ~ 0.95 - 1.0   - Strict I/O, daily weights, avoid/limit nephrotoxins  - Replete lytes PRN per protocol  - Hold PTA sildenafil   - Lasix 20mg x1 for goal net neg 1-1.5L      Endocrine:    # Stress hyperglycemia  Preop A1c 5.4   (7/1/24)   - Discontinue medium sliding scale insulin   - Goal BG <180 for optimal healing  # Hyperthyroidism d/t amiodarone toxicity by history   - PTA methimazole      ID / Antibiotics:  # Stress induced leukocytosis  - Completed perioperative regimen: Ancef and Vanco   - Monitor fever curve, WBC, and inflammatory markers as appropriate  # Chronic Immunosuppression given sarcoidosis   - Cellcept 500mg BID  - Hold PTA methotrexate and folic acid   - Not given stress dose steroids prior to procedure      Heme:     # Acute blood loss anemia  # Post CPB thrombocytopenia  No s/sx active bleeding  - Daily CBC   - Hgb goal > 7.0  - Hgb 10.8 today, with no concern for bleeding      MSK / Skin:  # Sternotomy  # Surgical Incision  - Sternal precautions, postop incision management protocol  - PT/OT/CR     Prophylaxis:     - Mechanical DVT ppx  - Chemical DVT ppx: Warfarin, no heparin bridge   - PPI     Lines / Tubes / Drains:  - CTs x2 (1 pleural, 1 mediastinal)   - Pacer wires - cut wires today     Patient seen, findings and plan discussed with CVICU staff and my attending, Dr. Octavia King MD  General Surgery, PGY-1    ====================================    TODAY'S PROGRESS  INTERVAL EVENTS  No acute overnight events reported. On interview, patient sitting upright in chair. Reports tolerating diet with no episodes of emesis. Reports passing flatus but no BM yet. Ambulating with assistance. Breathing on RA, conversational. Reports pain well  controlled on current regimen.      OBJECTIVE  1. VITAL SIGNS  Temp:  [97.4  F (36.3  C)-100  F (37.8  C)] 99.6  F (37.6  C)  Pulse:  [70] 70  Resp:  [15-23] 18  BP: ()/(55-75) 111/67  MAP:  [70 mmHg-113 mmHg] 70 mmHg  Arterial Line BP: (102-148)/(58-99) 102/58  SpO2:  [97 %-100 %] 97 %  Resp: 18      2. INTAKE/ OUTPUT  I/O last 3 completed shifts:  In: 1902.7 [P.O.:1200; I.V.:702.7]  Out: 1520 [Urine:1180; Chest Tube:340]    3. PHYSICAL EXAMINATION  GEN:  no acute distress, sitting upright in chair, conversational.   NEURO:  A&O x3, no focal deficits, moving upper and lower extremities spontaneously.   CV:  RRR, mild lower leg edema. Peripheral pulses 2+   PULM:  lungs CTAB, non-labored breathing on RA  MSK:  extremities warm and well perfused  SKIN:  no rash, incisional site c/d/I with no strikethrough   CT:  to suction, serosanguineous output, no airleak or crepitus    4. INVESTIGATIONS  Arterial Blood Gases   Recent Labs   Lab 07/17/24  0809 07/17/24  0350 07/16/24  2345 07/16/24  1958   PH 7.38 7.43 7.41 7.34*   PCO2 45 38 38 40   PO2 105 112* 101 96   HCO3 26 25 24 22     Complete Blood Count   Recent Labs   Lab 07/18/24  1250 07/18/24  0422 07/17/24  1438 07/17/24  0809   WBC 11.5* 16.2* 16.3* 15.4*   HGB 10.4* 10.4* 11.1* 11.5*   * 155 194 183     Basic Metabolic Panel  Recent Labs   Lab 07/19/24  0525 07/18/24  2316 07/18/24  2026 07/18/24  1845 07/18/24  1250 07/18/24  0424 07/18/24  0422 07/17/24  1540 07/17/24  1438 07/17/24  0859 07/17/24  0809   NA  --   --   --   --  138  --  139  --  140  --  140   POTASSIUM  --   --   --   --  4.3  --  4.1  --  3.9  --  4.4   CHLORIDE  --   --   --   --  105  --  105  --  106  --  107   CO2  --   --   --   --  27  --  27  --  24  --  25   BUN  --   --   --   --  18.4  --  18.2  --  18.1  --  18.0   CR  --   --   --   --  0.84  --  0.81  --  0.93  --  0.87   * 107* 131* 96 146*   < > 125*   < > 151*   < > 167*    < > = values in this interval not  displayed.     Liver Function Tests  Recent Labs   Lab 07/18/24  0422 07/17/24  0947 07/17/24  0809 07/17/24  0350 07/16/24  1620 07/16/24  1401   AST  --   --   --  53*  --  57*   ALT  --   --   --  12  --  15   ALKPHOS  --   --   --  64  --  80   BILITOTAL  --   --   --  1.3*  --  1.5*   ALBUMIN  --   --   --  3.8  --  3.8   INR 2.26* 1.56* 1.62* 1.46*   < > 1.30*    < > = values in this interval not displayed.     Coagulation Profile  Recent Labs   Lab 07/18/24  0422 07/17/24  0947 07/17/24  0809 07/17/24  0350 07/16/24  2345 07/16/24  1958   INR 2.26* 1.56* 1.62* 1.46* 1.45* 1.38*   PTT  --   --  39* 36 34 34       5. RADIOLOGY  Recent Results (from the past 24 hour(s))   CADEN with Report    Narrative    Ronny Taveras MD     7/16/2024  2:25 PM  Perioperative CADEN Procedure Note    Staff -        Anesthesiologist:  Alo Yanez MD       Resident/Fellow: Ronny Taveras MD       Performed By: fellow  Preanesthesia Checklist:  Patient identified, IV assessed, risks and   benefits discussed, monitors and equipment assessed, procedure being   performed at surgeon's request and anesthesia consent obtained.    CADEN Probe Insertion    Probe Status PRE Insertion: NO obvious damage  Probe type:  Adult 3D  Bite block used:   Soft  Insertion Technique: Easy, no oropharyngeal manipulation  Insertion complications: None obvious  Billing Report:CADEN report by Anesthesiologist (See Separate Report note)  Probe Status POST Removal: NO obvious damage    CADEN Report  General Procedure Information  Images for this study have been archived.  Modalities: 2D, 3D, CW Doppler, PW Doppler and Color flow mapping  Echocardiographic and Doppler Measurements  Right Ventricle:  Cavity size dilated.    Hypertrophy not present.     Thrombus not present.    Global function normal.     Left Ventricle:  Cavity size dilated.    Hypertrophy not present.     Thrombus not present.   Global Function mildly impaired.   Ejection   Fraction 45%.       Ventricular Regional Function:  1- Basal Anteroseptal:  normal  2- Basal Anterior:  normal  3- Basal Anterolateral:  normal  4- Basal Inferolateral:  normal  5- Basal Inferior:  normal  6- Basal Inferoseptal:  normal  7- Mid Anteroseptal:  normal  8- Mid Anterior:  normal  9- Mid Anterolateral:  normal  10- Mid Inferolateral:  normal  11- Mid Inferior:  normal  12- Mid Inferoseptal:  normal  13- Apical Anterior:  normal  14- Apical Lateral:  normal  15- Apical Inferior:  normal  16- Apical Septal:  normal  17- [place]:  normal    Valves  Aortic Valve: Annulus normal.  Stenosis not present.  Regurgitation +1.    Leaflets normal.  Leaflet motions normal.    Mitral Valve: Annulus normal.  Stenosis mild.  Regurgitation +4.  Leaflet   motions restricted.    Tricuspid Valve: Annulus dilated.  Stenosis not present.  Regurgitation   +3.  Leaflets normal.  Leaflet motions normal.    Pulmonic Valve: Annulus normal.  Stenosis not present.  Regurgitation   absent.    Other Valve Findings:  Tricuspid valve: Moderate regurgitant jet most   prominent at the posterior and septal commissure is present. V wire of   AICD seen traversing at this commissure. VC is moderate. There is no   hepatic vein reversal seen, but S wave blunting is present.    Mitral Valve: There is a mitraclip present that is well seated. There is   severe mitral regurgitation seen on both sides of the mitraclip with a   prominent jet at the A1/P1 leaflets. There is mild stenosis with mean PG 4   mmHg. Blunted pulmonary S wave. Regurgitant jet seen to be directed   towards JONELLE.  Aorta: Ascending Aorta: Size normal.  Dissection not present.  Plaque   thickness less than 3 mm.  Mobile plaque not present.    Aortic Arch: Size normal.    Dissection not present.   Plaque thickness   less than 3 mm.   Mobile plaque not present.    Descending Aorta: Size normal.    Dissection not present.   Plaque   thickness greater than 3 mm.   Mobile plaque not present.      Right  Atrium:   Spontaneous echo contrast not present.   Thrombus not   present.   Tumor not present.   Device present.   Left Atrium: Size dilated.  Spontaneous echo contrast not present.    Thrombus not present.  Tumor not present.  Device not present.    Left atrial appendage normal.     Atrial Septum:    Other atrial septal defect findings:  Iatrogenic septal   commuinication s/p mitraclip. Diameter 0.89cm and 3D ERO estimate of   0.6cm^2  Ventricular Septum: Intra-ventricular septum morphology normal.      Diastolic Function Measurements:  Diastolic Dysfunction Grade= indeterminate.  E=  ms.  A=  ms.  E/A Ratio=   .  DT=  ms.  S/D= .  IVRT= ms.  Other Findings:   Pericardium:  normal. Pleural Effusion:  none. Pulmonary Arteries:    normal. Pulmonary Venous Flow:  blunted (decreased) systolic flow.    Coronary sinus size (mm):  12.   Post Intervention Findings  Procedure(s) performed:  Mitral Valve Repair/Replace. Global function:    Worsened (See comments). Regional wall motion: Unchanged. Surgeon(s)   notified of all postintervention findings: Yes (Notified in real time).   Mitral Valve: Valve replaced with mechanical valve. No VEELIN present. No   paravalvular leak.            Post Intervention comments: Post bypass: RV function is mildly reduced. LV   function is moderately reduced, estimated 30%. No new RWMA. Intrinsic   pacer DDD pacing at 60 bpm. There is a new mechanical valve in the mitral   position that is well seated, functioning, no paravalvular leak and a mean   PG 1-3 mmHg. Tricuspid regurgitation is unchanged and moderate. There is a   residual atrial septal defect present. The aortic and pulmonic valves are   unchanged. There is no echo evidence of aortic dissection..    Echocardiogram Comments   Cardiac Device Check - Inpatient   Result Value    Date Time Interrogation Session [id number]    Implantable Pulse Generator  [employer]    Implantable Pulse Generator Model D433 RESONATE  EL ICD    Implantable Pulse Generator Serial Number [id number]    Type Interrogation Session In Clinic    Cl[facility] [place] [facility] [place]    Implantable Pulse Generator Type Defibrillator    Implantable Pulse Generator Implant Date 20181128    Implantable Lead  [place] [employer]    Implantable Lead Model 0292 Endotak [place] 4-Site SG    Implantable Lead Serial Number [id number]    Implantable Lead Implant Date 20181128    Implantable Lead Polarity Type Bipolar Lead    Implantable Lead Location Detail 1 UNKNOWN    Implantable Lead Location Right Ventricle    Implantable Lead Connection Status Connected    Implantable Lead  [place] [employer]    Implantable Lead Model 7741 Ingevity MRI    Implantable Lead Serial Number [id number]    Implantable Lead Implant Date 20181128    Implantable Lead Polarity Type Bipolar Lead    Implantable Lead Location Detail 1 UNKNOWN    Implantable Lead Location Right Atrium    Implantable Lead Connection Status Connected    [place] Setting Mode (NBG Code) DDD    [place] Setting Lower Rate Limit 60    [place] Setting Maximum Tracking Rate 125    [place] Setting RAJESH Delay Low 220    [place] Setting PAV Delay Low 220    [place] Setting PAV Delay High 110    [place] Setting RAJESH Delay High 110    [place] Setting AT Mode Switch Rate 170    [place] Setting AT Mode Switch Mode VDI    Lead Channel Setting Sensing Polarity Bipolar    Lead Channel Setting Sensing Sensitivity 0.25    Lead Channel Setting Sensing Adaptation Mode Adaptive    Lead Channel Setting Sensing Polarity Bipolar    Lead Channel Setting Sensing Sensitivity 0.6    Lead Channel Setting Sensing Adaptation Mode Adaptive    Lead Channel Setting Pacing Polarity Bipolar    Lead Channel Setting Pacing Pulse Width 0.4    Lead Channel Setting Pacing Amplitude 2.5    Lead Channel Setting Pacing Capture Mode Fixed Pacing    Lead Channel Setting Pacing Polarity Bipolar    Lead Channel Setting Pacing Pulse Width 0.4    Lead Channel  Setting Pacing Amplitude 3.0    Lead Channel Setting Pacing Capture Mode Fixed Pacing    Zone Setting Type Category VF    Zone Setting Vendor Type Category VF    Zone Setting Status Inactive    Zone Setting Type Category VT    Zone Setting Vendor Type Category VT    Zone Setting Status Inactive    Zone Setting Type Category VT    Zone Setting Vendor Type Category VT-1    Zone Setting Status Inactive    Lead Channel Impedance Value 768    Lead Channel Pacing Threshold Amplitude 0.8    Lead Channel Pacing Threshold Pulse Width 0.4    Lead Channel Status Check Lead    Lead Channel Impedance Value 389    Lead Channel Pacing Threshold Amplitude 1.4    Lead Channel Pacing Threshold Pulse Width 0.4    Battery Date Time of Measurements [id number]    Battery Status Middle of Service    Battery Remaining Longevity 90    Battery Remaining Percentage 81    Capacitor Charge Type Reformation    Capacitor Last Charge Date Time [id number]    Capacitor Charge Time 10.7    [place] Statistic Date Time Start 20240328000000    [place] Statistic Date Time End 20240716000000    [place] Statistic RA Percent Paced 21    [place] Statistic RV Percent Paced 12    Atrial Tachy Statistic Date Time Start 46107526660033    Atrial Tachy Statistic Date Time End 27271116320382    Atrial Tachy Statistic AT/AF [place] Percent 1    Therapy Statistic Recent Shocks Delivered 0    Therapy Statistic Recent Shocks Aborted 0    Therapy Statistic Recent ATP Delivered 0    Therapy Statistic Recent Date Time Start 34032820442079    Therapy Statistic Recent Date Time End 42846113914675    Therapy Statistic Total Shocks Delivered 0    Therapy Statistic Total Shocks Aborted 0    Therapy Statistic Total ATP Delivered 17    Therapy Statistic Total  Date Time Start 41301143716567    Therapy Statistic Total  Date Time End 12710247652737    Episode Statistic Recent Count 0    Episode Statistic Type Category Other    Episode Statistic Recent Count 8    Episode Statistic  Type Category VT    Episode Statistic Vendor Type Category NSVT    Episode Statistic Recent Count 0    Episode Statistic Type Category VT    Episode Statistic Vendor Type Category VT    Episode Statistic Recent Count 0    Episode Statistic Type Category VT    Episode Statistic Vendor Type Category VT-1    Episode Statistic Recent Date Time Start 20240328000000    Episode Statistic Recent Date Time End 20240716000000    Episode Statistic Recent Date Time Start 20240328000000    Episode Statistic Recent Date Time End 20240716000000    Episode Statistic Recent Date Time Start 20240328000000    Episode Statistic Recent Date Time End 20240716000000    Episode Statistic Recent Date Time Start 20240328000000    Episode Statistic Recent Date Time End 20240716000000    Narrative    Patient seen in 3C for evaluation and iterative programming of their ICD per MD orders.     Device: Urgent.ly D433 RESONATE EL ICD  Normal device function.  Mode: DDD  bpm  AP: 21%  : 12%  Intrinsic rhythm: SR 63 bpm  Lead Trends Appear Stable.  Estimated battery longevity to RRT = 7 years.  Atrial Arrhythmia: 9 ATR episodes lasting 2 sec - 17 hrs 41 sec.  AF [place]: 1%  Anticoagulant: Xarelto  Ventricular Arrhythmia: 8 NSVT episodes lasting 2-4 sec @ 116-238 bpm.  Setting Changes: ICD Therapies turned OFF, rate response turned OFF.  Plan: Page Device RN to turn ICD Therapies back ON.    ERIC Trejo, PANCHITO    Dual lead ICD    I have reviewed and interpreted the device interrogation, settings, programming and nurse's summary. The device is functioning within normal device parameters. I agree with the current findings, assessment and plan.   XR Chest Port 1 View    Narrative    EXAM: XR CHEST PORT 1 VIEW  7/16/2024 3:23 PM      HISTORY: Endotracheal tube positioning    COMPARISON: Chest radiograph from 8/29/2023    FINDINGS: Single view of the chest. There is an endotracheal tube with  tip 4 cm from the kamille. Redemonstration of  implantable cardiac  defibrillator is unchanged positioning of leads. The right IJ  [place]uli catheter with catheter tip in the right main pulmonary  artery curled on itself with tip in the very proximal right main  branch pulmonary artery. There is a right chest tube projects over the  right midlung and new mediastinal drain. Interval placement of mitral  valve prosthesis. Patchy right perihilar and basilar opacities.  Left-sided basilar hazy opacities with silhouetting of the left  hemidiaphragm and blunting of the left costophrenic angle. No  appreciable pneumothorax.      Impression    IMPRESSION:   1. Endotracheal tube projects 4 cm from the kamille. Interval placement  of multiple additional support devices including [place]-Juli catheter,  chest tube, mediastinal drain.   2. Interval placement of a mitral valve prosthesis.  3. Perihilar and bibasilar opacities most consistent with subsegmental  atelectasis versus edema.   4. [place]uli catheter curled upon itself in the right main branch  pulmonary with tip directed leftward and presumably in the very  proximal right main branch pulmonary artery.    I have personally reviewed the examination and initial interpretation  and I agree with the findings.    ISABELLA RIOS MD         SYSTEM ID:  [id number]   Cardiac Device Check - Inpatient   Result Value    Date Time Interrogation Session [id number]    Implantable Pulse Generator  [employer]    Implantable Pulse Generator Model D433 RESONATE EL ICD    Implantable Pulse Generator Serial Number [id number]    Type Interrogation Session In Clinic    Clinic Name [facility]    Implantable Pulse Generator Type Defibrillator    Implantable Pulse Generator Implant Date 20181128    Implantable Lead  [employer] [employer]    Implantable Lead Model 0292 Endotak [place] 4-Site SG    Implantable Lead Serial Number [id number]    Implantable Lead Implant Date 20181128    Implantable  Lead Polarity Type Bipolar Lead    Implantable Lead Location Detail 1 UNKNOWN    Implantable Lead Location Right Ventricle    Implantable Lead Connection Status Connected    Implantable Lead  [employer]    Implantable Lead Model 7741 IngevIGAWorks MRI    Implantable Lead Serial Number [id number]    Implantable Lead Implant Date 20181128    Implantable Lead Polarity Type Bipolar Lead    Implantable Lead Location Detail 1 UNKNOWN    Implantable Lead Location Right Atrium    Implantable Lead Connection Status Connected    [place] Setting Mode (NBG Code) DDDR    [place] Setting Lower Rate Limit 60    [place] Setting Maximum Tracking Rate 125    [place] Setting Maximum Sensor Rate 125    [place] Setting RAJESH Delay Low 220    [place] Setting PAV Delay Low 220    [place] Setting PAV Delay High 110    [place] Setting RAJESH Delay High 110    [place] Setting AT Mode Switch Rate 170    [place] Setting AT Mode Switch Mode VDI    Lead Channel Setting Sensing Polarity Bipolar    Lead Channel Setting Sensing Sensitivity 0.25    Lead Channel Setting Sensing Adaptation Mode Adaptive    Lead Channel Setting Sensing Polarity Bipolar    Lead Channel Setting Sensing Sensitivity 0.6    Lead Channel Setting Sensing Adaptation Mode Adaptive    Lead Channel Setting Pacing Polarity Bipolar    Lead Channel Setting Pacing Pulse Width 0.4    Lead Channel Setting Pacing Amplitude 2.5    Lead Channel Setting Pacing Capture Mode Fixed Pacing    Lead Channel Setting Pacing Polarity Bipolar    Lead Channel Setting Pacing Pulse Width 0.4    Lead Channel Setting Pacing Amplitude 3.0    Lead Channel Setting Pacing Capture Mode Fixed Pacing    Zone Setting Type Category VF    Zone Setting Vendor Type Category VF    Zone Setting Status Active    Zone Setting Detection Interval 300    Zone Setting Type Category VT    Zone Setting Vendor Type Category VT    Zone Setting Status Active    Zone Setting Detection Interval 353    Zone Setting Type Category VT    Zone  Setting Vendor Type Category VT-1    Zone Setting Status Active    Zone Setting Detection Interval 462    Lead Channel Impedance Value 615    Lead Channel Pacing Threshold Amplitude 0.6    Lead Channel Pacing Threshold Pulse Width 0.4    Lead Channel Status Check Lead    Lead Channel Impedance Value 396    Lead Channel Pacing Threshold Amplitude 1.3    Lead Channel Pacing Threshold Pulse Width 0.4    Battery Date Time of Measurements 20240716150000    Battery Status Beginning of Service    Battery Remaining Longevity 84    Battery Remaining Percentage 80    Capacitor Charge Type Reformation    Capacitor Last Charge Date Time 20240506031600    Capacitor Charge Time 10.7    [place] Statistic Date Time Start 20240328000000    [place] Statistic Date Time End 20240716000000    [place] Statistic RA Percent Paced 21    [place] Statistic RV Percent Paced 12    Atrial Tachy Statistic Date Time Start 20240330000000    Atrial Tachy Statistic Date Time End 20240716000000    Atrial Tachy Statistic AT/AF [place] Percent 1    Therapy Statistic Recent Shocks Delivered 0    Therapy Statistic Recent Shocks Aborted 0    Therapy Statistic Recent ATP Delivered 0    Therapy Statistic Recent Date Time Start 20240328000000    Therapy Statistic Recent Date Time End [id number]    Therapy Statistic Total Shocks Delivered 0    Therapy Statistic Total Shocks Aborted 0    Therapy Statistic Total ATP Delivered 17    Therapy Statistic Total  Date Time Start 20181128000000    Therapy Statistic Total  Date Time End [id number]    Episode Statistic Recent Count 0    Episode Statistic Type Category Other    Episode Statistic Recent Count 8    Episode Statistic Type Category VT    Episode Statistic Vendor Type Category NSVT    Episode Statistic Recent Count 0    Episode Statistic Type Category VT    Episode Statistic Vendor Type Category VT    Episode Statistic Recent Count 0    Episode Statistic Type Category VT    Episode Statistic Vendor Type Category  VT-1    Episode Statistic Recent Date Time Start 20240328000000    Episode Statistic Recent Date Time End 20240716000000    Episode Statistic Recent Date Time Start 20240328000000    Episode Statistic Recent Date Time End 20240716000000    Episode Statistic Recent Date Time Start 20240328000000    Episode Statistic Recent Date Time End 20240716000000    Episode Statistic Recent Date Time Start 20240328000000    Episode Statistic Recent Date Time End 20240716000000    Narrative    Patient seen in [address] for evaluation and iterative programming of their ICD per MD orders.     Device: [employer] D433 RESONATE EL ICD  Normal device function.  Mode: DDDR  bpm  AP: 21%  : 12%  Intrinsic rhythm: CHB; AS/VS 31 bpm  Lead Trends Appear Stable.  Estimated battery longevity to RRT = 7 years.  Atrial Arrhythmia: None.  Anticoagulant: Xarelto  Ventricular Arrhythmia: None.  Setting Changes: ICD Therapies turned ON.    ERIC Trejo RN    Dual lead ICD    I have reviewed and interpreted the device interrogation, settings, programming and nurse's summary. The device is functioning within normal device parameters. I agree with the current findings, assessment and plan.   XR Abdomen Port 1 View    Narrative    XR ABDOMEN PORT 1 VIEW 7/16/2024 4:25 PM    HISTORY: Check NG/OG tube placement    COMPARISON: None.    FINDINGS:   Frontal view of the abdomen. Gastric tube tip projects over the  stomach. Nonobstructive bowel gas pattern. Moderate colonic stool  burden. Spine degenerative changes. Small left pleural effusion. No  pneumatosis.      Impression    IMPRESSION:   Gastric tube projects over the stomach.    I have personally reviewed the examination and initial interpretation  and I agree with the findings.    COCO DALY DO         SYSTEM ID:  [id number]

## 2024-07-19 NOTE — PROGRESS NOTES
Pt transferred from CVICU due to not needing that level of  care. Patient appropriate for [facility].  Tele: applied  Chest Tubes connected to Y to -20cm suction.  Patient is alert and oriented x 4.   Patient given call light and instructed on use of call light and when to call for assistance.

## 2024-07-19 NOTE — PLAN OF CARE
Major Shift Events: A/Ox4, VSS on RA. PRN 5 mg oxy given for sternal incisional pain. 20 mg lasix given with 1600 mL output. MAPs dropped to 61-63 post-diuresis, now stabilized >65. 100% V paced. Capped V wires, plan to remove today. Phos 2.0 replaced. CT x2 with small amount of serosang output. No BM. Ax1. Transferring to .     Goal Outcome Evaluation:      Plan of Care Reviewed With: patient and spouse

## 2024-07-19 NOTE — PLAN OF CARE
Major Shift Events:  no significant events this shift. PRN Oxy given.   Plan: await bed availability on 6C    For vital signs and complete assessments, please see documentation flowsheets.

## 2024-07-20 ENCOUNTER — APPOINTMENT (OUTPATIENT)
Dept: GENERAL RADIOLOGY | Facility: CLINIC | Age: 64
DRG: 219 | End: 2024-07-20
Attending: STUDENT IN AN ORGANIZED HEALTH CARE EDUCATION/TRAINING PROGRAM
Payer: COMMERCIAL

## 2024-07-20 ENCOUNTER — APPOINTMENT (OUTPATIENT)
Dept: CARDIOLOGY | Facility: CLINIC | Age: 64
DRG: 219 | End: 2024-07-20
Attending: STUDENT IN AN ORGANIZED HEALTH CARE EDUCATION/TRAINING PROGRAM
Payer: COMMERCIAL

## 2024-07-20 ENCOUNTER — APPOINTMENT (OUTPATIENT)
Dept: OCCUPATIONAL THERAPY | Facility: CLINIC | Age: 64
DRG: 219 | End: 2024-07-20
Attending: SURGERY
Payer: COMMERCIAL

## 2024-07-20 LAB
ANION GAP SERPL CALCULATED.3IONS-SCNC: 11 MMOL/L (ref 7–15)
ANION GAP SERPL CALCULATED.3IONS-SCNC: 7 MMOL/L (ref 7–15)
BI-PLANE LVEF ECHO: NORMAL
BUN SERPL-MCNC: 13 MG/DL (ref 8–23)
BUN SERPL-MCNC: 13.9 MG/DL (ref 8–23)
CALCIUM SERPL-MCNC: 8.5 MG/DL (ref 8.8–10.4)
CALCIUM SERPL-MCNC: 8.8 MG/DL (ref 8.8–10.4)
CHLORIDE SERPL-SCNC: 100 MMOL/L (ref 98–107)
CHLORIDE SERPL-SCNC: 105 MMOL/L (ref 98–107)
CREAT SERPL-MCNC: 0.78 MG/DL (ref 0.67–1.17)
CREAT SERPL-MCNC: 0.82 MG/DL (ref 0.67–1.17)
EGFRCR SERPLBLD CKD-EPI 2021: >90 ML/MIN/1.73M2
EGFRCR SERPLBLD CKD-EPI 2021: >90 ML/MIN/1.73M2
ERYTHROCYTE [DISTWIDTH] IN BLOOD BY AUTOMATED COUNT: 14.8 % (ref 10–15)
GLUCOSE SERPL-MCNC: 107 MG/DL (ref 70–99)
GLUCOSE SERPL-MCNC: 110 MG/DL (ref 70–99)
HCO3 SERPL-SCNC: 27 MMOL/L (ref 22–29)
HCO3 SERPL-SCNC: 27 MMOL/L (ref 22–29)
HCT VFR BLD AUTO: 34.5 % (ref 40–53)
HGB BLD-MCNC: 11.2 G/DL (ref 13.3–17.7)
INR PPP: 2.12 (ref 0.85–1.15)
LVEF ECHO: NORMAL
MAGNESIUM SERPL-MCNC: 1.7 MG/DL (ref 1.7–2.3)
MAGNESIUM SERPL-MCNC: 1.8 MG/DL (ref 1.7–2.3)
MCH RBC QN AUTO: 32 PG (ref 26.5–33)
MCHC RBC AUTO-ENTMCNC: 32.5 G/DL (ref 31.5–36.5)
MCV RBC AUTO: 99 FL (ref 78–100)
PHOSPHATE SERPL-MCNC: 2.2 MG/DL (ref 2.5–4.5)
PLATELET # BLD AUTO: 155 10E3/UL (ref 150–450)
POTASSIUM SERPL-SCNC: 3.8 MMOL/L (ref 3.4–5.3)
POTASSIUM SERPL-SCNC: 3.9 MMOL/L (ref 3.4–5.3)
RBC # BLD AUTO: 3.5 10E6/UL (ref 4.4–5.9)
SODIUM SERPL-SCNC: 138 MMOL/L (ref 135–145)
SODIUM SERPL-SCNC: 139 MMOL/L (ref 135–145)
WBC # BLD AUTO: 8.7 10E3/UL (ref 4–11)

## 2024-07-20 PROCEDURE — 71045 X-RAY EXAM CHEST 1 VIEW: CPT | Mod: 76

## 2024-07-20 PROCEDURE — 93306 TTE W/DOPPLER COMPLETE: CPT | Mod: 26 | Performed by: INTERNAL MEDICINE

## 2024-07-20 PROCEDURE — 84100 ASSAY OF PHOSPHORUS: CPT

## 2024-07-20 PROCEDURE — 80048 BASIC METABOLIC PNL TOTAL CA: CPT | Performed by: STUDENT IN AN ORGANIZED HEALTH CARE EDUCATION/TRAINING PROGRAM

## 2024-07-20 PROCEDURE — 85027 COMPLETE CBC AUTOMATED: CPT

## 2024-07-20 PROCEDURE — 120N000005 HC R&B MS OVERFLOW UMMC

## 2024-07-20 PROCEDURE — 97110 THERAPEUTIC EXERCISES: CPT | Mod: GO | Performed by: OCCUPATIONAL THERAPIST

## 2024-07-20 PROCEDURE — 36415 COLL VENOUS BLD VENIPUNCTURE: CPT

## 2024-07-20 PROCEDURE — 250N000012 HC RX MED GY IP 250 OP 636 PS 637: Performed by: SURGERY

## 2024-07-20 PROCEDURE — 83735 ASSAY OF MAGNESIUM: CPT | Performed by: STUDENT IN AN ORGANIZED HEALTH CARE EDUCATION/TRAINING PROGRAM

## 2024-07-20 PROCEDURE — 250N000013 HC RX MED GY IP 250 OP 250 PS 637: Performed by: STUDENT IN AN ORGANIZED HEALTH CARE EDUCATION/TRAINING PROGRAM

## 2024-07-20 PROCEDURE — 250N000013 HC RX MED GY IP 250 OP 250 PS 637: Performed by: SURGERY

## 2024-07-20 PROCEDURE — 80048 BASIC METABOLIC PNL TOTAL CA: CPT

## 2024-07-20 PROCEDURE — 71045 X-RAY EXAM CHEST 1 VIEW: CPT

## 2024-07-20 PROCEDURE — 85610 PROTHROMBIN TIME: CPT | Performed by: STUDENT IN AN ORGANIZED HEALTH CARE EDUCATION/TRAINING PROGRAM

## 2024-07-20 PROCEDURE — 71045 X-RAY EXAM CHEST 1 VIEW: CPT | Mod: 26 | Performed by: RADIOLOGY

## 2024-07-20 PROCEDURE — C8929 TTE W OR WO FOL WCON,DOPPLER: HCPCS

## 2024-07-20 PROCEDURE — 250N000013 HC RX MED GY IP 250 OP 250 PS 637: Performed by: PHYSICIAN ASSISTANT

## 2024-07-20 PROCEDURE — 83735 ASSAY OF MAGNESIUM: CPT

## 2024-07-20 PROCEDURE — 36415 COLL VENOUS BLD VENIPUNCTURE: CPT | Performed by: STUDENT IN AN ORGANIZED HEALTH CARE EDUCATION/TRAINING PROGRAM

## 2024-07-20 PROCEDURE — 250N000011 HC RX IP 250 OP 636: Performed by: STUDENT IN AN ORGANIZED HEALTH CARE EDUCATION/TRAINING PROGRAM

## 2024-07-20 PROCEDURE — 999N000208 ECHOCARDIOGRAM COMPLETE

## 2024-07-20 PROCEDURE — 97530 THERAPEUTIC ACTIVITIES: CPT | Mod: GO | Performed by: OCCUPATIONAL THERAPIST

## 2024-07-20 PROCEDURE — 255N000002 HC RX 255 OP 636: Performed by: INTERNAL MEDICINE

## 2024-07-20 PROCEDURE — 250N000013 HC RX MED GY IP 250 OP 250 PS 637

## 2024-07-20 RX ORDER — WARFARIN SODIUM 4 MG/1
4 TABLET ORAL
Status: COMPLETED | OUTPATIENT
Start: 2024-07-20 | End: 2024-07-20

## 2024-07-20 RX ORDER — POTASSIUM CHLORIDE 750 MG/1
20 TABLET, EXTENDED RELEASE ORAL ONCE
Status: COMPLETED | OUTPATIENT
Start: 2024-07-20 | End: 2024-07-20

## 2024-07-20 RX ORDER — MAGNESIUM OXIDE 400 MG/1
400 TABLET ORAL EVERY 4 HOURS
Status: COMPLETED | OUTPATIENT
Start: 2024-07-20 | End: 2024-07-20

## 2024-07-20 RX ORDER — FUROSEMIDE 10 MG/ML
40 INJECTION INTRAMUSCULAR; INTRAVENOUS ONCE
Status: COMPLETED | OUTPATIENT
Start: 2024-07-20 | End: 2024-07-20

## 2024-07-20 RX ORDER — METOPROLOL SUCCINATE 25 MG/1
25 TABLET, EXTENDED RELEASE ORAL DAILY
Status: DISCONTINUED | OUTPATIENT
Start: 2024-07-20 | End: 2024-07-21 | Stop reason: HOSPADM

## 2024-07-20 RX ADMIN — MAGNESIUM OXIDE TAB 400 MG (241.3 MG ELEMENTAL MG) 400 MG: 400 (241.3 MG) TAB at 17:33

## 2024-07-20 RX ADMIN — MYCOPHENOLATE MOFETIL 1000 MG: 500 TABLET, FILM COATED ORAL at 08:43

## 2024-07-20 RX ADMIN — POTASSIUM & SODIUM PHOSPHATES POWDER PACK 280-160-250 MG 1 PACKET: 280-160-250 PACK at 08:43

## 2024-07-20 RX ADMIN — METOPROLOL SUCCINATE 25 MG: 25 TABLET, EXTENDED RELEASE ORAL at 08:45

## 2024-07-20 RX ADMIN — HUMAN ALBUMIN MICROSPHERES AND PERFLUTREN 6 ML: 10; .22 INJECTION, SOLUTION INTRAVENOUS at 11:26

## 2024-07-20 RX ADMIN — MYCOPHENOLATE MOFETIL 1000 MG: 500 TABLET, FILM COATED ORAL at 19:34

## 2024-07-20 RX ADMIN — WARFARIN SODIUM 4 MG: 4 TABLET ORAL at 17:33

## 2024-07-20 RX ADMIN — POLYETHYLENE GLYCOL 3350 17 G: 17 POWDER, FOR SOLUTION ORAL at 08:43

## 2024-07-20 RX ADMIN — POTASSIUM & SODIUM PHOSPHATES POWDER PACK 280-160-250 MG 1 PACKET: 280-160-250 PACK at 11:38

## 2024-07-20 RX ADMIN — MAGNESIUM OXIDE TAB 400 MG (241.3 MG ELEMENTAL MG) 400 MG: 400 (241.3 MG) TAB at 21:13

## 2024-07-20 RX ADMIN — POTASSIUM & SODIUM PHOSPHATES POWDER PACK 280-160-250 MG 1 PACKET: 280-160-250 PACK at 15:22

## 2024-07-20 RX ADMIN — METHIMAZOLE 5 MG: 5 TABLET ORAL at 08:43

## 2024-07-20 RX ADMIN — MAGNESIUM OXIDE TAB 400 MG (241.3 MG ELEMENTAL MG) 400 MG: 400 (241.3 MG) TAB at 11:37

## 2024-07-20 RX ADMIN — PANTOPRAZOLE SODIUM 40 MG: 40 TABLET, DELAYED RELEASE ORAL at 08:45

## 2024-07-20 RX ADMIN — ASPIRIN 81 MG CHEWABLE TABLET 81 MG: 81 TABLET CHEWABLE at 08:45

## 2024-07-20 RX ADMIN — SENNOSIDES AND DOCUSATE SODIUM 2 TABLET: 50; 8.6 TABLET ORAL at 08:44

## 2024-07-20 RX ADMIN — FUROSEMIDE 40 MG: 10 INJECTION, SOLUTION INTRAVENOUS at 11:38

## 2024-07-20 RX ADMIN — MAGNESIUM OXIDE TAB 400 MG (241.3 MG ELEMENTAL MG) 400 MG: 400 (241.3 MG) TAB at 08:44

## 2024-07-20 RX ADMIN — POTASSIUM CHLORIDE 20 MEQ: 750 TABLET, EXTENDED RELEASE ORAL at 17:33

## 2024-07-20 RX ADMIN — TIMOLOL MALEATE 1 DROP: 5 SOLUTION/ DROPS OPHTHALMIC at 21:13

## 2024-07-20 ASSESSMENT — ACTIVITIES OF DAILY LIVING (ADL)
ADLS_ACUITY_SCORE: 29
ADLS_ACUITY_SCORE: 23
ADLS_ACUITY_SCORE: 29
ADLS_ACUITY_SCORE: 23
ADLS_ACUITY_SCORE: 23
ADLS_ACUITY_SCORE: 29
ADLS_ACUITY_SCORE: 23
ADLS_ACUITY_SCORE: 25
ADLS_ACUITY_SCORE: 23
ADLS_ACUITY_SCORE: 29
ADLS_ACUITY_SCORE: 23
ADLS_ACUITY_SCORE: 23
DEPENDENT_IADLS:: INDEPENDENT
ADLS_ACUITY_SCORE: 25
ADLS_ACUITY_SCORE: 23
ADLS_ACUITY_SCORE: 23
ADLS_ACUITY_SCORE: 29
ADLS_ACUITY_SCORE: 23
ADLS_ACUITY_SCORE: 29
ADLS_ACUITY_SCORE: 23
ADLS_ACUITY_SCORE: 25
ADLS_ACUITY_SCORE: 23

## 2024-07-20 NOTE — PLAN OF CARE
Goal Outcome Evaluation:      Plan of Care Reviewed With: patient, spouse          Outcome Evaluation: Home with OP Cardiac Rehab

## 2024-07-20 NOTE — CONSULTS
Care Management Initial Consult    General Information  Assessment completed with: Patient, Spouse or significant other, Ganesh  Type of CM/SW Visit: Initial Assessment    Primary Care Provider verified and updated as needed: Yes   Readmission within the last 30 days: no previous admission in last 30 days      Reason for Consult: discharge planning  Advance Care Planning: Advance Care Planning Reviewed: no concerns identified, other (see comments) (patient has blank form at home; we talked about if he decides to not complete a HCD that having conversation with those in his life who would be making decisions is helpful for them to understand his wishes for medical care)        Communication Assessment  Patient's communication style: spoken language (English or Bilingual)    Hearing Difficulty or Deaf: no   Wear Glasses or Blind: no    Cognitive  Cognitive/Neuro/Behavioral: WDL  Level of Consciousness: alert  Arousal Level: opens eyes spontaneously  Orientation: oriented x 4  Mood/Behavior: calm, cooperative, behavior appropriate to situation  Best Language: 0 - No aphasia  Speech: clear, spontaneous, logical    Living Environment:   People in home: spouse  Amira  Current living Arrangements: house      Able to return to prior arrangements: yes     Family/Social Support:  Care provided by: self, spouse/significant other  Provides care for: no one  Marital Status:   Wife  Amira       Description of Support System: Supportive, Involved    Support Assessment: Adequate family and caregiver support, Adequate social supports    Current Resources:   Patient receiving home care services: No     Community Resources: None  Equipment currently used at home: none  Supplies currently used at home: None    Employment/Financial:  Employment Status: employed full-time     Employment/ Comments: patient has leave paperwork that we filled out but he is not sure if it is FMLA or Short-term  disability  Financial Concerns: none   Referral to Financial Worker: No     Does the patient's insurance plan have a 3 day qualifying hospital stay waiver?  No    Lifestyle & Psychosocial Needs:  Social Determinants of Health     Food Insecurity: Unknown (11/6/2022)    [facility]    Hunger Vital Sign     Worried About Running Out of Food in the Last Year: Patient declined     Ran Out of Food in the Last Year: Patient declined   Depression: Not at risk (7/1/2024)    PHQ-2     PHQ-2 Score: 0   Housing Stability: Not on file   Tobacco Use: Low Risk  (7/1/2024)    Patient History     Smoking Tobacco Use: Never     Smokeless Tobacco Use: Never     Passive Exposure: Not on file   Financial Resource Strain: Unknown (11/6/2022)    [facility]    Overall Financial Resource Strain (CARDIA)     Difficulty of Paying Living Expenses: Patient declined   Alcohol Use: Not At Risk (11/26/2021)    [facility]    AUDIT-C     Frequency of Alcohol Consumption: Never     Average Number of Drinks: 1 or 2     Frequency of Binge Drinking: Never   Transportation Needs: Unknown (11/6/2022)    [facility]    PRAPARE - Transportation     Lack of Transportation (Medical): Patient declined     Lack of Transportation (Non-Medical): Patient declined   Physical Activity: Insufficiently Active (11/26/2021)    [facility]    Exercise Vital Sign     Days of Exercise per Week: 1 day     Minutes of Exercise per Session: 10 min   Interpersonal Safety: Not At Risk (6/9/2023)     "Received from [facility] IP Custom IPV     Do you feel UNSAFE in any of your personal relationships with your family members or any other acquaintances?: No   Stress: Stress Concern Present (11/26/2021)    [facility]    Citizen of [place] of Occupational Health - Occupational Stress Questionnaire     Feeling of Stress : To some extent   Social Connections: Moderately Integrated (11/26/2021)    [facility]    Social Connection and Isolation Panel [NHANES]     Frequency of Communication with Friends and Family: Once a week     Frequency of Social Gatherings with Friends and Family: Once a week     Attends Muslim Services: More than 4 times per year     Active Member of Clubs or Organizations: Yes     Attends Club or Organization Meetings: Never     Marital Status:    Health Literacy: Not on file     Functional Status:  Prior to admission patient needed assistance:   Dependent ADLs:: Independent  Dependent IADLs:: Independent  Assessment of Functional Status: Needs assistance with transportation, Not at  functional baseline    Mental Health Status:  Mental Health Status: No Current Concerns       Chemical Dependency Status:  Chemical Dependency Status: No Current Concerns           Values/Beliefs:  Spiritual, Cultural Beliefs, Muslim Practices, Values that affect care: other (see comments) (his wife stated he is Evangelical and would need \"last rites\" if he were ever in a situation to require this)             Additional Information:    Per chart patient admitted with \"a 63 year old male with PMH of WILLIAM, SVT s/p ICD, hyperthyroidism, and systemic sarcoidosis with cardiac involvement prompting presentation. Patient is s/p MitraClip in August 2023 d/t severe MR from posterior " "leaflet restriction who ultimately continued with moderate residual MR and evidence of new pulmonary HTN. Patient now presents to [facility] for minimally invasive MVR with On-X  mechanical valve by Dr. Kiran on 7/16/24. \"    Spoke with patient and wife in room - patient was sitting up in bed and wife was sitting on window seat. Introduced self and role. Patient and wife live several hours from hospital - this was a planned procedure for patient. Wife staying locally at \[facility]\"" and the plan is for patient to discharge tomorrow 7/21/24.  He is off work while he recovers and already has his medical leave paperwork completed. His wife stated that patient can get his OP Cardiac Rehab from [facility] in [place], [place].    Contact information:  [address], [place] [address] [phone]  FAX # FOR CARDIAC REHAB ORDERS: [phone]    Wife asked for assistance in securing [employer] INR Self-Testing machine. They are not open on the weekend but will leave message for weekday RNCC (see RNCC note from 7/20/2024) to help on Monday 7/22/24. Updated patient and wife.     Will continue to follow.    SHIRA Maravilla MSW  7/20/2024       Social Work and Care Management Department       SEARCHABLE in [facility] - search SOCIAL WORK       [place] (0800 - 1630) Saturday and Sunday     Units: 4A Vocera, 4C Vocera, & 4E Vocera        Units: 5A 6416-4325 Vocera, 5A 4573-1328 Vocera , BMT SW 1 BMT SW 2, BMT SW 3 & BMT SW 4  5C Off Service 5401 - 5416  5C Off Service [phone]     Units: 6A Vocera & 6B Vocera      Units: 6C Vocera     Units: 7A Vocera & 7B Vocera      Units: 7C Med Surg 7401 thru 7418 and 7C Med Surg 7502 thru 7521      Unit: [place] ED Vocera & [place] Obs Vocera     [facility] [place] ([phone]) Saturday and Sunday      Units: 5 Ortho Vocera, 5 Med Surg Vocera & WB ED Vocera     Units: 6 Med Surg Vocera, 8 Med Surg Vocera, & 10 ICU Vocera    "   After hours Vocera [place] and After Hours Vocera [place]     Saturday & Sunday (1630 - 0000)    Mon-Fri (4554-0977)     [facility] Recognized Holidays  ([phone])    Units: ALL   - see above VOCERA links to units and after hours

## 2024-07-20 NOTE — PLAN OF CARE
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 Now s/p minimally invasive MVR with On-X mechanical valve on 7/16/24.    I/A: Jose (he/him) is A/O x4. Calls appropriately, calm and cooperative. Tele in place, 100% V-paced. TPW also present, but capped. VSS on RA. L PIV in place SL. R groin site, WILLIAMS, R thoracotomy site, dressing CDI and CT site. 1 pleural and 1 mediastinal chest tube to -20 suction, patent and draining. No new skin deficits noted. Tolerating 2gm Na+ diet. Up with Ax1, adequate UOP, no BM this shift. Appeared to sleep well overnight.    Changed:  Running:   PRN:    P: Continue to monitor and follow POC. Notify CVTS with changes.    Goal Outcome Evaluation:    Plan of Care Reviewed With: patient  Overall Patient Progress: improving  Outcome Evaluation: VSS on RA, denies pain, up with Ax1. Due for BM, bowel regimen increased

## 2024-07-20 NOTE — PROGRESS NOTES
Care Management Follow Up    Length of Stay (days): 4    Expected Discharge Date: 07/22/2024     Concerns to be Addressed:       Patient plan of care discussed at interdisciplinary rounds: No    Anticipated Discharge Disposition:       Anticipated Discharge Services:    Anticipated Discharge DME:      Patient/family educated on Medicare website which has current facility and service quality ratings:    Education Provided on the Discharge Plan:    Patient/Family in Agreement with the Plan:      Referrals Placed by CM/SW:    Private pay costs discussed: Not applicable    Additional Information:  RNCC updated by covering SW that patient could be a home discharge with [facility] and home INR testing set up. SW looking into [facility] CR fax number to send orders to. RNCC reached out to home INR testing company, [employer], called contact number, [phone], but their business hours are M-F 5 a.m. to 5 p.m. Caroline Standard Time, RNCC/SW/CHW will need to call Monday to determine status of INR home monitoring machine order/delivery.       [facility]  Ph: [phone]    [employer]  Remote home INR testing  Ph: [phone]    Sharad Gonzalez BSN, BA, RN, CMSRN, RNCC  [facility]  Covering Units 6C Beds 2931-8762/OBS  Phone: [phone]  Available on [employer] search 6C 6502-14 RNCC or OBS RNCC  After Hours [phone]     6C Beds 5350-8089  Ph: [phone]     6B/CRNCC Weekend/holiday on Vocera or [phone]     [facility] RNCC ED/5 Ortho/5 Med/Surg [phone]     [facility] RNCC 6 Med/Surg 8A, [phone]     Observation SW and weekend/after hours phone: [phone]

## 2024-07-20 NOTE — DISCHARGE SUMMARY
[facility], [place]   Cardiothoracic Surgery Hospital Discharge Summary     Jose Carney MRN# [id number]   Age: 63 year old YOB: 1960     Admitting Physician:  Mag Kiran MD  Discharge Physician:  Myra Hernandez PA-C   Primary Care Physician:        Amira Gentile     DATE OF ADMISSION: 7/16/2024      DATE OF DISCHARGE: July 21, 2024    Admit Wt: 171 lbs  Discharge Wt: 167 lbs          Primary Diagnoses:   Hx of mitral regurgitation, mitral stenosis after MitraClip 08/2023 - now s/p minimally invasive MVR (On-X) via right mini thoracotomy  PFO s/p closure with small residual PFO          Secondary Diagnoses:   Acute postoperative pain, improving  Acute blood loss anemia, resolved  WILLIAM on home CPAP  Small bilateral pleural effusions  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  Surgeon: Dr. Kiran  Right mini-thoracotomy, mitral valve replacement with a 25/33 mm On-X mechanical valve, right common femoral arterial and venous cannulation for cardiopulmonary bypass, intraoperative CADEN     INTRAOPERATIVE FINDINGS:    The patient had a mildly diminished LV systolic function.  He had severe MR.  He had mild to moderate TR.  There was a small iatrogenic PFO from the transseptal access from the previous the MitraClip procedure.    PATHOLOGY RESULTS:    In process at time of discharge     CULTURE RESULTS:    none    CONSULTS:    PT/OT  Intensivist  Social work  Pharmacist    BRIEF HISTORY OF ILLNESS:  Jose Carney is a 63 year old male with a history of WILLIAM,  SVT s/p ICD, hyperthyroidism 2/2 amiodarone toxicity, and systemic sarcoidosis with cardiac involvement, MR s/p MitraClip August 2023 with residual MR with MS and also moderate TR, pulmonary HTN.     HOSPITAL COURSE: Jose Carney is a 63 year old male who on 7/16/2024 underwent the above-named procedures and tolerated the operation well.     Immediately postoperatively the patient was admitted to the CVICU. Patient was extubated on POD #1. Blood pressure and cardiac index were managed with vasopressors and inotropic agents which were continuously weaned until no longer needed. Patient was subsequently transferred to the surgical telemetry floor on POD 3.    The patient continued to progress well. Chest tubes were removed as appropriate when output decreased. Temporary pacing wires were cut at the skin due to elevated INR. This is a retained foreign body and was added to patient's chart history.     The patient was fluid overloaded and treated with diuretics. Also with small bilateral pleural effusions. He will discharge with 3 days of diuretic therapy. Patient was transiently hyperglycemic and treated with insulin infusion then transitioned to sliding scale insulin per protocol. Blood sugars remained stable. No further glycemic control agents needed at this time.    He was started on warfarin for mechanical mitral valve (On-X). INR goal is 2.5-3.5 for 3 months, then 2-3 thereafter. INR on day of discharge 2.35 and rising. Will be followed by [facility] anticoagulation clinic closer to home, should have next INR check 7/23. Patient to take 4 mg warfarin for the next few days prior to this appointment, then will be prescribed further doses by managing PCP.    Patient has been on chronic immunosuppression for sarcoidosis. Cellcept was continued while inpatient in the perioperative period, but methotrexate held for healing. Recommend holding methotrexate for 2 additional weeks, then may resume. To call Dr. Dickey's  "office for confirmation of instructions for cellcept/methotrexate.    Patient with history of afib, AVNRT. ICD in place. Prior to discharge had aflutter but rate remained 70, v paced. Asymptomatic. After metoprolol XL 25 mg patient returned to normal v paced rhythm. To remain on metoprolol daily until further instructions by cardiologist. Discussed warning signs and patient aware of when he needs to call provider or report to ED for elevated heart rate, lightheadedness, etc.    On postop echo prior to discharge, LVEF was 31% down from 50-55% pre surgery. After mitral valve intervention this may be expected and is not necessarily a long-term finding. Follow up echo may be ordered by patient's cardiologist in a few months to re-evaluate.     Prior to discharge, his pain was controlled well, he was working well with therapies, able to perform most ADLs, ambulate without difficulty, and had full return of bowel and bladder function. On July 21, 2024, he was discharged to home in stable condition. Follow up with cardiology will be arranged with CVTS care coordinators. Pt encouraged to follow up with PCP and cardiac rehab upon discharge.    Patient discharged on aspirin:  Yes 81 mg  Patient discharged on beta blocker: yes metoprolol XL daily  Patient discharged on ACE Inhibitor/ARB: no      Patient discharged on statin: no   Patient discharged on anticoagulation: yes warfarin         Discharge Disposition:     Discharged to home            Condition on Discharge:     Discharge condition: Good   Discharge vitals: Blood pressure 100/64, pulse 64, temperature 97.4  F (36.3  C), temperature source Oral, resp. rate 18, height 1.854 m (6' 1\"), weight 75.9 kg (167 lb 6.4 oz), SpO2 98%.   Code status on discharge: Full Code     Vitals:    07/19/24 0600 07/20/24 0646 07/21/24 0700   Weight: 78.7 kg (173 lb 9.6 oz) 77.8 kg (171 lb 9.6 oz) 75.9 kg (167 lb 6.4 oz)       DAY OF DISCHARGE PHYSICAL EXAM:  Gen: A&Ox4, NAD  Neuro: no " focal deficits   CV: v paced, normal S1 S2, no murmurs, rubs or gallops. No JVD  Pulm: CTA, no wheezing or rhonchi, unlabored work of breathing on RA  Abd: nondistended, normal BS, soft, nontender  Ext: no peripheral edema  Incision: right mini thoracotomy site clean, dry, intact, no erythema, small amount of surrounding evolving ecchymosis, appropriately tender. Right groin incision c/d/i, no erythema.  Tubes/drain sites: dressing clean and dry    LABS  Most Recent 3 CBC's:  Recent Labs   Lab Test 07/21/24  0722 07/20/24  0623 07/19/24  0544   WBC 8.1 8.7 8.9   HGB 13.3 11.2* 10.8*   MCV 97 99 99    155 123*      Most Recent 3 BMP's:  Recent Labs   Lab Test 07/21/24  0722 07/20/24  1612 07/20/24  0623    138 139   POTASSIUM 4.2 3.8 3.9   CHLORIDE 102 100 105   CO2 27 27 27   BUN 13.3 13.9 13.0   CR 0.84 0.82 0.78   ANIONGAP 9 11 7   INGE 9.2 8.8 8.5*   * 110* 107*     Most Recent 2 LFT's:  Recent Labs   Lab Test 07/17/24  0350 07/16/24  1401   AST 53* 57*   ALT 12 15   ALKPHOS 64 80   BILITOTAL 1.3* 1.5*     Most Recent INR's and Anticoagulation Dosing History:  Anticoagulation Dose History  More data exists         Latest Ref Rng & Units 7/1/2024 7/16/2024 7/17/2024 7/18/2024 7/19/2024 7/20/2024 7/21/2024   Recent Dosing and Labs   warfarin ANTICOAGULANT (COUMADIN) 1 MG tablet - - - - - 2 mg, $Given - -   warfarin ANTICOAGULANT (COUMADIN) 2 MG tablet - - - - 2 mg, $Given - - -   warfarin ANTICOAGULANT (COUMADIN) 4 MG tablet - - - 4 mg, $Given - - 4 mg, $Given -   INR 0.85 - 1.15 2.44  1.45  1.38  1.37  1.30  1.84  1.56  1.62  1.46  2.26  2.56  2.12  2.35       Details          Multiple values from one day are sorted in reverse-chronological order                 Most Recent TSH, T4 and A1c Labs:  Recent Labs   Lab Test 07/01/24  1240 03/22/18  1321   TSH  --  1.01   A1C 5.4  --       Recent Labs   Lab 07/21/24  0722 07/20/24  1612 07/20/24  0623 07/19/24  0850 07/19/24  0544 07/19/24  0525   GLC  115* 110* 107* 96 100* 105*       Imaging:  EXAM: XR CHEST PORT 1 VIEW  7/20/2024 2:01 PM      FINDINGS:   AP portable semiupright radiograph of the chest. Implantable cardiac  device with intact leads. Epicardial pacer leads. Trachea is midline.  Stable cardiomediastinal silhouette. Unchanged bibasilar streaky  pulmonary opacities. Stable small bilateral pleural effusions. No  appreciable pneumothorax.     No acute osseous abnormality. Visualized upper abdomen is  unremarkable.                                                           IMPRESSION:   1. Stable support devices.  2. Stable bibasilar predominant atelectasis/edema.  3. Unchanged small bilateral pleural effusions.      Complete Portable Echo Adult. Contrast Optison. Patient was given 6 ml mixture  of 3 ml Optison and 6 ml saline. 3 ml wasted.  ______________________________________________________________________________  Interpretation Summary  S/P MVR with 25/33 mm On-X bileaflet mechanical prosthesis 7/16/24.     Moderately reduced LV function, LVEF=31%. Mild to moderate diffuse hypokinesis  is present. Abnormal septal motion consistent with pacemaker is present.  Global right ventricular function is mildly reduced.  S/P MVR with 25/33 mm On-X bileaflet mechanical prosthesis. The prosthetic  mitral valve is well-seated. Doppler interrogation of the mitral valve is  normal. The mean gradient across the mitral valve is 4 mmHg.  Moderate tricuspid insufficiency is present.  Mild aortic insufficiency is present.  No pericardial effusion is present.  This study was compared with the study from 3/28/24: There has been interval  MVR. LV and RV function have decreased.  ______________________________________________________________________________  Left Ventricle  Left ventricular wall thickness cannot evaluate. Biplane LVEF is 31%. Moderate  left ventricular dilation is present. Left ventricular function is decreased.  The ejection fraction is 30-35%  (moderately reduced). Diastolic function not  assessed due to presence of prosthetic mitral valve. Mild to moderate diffuse  hypokinesis is present. Abnormal septal motion consistent with pacemaker is  present.     Right Ventricle  Mild right ventricular dilation is present. Global right ventricular function  is mildly reduced. A pacemaker lead is noted in the right ventricle.     Mitral Valve  S/P MVR with 25/33 mm On-X bileaflet mechanical prosthesis. The prosthetic  mitral valve is well-seated. Doppler interrogation of the mitral valve is  normal. The mean gradient across the mitral valve is 4 mmHg. There is trace  paravalvular regurgitation.     Aortic Valve  Mild aortic insufficiency is present.     Tricuspid Valve  Moderate tricuspid insufficiency is present. The right ventricular systolic  pressure is approximated at 32.0 mmHg plus the right atrial pressure.     Vessels  The aorta root is normal. The inferior vena cava cannot be assessed.     Pericardium  No pericardial effusion is present.     Compared to Previous Study  This study was compared with the study from 3/28/24 . There has been interval  MVR. LV and RV function have decreased.       PRE-ADMISSION MEDICATIONS:  Medications Prior to Admission   Medication Sig Dispense Refill Last Dose    CELLCEPT (BRAND) 500 MG tablet Take 2 tablets (1,000 mg) by mouth 2 times daily 120 tablet 3 7/16/2024 at 0530    folic acid (FOLVITE) 1 MG tablet TAKE FIVE TABLETS (5MG) ONCE WEEKLY WITH YOUR METHOTREXATE 60 tablet 3 6/23/2024    methimazole (TAPAZOLE) 5 MG tablet Take 5 mg by mouth every evening   7/16/2024 at 0530    methotrexate 2.5 MG tablet    6/23/2024    sildenafil (REVATIO) 20 MG tablet Take 20-60 mg by mouth as needed   Unknown    timolol maleate (TIMOPTIC) 0.5 % ophthalmic solution Place 1 drop into both eyes at bedtime   7/15/2024    [DISCONTINUED] metoprolol succinate ER (TOPROL XL) 25 MG 24 hr tablet Take 3 tablets (75 mg) by mouth daily (Patient  taking differently: Take 75 mg by mouth daily as needed) 360 tablet 3 7/10/2024    [DISCONTINUED] rivaroxaban ANTICOAGULANT (XARELTO) 20 MG TABS tablet Take 1 tablet (20 mg) by mouth daily (with dinner) (Patient taking differently: Take 20 mg by mouth every morning) 30 tablet 1 7/9/2024       DISCHARGE MEDICATIONS:   Current Discharge Medication List        START taking these medications    Details   acetaminophen (TYLENOL) 325 MG tablet Take 2 tablets (650 mg) by mouth every 4 hours as needed for pain  Qty: 50 tablet, Refills: 0    Associated Diagnoses: Severe mitral regurgitation      aspirin (ASA) 81 MG chewable tablet Take 1 tablet (81 mg) by mouth daily  Qty: 30 tablet, Refills: 2    Associated Diagnoses: Severe mitral regurgitation      furosemide (LASIX) 20 MG tablet Take 1 tablet (20 mg) by mouth daily  Qty: 3 tablet, Refills: 0    Associated Diagnoses: Severe mitral regurgitation      oxyCODONE (ROXICODONE) 5 MG tablet Take 1 tablet (5 mg) by mouth every 6 hours as needed for moderate pain  Qty: 10 tablet, Refills: 0    Associated Diagnoses: Severe mitral regurgitation      pantoprazole (PROTONIX) 40 MG EC tablet Take 1 tablet (40 mg) by mouth daily  Qty: 14 tablet, Refills: 0    Associated Diagnoses: Severe mitral regurgitation      senna-docusate (SENOKOT-S/PERICOLACE) 8.6-50 MG tablet Take 2 tablets by mouth or Feeding Tube 2 times daily as needed for constipation  Qty: 14 tablet, Refills: 0    Associated Diagnoses: Severe mitral regurgitation      warfarin ANTICOAGULANT (COUMADIN) 2 MG tablet Take 2 tablets (4 mg) by mouth daily  Qty: 5 tablet, Refills: 0    Associated Diagnoses: Severe mitral regurgitation           CONTINUE these medications which have CHANGED    Details   metoprolol succinate ER (TOPROL XL) 25 MG 24 hr tablet Take 1 tablet (25 mg) by mouth daily  Qty: 30 tablet, Refills: 0    Associated Diagnoses: Severe mitral regurgitation           CONTINUE these medications which have NOT CHANGED     Details   CELLCEPT (BRAND) 500 MG tablet Take 2 tablets (1,000 mg) by mouth 2 times daily  Qty: 120 tablet, Refills: 3    Associated Diagnoses: Sarcoidosis      folic acid (FOLVITE) 1 MG tablet TAKE FIVE TABLETS (5MG) ONCE WEEKLY WITH YOUR METHOTREXATE  Qty: 60 tablet, Refills: 3    Associated Diagnoses: Sarcoid, cardiac      methimazole (TAPAZOLE) 5 MG tablet Take 5 mg by mouth every evening      methotrexate 2.5 MG tablet       sildenafil (REVATIO) 20 MG tablet Take 20-60 mg by mouth as needed      timolol maleate (TIMOPTIC) 0.5 % ophthalmic solution Place 1 drop into both eyes at bedtime           STOP taking these medications       rivaroxaban ANTICOAGULANT (XARELTO) 20 MG TABS tablet Comments:   Reason for Stopping:                CC:Amira Salgado, Mandi Mariano,  Myra Hernandez PA-C    [facility]   Cardiothoracic Surgery  Office phone: [phone]  Office fax: [phone]

## 2024-07-21 ENCOUNTER — APPOINTMENT (OUTPATIENT)
Dept: GENERAL RADIOLOGY | Facility: CLINIC | Age: 64
DRG: 219 | End: 2024-07-21
Attending: STUDENT IN AN ORGANIZED HEALTH CARE EDUCATION/TRAINING PROGRAM
Payer: COMMERCIAL

## 2024-07-21 VITALS
OXYGEN SATURATION: 98 % | DIASTOLIC BLOOD PRESSURE: 64 MMHG | RESPIRATION RATE: 18 BRPM | HEIGHT: 73 IN | WEIGHT: 167.4 LBS | BODY MASS INDEX: 22.19 KG/M2 | HEART RATE: 64 BPM | SYSTOLIC BLOOD PRESSURE: 100 MMHG | TEMPERATURE: 97.4 F

## 2024-07-21 LAB
ANION GAP SERPL CALCULATED.3IONS-SCNC: 9 MMOL/L (ref 7–15)
BUN SERPL-MCNC: 13.3 MG/DL (ref 8–23)
CALCIUM SERPL-MCNC: 9.2 MG/DL (ref 8.8–10.4)
CHLORIDE SERPL-SCNC: 102 MMOL/L (ref 98–107)
CREAT SERPL-MCNC: 0.84 MG/DL (ref 0.67–1.17)
EGFRCR SERPLBLD CKD-EPI 2021: >90 ML/MIN/1.73M2
ERYTHROCYTE [DISTWIDTH] IN BLOOD BY AUTOMATED COUNT: 14.7 % (ref 10–15)
GLUCOSE SERPL-MCNC: 115 MG/DL (ref 70–99)
HCO3 SERPL-SCNC: 27 MMOL/L (ref 22–29)
HCT VFR BLD AUTO: 40.7 % (ref 40–53)
HGB BLD-MCNC: 13.3 G/DL (ref 13.3–17.7)
INR PPP: 2.35 (ref 0.85–1.15)
MAGNESIUM SERPL-MCNC: 2 MG/DL (ref 1.7–2.3)
MCH RBC QN AUTO: 31.7 PG (ref 26.5–33)
MCHC RBC AUTO-ENTMCNC: 32.7 G/DL (ref 31.5–36.5)
MCV RBC AUTO: 97 FL (ref 78–100)
PHOSPHATE SERPL-MCNC: 2.9 MG/DL (ref 2.5–4.5)
PLATELET # BLD AUTO: 222 10E3/UL (ref 150–450)
POTASSIUM SERPL-SCNC: 4.2 MMOL/L (ref 3.4–5.3)
RBC # BLD AUTO: 4.19 10E6/UL (ref 4.4–5.9)
SODIUM SERPL-SCNC: 138 MMOL/L (ref 135–145)
WBC # BLD AUTO: 8.1 10E3/UL (ref 4–11)

## 2024-07-21 PROCEDURE — 80048 BASIC METABOLIC PNL TOTAL CA: CPT

## 2024-07-21 PROCEDURE — 250N000013 HC RX MED GY IP 250 OP 250 PS 637: Performed by: PHYSICIAN ASSISTANT

## 2024-07-21 PROCEDURE — 250N000013 HC RX MED GY IP 250 OP 250 PS 637: Performed by: SURGERY

## 2024-07-21 PROCEDURE — 85014 HEMATOCRIT: CPT

## 2024-07-21 PROCEDURE — 85610 PROTHROMBIN TIME: CPT | Performed by: STUDENT IN AN ORGANIZED HEALTH CARE EDUCATION/TRAINING PROGRAM

## 2024-07-21 PROCEDURE — 83735 ASSAY OF MAGNESIUM: CPT

## 2024-07-21 PROCEDURE — 250N000013 HC RX MED GY IP 250 OP 250 PS 637: Performed by: STUDENT IN AN ORGANIZED HEALTH CARE EDUCATION/TRAINING PROGRAM

## 2024-07-21 PROCEDURE — 71046 X-RAY EXAM CHEST 2 VIEWS: CPT

## 2024-07-21 PROCEDURE — 84100 ASSAY OF PHOSPHORUS: CPT

## 2024-07-21 PROCEDURE — 71046 X-RAY EXAM CHEST 2 VIEWS: CPT | Mod: 26 | Performed by: STUDENT IN AN ORGANIZED HEALTH CARE EDUCATION/TRAINING PROGRAM

## 2024-07-21 PROCEDURE — 36415 COLL VENOUS BLD VENIPUNCTURE: CPT

## 2024-07-21 PROCEDURE — 250N000012 HC RX MED GY IP 250 OP 636 PS 637: Performed by: SURGERY

## 2024-07-21 RX ORDER — ACETAMINOPHEN 325 MG/1
650 TABLET ORAL EVERY 4 HOURS PRN
Qty: 50 TABLET | Refills: 0 | Status: SHIPPED | OUTPATIENT
Start: 2024-07-21 | End: 2024-09-27

## 2024-07-21 RX ORDER — ASPIRIN 81 MG/1
81 TABLET, CHEWABLE ORAL DAILY
Qty: 30 TABLET | Refills: 2 | Status: SHIPPED | OUTPATIENT
Start: 2024-07-21

## 2024-07-21 RX ORDER — WARFARIN SODIUM 4 MG/1
4 TABLET ORAL
Status: DISCONTINUED | OUTPATIENT
Start: 2024-07-21 | End: 2024-07-21 | Stop reason: HOSPADM

## 2024-07-21 RX ORDER — OXYCODONE HYDROCHLORIDE 5 MG/1
5 TABLET ORAL EVERY 6 HOURS PRN
Qty: 10 TABLET | Refills: 0 | Status: SHIPPED | OUTPATIENT
Start: 2024-07-21 | End: 2024-09-27

## 2024-07-21 RX ORDER — FUROSEMIDE 20 MG
20 TABLET ORAL DAILY
Qty: 3 TABLET | Refills: 0 | Status: SHIPPED | OUTPATIENT
Start: 2024-07-21

## 2024-07-21 RX ORDER — WARFARIN SODIUM 2 MG/1
4 TABLET ORAL DAILY
Qty: 5 TABLET | Refills: 0 | Status: SHIPPED | OUTPATIENT
Start: 2024-07-21 | End: 2024-07-21

## 2024-07-21 RX ORDER — AMOXICILLIN 250 MG
2 CAPSULE ORAL 2 TIMES DAILY PRN
Qty: 14 TABLET | Refills: 0 | Status: SHIPPED | OUTPATIENT
Start: 2024-07-21

## 2024-07-21 RX ORDER — PANTOPRAZOLE SODIUM 40 MG/1
40 TABLET, DELAYED RELEASE ORAL DAILY
Qty: 14 TABLET | Refills: 0 | Status: SHIPPED | OUTPATIENT
Start: 2024-07-21 | End: 2024-09-27

## 2024-07-21 RX ORDER — WARFARIN SODIUM 2 MG/1
4 TABLET ORAL DAILY
Qty: 8 TABLET | Refills: 0 | Status: ACTIVE | OUTPATIENT
Start: 2024-07-21

## 2024-07-21 RX ORDER — METOPROLOL SUCCINATE 25 MG/1
25 TABLET, EXTENDED RELEASE ORAL DAILY
Qty: 30 TABLET | Refills: 0 | Status: SHIPPED | OUTPATIENT
Start: 2024-07-22

## 2024-07-21 RX ADMIN — ASPIRIN 81 MG CHEWABLE TABLET 81 MG: 81 TABLET CHEWABLE at 08:56

## 2024-07-21 RX ADMIN — MYCOPHENOLATE MOFETIL 1000 MG: 500 TABLET, FILM COATED ORAL at 08:54

## 2024-07-21 RX ADMIN — ACETAMINOPHEN 650 MG: 325 TABLET, FILM COATED ORAL at 11:33

## 2024-07-21 RX ADMIN — METOPROLOL SUCCINATE 25 MG: 25 TABLET, EXTENDED RELEASE ORAL at 08:52

## 2024-07-21 RX ADMIN — PANTOPRAZOLE SODIUM 40 MG: 40 TABLET, DELAYED RELEASE ORAL at 08:52

## 2024-07-21 RX ADMIN — METHIMAZOLE 5 MG: 5 TABLET ORAL at 08:56

## 2024-07-21 ASSESSMENT — ACTIVITIES OF DAILY LIVING (ADL)
ADLS_ACUITY_SCORE: 23
ADLS_ACUITY_SCORE: 25
ADLS_ACUITY_SCORE: 23
ADLS_ACUITY_SCORE: 23
ADLS_ACUITY_SCORE: 25
ADLS_ACUITY_SCORE: 23
ADLS_ACUITY_SCORE: 25
ADLS_ACUITY_SCORE: 23
ADLS_ACUITY_SCORE: 25
ADLS_ACUITY_SCORE: 23

## 2024-07-21 NOTE — PHARMACY-ANTICOAGULATION SERVICE
Clinical Pharmacy- Warfarin Discharge Note  This patient is currently on warfarin for the treatment of Mechanical Mitral Valve Replacement (OnX Mitral)  INR Goal= 2.5-3.5  Expected length of therapy lifetime.    Warfarin PTA Regimen: N/A    Anticoagulation Dose History  More data exists         Latest Ref Rng & Units 7/1/2024 7/16/2024 7/17/2024 7/18/2024 7/19/2024 7/20/2024 7/21/2024   Recent Dosing and Labs   warfarin ANTICOAGULANT (COUMADIN) 1 MG tablet - - - - - 2 mg, $Given - -   warfarin ANTICOAGULANT (COUMADIN) 2 MG tablet - - - - 2 mg, $Given - - -   warfarin ANTICOAGULANT (COUMADIN) 4 MG tablet - - - 4 mg, $Given - - 4 mg, $Given -   INR 0.85 - 1.15 2.44  1.45  1.38  1.37  1.30  1.84  1.56  1.62  1.46  2.26  2.56  2.12  2.35       Details          Multiple values from one day are sorted in reverse-chronological order               Vitamin K doses administered during the last 7 days: None  FFP administered during the last 7 days: None (Kcentra given intra-op 7/16)  Recommend discharging the patient on a warfarin regimen of 4 mg with a prescription for warfarin 2mg tablets.      The patient should have an INR checked within the next 3 days    Elisa Woodruff, PGY-1 Pharmacy Resident

## 2024-07-21 NOTE — DISCHARGE SUMMARY
DISCHARGE today 7/21/24  Discharged to: Home  Via: Automobile  Accompanied by: Family  Discharge Instructions: principal diagnosis, major findings/procedures, diet, activity, medications, follow up appointments, when to call the MD, and what to watchout for (i.e. s/s of infection, increasing SOB, palpitations, Angina). Pt states understanding of all discharge instructions.   Prescriptions: To be filled by home pharmacy per pt's request; scripts sent with pt.  Follow Up Appointments:   Belongings: All sent with pt. Pt verifies that they are taking all belongings with them.   IV: discontinued.   Telemetry: discontinued.   Pt exhibits understanding of above discharge instructions; all questions answered.  Discharge Paperwork: faxed to discharge planner.

## 2024-07-21 NOTE — PROGRESS NOTES
Cardiac Rehab Discharge Summary    Reason for therapy discharge:    Discharged to home with outpatient therapy.    Progress towards therapy goal(s). See goals on Care Plan in [facility] electronic health record for goal details.  Goals partially met.  Barriers to achieving goals:   discharge from facility.    Therapy recommendation(s):    Continued therapy is recommended.  Rationale/Recommendations:  Rec OP CR to regain strength and IND fo rIADLS.

## 2024-07-21 NOTE — PLAN OF CARE
"Goal Outcome Evaluation:    Neuro: A&Ox4. Calls Appropriately  Cardiac: Afebrile, VSS. 100% V paced/ Aflutter   Respiratory: Sating appropriately on RA  GI/: Voiding spontaneously. No BM this shift.  Diet/appetite: Tolerating consistent carb/2g Na diet. Denies nausea.  Activity: Up with SBA  Pain: Denies   Skin: No new deficits noted.  Lines: L PIV SL  Replacements: On mag, phos, and K+ protocols. No replacements given    /67 (BP Location: Left arm, Cuff Size: Adult Regular)   Pulse 77   Temp 98.2  F (36.8  C) (Oral)   Resp 18   Ht 1.854 m (6' 1\")   Wt 77.8 kg (171 lb 9.6 oz)   SpO2 97%   BMI 22.64 kg/m       Continue with POC and notify team of any changes         "

## 2024-07-21 NOTE — PROGRESS NOTES
Care Management Follow Up    Length of Stay (days): 5    Expected Discharge Date: 07/22/2024     Concerns to be Addressed: all concerns addressed in this encounter     Patient plan of care discussed at interdisciplinary rounds: Yes    Anticipated Discharge Disposition: Home, Outpatient Rehab (PT, OT, SLP, Cardiac or Pulmonary)  Disposition Comments: wife will provide ride home  Anticipated Discharge Services: None  Anticipated Discharge DME: Other (see comment) (wife asked for a machine that would check his INR and home and allow her to just call the readings to the MD office for any dosing changes of blood thinner)    Patient/family educated on Medicare website which has current facility and service quality ratings: no  Education Provided on the Discharge Plan: Yes  Patient/Family in Agreement with the Plan: yes    Referrals Placed by CM/SW: External Care Coordination  Private pay costs discussed: Not applicable    Additional Information:  RNCC updated that patient would be ready for discharge, pending INR lab set up, PCP follow up. RNCC called to patient's PCP, however their office is closed on the weekends, will need to call back on Monday to set up.     Also, need to fax to clinic for OP CR set up on Monday.    [facility]  [address] [place] [address]  [phone]  FAX # FOR CARDIAC REHAB ORDERS: [phone]      Additionally, need to call [facility] regarding home monitoring equipment, ph: [phone] if that will be set up with clinic at discharge time, they are also closed on weekends.      PCP- [facility] in [place], [place]   Amira Gentile [phone]     [facility] ([facility])  Remote home INR testing  Ph: [phone]      UPDATE 1610: Patient was discharged RNCC had been alerted by unit covering SW. As patient has not had INR clinic appointment/lab appointment set up, RNCC faxed provider/clinic of this request for need of Tuesday appointment,  also called patient/family to ensure gets appointment on Tuesday 7/23. RNCC faxed orders to clinic for OP CR, AVS, INR results, request for INR following as well with goal 2.5-3.5 INR.     Sharad Gonzalez, BSN, BA, RN, CMSRN, RNCC  [facility]  Covering Units 6C Beds 7510-3568/OBS  Phone: [phone]  Available on [employer] search 6C 6502-14 RNCC or OBS RNCC  After Hours [phone]     6C Beds 6235-7155 SW Ph: [phone]     6B/CRNCC Weekend/holiday on [employer] or [phone]     [facility] RNCC ED/5 Ortho/5 Med/Surg [phone]     [facility] RNCC 6 Med/Surg 8A, [phone]     Observation SW and weekend/after hours phone: [phone]

## 2024-07-22 ENCOUNTER — TELEPHONE (OUTPATIENT)
Dept: CARDIOLOGY | Facility: CLINIC | Age: 64
End: 2024-07-22
Payer: COMMERCIAL

## 2024-07-22 LAB
PATH REPORT.COMMENTS IMP SPEC: NORMAL
PATH REPORT.COMMENTS IMP SPEC: NORMAL
PATH REPORT.FINAL DX SPEC: NORMAL
PATH REPORT.GROSS SPEC: NORMAL
PATH REPORT.MICROSCOPIC SPEC OTHER STN: NORMAL
PATH REPORT.RELEVANT HX SPEC: NORMAL
PHOTO IMAGE: NORMAL

## 2024-07-22 NOTE — PROGRESS NOTES
RNCC Brief Post Discharge Note:  7/22/2024 at 11:19am    This writer had received hand off from weekend RNCC that pt discharged yesterday but unable to confirm INR lab draw and PCP follow up as the clinic was closed over the weekend. This writer called pt directly who confirmed that he was able to call and get scheduled with the [facility] anticoagulation team for INR lab draw tomorrow. Pt stated he is going to call and schedule primary care follow up as well. Pt states he has no further questions or concerns at this time. Discharge summary and warfarin dosing summary were faxed to [facility] yesterday along with cardiac rehab referral.     Donna Whitt RN BSN  6C Unit Care Coordinator  Phone number: [phone]    SEARCHABLE in [facility] - search CARE COORDINATOR      [place] ([phone]) Saturday & Sunday; ([phone]) [facility] Recognized Holidays      Units: 5A Onc Vocera & 5C Vocera      Units: 6B Vocera & 6C Vocera       Units: 7A SOT RNCC Vocera, 7B Med Surg Vocera, & 7C Med Surg Vocera      Units: 6A Vocera & 4A CVICU Vocera, 4C MICU Vocera, and 4E SICU Vocera       Units: 5 Ortho Vocera & 5 Med Surg Vocera      Units: 6 Med Surg Vocera & 8 Med Surg Vocera

## 2024-07-22 NOTE — TELEPHONE ENCOUNTER
CARDIOTHORACIC SURGERY  Discharge Follow Up Phone Call    POST OP MONITORING  How is your pain on a 0-10 scale, how are you managing your pain? Pain is being managed with tylenol.    ACTIVITY  How is your activity tolerance? Up and walking well.  Are you still doing sternal precautions? NA  Do you hear any clicking when you are moving or taking a deep breath? NA    Are you weighing yourself daily? Yes pt is 169 lbs.    SIGNS AND SYMPTOMS OF INFECTION  INCREASE IN PAIN - No  FEVER - No  DRAINAGE - No If so, color: NA  REDNESS - No  SWELLING - NO    ASSISTANCE  Do you have someone at home to assist you with your daily activities? Spouse.    MEDICATIONS  Is someone helping you to set up your medications? Pt and spouse.  Do you have any questions about your medications? No.    Are you on a blood thinner? No.  Who is managing your INRs? NA    FOLLOW UP  You are scheduled to see your primary care physician on - pt was advised to schedule within 2 weeks..  You are scheduled to see our surgery advanced practive provider for post operative follow up on NA.    You are scheduled for cardiac rehab on - pt will schedule.  You are scheduled to see your cardiologist on - pt was advised to schedule within 2 months.    CONTACT INFORMATION  Please feel free to call us with any questions or symptoms that are concerning for you at [phone]. If it is after 4:00 in the afternoon, or a weekend please call [phone] and ask for the on call specialist. We want to do everything we can to help prevent you needing to return to the ED, so please do not hesitate to call us.    Tia Holcomb, RNCC  Cardiothoracic Surgery  [phone]  July 22, 2024 1:06 PM

## 2024-07-23 ENCOUNTER — DOCUMENTATION ONLY (OUTPATIENT)
Dept: CARDIOLOGY | Facility: CLINIC | Age: 64
End: 2024-07-23
Payer: COMMERCIAL

## 2024-07-23 NOTE — PROGRESS NOTES
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    If patient wishes to return to work earlier than 3 months recovery/rehabilitation provided for in paperwork, may contact our office for return to work letter if needed.    Myra Hernandez PA-C  7/23/2024

## 2024-07-24 NOTE — PROGRESS NOTES
Post Discharge: Care Coordinator  D/I: pt discharged on 7/21/24. [place], [place] Ph: [phone] Fax: [phone] Jony called and said they only got 2 pages of the fax that was sent and knows he needs OP CR--they need the order.  P: I fax'd AVS and summary @ 1529.

## 2024-08-27 ENCOUNTER — TELEPHONE (OUTPATIENT)
Dept: CARDIOLOGY | Facility: CLINIC | Age: 64
End: 2024-08-27
Payer: COMMERCIAL

## 2024-08-27 NOTE — TELEPHONE ENCOUNTER
Patient called in today stating he has been feeling lightheaded regularly. States he drinks maybe two 20 ounce water bottles a day.     Advised pt to inquire with his PCP if he can lower his metoprolol dose, or increase his fluid intake. Pt verbalized understanding and will call his PCP to follow up. All questions addressed.

## 2024-09-12 DIAGNOSIS — I50.33 ACUTE ON CHRONIC DIASTOLIC HEART FAILURE (H): ICD-10-CM

## 2024-09-12 DIAGNOSIS — D86.85 CARDIAC SARCOIDOSIS: Primary | ICD-10-CM

## 2024-09-24 ENCOUNTER — TELEPHONE (OUTPATIENT)
Dept: CARDIOLOGY | Facility: CLINIC | Age: 64
End: 2024-09-24
Payer: COMMERCIAL

## 2024-09-24 NOTE — TELEPHONE ENCOUNTER
Patient Contacted for the patient to call back and schedule the following:    Appointment type:  rtn hf   Provider: Noble   Return date: 09/27/2  Specialty phone number: [phone] OPT 1  Additional appointment(s) needed: N/A   Additonal Notes: N/A

## 2024-09-26 ENCOUNTER — ANCILLARY PROCEDURE (OUTPATIENT)
Dept: CARDIOLOGY | Facility: CLINIC | Age: 64
End: 2024-09-26
Attending: NURSE PRACTITIONER
Payer: COMMERCIAL

## 2024-09-26 ENCOUNTER — LAB (OUTPATIENT)
Dept: LAB | Facility: CLINIC | Age: 64
End: 2024-09-26
Payer: COMMERCIAL

## 2024-09-26 DIAGNOSIS — Z95.818 STATUS POST IMPLANTATION OF MITRAL VALVE LEAFLET CLIP: Primary | ICD-10-CM

## 2024-09-26 DIAGNOSIS — I50.33 ACUTE ON CHRONIC DIASTOLIC HEART FAILURE (H): ICD-10-CM

## 2024-09-26 DIAGNOSIS — D86.85 CARDIAC SARCOIDOSIS: ICD-10-CM

## 2024-09-26 DIAGNOSIS — Z98.890 STATUS POST IMPLANTATION OF MITRAL VALVE LEAFLET CLIP: Primary | ICD-10-CM

## 2024-09-26 LAB
ANION GAP SERPL CALCULATED.3IONS-SCNC: 9 MMOL/L (ref 7–15)
BI-PLANE LVEF ECHO: NORMAL
BUN SERPL-MCNC: 16.5 MG/DL (ref 8–23)
CALCIUM SERPL-MCNC: 9.4 MG/DL (ref 8.8–10.4)
CHLORIDE SERPL-SCNC: 106 MMOL/L (ref 98–107)
CREAT SERPL-MCNC: 0.84 MG/DL (ref 0.67–1.17)
EGFRCR SERPLBLD CKD-EPI 2021: >90 ML/MIN/1.73M2
GLUCOSE SERPL-MCNC: 91 MG/DL (ref 70–99)
HCO3 SERPL-SCNC: 27 MMOL/L (ref 22–29)
LVEF ECHO: NORMAL
NT-PROBNP SERPL-MCNC: 1267 PG/ML (ref 0–900)
POTASSIUM SERPL-SCNC: 4.4 MMOL/L (ref 3.4–5.3)
SODIUM SERPL-SCNC: 142 MMOL/L (ref 135–145)

## 2024-09-26 PROCEDURE — 83880 ASSAY OF NATRIURETIC PEPTIDE: CPT | Performed by: PATHOLOGY

## 2024-09-26 PROCEDURE — 93306 TTE W/DOPPLER COMPLETE: CPT | Mod: GC | Performed by: INTERNAL MEDICINE

## 2024-09-26 PROCEDURE — 99207 PR STATISTIC IV PUSH SINGLE INITIAL SUBSTANCE: CPT | Performed by: INTERNAL MEDICINE

## 2024-09-26 PROCEDURE — 80048 BASIC METABOLIC PNL TOTAL CA: CPT | Performed by: PATHOLOGY

## 2024-09-26 PROCEDURE — 36415 COLL VENOUS BLD VENIPUNCTURE: CPT | Performed by: PATHOLOGY

## 2024-09-26 RX ADMIN — Medication 7 ML: at 12:30

## 2024-09-27 ENCOUNTER — VIRTUAL VISIT (OUTPATIENT)
Dept: CARDIOLOGY | Facility: CLINIC | Age: 64
End: 2024-09-27
Attending: INTERNAL MEDICINE
Payer: COMMERCIAL

## 2024-09-27 VITALS — WEIGHT: 165 LBS | HEIGHT: 73 IN | BODY MASS INDEX: 21.87 KG/M2

## 2024-09-27 DIAGNOSIS — D86.85 CARDIAC SARCOIDOSIS: Primary | ICD-10-CM

## 2024-09-27 DIAGNOSIS — Z95.818 STATUS POST IMPLANTATION OF MITRAL VALVE LEAFLET CLIP: ICD-10-CM

## 2024-09-27 DIAGNOSIS — Z98.890 STATUS POST IMPLANTATION OF MITRAL VALVE LEAFLET CLIP: ICD-10-CM

## 2024-09-27 DIAGNOSIS — I50.33 ACUTE ON CHRONIC DIASTOLIC HEART FAILURE (H): ICD-10-CM

## 2024-09-27 PROCEDURE — 99215 OFFICE O/P EST HI 40 MIN: CPT | Mod: 24 | Performed by: INTERNAL MEDICINE

## 2024-09-27 ASSESSMENT — PAIN SCALES - GENERAL: PAINLEVEL: NO PAIN (0)

## 2024-09-27 NOTE — PROGRESS NOTES
Wm reports today that he is feeling well    He feels he has fully recovered from his surgery    He denies shortness of breath lower extremity edema PND orthopnea or palpitations.  His chest surgical wounds have healed he is back at work and he has completed cardiac rehab    His breathing feels much better than it did prior to surgery    Assessment and recommendations it is not unexpected that his ejection fraction went down postsurgery given the severity of him MR and is already starting to improve.  At this point we will need to ramp up goal-directed medical therapy.  Unfortunately his blood pressure is low.  Based off of labs virtual exam and history I do not think he is retaining any fluid    I would like to start Aldactone 12.5 mg daily with a BMP in a week within a few weeks time if he is tolerating that    We will start Entresto 2426 twice daily    I would also like to add in Farxiga 10 mg daily I will see him back in 4 months with another echo to assess trajectory of LV function postsurgery and on GDMT    On echo today he has some mitral stenosis through the valve with a mean gradient of 8 though the valve is well-seated without any regurgitation we will watch this for now he remains on low-dose aspirin as well as anticoagulation with an INR goal of 2.5-3.5    For his cardiac sarcoidosis we will put him back on methotrexate when his CellCept runs out which is in about 2 weeks he can stop his CellCept and then resume taking methotrexate on Sundays as he did previously.  If he has a few days without immunosuppression I think that is totally fine

## 2024-09-27 NOTE — PROGRESS NOTES
"Virtual Visit Details    Type of service:  Video Visit     Originating Location (pt. Location): {video visit patient location:023430::\"Home\"}  {PROVIDER LOCATION On-site should be selected for visits conducted from your clinic location or adjoining [facility] hospital, academic office, or other nearby [facility] building. Off-site should be selected for all other provider locations, including home:[id number]}  Distant Location (provider location):  {virtual location provider:058552}  Platform used for Video Visit: {Virtual Visit Platforms:965835::\"Ultracell\"}  "

## 2024-09-27 NOTE — LETTER
9/27/2024      RE: Jose Carney  [address]  [facility] [address]       Dear Colleague,    Thank you for the opportunity to participate in the care of your patient, Jose Carney, at the [facility] [place] [facility]. Please see a copy of my visit note below.    September 27, 2024    Application: [employer]   Patient location; [place], [place]   My location [facility] remote clinic   Start time: 12:30 pm    End time: 1: 05 pm       I the pleasure of seeing Jose Carney in the [facility] Cardiac Sarcoidosis clinic today.     Complex cardiac history is as follows:     History sarcoidosis which  diagnosed by transbronchial biopsy in 2013.  He had been having back pain prior to this and 30 pound weight loss and had substantial mediastinal lymphadenopathy.  He was placed on several months of steroids with significant weight gain and improvement in symptoms as well as back pain.      In late summer and fall 2017 he started to develop palpitations which were found to be supraventricular tachycardia (likely AVNRT as well as some atrial tachycardia)  based on his cardiac monitor. He then had a cardiac MRI which was consistent with myocardial involvement from sarcoidosis (see below).  He was then sent down here for further management and was placed on metoprolol.    Based on high risk features of his cardiac MRI we recommended that he have an ICD placed. I did place him on a burst of prednisone given a highly suspicious PET scan which showed complete resolution of his cardiac sarcoidosis.  At that time I switch him to methotrexate as a staring steroid sparing agent and was titrating this up and tapering down his prednisone when he developed ventricular tachycardia.  This was in January 2019.  The VT was at a rate of 135 and was unable to be paced out.  He did not have syncope with this.  He was loaded with amiodarone, and given failure to gain  control of his VT he was given Solu-Medrol and placed on higher dose of prednisone in addition to his methotrexate.     He was then given clinical stability I was eventually able to get him off of amiodarone in 2021 -for control of his sarcoid to get him off of prednisone I increase his methotrexate to 20 mg daily which he remains on today.    Thyroid function has been normal throughout the time that he was on amiodarone.       He was intermittently followed in EP clinic here for paroxysmal atrial fibrillation.     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 No ATP since the ablation but does have runs of non-sustained SVT on his last device interrogation in 12/2022. Total A. fib burden on his last interrogation less than 1%.     12/2022: LVEF 42%, severe MR appears to be from posterior leaflet restriction.     Later underwent mitral clip 8/23 - not successful     7/24 Right mini-thoracotomy, mitral valve replacement with a 25/33 mm On-X mechanical valve     Overall he had a smooth postoperative course    Wm reports today that he is feeling well    He feels he has fully recovered from his surgery    He denies shortness of breath lower extremity edema PND orthopnea or palpitations.  His chest surgical wounds have healed he is back at work and he has completed cardiac rehab    His breathing feels much better than it did prior to surgery        PAST MEDICAL HISTORY:  Past Medical History:   Diagnosis Date     First degree AV block 03/20/2018     Heart murmur      Hyperthyroidism     2/2 amiodarone toxicity     ICD (implantable cardioverter-defibrillator) in place      Palpitations 03/20/2018     Paroxysmal atrial fibrillation (H)      Paroxysmal supraventricular  tachycardia (H) 03/20/2018     Retained foreign body 07/19/2024    epicardial pacing wire retained     Sarcoid, cardiac/EF 54% per MRI,[place] of affected myocardium is 12% of myocardial mass 01/25/2018     Sarcoidosis/ systemic 2013 03/20/2018     Severe mitral regurgitation      Sleep apnea      Thyroid disease      FAMILY HISTORY:  His family history is notable for several family members with autoimmune disease.  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  He denies any tobacco or alcohol use or recreational drug use.     CURRENT MEDICATIONS:  Current Outpatient Medications   Medication Sig Dispense Refill     aspirin (ASA) 81 MG chewable tablet Take 1 tablet (81 mg) by mouth daily 30 tablet 2     CELLCEPT (BRAND) 500 MG tablet Take 2 tablets (1,000 mg) by mouth 2 times daily 120 tablet 3     folic acid (FOLVITE) 1 MG tablet TAKE FIVE TABLETS (5MG) ONCE WEEKLY WITH YOUR METHOTREXATE 60 tablet 3     furosemide (LASIX) 20 MG tablet Take 1 tablet (20 mg) by mouth daily 3 tablet 0     methimazole (TAPAZOLE) 5 MG tablet Take 5 mg by mouth every evening       methotrexate 2.5 MG tablet        metoprolol succinate ER (TOPROL XL) 25 MG 24 hr tablet Take 1 tablet (25 mg) by mouth daily 30 tablet 0     senna-docusate (SENOKOT-S/PERICOLACE) 8.6-50 MG tablet Take 2 tablets by mouth or Feeding Tube 2 times daily as needed for constipation 14 tablet 0     sildenafil (REVATIO) 20 MG tablet Take 20-60 mg by mouth as needed       timolol maleate (TIMOPTIC) 0.5 % ophthalmic solution Place 1 drop into both eyes at bedtime       warfarin ANTICOAGULANT (COUMADIN) 2 MG tablet Take 2 tablets (4 mg) by mouth daily 8 tablet 0         Cardiac MRI: 2/2019    2. The RV is normal in cavity size. The global systolic function is normal. The RVEF is 66%.      3. Both atria are normal in size.     4. Due to ICD  lead artifact, the tricuspid and pulmonic valves were not well visualized.       The mitral and aortic valves appear normally functioning without significant disease.       5. Late gadolinium enhancement imaging shows epicardial and mid-myocardial enhancement in the  basal-inferoseptal, basal-inferior, and basal-inferolateral segments.      6. There is no pericardial effusion.     7. Unable to assess for intracardiac thrombus.     8. Unable to do T2 mapping and assess for myocardial edema (active inflammation).      CONCLUSIONS:    1. Cardiac sarcoidosis with preserved systolic function; LVEF 55%, RVEF 66%.  2. Sarcoidosis related scarring and severe [id number] involving the basal-inferoseptal and  basal-inferior segments. Total scar burden 12%. Unchanged from previous CMR dated 01/19/2018.      Interpretation Summary  S/P MVR with 25/33 mm On-X bileaflet mechanical prosthesis 7/16/24.     Left ventricular function is moderately reduced. Biplane LVEF is 35%. Moderate  diffuse hypokinesis.  Global right ventricular function is mildly reduced.  S/P MVR with 25/33 mm On-X bileaflet mechanical prosthesis 7/16/24.The mean  gradient across the valve is 8 mm Hg at 88 bpm.  Mild aortic insufficiency .  IVC is normal.  No pericardial effusion.     This study was compared with the study from 7/20/24. LV function appears  slightly improved visually and by LVEF biplane assessment. Mitral valve  gradient is higher at a higher heart rate.       Latest Reference Range & Units 09/26/24 12:45   Sodium 135 - 145 mmol/L 142   Potassium 3.4 - 5.3 mmol/L 4.4   Chloride 98 - 107 mmol/L 106   Carbon Dioxide (CO2) 22 - 29 mmol/L 27   Urea Nitrogen 8.0 - 23.0 mg/dL 16.5   Creatinine 0.67 - 1.17 mg/dL 0.84     Device: [employer] D433 RESONATE EL ICD  Normal device function.  Mode: DDDR  bpm  AP: 21%  : 12%  Intrinsic rhythm: CHB; AS/VS 31 bpm  Lead Trends Appear Stable.  Estimated battery longevity to RRT = 7  years.  Atrial Arrhythmia: None.  Anticoagulant: Xarelto  Ventricular Arrhythmia: None.  Setting Changes: ICD Therapies turned ON.    Assessment and Plan:   Follow-up cardiac sarcoidosis with now reduced LVEF     history of biopsy-proven cardiac sarcoid from a transbronchial biopsy-who has a cardiac MRI consistent with myocardial involvement from sarcoidosis. His PET scan related inflammation resolved completely with 3-4 months of 40 mg of prednisone.  I did have an ICD placed given he had high risk features on his cardiac MRI for malignant arrhythmias.     With respect to his cardiac sarcoidosis, given history of inflammation by MRI and PET as well as ventricular arrhythmias I have kept him on long-term immunosuppression.   Last PET negative   We will put him back on methotrexate when his CellCept runs out which is in about 2 weeks (Changed for surgical healing) he can stop his CellCept and then resume taking methotrexate on Sundays as he did previously.  If he has a few days without immunosuppression I think that is totally fine. Will then resume Q 4 month CBC, LFTs for methotrexate monitoring   Will resume methotrexate 20 mg weekly with folic acid when CellCept runs out      S/p MVR:   he has some mitral stenosis through the valve with a mean gradient of 8 though the valve is well-seated without any regurgitation we will watch this for now he remains on low-dose aspirin as well as anticoagulation with an INR goal of 2.5-3.5       HFrEF:   He is NYHA class II, stage C.  Etiology: sarcoid, then severe MR   it is not unexpected that his ejection fraction went down postsurgery given the severity of him MR and is already starting to improve.  At this point we will need to ramp up goal-directed medical therapy.  Unfortunately his blood pressure is low.  Based off of labs virtual exam and history I do not think he is retaining any fluid  Will watch v pacing trends as this may be another area to target if LV function  remains down     GDMT:   Continue metoprolol 12. Mg daily   I would like to start Aldactone 12.5 mg daily with a BMP in a week within a few weeks time if he is tolerating that  In a few weeks : Entresto 24/26 twice daily  I would also like to add in Farxiga 10 mg daily in about a month and  I will see him back in 4 months with another echo to assess trajectory of LV function postsurgery and on GDMT      Rhythm: he has minimal atrial fibrillation burden and his AVNRT is ablated-EP took him off of all antiarrhythmics.  He is off of Xarelto given low stroke risk and less than 1% burden but will need to consider going back on at age 66.   Has ICD for SCD prevention       RTC 4 months with repeat echo     Mandi Dickey MD      The longitudinal plan of care for heart failure was addressed during this visit. Due to the the added complexity in care, I will continue to support Jose Carney in the subsequent management of this this condition and with ongoing continuity to care of this condition.              CC  Patient Care Team:  Amira Gentile PA-C as PCP - General (Physician Assistant - Medical)  Helen Gonzalez, RN as Specialty Care Coordinator (Cardiology)  Janice Shafer, PANCHITO as Specialty Care Coordinator (Cardiology)  Lauro Will MD as MD (Clinical Cardiac Electrophysiology)  Mandi Dickey MD as Assigned Heart and Vascular Provider  Layne Madera PA-C as Physician Assistant (Pediatric Critical Care Medicine)  GIULIANO KOHLER [place], [place] Hausch, Raymond C, MD      Please do not hesitate to contact me if you have any questions/concerns.     Sincerely,     Mandi Dickey MD

## 2024-09-27 NOTE — NURSING NOTE
Current patient location: [address]  [facility] [address]    Is the patient currently in the state of [place]? NO    Visit mode:VIDEO    If the visit is dropped, the patient can be reconnected by: VIDEO VISIT: Text to cell phone:   Telephone Information:   Mobile [phone]       Will anyone else be joining the visit? Yes, pt's wife is with the pt and will be joining the video visit per pt  (If patient encounters technical issues they should call [phone])    How would you like to obtain your AVS? MyChart    Are changes needed to the allergy or medication list? Pt stated no changes to allergies and Pt stated no med changes    Are refills needed on medications prescribed by this physician? NO    Rooming Documentation:  Not applicable    Reason for visit: RECHECK    No other vitals to report per pt    Ida OJEDAF

## 2024-09-27 NOTE — PROGRESS NOTES
September 27, 2024    Application: Birgit   Patient location; [place]   My location [facility] remote clinic   Start time: 12:30 pm    End time: 1: 05 pm       I the pleasure of seeing Jose Carney in the [facility] Cardiac Sarcoidosis clinic today.     Complex cardiac history is as follows:     History sarcoidosis which  diagnosed by transbronchial biopsy in 2013.  He had been having back pain prior to this and 30 pound weight loss and had substantial mediastinal lymphadenopathy.  He was placed on several months of steroids with significant weight gain and improvement in symptoms as well as back pain.      In late summer and fall 2017 he started to develop palpitations which were found to be supraventricular tachycardia (likely AVNRT as well as some atrial tachycardia)  based on his cardiac monitor. He then had a cardiac MRI which was consistent with myocardial involvement from sarcoidosis (see below).  He was then sent down here for further management and was placed on metoprolol.    Based on high risk features of his cardiac MRI we recommended that he have an ICD placed. I did place him on a burst of prednisone given a highly suspicious PET scan which showed complete resolution of his cardiac sarcoidosis.  At that time I switch him to methotrexate as a staring steroid sparing agent and was titrating this up and tapering down his prednisone when he developed ventricular tachycardia.  This was in January 2019.  The VT was at a rate of 135 and was unable to be paced out.  He did not have syncope with this.  He was loaded with amiodarone, and given failure to gain control of his VT he was given Solu-Medrol and placed on higher dose of prednisone in addition to his methotrexate.     He was then given clinical stability I was eventually able to get him off of amiodarone in 2021 -for control of his sarcoid to get him off of prednisone I increase his methotrexate to 20 mg daily which he remains on  today.    Thyroid function has been normal throughout the time that he was on amiodarone.       He was intermittently followed in EP clinic here for paroxysmal atrial fibrillation.     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 No ATP since the ablation but does have runs of non-sustained SVT on his last device interrogation in 12/2022. Total A. fib burden on his last interrogation less than 1%.     12/2022: LVEF 42%, severe MR appears to be from posterior leaflet restriction.     Later underwent mitral clip 8/23 - not successful     7/24 Right mini-thoracotomy, mitral valve replacement with a 25/33 mm On-X mechanical valve     Overall he had a smooth postoperative course    Wm reports today that he is feeling well    He feels he has fully recovered from his surgery    He denies shortness of breath lower extremity edema PND orthopnea or palpitations.  His chest surgical wounds have healed he is back at work and he has completed cardiac rehab    His breathing feels much better than it did prior to surgery        PAST MEDICAL HISTORY:  Past Medical History:   Diagnosis Date    First degree AV block 03/20/2018    Heart murmur     Hyperthyroidism     2/2 amiodarone toxicity    ICD (implantable cardioverter-defibrillator) in place     Palpitations 03/20/2018    Paroxysmal atrial fibrillation (H)     Paroxysmal supraventricular tachycardia (H) 03/20/2018    Retained foreign body 07/19/2024    epicardial pacing wire retained    Sarcoid, cardiac/EF 54% per MRI,[place] of affected myocardium is 12% of myocardial mass 01/25/2018    Sarcoidosis/ systemic 2013 03/20/2018    Severe mitral regurgitation     Sleep apnea     Thyroid disease      FAMILY HISTORY:  His family history  is notable for several family members with autoimmune disease.  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  He denies any tobacco or alcohol use or recreational drug use.     CURRENT MEDICATIONS:  Current Outpatient Medications   Medication Sig Dispense Refill    aspirin (ASA) 81 MG chewable tablet Take 1 tablet (81 mg) by mouth daily 30 tablet 2    CELLCEPT (BRAND) 500 MG tablet Take 2 tablets (1,000 mg) by mouth 2 times daily 120 tablet 3    folic acid (FOLVITE) 1 MG tablet TAKE FIVE TABLETS (5MG) ONCE WEEKLY WITH YOUR METHOTREXATE 60 tablet 3    furosemide (LASIX) 20 MG tablet Take 1 tablet (20 mg) by mouth daily 3 tablet 0    methimazole (TAPAZOLE) 5 MG tablet Take 5 mg by mouth every evening      methotrexate 2.5 MG tablet       metoprolol succinate ER (TOPROL XL) 25 MG 24 hr tablet Take 1 tablet (25 mg) by mouth daily 30 tablet 0    senna-docusate (SENOKOT-S/PERICOLACE) 8.6-50 MG tablet Take 2 tablets by mouth or Feeding Tube 2 times daily as needed for constipation 14 tablet 0    sildenafil (REVATIO) 20 MG tablet Take 20-60 mg by mouth as needed      timolol maleate (TIMOPTIC) 0.5 % ophthalmic solution Place 1 drop into both eyes at bedtime      warfarin ANTICOAGULANT (COUMADIN) 2 MG tablet Take 2 tablets (4 mg) by mouth daily 8 tablet 0         Cardiac MRI: 2/2019    2. The RV is normal in cavity size. The global systolic function is normal. The RVEF is 66%.      3. Both atria are normal in size.     4. Due to ICD lead artifact, the tricuspid and pulmonic valves were not well visualized.       The mitral and aortic valves appear normally functioning without significant disease.       5. Late gadolinium enhancement imaging shows epicardial and mid-myocardial enhancement in the  basal-inferoseptal, basal-inferior, and basal-inferolateral segments.      6. There is no pericardial  effusion.     7. Unable to assess for intracardiac thrombus.     8. Unable to do T2 mapping and assess for myocardial edema (active inflammation).      CONCLUSIONS:    1. Cardiac sarcoidosis with preserved systolic function; LVEF 55%, RVEF 66%.  2. Sarcoidosis related scarring and severe [id number] involving the basal-inferoseptal and  basal-inferior segments. Total scar burden 12%. Unchanged from previous CMR dated 01/19/2018.      Interpretation Summary  S/P MVR with 25/33 mm On-X bileaflet mechanical prosthesis 7/16/24.     Left ventricular function is moderately reduced. Biplane LVEF is 35%. Moderate  diffuse hypokinesis.  Global right ventricular function is mildly reduced.  S/P MVR with 25/33 mm On-X bileaflet mechanical prosthesis 7/16/24.The mean  gradient across the valve is 8 mm Hg at 88 bpm.  Mild aortic insufficiency .  IVC is normal.  No pericardial effusion.     This study was compared with the study from 7/20/24. LV function appears  slightly improved visually and by LVEF biplane assessment. Mitral valve  gradient is higher at a higher heart rate.       Latest Reference Range & Units 09/26/24 12:45   Sodium 135 - 145 mmol/L 142   Potassium 3.4 - 5.3 mmol/L 4.4   Chloride 98 - 107 mmol/L 106   Carbon Dioxide (CO2) 22 - 29 mmol/L 27   Urea Nitrogen 8.0 - 23.0 mg/dL 16.5   Creatinine 0.67 - 1.17 mg/dL 0.84     Device: [employer] D433 RESONATE EL ICD  Normal device function.  Mode: DDDR  bpm  AP: 21%  : 12%  Intrinsic rhythm: CHB; AS/VS 31 bpm  Lead Trends Appear Stable.  Estimated battery longevity to RRT = 7 years.  Atrial Arrhythmia: None.  Anticoagulant: Xarelto  Ventricular Arrhythmia: None.  Setting Changes: ICD Therapies turned ON.    Assessment and Plan:   Follow-up cardiac sarcoidosis with now reduced LVEF     history of biopsy-proven cardiac sarcoid from a transbronchial biopsy-who has a cardiac MRI consistent with myocardial involvement from sarcoidosis. His PET scan  related inflammation resolved completely with 3-4 months of 40 mg of prednisone.  I did have an ICD placed given he had high risk features on his cardiac MRI for malignant arrhythmias.     With respect to his cardiac sarcoidosis, given history of inflammation by MRI and PET as well as ventricular arrhythmias I have kept him on long-term immunosuppression.   Last PET negative   We will put him back on methotrexate when his CellCept runs out which is in about 2 weeks (Changed for surgical healing) he can stop his CellCept and then resume taking methotrexate on Sundays as he did previously.  If he has a few days without immunosuppression I think that is totally fine. Will then resume Q 4 month CBC, LFTs for methotrexate monitoring   Will resume methotrexate 20 mg weekly with folic acid when CellCept runs out      S/p MVR:   he has some mitral stenosis through the valve with a mean gradient of 8 though the valve is well-seated without any regurgitation we will watch this for now he remains on low-dose aspirin as well as anticoagulation with an INR goal of 2.5-3.5       HFrEF:   He is NYHA class II, stage C.  Etiology: sarcoid, then severe MR   it is not unexpected that his ejection fraction went down postsurgery given the severity of him MR and is already starting to improve.  At this point we will need to ramp up goal-directed medical therapy.  Unfortunately his blood pressure is low.  Based off of labs virtual exam and history I do not think he is retaining any fluid  Will watch v pacing trends as this may be another area to target if LV function remains down     GDMT:   Continue metoprolol 12. Mg daily   I would like to start Aldactone 12.5 mg daily with a BMP in a week within a few weeks time if he is tolerating that  In a few weeks : Entresto 24/26 twice daily  I would also like to add in Farxiga 10 mg daily in about a month and  I will see him back in 4 months with another echo to assess trajectory of LV function  postsurgery and on GDMT      Rhythm: he has minimal atrial fibrillation burden and his AVNRT is ablated-EP took him off of all antiarrhythmics.  He is off of Xarelto given low stroke risk and less than 1% burden but will need to consider going back on at age 66.   Has ICD for SCD prevention       RTC 4 months with repeat echo     Mandi Dickey MD      The longitudinal plan of care for heart failure was addressed during this visit. Due to the the added complexity in care, I will continue to support Jose Carney in the subsequent management of this this condition and with ongoing continuity to care of this condition.              CC  Patient Care Team:  Amira Gentile PA-C as PCP - General (Physician Assistant - Medical)  Helen Gonzalez, RN as Specialty Care Coordinator (Cardiology)  Janice Shafer, PANCHITO as Specialty Care Coordinator (Cardiology)  Lauro Will MD as MD (Clinical Cardiac Electrophysiology)  Mandi Dickey MD as Assigned Heart and Vascular Provider  Layne Madera PA-C as Physician Assistant (Pediatric Critical Care Medicine)  GIULIANO KOHLER  [place], [place]     Gary Soares MD

## 2024-10-04 RX ORDER — METHOTREXATE 2.5 MG/1
20 TABLET ORAL
Qty: 104 TABLET | Refills: 3 | Status: SHIPPED | OUTPATIENT
Start: 2024-10-04

## 2024-10-04 NOTE — PATIENT INSTRUCTIONS
Cardiology Providers you saw during your visit:  Dr. Dickey    Medication changes:   Restart methotrexate 20 mg weekly with Folic Acid 5 mg weekly when Cellcept runs out.    Start spironolactone 12.5 mg daily   Over the next weeks/months will restart Entresto and Farxiga     Follow up:   Labs about a week starting spironolactone  Follow up with Dr Dickey in 4 months with labs and echo prior.        Please call if you have :  1. Weight gain of more than 2 pounds in a day or 5 pounds in a week  2. Increased shortness of breath, swelling or bloating  3. Dizziness, lightheadedness   4. Any questions or concerns.       Follow the American Heart Association Diet and Lifestyle recommendations:  Limit saturated fat, trans fat, sodium, red meat, sweets and sugar-sweetened beverages. If you choose to eat red meat, compare labels and select the leanest cuts available.  Aim for at least 150 minutes of moderate physical activity or 75 minutes of vigorous physical activity - or an equal combination of both - each week.      During business hours: [phone], press option # 1 to schedule and leave a message for your care team.        After hours, weekends or holidays: On Call Cardiologist- [phone]   option #4 and ask to speak to the on-call Cardiologist. Inform them you are a CORE/heart failure patient at the [facility].      Heart Failure Support Group     dates:    Monday, , 1-2pm     Monday,  , 1-2pm     Monday,  , 1-2pm     Monday, , 1-2pm     Monday, May 6th, 1-2pm     Monday, , 1-2pm     Monday, , 1-2pm     Monday, , 1-2pm     Monday, , 1-2pm     Monday, , 1-2pm     Monday, , 1-2pm     Monday, , 1-2pm     Helen Gonzalez RN BSN CHFN  Cardiology Care Coordinator - Heart Failure/ [facility]  [facility]   Questions and [phone]   First press #1 for the  "[employer] and \"To send a message to your care team\"    "

## 2024-10-14 ENCOUNTER — MYC MEDICAL ADVICE (OUTPATIENT)
Dept: CARDIOLOGY | Facility: CLINIC | Age: 64
End: 2024-10-14
Payer: COMMERCIAL

## 2024-10-22 ENCOUNTER — MYC MEDICAL ADVICE (OUTPATIENT)
Dept: CARDIOLOGY | Facility: CLINIC | Age: 64
End: 2024-10-22
Payer: COMMERCIAL

## 2024-10-22 DIAGNOSIS — D86.85 CARDIAC SARCOIDOSIS: Primary | ICD-10-CM

## 2024-10-22 DIAGNOSIS — I50.33 ACUTE ON CHRONIC DIASTOLIC HEART FAILURE (H): ICD-10-CM

## 2024-10-24 NOTE — TELEPHONE ENCOUNTER
Jose started the spironolactone only about 5 days ago, so far not having any symptoms- no changes in weights, breathing or dizziness.      Will get labs next week at his local clinic.

## 2024-10-25 ENCOUNTER — TELEPHONE (OUTPATIENT)
Dept: CARDIOLOGY | Facility: CLINIC | Age: 64
End: 2024-10-25
Payer: COMMERCIAL

## 2024-10-25 NOTE — TELEPHONE ENCOUNTER
Left Voicemail (1st Attempt) for the patient to call back and schedule the following:    Appointment type:  rtn hf   Provider: fallon   Return date: 02/04/25  Specialty phone number: [phone] opt 1   Additional appointment(s) needed: labs   Additonal Notes: n/a

## 2024-10-31 ENCOUNTER — TELEPHONE (OUTPATIENT)
Dept: CARDIOLOGY | Facility: CLINIC | Age: 64
End: 2024-10-31
Payer: COMMERCIAL

## 2024-10-31 NOTE — TELEPHONE ENCOUNTER
Left Voicemail (1st Attempt) for the patient to call back and schedule the following:    Appointment type: rtn hf   Provider: fallon  Return date: 02/05/25  Specialty phone number: [phone] opt 1   Additional appointment(s) needed: echo   Additonal Notes: n/a

## 2024-11-12 RX ORDER — SACUBITRIL AND VALSARTAN 24; 26 MG/1; MG/1
1 TABLET, FILM COATED ORAL 2 TIMES DAILY
Qty: 180 TABLET | Refills: 3 | Status: SHIPPED | OUTPATIENT
Start: 2024-11-12

## 2024-11-12 NOTE — TELEPHONE ENCOUNTER
Spoke with Jose, he reports that the dizziness was just occasional, wasn't connected to anything in particular and is improving now that he's been on it for a few weeks.  Same with the heaviness of breathing- happened only a few times and is becoming even less frequent now.  Denies swelling nor weight gain.    Will discuss with provider starting Entresto or Farxiaga now and will send a [email or web address] message to Jose with the plan.      Date: 11/12/2024    Time of Call: 10:19 AM     Diagnosis:  heart failure      [ VORB ] Ordering provider: Dr Dickey    Order: Entresto 24-26 mg BID, BMP in 1-2 weeks after starting      Order received by: Helen Gonzalez RN       Follow-up/additional notes: In [employer] sent to Jose.

## 2024-12-17 ENCOUNTER — TELEPHONE (OUTPATIENT)
Dept: CARDIOLOGY | Facility: CLINIC | Age: 64
End: 2024-12-17
Payer: COMMERCIAL

## 2024-12-17 NOTE — TELEPHONE ENCOUNTER
30 days supply test claims requested for the drugs below:  Jardiance 10mg daily, Farxiga 10mg daily     Jardiance 10mg  -$0 copay          Farxiga 10mg   -$0 copay

## 2025-01-21 DIAGNOSIS — I50.33 ACUTE ON CHRONIC DIASTOLIC HEART FAILURE (H): Primary | ICD-10-CM

## 2025-01-29 NOTE — PROGRESS NOTES
January 30, 2025      Application: Birgit   Patient location; [place], [place]   My location [facility] remote clinic   Start time: 12:25 pm    End time: 12:55  pm     I the pleasure of seeing Jose Carney in the [facility] Cardiac Sarcoidosis clinic today.     Complex cardiac history is as follows:     History sarcoidosis which  diagnosed by transbronchial biopsy in 2013.  He had been having back pain prior to this and 30 pound weight loss and had substantial mediastinal lymphadenopathy.  He was placed on several months of steroids with significant weight gain and improvement in symptoms as well as back pain.      In late summer and fall 2017 he started to develop palpitations which were found to be supraventricular tachycardia (likely AVNRT as well as some atrial tachycardia)  based on his cardiac monitor. He then had a cardiac MRI which was consistent with myocardial involvement from sarcoidosis (see below).  He was then sent down here for further management and was placed on metoprolol.    Based on high risk features of his cardiac MRI we recommended that he have an ICD placed. I did place him on a burst of prednisone given a highly suspicious PET scan which showed complete resolution of his cardiac sarcoidosis.  At that time I switch him to methotrexate as a staring steroid sparing agent and was titrating this up and tapering down his prednisone when he developed ventricular tachycardia.  This was in January 2019.  The VT was at a rate of 135 and was unable to be paced out.  He did not have syncope with this.  He was loaded with amiodarone, and given failure to gain control of his VT he was given Solu-Medrol and placed on higher dose of prednisone in addition to his methotrexate.     He was then given clinical stability I was eventually able to get him off of amiodarone in 2021 -for control of his sarcoid to get him off of prednisone I increase his methotrexate to 20 mg daily which he remains on  today.    Thyroid function has been normal throughout the time that he was on amiodarone.       He was intermittently followed in EP clinic here for paroxysmal atrial fibrillation.     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 No ATP since the ablation but does have runs of non-sustained SVT on his last device interrogation in 12/2022. Total A. fib burden on his last interrogation less than 1%.     12/2022: LVEF 42%, severe MR appears to be from posterior leaflet restriction.     Later underwent mitral clip 8/23 - not successful     7/24 Right mini-thoracotomy, mitral valve replacement with a 25/33 mm On-X mechanical valve     Overall he had a smooth postoperative course-ejection fraction went down after surgery we have been uptitrating GDMT for months.     This visit:   He feels he is finally recovered fully from surgery    He denies shortness of breath lower extremity edema PND orthopnea or palpitations.      His breathing feels much better than it did prior to surgery  He has no bleeding problems on his Coumadin  We have been trying to uptitrate GDMT without success as blood pressures remain low    He is overall living a good quality of life but is disappointed that his ejection fraction remains low    Wm's current weight is 167 or 168 pounds, down from his usual adult weight of around 190 pounds. He attributes some of this weight loss to muscle mass reduction but feels he's trying to gain some muscle back. He notes his jeans still feel loose and baggy on his thighs.      PAST MEDICAL HISTORY:  Past Medical History:   Diagnosis Date    First degree AV block 03/20/2018    Heart murmur     Hyperthyroidism     2/2 amiodarone toxicity    ICD (implantable  cardioverter-defibrillator) in place     Palpitations 03/20/2018    Paroxysmal atrial fibrillation (H)     Paroxysmal supraventricular tachycardia 03/20/2018    Retained foreign body 07/19/2024    epicardial pacing wire retained    Sarcoid, cardiac/EF 54% per MRI,[place] of affected myocardium is 12% of myocardial mass 01/25/2018    Sarcoidosis/ systemic 2013 03/20/2018    Severe mitral regurgitation     Sleep apnea     Thyroid disease      FAMILY HISTORY:  His family history is notable for several family members with autoimmune disease.  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  He denies any tobacco or alcohol use or recreational drug use.     CURRENT MEDICATIONS:  Current Outpatient Medications   Medication Sig Dispense Refill    aspirin (ASA) 81 MG chewable tablet Take 1 tablet (81 mg) by mouth daily 30 tablet 2    dapagliflozin (FARXIGA) 10 MG TABS tablet Take 1 tablet (10 mg) by mouth daily. 90 tablet 3    folic acid (FOLVITE) 1 MG tablet TAKE FIVE TABLETS (5MG) ONCE WEEKLY WITH YOUR METHOTREXATE 60 tablet 3    methimazole (TAPAZOLE) 5 MG tablet Take 5 mg by mouth every evening      methotrexate 2.5 MG tablet Take 8 tablets (20 mg) by mouth every 7 days. 104 tablet 3    metoprolol succinate ER (TOPROL XL) 25 MG 24 hr tablet Take 0.5 tablets (12.5 mg) by mouth daily. 45 tablet 1    sacubitril-valsartan (ENTRESTO) 24-26 MG per tablet Take 0.5 tablets by mouth 2 times daily. 90 tablet 3    senna-docusate (SENOKOT-S/PERICOLACE) 8.6-50 MG tablet Take 2 tablets by mouth or Feeding Tube 2 times daily as needed for constipation 14 tablet 0    sildenafil (REVATIO) 20 MG tablet Take 20-60 mg by mouth as needed      spironolactone (ALDACTONE) 25 MG tablet Take 0.5 tablets (12.5 mg) by mouth daily. 45 tablet 3    timolol maleate (TIMOPTIC) 0.5 % ophthalmic solution Place 1 drop into both  "eyes at bedtime      warfarin ANTICOAGULANT (COUMADIN) 2 MG tablet Take 2 tablets (4 mg) by mouth daily 8 tablet 0       BP 97/62 (BP Location: Right arm, Patient Position: Chair, Cuff Size: Adult Regular)   Pulse 64   Ht 1.854 m (6' 1\")   Wt 76.1 kg (167 lb 12.8 oz)   SpO2 99%   BMI 22.14 kg/m      Physical Exam Elements:  Constitutional - well appearing   Eyes - no redness, discharge  Respiratory - no cough, breathing is comfortable   Musculoskeletal - normal range of motion  Skin - no discoloration, lesions  Neurological - no tremors,   Psychiatric - no anxiety, patient is alert & oriented      All lab trends, imaging, recent echos personally reviewed with the patient.     Laboratory Results:  - Creatinine: 0.92    - BNP: 478 (reported as normal, down from 1,045 in March of 2024)    Device Interrogation Results:  - Normal rhythm at a rate of 64  -V pacing about 20% of the time  - AFib burden: 5%  - No longer episodes of ventricular tachycardia    Echocardiogram Results:  -Mitral valve is well-seated  -Normal gradients  -LV ejection fraction is measuring in the high 20% range  -IVC is normal    Cardiac MRI: 2/2019       CONCLUSIONS:    1. Cardiac sarcoidosis with preserved systolic function; LVEF 55%, RVEF 66%.  2. Sarcoidosis related scarring and severe ojbqkxfmdts-bg-ijtslika involving the basal-inferoseptal and  basal-inferior segments. Total scar burden 12%. Unchanged from previous CMR dated 01/19/2018.        Assessment and Plan:   Follow-up cardiac sarcoidosis with now reduced LVEF     history of biopsy-proven cardiac sarcoid from a transbronchial biopsy-who has a cardiac MRI consistent with myocardial involvement from sarcoidosis. His PET scan related inflammation resolved completely with 3-4 months of 40 mg of prednisone.  I did have an ICD placed given he had high risk features on his cardiac MRI for malignant arrhythmias.       1. Heart Failure with reduced ejection fraction-due to cardiac sarcoid " and severe valvular disease     - Continue current medications: Entresto (half tab), metoprolol (25 mg, half tab daily), Farxiga 10 mg daily     - Monitor symptoms and weight     - Encourage continued cardiac rehabilitation and exercise as tolerated     - Follow up in 6 months     - will continue future discussion about heart transplant as a potential long-term option if needed    2. Atrial Fibrillation-paroxysmal     - Continue Coumadin for anticoagulation     - Monitor AFib burden through device checks    3.  Cardiac sarcoidosis-in remission on last imaging (i.e. not active at the moment)     - Continue methotrexate (20 mg once weekly) and folic acid with methotrexate) for management     - Monitor for any signs of active disease with serial echo and device interrogations troponins and NT proBNP    4.  Status post MVR-with stable gradients  -Aspirin 81 mg, Coumadin with an INR goal of 2.5-3.5    5. General     - Continue aspirin 81 mg daily     - Maintain low-salt diet     - Monitor blood pressure at home     - Message Dr. Toan hills about potential device setting adjustments to improve heart function     - Patient to contact if experiencing increased shortness of breath, fatigue, or other concerning symptoms    Follow-up: Plan to see patient back in clinic in 6 months, sooner if symptoms worsen.      The longitudinal plan of care for heart failure was addressed during this visit. Due to the the added complexity in care, I will continue to support Jose Carney in the subsequent management of this this condition and with ongoing continuity to care of this condition.     30 minutes was spent in direct patient contact today and another 20 minutes were spent reviewing medical chart and in medical decision making      CC  Patient Care Team:  Amira Gentile PA-C as PCP - General (Physician Assistant - Medical)  Helen Gonzalez, RN as Specialty Care Coordinator (Cardiology)  Janice Shafer, PANCHITO as Specialty Care  Coordinator (Cardiology)  Lauro Will MD as MD (Clinical Cardiac Electrophysiology)  Mandi Dickey MD as Assigned Heart and Vascular Provider  Layne Madera PA-C as Physician Assistant (Pediatric Critical Care Medicine)  GIULIANO KOHLER [place]     Gary Soares MD    Virtual Visit Details

## 2025-01-30 ENCOUNTER — LAB (OUTPATIENT)
Dept: LAB | Facility: CLINIC | Age: 65
End: 2025-01-30
Attending: INTERNAL MEDICINE
Payer: COMMERCIAL

## 2025-01-30 ENCOUNTER — ANCILLARY PROCEDURE (OUTPATIENT)
Dept: CARDIOLOGY | Facility: CLINIC | Age: 65
End: 2025-01-30
Attending: INTERNAL MEDICINE
Payer: COMMERCIAL

## 2025-01-30 VITALS
BODY MASS INDEX: 22.24 KG/M2 | SYSTOLIC BLOOD PRESSURE: 97 MMHG | HEART RATE: 64 BPM | DIASTOLIC BLOOD PRESSURE: 62 MMHG | WEIGHT: 167.8 LBS | HEIGHT: 73 IN | OXYGEN SATURATION: 99 %

## 2025-01-30 DIAGNOSIS — D86.85 SARCOID, CARDIAC: Primary | ICD-10-CM

## 2025-01-30 DIAGNOSIS — D86.85 CARDIAC SARCOIDOSIS: ICD-10-CM

## 2025-01-30 DIAGNOSIS — I47.10 PAROXYSMAL SUPRAVENTRICULAR TACHYCARDIA: ICD-10-CM

## 2025-01-30 DIAGNOSIS — I50.33 ACUTE ON CHRONIC DIASTOLIC HEART FAILURE (H): ICD-10-CM

## 2025-01-30 LAB
ALBUMIN SERPL BCG-MCNC: 4.2 G/DL (ref 3.5–5.2)
ALP SERPL-CCNC: 86 U/L (ref 40–150)
ALT SERPL W P-5'-P-CCNC: 20 U/L (ref 0–70)
ANION GAP SERPL CALCULATED.3IONS-SCNC: 8 MMOL/L (ref 7–15)
AST SERPL W P-5'-P-CCNC: 27 U/L (ref 0–45)
BI-PLANE LVEF ECHO: NORMAL
BILIRUB DIRECT SERPL-MCNC: <0.2 MG/DL (ref 0–0.3)
BILIRUB SERPL-MCNC: 0.6 MG/DL
BUN SERPL-MCNC: 18.3 MG/DL (ref 8–23)
CALCIUM SERPL-MCNC: 8.9 MG/DL (ref 8.8–10.4)
CHLORIDE SERPL-SCNC: 107 MMOL/L (ref 98–107)
CREAT SERPL-MCNC: 0.92 MG/DL (ref 0.67–1.17)
EGFRCR SERPLBLD CKD-EPI 2021: >90 ML/MIN/1.73M2
ERYTHROCYTE [DISTWIDTH] IN BLOOD BY AUTOMATED COUNT: 14.9 % (ref 10–15)
GLUCOSE SERPL-MCNC: 89 MG/DL (ref 70–99)
HCO3 SERPL-SCNC: 26 MMOL/L (ref 22–29)
HCT VFR BLD AUTO: 41.1 % (ref 40–53)
HGB BLD-MCNC: 13.8 G/DL (ref 13.3–17.7)
LVEF ECHO: NORMAL
MCH RBC QN AUTO: 31.3 PG (ref 26.5–33)
MCHC RBC AUTO-ENTMCNC: 33.6 G/DL (ref 31.5–36.5)
MCV RBC AUTO: 93 FL (ref 78–100)
MDC_IDC_LEAD_CONNECTION_STATUS: NORMAL
MDC_IDC_LEAD_CONNECTION_STATUS: NORMAL
MDC_IDC_LEAD_IMPLANT_DT: NORMAL
MDC_IDC_LEAD_IMPLANT_DT: NORMAL
MDC_IDC_LEAD_LOCATION: NORMAL
MDC_IDC_LEAD_LOCATION: NORMAL
MDC_IDC_LEAD_LOCATION_DETAIL_1: NORMAL
MDC_IDC_LEAD_LOCATION_DETAIL_1: NORMAL
MDC_IDC_LEAD_MFG: NORMAL
MDC_IDC_LEAD_MFG: NORMAL
MDC_IDC_LEAD_MODEL: NORMAL
MDC_IDC_LEAD_MODEL: NORMAL
MDC_IDC_LEAD_POLARITY_TYPE: NORMAL
MDC_IDC_LEAD_POLARITY_TYPE: NORMAL
MDC_IDC_LEAD_SERIAL: NORMAL
MDC_IDC_LEAD_SERIAL: NORMAL
MDC_IDC_MSMT_BATTERY_REMAINING_LONGEVITY: 81 MO
MDC_IDC_MSMT_BATTERY_REMAINING_PERCENTAGE: 73.2 %
MDC_IDC_MSMT_BATTERY_STATUS: NORMAL
MDC_IDC_MSMT_CAP_CHARGE_ENERGY: 0 J
MDC_IDC_MSMT_CAP_CHARGE_TIME: 0 S
MDC_IDC_MSMT_CAP_CHARGE_TYPE: NORMAL
MDC_IDC_MSMT_LEADCHNL_RA_IMPEDANCE_VALUE: 751 OHM
MDC_IDC_MSMT_LEADCHNL_RA_PACING_THRESHOLD_AMPLITUDE: 0.6 V
MDC_IDC_MSMT_LEADCHNL_RA_PACING_THRESHOLD_PULSEWIDTH: 0.4 MS
MDC_IDC_MSMT_LEADCHNL_RA_SENSING_INTR_AMPL: 9.7 MV
MDC_IDC_MSMT_LEADCHNL_RV_IMPEDANCE_VALUE: 451 OHM
MDC_IDC_MSMT_LEADCHNL_RV_PACING_THRESHOLD_AMPLITUDE: 1.1 V
MDC_IDC_MSMT_LEADCHNL_RV_PACING_THRESHOLD_PULSEWIDTH: 0.4 MS
MDC_IDC_MSMT_LEADCHNL_RV_SENSING_INTR_AMPL: 4.9 MV
MDC_IDC_PG_IMPLANT_DTM: NORMAL
MDC_IDC_PG_MFG: NORMAL
MDC_IDC_PG_MODEL: NORMAL
MDC_IDC_PG_SERIAL: NORMAL
MDC_IDC_PG_TYPE: NORMAL
MDC_IDC_SESS_CLINIC_NAME: NORMAL
MDC_IDC_SESS_DTM: NORMAL
MDC_IDC_SESS_TYPE: NORMAL
MDC_IDC_SET_BRADY_AT_MODE_SWITCH_MODE: NORMAL
MDC_IDC_SET_BRADY_AT_MODE_SWITCH_RATE: 170 {BEATS}/MIN
MDC_IDC_SET_BRADY_LOWRATE: 60 {BEATS}/MIN
MDC_IDC_SET_BRADY_MAX_SENSOR_RATE: 125 {BEATS}/MIN
MDC_IDC_SET_BRADY_MAX_TRACKING_RATE: 125 {BEATS}/MIN
MDC_IDC_SET_BRADY_MODE: NORMAL
MDC_IDC_SET_BRADY_PAV_DELAY_HIGH: 110 MS
MDC_IDC_SET_BRADY_PAV_DELAY_LOW: 220 MS
MDC_IDC_SET_BRADY_SAV_DELAY_HIGH: 110 MS
MDC_IDC_SET_BRADY_SAV_DELAY_LOW: 220 MS
MDC_IDC_SET_LEADCHNL_RA_PACING_AMPLITUDE: 2.5 V
MDC_IDC_SET_LEADCHNL_RA_PACING_CAPTURE_MODE: NORMAL
MDC_IDC_SET_LEADCHNL_RA_PACING_POLARITY: NORMAL
MDC_IDC_SET_LEADCHNL_RA_PACING_PULSEWIDTH: 0.4 MS
MDC_IDC_SET_LEADCHNL_RA_SENSING_ADAPTATION_MODE: NORMAL
MDC_IDC_SET_LEADCHNL_RA_SENSING_POLARITY: NORMAL
MDC_IDC_SET_LEADCHNL_RA_SENSING_SENSITIVITY: 0.25 MV
MDC_IDC_SET_LEADCHNL_RV_PACING_AMPLITUDE: 3 V
MDC_IDC_SET_LEADCHNL_RV_PACING_CAPTURE_MODE: NORMAL
MDC_IDC_SET_LEADCHNL_RV_PACING_POLARITY: NORMAL
MDC_IDC_SET_LEADCHNL_RV_PACING_PULSEWIDTH: 0.4 MS
MDC_IDC_SET_LEADCHNL_RV_SENSING_ADAPTATION_MODE: NORMAL
MDC_IDC_SET_LEADCHNL_RV_SENSING_POLARITY: NORMAL
MDC_IDC_SET_LEADCHNL_RV_SENSING_SENSITIVITY: 0.6 MV
MDC_IDC_SET_ZONE_DETECTION_INTERVAL: 300 MS
MDC_IDC_SET_ZONE_DETECTION_INTERVAL: 353 MS
MDC_IDC_SET_ZONE_DETECTION_INTERVAL: 462 MS
MDC_IDC_SET_ZONE_STATUS: NORMAL
MDC_IDC_SET_ZONE_TYPE: NORMAL
MDC_IDC_SET_ZONE_VENDOR_TYPE: NORMAL
MDC_IDC_STAT_AT_BURDEN_PERCENT: 5 %
MDC_IDC_STAT_BRADY_DTM_END: NORMAL
MDC_IDC_STAT_BRADY_DTM_START: NORMAL
MDC_IDC_STAT_BRADY_RA_PERCENT_PACED: 11 %
MDC_IDC_STAT_BRADY_RV_PERCENT_PACED: 26 %
MDC_IDC_STAT_EPISODE_RECENT_COUNT: 0
MDC_IDC_STAT_EPISODE_RECENT_COUNT: 0
MDC_IDC_STAT_EPISODE_RECENT_COUNT: 20
MDC_IDC_STAT_EPISODE_RECENT_COUNT: 3
MDC_IDC_STAT_EPISODE_RECENT_COUNT_DTM_END: NORMAL
MDC_IDC_STAT_EPISODE_RECENT_COUNT_DTM_START: NORMAL
MDC_IDC_STAT_EPISODE_TOTAL_COUNT: 1
MDC_IDC_STAT_EPISODE_TOTAL_COUNT: 17
MDC_IDC_STAT_EPISODE_TOTAL_COUNT: 299
MDC_IDC_STAT_EPISODE_TOTAL_COUNT_DTM_END: NORMAL
MDC_IDC_STAT_EPISODE_TYPE: NORMAL
MDC_IDC_STAT_EPISODE_VENDOR_TYPE: NORMAL
MDC_IDC_STAT_TACHYTHERAPY_ATP_DELIVERED_RECENT: 0
MDC_IDC_STAT_TACHYTHERAPY_ATP_DELIVERED_TOTAL: 17
MDC_IDC_STAT_TACHYTHERAPY_RECENT_DTM_END: NORMAL
MDC_IDC_STAT_TACHYTHERAPY_RECENT_DTM_START: NORMAL
MDC_IDC_STAT_TACHYTHERAPY_SHOCKS_ABORTED_RECENT: 0
MDC_IDC_STAT_TACHYTHERAPY_SHOCKS_ABORTED_TOTAL: 0
MDC_IDC_STAT_TACHYTHERAPY_SHOCKS_DELIVERED_RECENT: 0
MDC_IDC_STAT_TACHYTHERAPY_SHOCKS_DELIVERED_TOTAL: 0
MDC_IDC_STAT_TACHYTHERAPY_TOTAL_DTM_END: NORMAL
NT-PROBNP SERPL-MCNC: 478 PG/ML (ref 0–900)
PLATELET # BLD AUTO: 188 10E3/UL (ref 150–450)
POTASSIUM SERPL-SCNC: 4.2 MMOL/L (ref 3.4–5.3)
PROT SERPL-MCNC: 6.6 G/DL (ref 6.4–8.3)
RBC # BLD AUTO: 4.41 10E6/UL (ref 4.4–5.9)
SODIUM SERPL-SCNC: 141 MMOL/L (ref 135–145)
WBC # BLD AUTO: 4.8 10E3/UL (ref 4–11)

## 2025-01-30 PROCEDURE — 93306 TTE W/DOPPLER COMPLETE: CPT | Performed by: INTERNAL MEDICINE

## 2025-01-30 PROCEDURE — 80053 COMPREHEN METABOLIC PANEL: CPT | Performed by: PATHOLOGY

## 2025-01-30 PROCEDURE — 85027 COMPLETE CBC AUTOMATED: CPT | Performed by: PATHOLOGY

## 2025-01-30 PROCEDURE — 83880 ASSAY OF NATRIURETIC PEPTIDE: CPT | Performed by: PATHOLOGY

## 2025-01-30 PROCEDURE — 82248 BILIRUBIN DIRECT: CPT | Performed by: PATHOLOGY

## 2025-01-30 PROCEDURE — 93283 PRGRMG EVAL IMPLANTABLE DFB: CPT | Performed by: INTERNAL MEDICINE

## 2025-01-30 PROCEDURE — 36415 COLL VENOUS BLD VENIPUNCTURE: CPT | Performed by: PATHOLOGY

## 2025-01-30 RX ADMIN — Medication 6 ML: at 10:19

## 2025-01-30 ASSESSMENT — PAIN SCALES - GENERAL: PAINLEVEL_OUTOF10: NO PAIN (0)

## 2025-01-30 NOTE — NURSING NOTE
Current patient location:  [address] [place] [address]     Is the patient currently in the state of [place]? YES    Visit mode: VIDEO    If the visit is dropped, the patient can be reconnected by:VIDEO VISIT: Text to cell phone:   Telephone Information:   Mobile [phone]       Will anyone else be joining the visit? NO  (If patient encounters technical issues they should call [phone]    Are changes needed to the allergy or medication list? Pt stated no changes to allergies and Pt stated no med changes    Are refills needed on medications prescribed by this physician? NO    Rooming Documentation:  Questionnaire(s) completed    Reason for visit: TRACEE FERNANDEZ

## 2025-01-30 NOTE — LETTER
1/30/2025      RE: Jose Carney  [address]  [facility] [address]       Dear Colleague,    Thank you for the opportunity to participate in the care of your patient, Jose Carney, at the [facility] HEART CLINIC [employer] [facility]. Please see a copy of my visit note below.    January 30, 2025      Application: Birgit   Patient location; [place]   My location [facility] remote clinic   Start time: 12:25 pm    End time: 12:55  pm     I the pleasure of seeing Jose Carney in the [facility] Cardiac Sarcoidosis clinic today.     Complex cardiac history is as follows:     History sarcoidosis which  diagnosed by transbronchial biopsy in 2013.  He had been having back pain prior to this and 30 pound weight loss and had substantial mediastinal lymphadenopathy.  He was placed on several months of steroids with significant weight gain and improvement in symptoms as well as back pain.      In late summer and fall 2017 he started to develop palpitations which were found to be supraventricular tachycardia (likely AVNRT as well as some atrial tachycardia)  based on his cardiac monitor. He then had a cardiac MRI which was consistent with myocardial involvement from sarcoidosis (see below).  He was then sent down here for further management and was placed on metoprolol.    Based on high risk features of his cardiac MRI we recommended that he have an ICD placed. I did place him on a burst of prednisone given a highly suspicious PET scan which showed complete resolution of his cardiac sarcoidosis.  At that time I switch him to methotrexate as a staring steroid sparing agent and was titrating this up and tapering down his prednisone when he developed ventricular tachycardia.  This was in January 2019.  The VT was at a rate of 135 and was unable to be paced out.  He did not have syncope with this.  He was loaded with amiodarone, and given failure to gain  control of his VT he was given Solu-Medrol and placed on higher dose of prednisone in addition to his methotrexate.     He was then given clinical stability I was eventually able to get him off of amiodarone in 2021 -for control of his sarcoid to get him off of prednisone I increase his methotrexate to 20 mg daily which he remains on today.    Thyroid function has been normal throughout the time that he was on amiodarone.       He was intermittently followed in EP clinic here for paroxysmal atrial fibrillation.     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 No ATP since the ablation but does have runs of non-sustained SVT on his last device interrogation in 12/2022. Total A. fib burden on his last interrogation less than 1%.     12/2022: LVEF 42%, severe MR appears to be from posterior leaflet restriction.     Later underwent mitral clip 8/23 - not successful     7/24 Right mini-thoracotomy, mitral valve replacement with a 25/33 mm On-X mechanical valve     Overall he had a smooth postoperative course-ejection fraction went down after surgery we have been uptitrating GDMT for months.     This visit:   He feels he is finally recovered fully from surgery    He denies shortness of breath lower extremity edema PND orthopnea or palpitations.      His breathing feels much better than it did prior to surgery  He has no bleeding problems on his Coumadin  We have been trying to uptitrate GDMT without success as blood pressures remain low    He is overall living a good quality of life but is disappointed that his ejection fraction remains low    Wm's current weight is 167 or 168 pounds, down from his usual adult weight of around 190 pounds. He attributes some of this  weight loss to muscle mass reduction but feels he's trying to gain some muscle back. He notes his jeans still feel loose and baggy on his thighs.      PAST MEDICAL HISTORY:  Past Medical History:   Diagnosis Date     First degree AV block 03/20/2018     Heart murmur      Hyperthyroidism     2/2 amiodarone toxicity     ICD (implantable cardioverter-defibrillator) in place      Palpitations 03/20/2018     Paroxysmal atrial fibrillation (H)      Paroxysmal supraventricular tachycardia 03/20/2018     Retained foreign body 07/19/2024    epicardial pacing wire retained     Sarcoid, cardiac/EF 54% per MRI,[place] of affected myocardium is 12% of myocardial mass 01/25/2018     Sarcoidosis/ systemic 2013 03/20/2018     Severe mitral regurgitation      Sleep apnea      Thyroid disease      FAMILY HISTORY:  His family history is notable for several family members with autoimmune disease.  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  He denies any tobacco or alcohol use or recreational drug use.     CURRENT MEDICATIONS:  Current Outpatient Medications   Medication Sig Dispense Refill     aspirin (ASA) 81 MG chewable tablet Take 1 tablet (81 mg) by mouth daily 30 tablet 2     dapagliflozin (FARXIGA) 10 MG TABS tablet Take 1 tablet (10 mg) by mouth daily. 90 tablet 3     folic acid (FOLVITE) 1 MG tablet TAKE FIVE TABLETS (5MG) ONCE WEEKLY WITH YOUR METHOTREXATE 60 tablet 3     methimazole (TAPAZOLE) 5 MG tablet Take 5 mg by mouth every evening       methotrexate 2.5 MG tablet Take 8 tablets (20 mg) by mouth every 7 days. 104 tablet 3     metoprolol succinate ER (TOPROL XL) 25 MG 24 hr tablet Take 0.5 tablets (12.5 mg) by mouth daily. 45 tablet 1     sacubitril-valsartan (ENTRESTO) 24-26 MG per tablet Take 0.5 tablets by mouth 2 times daily. 90 tablet 3     senna-docusate (SENOKOT-S/PERICOLACE) 8.6-50  "MG tablet Take 2 tablets by mouth or Feeding Tube 2 times daily as needed for constipation 14 tablet 0     sildenafil (REVATIO) 20 MG tablet Take 20-60 mg by mouth as needed       spironolactone (ALDACTONE) 25 MG tablet Take 0.5 tablets (12.5 mg) by mouth daily. 45 tablet 3     timolol maleate (TIMOPTIC) 0.5 % ophthalmic solution Place 1 drop into both eyes at bedtime       warfarin ANTICOAGULANT (COUMADIN) 2 MG tablet Take 2 tablets (4 mg) by mouth daily 8 tablet 0       BP 97/62 (BP Location: Right arm, Patient Position: Chair, Cuff Size: Adult Regular)   Pulse 64   Ht 1.854 m (6' 1\")   Wt 76.1 kg (167 lb 12.8 oz)   SpO2 99%   BMI 22.14 kg/m      Physical Exam Elements:  Constitutional - well appearing   Eyes - no redness, discharge  Respiratory - no cough, breathing is comfortable   Musculoskeletal - normal range of motion  Skin - no discoloration, lesions  Neurological - no tremors,   Psychiatric - no anxiety, patient is alert & oriented      All lab trends, imaging, recent echos personally reviewed with the patient.     Laboratory Results:  - Creatinine: 0.92    - BNP: 478 (reported as normal, down from 1,045 in March of 2024)    Device Interrogation Results:  - Normal rhythm at a rate of 64  -V pacing about 20% of the time  - AFib burden: 5%  - No longer episodes of ventricular tachycardia    Echocardiogram Results:  -Mitral valve is well-seated  -Normal gradients  -LV ejection fraction is measuring in the high 20% range  -IVC is normal    Cardiac MRI: 2/2019       CONCLUSIONS:    1. Cardiac sarcoidosis with preserved systolic function; LVEF 55%, RVEF 66%.  2. Sarcoidosis related scarring and severe wnadujmdsoi-pn-jzzmeytc involving the basal-inferoseptal and  basal-inferior segments. Total scar burden 12%. Unchanged from previous CMR dated 01/19/2018.        Assessment and Plan:   Follow-up cardiac sarcoidosis with now reduced LVEF     history of biopsy-proven cardiac sarcoid from a transbronchial " biopsy-who has a cardiac MRI consistent with myocardial involvement from sarcoidosis. His PET scan related inflammation resolved completely with 3-4 months of 40 mg of prednisone.  I did have an ICD placed given he had high risk features on his cardiac MRI for malignant arrhythmias.       1. Heart Failure with reduced ejection fraction-due to cardiac sarcoid and severe valvular disease     - Continue current medications: Entresto (half tab), metoprolol (25 mg, half tab daily), Farxiga 10 mg daily     - Monitor symptoms and weight     - Encourage continued cardiac rehabilitation and exercise as tolerated     - Follow up in 6 months     - will continue future discussion about heart transplant as a potential long-term option if needed    2. Atrial Fibrillation-paroxysmal     - Continue Coumadin for anticoagulation     - Monitor AFib burden through device checks    3.  Cardiac sarcoidosis-in remission on last imaging (i.e. not active at the moment)     - Continue methotrexate (20 mg once weekly) and folic acid with methotrexate) for management     - Monitor for any signs of active disease with serial echo and device interrogations troponins and NT proBNP    4.  Status post MVR-with stable gradients  -Aspirin 81 mg, Coumadin with an INR goal of 2.5-3.5    5. General     - Continue aspirin 81 mg daily     - Maintain low-salt diet     - Monitor blood pressure at home     - Message Dr. Toan hills about potential device setting adjustments to improve heart function     - Patient to contact if experiencing increased shortness of breath, fatigue, or other concerning symptoms    Follow-up: Plan to see patient back in clinic in 6 months, sooner if symptoms worsen.      The longitudinal plan of care for heart failure was addressed during this visit. Due to the the added complexity in care, I will continue to support Jose Carney in the subsequent management of this this condition and with ongoing continuity to care of this  condition.     30 minutes was spent in direct patient contact today and another 20 minutes were spent reviewing medical chart and in medical decision making      CC  Patient Care Team:  Amira Gentile PA-C as PCP - General (Physician Assistant - Medical)  Helen Gonzalez, RN as Specialty Care Coordinator (Cardiology)  Janice Shafer, PANHCITO as Specialty Care Coordinator (Cardiology)  Lauro Will MD as MD (Clinical Cardiac Electrophysiology)  Mandi Dickey MD as Assigned Heart and Vascular Provider  Layne Madera PA-C as Physician Assistant (Pediatric Critical Care Medicine)  GIULIANO KOHLER [place] Hausch, Raymond C, MD    Virtual Visit Details      Please do not hesitate to contact me if you have any questions/concerns.     Sincerely,     Mandi Dickey MD

## 2025-01-30 NOTE — PATIENT INSTRUCTIONS
"Cardiology Providers you saw during your visit:  Dr. Dickey    Medication changes:   No changes.     Follow up:   Follow up with Dr Dickey in 6 months with labs prior       Please call if you have :  1. Weight gain of more than 2 pounds in a day or 5 pounds in a week  2. Increased shortness of breath, swelling or bloating  3. Dizziness, lightheadedness   4. Any questions or concerns.       Follow the American Heart Association Diet and Lifestyle recommendations:  Limit saturated fat, trans fat, sodium, red meat, sweets and sugar-sweetened beverages. If you choose to eat red meat, compare labels and select the leanest cuts available.  Aim for at least 150 minutes of moderate physical activity or 75 minutes of vigorous physical activity - or an equal combination of both - each week.      During business hours: [phone], press option # 1 to schedule and leave a message for your care team.        After hours, weekends or holidays: On Call Cardiologist- [phone]   option #4 and ask to speak to the on-call Cardiologist. Inform them you are a CORE/heart failure patient at the [place].      Heart Failure Support Group    Please consider attending our virtual support group which is held monthly. Please reach out to Jez at [phone] for more information if you are interested in attending.     Helen Gonzalez RN BSN CHFN  Cardiology Care Coordinator - Heart Failure/ [facility] Clinic  [facility]   Questions and [phone]   First press #1 for the [employer] and \"To send a message to your care team\"    "

## 2025-01-30 NOTE — PATIENT INSTRUCTIONS
It was a pleasure to see you in clinic today.  Please do not hesitate to call with any questions or concerns.  You are scheduled for a remote transmission from home on 5/1/25.      Colette Burks, RN, MS, CCRN  Electrophysiology Nurse Clinician  [facility]    During Business Hours Please Call:  [phone]  After Hours Please Call:  [phone] - select option #4 and ask for job code [phone]

## 2025-04-02 ENCOUNTER — TELEPHONE (OUTPATIENT)
Dept: CARDIOLOGY | Facility: CLINIC | Age: 65
End: 2025-04-02
Payer: COMMERCIAL

## 2025-04-02 NOTE — TELEPHONE ENCOUNTER
Left Voicemail (1st Attempt) and Sent Mychart (1st Attempt) for the patient to call back and schedule the following:    Appointment type: RTN HF  Provider: SRIDHAR  Return date: 7/30/2025 OR AFTER  Specialty phone number: [phone] OPT 1  Additional appointment(s) needed: LABS, ECHO, DEVICE CHECK  Additonal Notes: 6 MONTH FOLLOW-UP

## 2025-07-08 ENCOUNTER — MYC MEDICAL ADVICE (OUTPATIENT)
Dept: CARDIOLOGY | Facility: CLINIC | Age: 65
End: 2025-07-08
Payer: COMMERCIAL

## 2025-07-08 NOTE — TELEPHONE ENCOUNTER
Date: 7/8/2025    Time of Call: 11:59 AM     Diagnosis:  heart failure/hypotension      [ VORB ] Ordering provider: Dr Dickey    Order: stop spironolactone 12.5 mg      Order received by: Helen Gonzalez RN       Follow-up/additional notes: monitor BPs, will check back in a week.

## 2025-07-14 NOTE — PATIENT INSTRUCTIONS
The following individual(s) verbally consented to be recorded using ambient AI listening technology and understand that they can each withdraw their consent to this listening technology at any point by asking the clinician to turn off or pause the recording:    Patient name: Hugo NELLIE Saunders     Cardiology Providers you saw during your visit:  Dr. Dickey    Medication changes:  1.  Increase methotrexate to 20 mg weekly, on Sundays  2. On Sunday decrease Prednisone to 5 mg daily for 1 week then stop taking    Follow up:  1.  Labs in 2 months:  LFTs and CBC  2. Follow up with Dr Dickey in 4 months for device check, echo and labs.  Labs:  Results for LIVIER JONES (MRN [id number]) as of 5/9/2019 12:01   Ref. Range 5/9/2019 10:51   Sodium Latest Ref Range: 133 - 144 mmol/L 141   Potassium Latest Ref Range: 3.4 - 5.3 mmol/L 3.7   Chloride Latest Ref Range: 94 - 109 mmol/L 106   Carbon Dioxide Latest Ref Range: 20 - 32 mmol/L 30   Urea Nitrogen Latest Ref Range: 7 - 30 mg/dL 14   Creatinine Latest Ref Range: 0.66 - 1.25 mg/dL 1.04   GFR Estimate Latest Ref Range: >60 mL/min/1.73_m2 79   GFR Estimate If Black Latest Ref Range: >60 mL/min/1.73_m2 >90   Calcium Latest Ref Range: 8.5 - 10.1 mg/dL 9.0   Anion Gap Latest Ref Range: 3 - 14 mmol/L 5   Albumin Latest Ref Range: 3.4 - 5.0 g/dL 3.8   Protein Total Latest Ref Range: 6.8 - 8.8 g/dL 6.4 (L)   Bilirubin Total Latest Ref Range: 0.2 - 1.3 mg/dL 0.8   Alkaline Phosphatase Latest Ref Range: 40 - 150 U/L 54   ALT Latest Ref Range: 0 - 70 U/L 39   AST Latest Ref Range: 0 - 45 U/L 20   Bilirubin Direct Latest Ref Range: 0.0 - 0.2 mg/dL 0.2   Glucose Latest Ref Range: 70 - 99 mg/dL 89   WBC Latest Ref Range: 4.0 - 11.0 10e9/L 6.5   Hemoglobin Latest Ref Range: 13.3 - 17.7 g/dL 15.0   Hematocrit Latest Ref Range: 40.0 - 53.0 % 43.2   Platelet Count Latest Ref Range: 150 - 450 10e9/L 182   RBC Count Latest Ref Range: 4.4 - 5.9 10e12/L 4.46   MCV Latest Ref Range: 78 - 100 fl 97   MCH Latest Ref Range: 26.5 - 33.0 pg 33.6 (H)   MCHC Latest Ref Range: 31.5 - 36.5 g/dL 34.7   RDW Latest Ref Range: 10.0 - 15.0 % 14.4   Diff Method Unknown Automated Method   % Neutrophils Latest Units: % 75.6   % Lymphocytes Latest Units: % 9.9   % Monocytes Latest Units: % 10.8   %  "Eosinophils Latest Units: % 2.0   % Basophils Latest Units: % 0.8   % Immature Granulocytes Latest Units: % 0.9   Nucleated RBCs Latest Ref Range: 0 /100 0   Absolute Neutrophil Latest Ref Range: 1.6 - 8.3 10e9/L 4.9   Absolute Lymphocytes Latest Ref Range: 0.8 - 5.3 10e9/L 0.6 (L)   Absolute Monocytes Latest Ref Range: 0.0 - 1.3 10e9/L 0.7   Absolute Eosinophils Latest Ref Range: 0.0 - 0.7 10e9/L 0.1   Absolute Basophils Latest Ref Range: 0.0 - 0.2 10e9/L 0.1   Abs Immature Granulocytes Latest Ref Range: 0 - 0.4 10e9/L 0.1   Absolute Nucleated RBC Unknown 0.0       Please call if you have :  1. Weight gain of more than 2 pounds in a day or 5 pounds in a week  2. Increased shortness of breath, swelling or bloating  3. Dizziness, lightheadedness   4. Any questions or concerns.       Follow the American Heart Association Diet and Lifestyle recommendations:  Limit saturated fat, trans fat, sodium, red meat, sweets and sugar-sweetened beverages. If you choose to eat red meat, compare labels and select the leanest cuts available.  Aim for at least 150 minutes of moderate physical activity or 75 minutes of vigorous physical activity - or an equal combination of both - each week.      During business hours: [phone], press option # 1 to be routed to the [facility] then option # 3 for medical questions to speak with a nurse      After hours, weekends or holidays: On Call Cardiologist- [phone]   option #4 and ask to speak to the on-call Cardiologist. Inform them you are a CORE/heart failure patient at the [facility].      Helen Gonzalez RN BSN  Cardiology Care Coordinator - Heart Failure/ [facility]   Questions and [phone]   First press #1 for the [facility] and then press #3 for \"Medical Advise\" to reach us Cardiology Nurses.      "

## 2025-07-19 ENCOUNTER — HEALTH MAINTENANCE LETTER (OUTPATIENT)
Age: 65
End: 2025-07-19

## 2025-08-04 ENCOUNTER — MYC MEDICAL ADVICE (OUTPATIENT)
Dept: CARDIOLOGY | Facility: CLINIC | Age: 65
End: 2025-08-04
Payer: COMMERCIAL

## 2025-08-04 DIAGNOSIS — D86.85 SARCOID, CARDIAC: ICD-10-CM

## 2025-08-06 RX ORDER — FOLIC ACID 1 MG/1
5 TABLET ORAL WEEKLY
Qty: 60 TABLET | Refills: 3 | Status: SHIPPED | OUTPATIENT
Start: 2025-08-06

## 2025-08-27 DIAGNOSIS — I34.0 SEVERE MITRAL REGURGITATION: ICD-10-CM

## 2025-08-28 RX ORDER — METOPROLOL SUCCINATE 25 MG/1
12.5 TABLET, EXTENDED RELEASE ORAL DAILY
Qty: 45 TABLET | Refills: 0 | Status: SHIPPED | OUTPATIENT
Start: 2025-08-28

## (undated) DEVICE — SUCTION CANISTER MEDIVAC LINER 3000ML W/LID 65651-530

## (undated) DEVICE — SU ETHIBOND 3-0 BBDA 36" X588H

## (undated) DEVICE — ENDO TROCAR FIRST ENTRY KII FIOS Z-THRD 05X100MM CTF03

## (undated) DEVICE — SU SILK 0 TIE 6X30" A306H

## (undated) DEVICE — DECANTER BAG 2002S

## (undated) DEVICE — LINEN TOWEL PACK X6 WHITE 5487

## (undated) DEVICE — PAD DEFIBRILLATOR ELECTRODE 4.25INX6IN ADULT 2001M-C

## (undated) DEVICE — SUCTION CATH AIRLIFE TRI-FLO W/CONTROL PORT 14FR  T60C

## (undated) DEVICE — SOL NACL 0.9% IRRIG 3000ML BAG 2B7477

## (undated) DEVICE — DRAPE IOBAN INCISE 23X17" 6650EZ

## (undated) DEVICE — CLIP HORIZON SM RED WIDE SLOT 001201

## (undated) DEVICE — SU PLEDGET SOFT TFE 3/8"X3/26"X1/16" PCP40

## (undated) DEVICE — ESU ELEC BLADE 6" COATED E1450-6

## (undated) DEVICE — NDL COUNTER 40CT  31142311

## (undated) DEVICE — SURGICEL HEMOSTAT 4X8" 1952

## (undated) DEVICE — MANIFOLD KIT ANGIO AUTOMATED 014613

## (undated) DEVICE — CATH ANGIO 6FR MPA2 2 SH 100CM

## (undated) DEVICE — DILATOR VASCULAR 24FRX20CM G01884

## (undated) DEVICE — SLEEVE TR BAND RADIAL COMPRESSION DEVICE 24CM TRB24-REG

## (undated) DEVICE — DRAPE COVER ROBOTIC ARM (UNITRAC RETRACTOR) JG901

## (undated) DEVICE — DILATOR VASCULAR 16FRX20CM G01212

## (undated) DEVICE — SU DERMABOND ADVANCED .7ML DNX12

## (undated) DEVICE — PATCH CARTO 3 EXTERNAL REFERENCE 3D MAPPING CREFP6

## (undated) DEVICE — SU DEVICE ENDO COR KNOT QUICK LOAD RELOAD 030874

## (undated) DEVICE — SOL NACL 0.9% IRRIG 1000ML BOTTLE 07138-09

## (undated) DEVICE — INTRO GLIDESHEATH SLENDER 6FR 10X45CM 60-1060

## (undated) DEVICE — MITRACLIP CATHETER GUIDE STEERABLE SGC0701

## (undated) DEVICE — DRSG PRIMAPORE 02X3" 7133

## (undated) DEVICE — CONNECTOR BLAKE DRAIN SGL BCC1

## (undated) DEVICE — SU ETHIBOND 0 CT-1 CR 8X18" CX21D

## (undated) DEVICE — PACK ADULT HEART UMMC PV15CG92D

## (undated) DEVICE — PATCH SURGICAL EVARREST FIBRIN SEALANT 4X2IN EVT5024

## (undated) DEVICE — SU VICRYL 2-0 CT-1 27" UND J259H

## (undated) DEVICE — SOL WATER IRRIG 1000ML BOTTLE 2F7114

## (undated) DEVICE — PACK HEART LEFT CUSTOM

## (undated) DEVICE — ORGANIZER SUTURE CIRCUMFERENTIAL BELT MI-ISBA-001

## (undated) DEVICE — DEFIB PRO-PADZ LVP LQD GEL ADULT 8900-2105-01

## (undated) DEVICE — DILATOR VASCULAR 20FRX20CM G01471

## (undated) DEVICE — ESU ELEC BLADE 6" COATED/INSULATED E1455-6

## (undated) DEVICE — DRSG DRAIN 4X4" 7086

## (undated) DEVICE — INTRODUCER SHEATH FAST-CATH 6FRX12CM 406104

## (undated) DEVICE — SU PROLENE 3-0 SHDA 36" 8522H

## (undated) DEVICE — INTRO CATH 12CM 8.5FR FST-CATH

## (undated) DEVICE — CATH THERMOCOOL SMARTTOUCH SF DF CURVE

## (undated) DEVICE — INTRO SHEATH MICRO PLATINUM TIP 4FRX40CM 7274

## (undated) DEVICE — SUCTION MANIFOLD NEPTUNE 2 SYS 4 PORT 0702-020-000

## (undated) DEVICE — DRSG ABDOMINAL 07 1/2X8" 7197D

## (undated) DEVICE — TIES BANDING T50R

## (undated) DEVICE — SU ETHIBOND 2-0 SHDA 30" X563H

## (undated) DEVICE — ENDO TROCAR FIRST ENTRY KII FIOS Z-THRD 12X100MM CTF73

## (undated) DEVICE — SU VICRYL 0 CTX 36" J370H

## (undated) DEVICE — SU ETHIBOND 2-0 SH-2 DA 30" PXX80

## (undated) DEVICE — TOURNIQUET VASCULAR KIT ARGYLE 8888-585000

## (undated) DEVICE — SUCTION TIP YANKAUER STR K87

## (undated) DEVICE — BLADE SAW STRK STERNAL 6207-97-101

## (undated) DEVICE — SHEATH HEMO FAST CATH RAMP 406965

## (undated) DEVICE — NDL COUNTER 20CT 31142493

## (undated) DEVICE — SU ETHIBOND 5 V-37 4X30" MB66G

## (undated) DEVICE — CATH EP SUPREME 6FRX120CM 5MM CRD-2 CURVE 402004

## (undated) DEVICE — CATH EP RESPONSE 6FRX120CM 5MM JSN CURVE 401227

## (undated) DEVICE — SU PROLENE 4-0 SHDA 36" 8521H

## (undated) DEVICE — COVER ULTRASOUND PROBE W/GEL FLEXI-FEEL 6"X58" LF  25-FF658

## (undated) DEVICE — TOURNIQUET VASCULAR KIT 7 1/2" 79012

## (undated) DEVICE — GLOVE BIOGEL PI MICRO SZ 7.5 48575

## (undated) DEVICE — INTRO SHEATH 6FRX25CM PINNACLE RSS606

## (undated) DEVICE — DRAIN CHEST TUBE 28FR STR 8028

## (undated) DEVICE — DRSG TELFA 3X8" 1238

## (undated) DEVICE — PROTECTOR ARM ONE-STEP TRENDELENBURG 40418

## (undated) DEVICE — DRSG TEGADERM 8X12" 1629

## (undated) DEVICE — TAPE MEDIPORE 4"X2YD 2864

## (undated) DEVICE — REPRO BARD EP XT DECAPL STRBL DX EP CATH 6F

## (undated) DEVICE — TUBING INSUFFLATION PNEUMOCLEAR 0620050100

## (undated) DEVICE — ESU HOLSTER PLASTIC DISP E2400

## (undated) DEVICE — ENDO DISSECTOR BLUNT 10MM CHERRY BCD10

## (undated) DEVICE — INTRODUCER SHEATH FAST-CATH CATH-LOCK 7FRX12CM 406702

## (undated) DEVICE — Device

## (undated) DEVICE — WIPES FOLEY CARE SURESTEP PROVON DFC100

## (undated) DEVICE — CATH TRANSSEPTAL VERSACROSS 45D 63X230CM VXSK0032

## (undated) DEVICE — DILATOR VASCULAR 12FRX20CM G00995

## (undated) DEVICE — LINEN TOWEL PACK X30 5481

## (undated) DEVICE — KIT HAND CONTROL ACIST 016795

## (undated) DEVICE — BLADE CLIPPER SGL USE 9680

## (undated) DEVICE — SU DEVICE ENDO COR KNOT QUICK LOAD 030850

## (undated) DEVICE — SU DEVICE COR-KNOT MINI 4X14MM 031350

## (undated) DEVICE — PREP CHLORAPREP 26ML TINTED HI-LITE ORANGE 930815

## (undated) DEVICE — ANTIFOG SOLUTION W/FOAM PAD 31142527

## (undated) DEVICE — TUBING PRESSURE 30"

## (undated) DEVICE — INSERT INTRACK 66M CONFORMING N-1012V

## (undated) DEVICE — TUBE SET SMARKABLATE IRRIGATION

## (undated) DEVICE — SUCTION DRY CHEST DRAIN OASIS 3600-100

## (undated) DEVICE — DRAPE FLUID WARMING 52 X 60" ORS-321

## (undated) DEVICE — SU PROLENE 6-0 C-1DA 30" 8706H

## (undated) DEVICE — SU PROLENE 5-0 RB-2DA 30" 8710H

## (undated) DEVICE — SU VICRYL+ 3-0 FS1 27IN UND VCP442H

## (undated) DEVICE — LEAD PACER MYOCARDIAL BIPOLAR TEMPORARY 53CM 6495F

## (undated) DEVICE — ESU ELEC BLADE E-SEP INSULATED NEPTUNE 70MM 0703-070-002

## (undated) DEVICE — ANTIFOG SOLUTION SEE SHARP 150M TROCAR SWABS 30978

## (undated) DEVICE — SU PROLENE 4-0 RB-1DA 36" 8557H

## (undated) DEVICE — SOL NACL 0.9% 10ML VIAL 0409-4888-02

## (undated) DEVICE — DRSG PRIMAPORE 03 1/8X6" 66000318

## (undated) DEVICE — KIT VENOUS FLUSH 60210177

## (undated) DEVICE — CLOSURE DEVICE 6FR VASC PROGLIDE MEDICATED SUTURE 12673-03

## (undated) RX ORDER — KETOROLAC TROMETHAMINE 30 MG/ML
INJECTION, SOLUTION INTRAMUSCULAR; INTRAVENOUS
Status: DISPENSED
Start: 2023-08-28

## (undated) RX ORDER — CHLORHEXIDINE GLUCONATE ORAL RINSE 1.2 MG/ML
SOLUTION DENTAL
Status: DISPENSED
Start: 2024-07-16

## (undated) RX ORDER — FENTANYL CITRATE 50 UG/ML
INJECTION, SOLUTION INTRAMUSCULAR; INTRAVENOUS
Status: DISPENSED
Start: 2024-07-16

## (undated) RX ORDER — ACETAMINOPHEN 325 MG/1
TABLET ORAL
Status: DISPENSED
Start: 2024-07-16

## (undated) RX ORDER — FENTANYL CITRATE 50 UG/ML
INJECTION, SOLUTION INTRAMUSCULAR; INTRAVENOUS
Status: DISPENSED
Start: 2023-08-28

## (undated) RX ORDER — PROPOFOL 10 MG/ML
INJECTION, EMULSION INTRAVENOUS
Status: DISPENSED
Start: 2024-07-16

## (undated) RX ORDER — LIDOCAINE HYDROCHLORIDE 10 MG/ML
INJECTION, SOLUTION EPIDURAL; INFILTRATION; INTRACAUDAL; PERINEURAL
Status: DISPENSED
Start: 2023-08-28

## (undated) RX ORDER — CEFAZOLIN SODIUM/WATER 2 G/20 ML
SYRINGE (ML) INTRAVENOUS
Status: DISPENSED
Start: 2024-07-16

## (undated) RX ORDER — NICARDIPINE HCL-0.9% SOD CHLOR 1 MG/10 ML
SYRINGE (ML) INTRAVENOUS
Status: DISPENSED
Start: 2023-06-30

## (undated) RX ORDER — HEPARIN SODIUM 1000 [USP'U]/ML
INJECTION, SOLUTION INTRAVENOUS; SUBCUTANEOUS
Status: DISPENSED
Start: 2024-07-16

## (undated) RX ORDER — FENTANYL CITRATE 50 UG/ML
INJECTION, SOLUTION INTRAMUSCULAR; INTRAVENOUS
Status: DISPENSED
Start: 2023-06-30

## (undated) RX ORDER — ACETAMINOPHEN 325 MG/1
TABLET ORAL
Status: DISPENSED
Start: 2023-08-28

## (undated) RX ORDER — HEPARIN SODIUM 1000 [USP'U]/ML
INJECTION, SOLUTION INTRAVENOUS; SUBCUTANEOUS
Status: DISPENSED
Start: 2023-08-28

## (undated) RX ORDER — PROTAMINE SULFATE 10 MG/ML
INJECTION, SOLUTION INTRAVENOUS
Status: DISPENSED
Start: 2023-08-28

## (undated) RX ORDER — CEFAZOLIN SODIUM/WATER 2 G/20 ML
SYRINGE (ML) INTRAVENOUS
Status: DISPENSED
Start: 2023-08-28

## (undated) RX ORDER — FENTANYL CITRATE 50 UG/ML
INJECTION, SOLUTION INTRAMUSCULAR; INTRAVENOUS
Status: DISPENSED
Start: 2022-03-29

## (undated) RX ORDER — METOPROLOL TARTRATE 25 MG/1
TABLET, FILM COATED ORAL
Status: DISPENSED
Start: 2024-07-16

## (undated) RX ORDER — CALCIUM CHLORIDE 100 MG/ML
INJECTION INTRAVENOUS; INTRAVENTRICULAR
Status: DISPENSED
Start: 2024-07-16

## (undated) RX ORDER — HYDROMORPHONE HYDROCHLORIDE 1 MG/ML
INJECTION, SOLUTION INTRAMUSCULAR; INTRAVENOUS; SUBCUTANEOUS
Status: DISPENSED
Start: 2024-07-16

## (undated) RX ORDER — HEPARIN SODIUM 1000 [USP'U]/ML
INJECTION, SOLUTION INTRAVENOUS; SUBCUTANEOUS
Status: DISPENSED
Start: 2023-06-30

## (undated) RX ORDER — SODIUM CHLORIDE 9 MG/ML
INJECTION, SOLUTION INTRAVENOUS
Status: DISPENSED
Start: 2022-03-29

## (undated) RX ORDER — SODIUM CHLORIDE 9 MG/ML
INJECTION, SOLUTION INTRAVENOUS
Status: DISPENSED
Start: 2023-06-30

## (undated) RX ORDER — FENTANYL CITRATE 50 UG/ML
INJECTION, SOLUTION INTRAMUSCULAR; INTRAVENOUS
Status: DISPENSED
Start: 2023-05-18

## (undated) RX ORDER — HYDROMORPHONE HCL IN WATER/PF 6 MG/30 ML
PATIENT CONTROLLED ANALGESIA SYRINGE INTRAVENOUS
Status: DISPENSED
Start: 2023-08-28

## (undated) RX ORDER — CEFAZOLIN SODIUM 1 G/3ML
INJECTION, POWDER, FOR SOLUTION INTRAMUSCULAR; INTRAVENOUS
Status: DISPENSED
Start: 2024-07-16

## (undated) RX ORDER — ACETAMINOPHEN 325 MG/1
TABLET ORAL
Status: DISPENSED
Start: 2022-03-29

## (undated) RX ORDER — ASPIRIN 325 MG
TABLET ORAL
Status: DISPENSED
Start: 2023-06-30

## (undated) RX ORDER — GABAPENTIN 300 MG/1
CAPSULE ORAL
Status: DISPENSED
Start: 2024-07-16

## (undated) RX ORDER — LIDOCAINE HYDROCHLORIDE 10 MG/ML
INJECTION, SOLUTION EPIDURAL; INFILTRATION; INTRACAUDAL; PERINEURAL
Status: DISPENSED
Start: 2022-03-29

## (undated) RX ORDER — LIDOCAINE HYDROCHLORIDE 20 MG/ML
SOLUTION OROPHARYNGEAL
Status: DISPENSED
Start: 2023-05-18

## (undated) RX ORDER — FAMOTIDINE 20 MG/1
TABLET, FILM COATED ORAL
Status: DISPENSED
Start: 2024-07-16

## (undated) RX ORDER — DEXAMETHASONE SODIUM PHOSPHATE 4 MG/ML
INJECTION, SOLUTION INTRA-ARTICULAR; INTRALESIONAL; INTRAMUSCULAR; INTRAVENOUS; SOFT TISSUE
Status: DISPENSED
Start: 2024-07-16

## (undated) RX ORDER — BUPIVACAINE HYDROCHLORIDE 5 MG/ML
INJECTION, SOLUTION EPIDURAL; INTRACAUDAL
Status: DISPENSED
Start: 2022-03-29

## (undated) RX ORDER — LIDOCAINE HYDROCHLORIDE 10 MG/ML
INJECTION, SOLUTION EPIDURAL; INFILTRATION; INTRACAUDAL; PERINEURAL
Status: DISPENSED
Start: 2024-07-16

## (undated) RX ORDER — NITROGLYCERIN 5 MG/ML
VIAL (ML) INTRAVENOUS
Status: DISPENSED
Start: 2023-06-30

## (undated) RX ORDER — ASPIRIN 325 MG
TABLET ORAL
Status: DISPENSED
Start: 2023-08-28